# Patient Record
Sex: FEMALE | Race: WHITE | Employment: OTHER | URBAN - METROPOLITAN AREA
[De-identification: names, ages, dates, MRNs, and addresses within clinical notes are randomized per-mention and may not be internally consistent; named-entity substitution may affect disease eponyms.]

---

## 2018-02-21 PROBLEM — G30.9 ALZHEIMER'S DISEASE (HCC): Status: ACTIVE | Noted: 2018-02-21

## 2018-02-21 PROBLEM — F02.80 ALZHEIMER'S DISEASE (HCC): Status: ACTIVE | Noted: 2018-02-21

## 2018-02-21 PROBLEM — E53.8 VITAMIN B 12 DEFICIENCY: Status: ACTIVE | Noted: 2018-02-21

## 2018-03-21 ENCOUNTER — OFFICE VISIT (OUTPATIENT)
Dept: PRIMARY CARE CLINIC | Age: 83
End: 2018-03-21
Payer: MEDICARE

## 2018-03-21 VITALS
SYSTOLIC BLOOD PRESSURE: 134 MMHG | BODY MASS INDEX: 20.25 KG/M2 | HEART RATE: 64 BPM | WEIGHT: 126 LBS | TEMPERATURE: 98.4 F | DIASTOLIC BLOOD PRESSURE: 82 MMHG | HEIGHT: 66 IN

## 2018-03-21 DIAGNOSIS — M19.90 ARTHRITIS: ICD-10-CM

## 2018-03-21 DIAGNOSIS — K21.9 GASTROESOPHAGEAL REFLUX DISEASE WITHOUT ESOPHAGITIS: Primary | ICD-10-CM

## 2018-03-21 DIAGNOSIS — F02.80 ALZHEIMER'S DEMENTIA WITHOUT BEHAVIORAL DISTURBANCE, UNSPECIFIED TIMING OF DEMENTIA ONSET: ICD-10-CM

## 2018-03-21 DIAGNOSIS — G30.9 ALZHEIMER'S DEMENTIA WITHOUT BEHAVIORAL DISTURBANCE, UNSPECIFIED TIMING OF DEMENTIA ONSET: ICD-10-CM

## 2018-03-21 DIAGNOSIS — E53.8 VITAMIN B 12 DEFICIENCY: ICD-10-CM

## 2018-03-21 PROCEDURE — 99213 OFFICE O/P EST LOW 20 MIN: CPT | Performed by: INTERNAL MEDICINE

## 2018-03-21 PROCEDURE — 96372 THER/PROPH/DIAG INJ SC/IM: CPT | Performed by: INTERNAL MEDICINE

## 2018-03-21 RX ORDER — CYANOCOBALAMIN 1000 UG/ML
1000 INJECTION INTRAMUSCULAR; SUBCUTANEOUS ONCE
Status: COMPLETED | OUTPATIENT
Start: 2018-03-21 | End: 2018-03-21

## 2018-03-21 RX ORDER — CITALOPRAM 10 MG/1
10 TABLET ORAL EVERY MORNING
Qty: 90 TABLET | Refills: 0 | Status: SHIPPED | OUTPATIENT
Start: 2018-03-21 | End: 2018-05-30 | Stop reason: SDUPTHER

## 2018-03-21 RX ORDER — PROCHLORPERAZINE MALEATE 5 MG/1
5 TABLET ORAL EVERY 6 HOURS PRN
Qty: 60 TABLET | Refills: 1 | Status: SHIPPED | OUTPATIENT
Start: 2018-03-21 | End: 2018-10-03

## 2018-03-21 RX ORDER — PROCHLORPERAZINE MALEATE 5 MG/1
TABLET ORAL
Refills: 1 | COMMUNITY
Start: 2017-12-20 | End: 2018-03-21 | Stop reason: SDUPTHER

## 2018-03-21 RX ORDER — DONEPEZIL HYDROCHLORIDE 10 MG/1
10 TABLET, FILM COATED ORAL NIGHTLY
Qty: 90 TABLET | Refills: 0 | Status: SHIPPED | OUTPATIENT
Start: 2018-03-21 | End: 2018-05-30 | Stop reason: SDUPTHER

## 2018-03-21 RX ADMIN — CYANOCOBALAMIN 1000 MCG: 1000 INJECTION INTRAMUSCULAR; SUBCUTANEOUS at 15:39

## 2018-03-21 ASSESSMENT — ENCOUNTER SYMPTOMS
SHORTNESS OF BREATH: 0
HEMOPTYSIS: 0
EYE DISCHARGE: 0
ORTHOPNEA: 0
BLURRED VISION: 0
NAUSEA: 0
BLOOD IN STOOL: 0
ABDOMINAL PAIN: 0

## 2018-03-21 NOTE — PROGRESS NOTES
Chief Complaint   Patient presents with    Atrial Fibrillation    Other     b12 deficiency    Osteoporosis       HPI:  Patient is here for follow-up. Feel okay      Allergy and Medications are reviewed and updated. Past Medical History, Surgical History, and Family History has been reviewed and updated. Review of Systems:  Review of Systems   Constitutional: Negative for chills and fever. HENT: Negative for congestion and ear pain. Eyes: Negative for blurred vision and discharge. Respiratory: Negative for hemoptysis and shortness of breath (No new SOB). Cardiovascular: Negative for chest pain, orthopnea and leg swelling. Gastrointestinal: Negative for abdominal pain, blood in stool and nausea. Genitourinary: Negative for flank pain and hematuria. Musculoskeletal: Negative for myalgias and neck pain. Skin: Negative for rash. Neurological: Negative for dizziness, focal weakness and seizures. Endo/Heme/Allergies: Negative for polydipsia. Does not bruise/bleed easily. Psychiatric/Behavioral: Negative for depression, hallucinations and suicidal ideas. Vitals:    03/21/18 1442   BP: 134/82   Pulse: 64   Temp: 98.4 °F (36.9 °C)   TempSrc: Oral   Weight: 126 lb (57.2 kg)   Height: 5' 6\" (1.676 m)       Physical Exam   Constitutional: She is oriented to person, place, and time. She appears well-developed and well-nourished. HENT:   Head: Normocephalic and atraumatic. Eyes: EOM are normal. Pupils are equal, round, and reactive to light. Neck: Normal range of motion. Neck supple. Cardiovascular: Normal rate and regular rhythm. Pulmonary/Chest: Effort normal and breath sounds normal.   Abdominal: Soft. Bowel sounds are normal.   Musculoskeletal: Normal range of motion. She exhibits no edema. Neurological: She is alert and oriented to person, place, and time. Skin: Skin is warm and dry.        Labs :    Lab Results   Component Value Date    WBC 5.8 02/12/2018    HGB 13.9

## 2018-03-21 NOTE — PATIENT INSTRUCTIONS
B12 Injection is given today  Low salt, Low Carb diet an low fat diet  Continue medications as advised and take them regularly  Regular exercises as advised    Discussed natural and expected course of this diagnosis and need to alert me if symptoms do not follow expected course, or if any worse.

## 2018-05-30 ENCOUNTER — OFFICE VISIT (OUTPATIENT)
Dept: PRIMARY CARE CLINIC | Age: 83
End: 2018-05-30
Payer: MEDICARE

## 2018-05-30 VITALS
OXYGEN SATURATION: 96 % | HEIGHT: 66 IN | BODY MASS INDEX: 20.73 KG/M2 | WEIGHT: 129 LBS | TEMPERATURE: 98.6 F | SYSTOLIC BLOOD PRESSURE: 128 MMHG | HEART RATE: 59 BPM | DIASTOLIC BLOOD PRESSURE: 82 MMHG

## 2018-05-30 DIAGNOSIS — F02.80 ALZHEIMER'S DEMENTIA WITHOUT BEHAVIORAL DISTURBANCE, UNSPECIFIED TIMING OF DEMENTIA ONSET: Primary | ICD-10-CM

## 2018-05-30 DIAGNOSIS — G30.9 ALZHEIMER'S DEMENTIA WITHOUT BEHAVIORAL DISTURBANCE, UNSPECIFIED TIMING OF DEMENTIA ONSET: Primary | ICD-10-CM

## 2018-05-30 DIAGNOSIS — Z12.39 BREAST CANCER SCREENING: ICD-10-CM

## 2018-05-30 DIAGNOSIS — M19.90 ARTHRITIS: ICD-10-CM

## 2018-05-30 DIAGNOSIS — K21.9 GASTROESOPHAGEAL REFLUX DISEASE WITHOUT ESOPHAGITIS: ICD-10-CM

## 2018-05-30 DIAGNOSIS — E53.8 VITAMIN B 12 DEFICIENCY: ICD-10-CM

## 2018-05-30 PROCEDURE — 99213 OFFICE O/P EST LOW 20 MIN: CPT | Performed by: INTERNAL MEDICINE

## 2018-05-30 PROCEDURE — 96372 THER/PROPH/DIAG INJ SC/IM: CPT | Performed by: INTERNAL MEDICINE

## 2018-05-30 RX ORDER — MEMANTINE HYDROCHLORIDE 10 MG/1
10 TABLET ORAL 2 TIMES DAILY
Qty: 180 TABLET | Refills: 1 | Status: SHIPPED | OUTPATIENT
Start: 2018-05-30 | End: 2018-10-03 | Stop reason: SDUPTHER

## 2018-05-30 RX ORDER — CITALOPRAM 10 MG/1
10 TABLET ORAL EVERY MORNING
Qty: 90 TABLET | Refills: 1 | Status: SHIPPED | OUTPATIENT
Start: 2018-05-30 | End: 2018-10-03 | Stop reason: SDUPTHER

## 2018-05-30 RX ORDER — DONEPEZIL HYDROCHLORIDE 10 MG/1
10 TABLET, FILM COATED ORAL NIGHTLY
Qty: 90 TABLET | Refills: 1 | Status: SHIPPED | OUTPATIENT
Start: 2018-05-30 | End: 2018-10-03 | Stop reason: SDUPTHER

## 2018-05-30 RX ORDER — CALCITONIN SALMON 200 [IU]/.09ML
1 SPRAY, METERED NASAL DAILY
Qty: 3 BOTTLE | Refills: 1 | Status: CANCELLED | OUTPATIENT
Start: 2018-05-30

## 2018-05-30 RX ORDER — CYANOCOBALAMIN 1000 UG/ML
1000 INJECTION INTRAMUSCULAR; SUBCUTANEOUS ONCE
Status: COMPLETED | OUTPATIENT
Start: 2018-05-30 | End: 2018-05-30

## 2018-05-30 RX ADMIN — CYANOCOBALAMIN 1000 MCG: 1000 INJECTION INTRAMUSCULAR; SUBCUTANEOUS at 15:18

## 2018-05-30 ASSESSMENT — ENCOUNTER SYMPTOMS
BLOOD IN STOOL: 0
NAUSEA: 0
ABDOMINAL PAIN: 0
HEMOPTYSIS: 0
EYE DISCHARGE: 0
SHORTNESS OF BREATH: 0
BLURRED VISION: 0
ORTHOPNEA: 0

## 2018-06-05 ENCOUNTER — HOSPITAL ENCOUNTER (OUTPATIENT)
Dept: MAMMOGRAPHY | Age: 83
Discharge: HOME | End: 2018-06-07
Payer: MEDICARE

## 2018-06-05 DIAGNOSIS — Z12.39 BREAST CANCER SCREENING: ICD-10-CM

## 2018-06-05 PROCEDURE — 77063 BREAST TOMOSYNTHESIS BI: CPT

## 2018-06-27 ENCOUNTER — NURSE ONLY (OUTPATIENT)
Dept: PRIMARY CARE CLINIC | Age: 83
End: 2018-06-27
Payer: MEDICARE

## 2018-06-27 DIAGNOSIS — E53.8 VITAMIN B 12 DEFICIENCY: Primary | ICD-10-CM

## 2018-06-27 PROCEDURE — 96372 THER/PROPH/DIAG INJ SC/IM: CPT | Performed by: INTERNAL MEDICINE

## 2018-06-27 RX ORDER — CYANOCOBALAMIN 1000 UG/ML
1000 INJECTION INTRAMUSCULAR; SUBCUTANEOUS ONCE
Status: COMPLETED | OUTPATIENT
Start: 2018-06-27 | End: 2018-06-27

## 2018-06-27 RX ADMIN — CYANOCOBALAMIN 1000 MCG: 1000 INJECTION INTRAMUSCULAR; SUBCUTANEOUS at 11:17

## 2018-08-01 ENCOUNTER — HOSPITAL ENCOUNTER (OUTPATIENT)
Age: 83
Discharge: HOME | End: 2018-08-03
Payer: MEDICARE

## 2018-08-01 ENCOUNTER — OFFICE VISIT (OUTPATIENT)
Dept: PRIMARY CARE CLINIC | Age: 83
End: 2018-08-01
Payer: MEDICARE

## 2018-08-01 VITALS
WEIGHT: 130 LBS | DIASTOLIC BLOOD PRESSURE: 78 MMHG | HEIGHT: 66 IN | SYSTOLIC BLOOD PRESSURE: 136 MMHG | OXYGEN SATURATION: 97 % | HEART RATE: 56 BPM | BODY MASS INDEX: 20.89 KG/M2

## 2018-08-01 DIAGNOSIS — G30.9 ALZHEIMER'S DEMENTIA WITHOUT BEHAVIORAL DISTURBANCE, UNSPECIFIED TIMING OF DEMENTIA ONSET: ICD-10-CM

## 2018-08-01 DIAGNOSIS — K21.9 GASTROESOPHAGEAL REFLUX DISEASE WITHOUT ESOPHAGITIS: ICD-10-CM

## 2018-08-01 DIAGNOSIS — E53.8 VITAMIN B 12 DEFICIENCY: Primary | ICD-10-CM

## 2018-08-01 DIAGNOSIS — F02.80 ALZHEIMER'S DEMENTIA WITHOUT BEHAVIORAL DISTURBANCE, UNSPECIFIED TIMING OF DEMENTIA ONSET: ICD-10-CM

## 2018-08-01 DIAGNOSIS — M19.90 ARTHRITIS: ICD-10-CM

## 2018-08-01 LAB
ALBUMIN SERPL-MCNC: 3.9 G/DL (ref 3.5–5.2)
ALP BLD-CCNC: 99 U/L (ref 35–104)
ALT SERPL-CCNC: 9 U/L (ref 0–32)
ANION GAP SERPL CALCULATED.3IONS-SCNC: 12 MMOL/L (ref 7–16)
AST SERPL-CCNC: 21 U/L (ref 0–31)
BASOPHILS ABSOLUTE: 0.02 E9/L (ref 0–0.2)
BASOPHILS RELATIVE PERCENT: 0.3 % (ref 0–2)
BILIRUB SERPL-MCNC: 0.3 MG/DL (ref 0–1.2)
BUN BLDV-MCNC: 13 MG/DL (ref 8–23)
CALCIUM SERPL-MCNC: 9.2 MG/DL (ref 8.6–10.2)
CHLORIDE BLD-SCNC: 106 MMOL/L (ref 98–107)
CO2: 27 MMOL/L (ref 22–29)
CREAT SERPL-MCNC: 0.9 MG/DL (ref 0.5–1)
EOSINOPHILS ABSOLUTE: 0.06 E9/L (ref 0.05–0.5)
EOSINOPHILS RELATIVE PERCENT: 1 % (ref 0–6)
GFR AFRICAN AMERICAN: >60
GFR NON-AFRICAN AMERICAN: 59 ML/MIN/1.73
GLUCOSE BLD-MCNC: 90 MG/DL (ref 74–109)
HCT VFR BLD CALC: 39.4 % (ref 34–48)
HEMOGLOBIN: 12.5 G/DL (ref 11.5–15.5)
IMMATURE GRANULOCYTES #: 0.02 E9/L
IMMATURE GRANULOCYTES %: 0.3 % (ref 0–5)
LYMPHOCYTES ABSOLUTE: 2.32 E9/L (ref 1.5–4)
LYMPHOCYTES RELATIVE PERCENT: 39.1 % (ref 20–42)
MCH RBC QN AUTO: 31.6 PG (ref 26–35)
MCHC RBC AUTO-ENTMCNC: 31.7 % (ref 32–34.5)
MCV RBC AUTO: 99.7 FL (ref 80–99.9)
MONOCYTES ABSOLUTE: 0.64 E9/L (ref 0.1–0.95)
MONOCYTES RELATIVE PERCENT: 10.8 % (ref 2–12)
NEUTROPHILS ABSOLUTE: 2.87 E9/L (ref 1.8–7.3)
NEUTROPHILS RELATIVE PERCENT: 48.5 % (ref 43–80)
PDW BLD-RTO: 15 FL (ref 11.5–15)
PLATELET # BLD: 317 E9/L (ref 130–450)
PMV BLD AUTO: 10.2 FL (ref 7–12)
POTASSIUM SERPL-SCNC: 4.1 MMOL/L (ref 3.5–5)
RBC # BLD: 3.95 E12/L (ref 3.5–5.5)
SODIUM BLD-SCNC: 145 MMOL/L (ref 132–146)
TOTAL PROTEIN: 6.9 G/DL (ref 6.4–8.3)
WBC # BLD: 5.9 E9/L (ref 4.5–11.5)

## 2018-08-01 PROCEDURE — G8427 DOCREV CUR MEDS BY ELIG CLIN: HCPCS | Performed by: INTERNAL MEDICINE

## 2018-08-01 PROCEDURE — 80053 COMPREHEN METABOLIC PANEL: CPT

## 2018-08-01 PROCEDURE — 36415 COLL VENOUS BLD VENIPUNCTURE: CPT

## 2018-08-01 PROCEDURE — 99213 OFFICE O/P EST LOW 20 MIN: CPT | Performed by: INTERNAL MEDICINE

## 2018-08-01 PROCEDURE — 85025 COMPLETE CBC W/AUTO DIFF WBC: CPT

## 2018-08-01 PROCEDURE — 1101F PT FALLS ASSESS-DOCD LE1/YR: CPT | Performed by: INTERNAL MEDICINE

## 2018-08-01 PROCEDURE — 1123F ACP DISCUSS/DSCN MKR DOCD: CPT | Performed by: INTERNAL MEDICINE

## 2018-08-01 PROCEDURE — 96372 THER/PROPH/DIAG INJ SC/IM: CPT | Performed by: INTERNAL MEDICINE

## 2018-08-01 PROCEDURE — G8420 CALC BMI NORM PARAMETERS: HCPCS | Performed by: INTERNAL MEDICINE

## 2018-08-01 PROCEDURE — 1090F PRES/ABSN URINE INCON ASSESS: CPT | Performed by: INTERNAL MEDICINE

## 2018-08-01 PROCEDURE — 1036F TOBACCO NON-USER: CPT | Performed by: INTERNAL MEDICINE

## 2018-08-01 PROCEDURE — 4040F PNEUMOC VAC/ADMIN/RCVD: CPT | Performed by: INTERNAL MEDICINE

## 2018-08-01 PROCEDURE — G8399 PT W/DXA RESULTS DOCUMENT: HCPCS | Performed by: INTERNAL MEDICINE

## 2018-08-01 RX ORDER — CYANOCOBALAMIN 1000 UG/ML
1000 INJECTION INTRAMUSCULAR; SUBCUTANEOUS ONCE
Status: COMPLETED | OUTPATIENT
Start: 2018-08-01 | End: 2018-08-01

## 2018-08-01 RX ADMIN — CYANOCOBALAMIN 1000 MCG: 1000 INJECTION INTRAMUSCULAR; SUBCUTANEOUS at 15:00

## 2018-08-01 ASSESSMENT — ENCOUNTER SYMPTOMS
BLURRED VISION: 0
NAUSEA: 0
ABDOMINAL PAIN: 0
SHORTNESS OF BREATH: 0
EYE DISCHARGE: 0
HEMOPTYSIS: 0
ORTHOPNEA: 0
BLOOD IN STOOL: 0

## 2018-08-06 ENCOUNTER — OFFICE VISIT (OUTPATIENT)
Dept: ORTHOPEDIC SURGERY | Age: 83
End: 2018-08-06
Payer: MEDICARE

## 2018-08-06 VITALS — BODY MASS INDEX: 20.83 KG/M2 | HEIGHT: 65 IN | TEMPERATURE: 98 F | WEIGHT: 125 LBS

## 2018-08-06 DIAGNOSIS — M17.0 PRIMARY OSTEOARTHRITIS OF BOTH KNEES: Primary | ICD-10-CM

## 2018-08-06 PROCEDURE — 1101F PT FALLS ASSESS-DOCD LE1/YR: CPT | Performed by: ORTHOPAEDIC SURGERY

## 2018-08-06 PROCEDURE — 99204 OFFICE O/P NEW MOD 45 MIN: CPT | Performed by: ORTHOPAEDIC SURGERY

## 2018-08-06 PROCEDURE — 4040F PNEUMOC VAC/ADMIN/RCVD: CPT | Performed by: ORTHOPAEDIC SURGERY

## 2018-08-06 PROCEDURE — 1123F ACP DISCUSS/DSCN MKR DOCD: CPT | Performed by: ORTHOPAEDIC SURGERY

## 2018-08-06 PROCEDURE — 1090F PRES/ABSN URINE INCON ASSESS: CPT | Performed by: ORTHOPAEDIC SURGERY

## 2018-08-06 PROCEDURE — G8399 PT W/DXA RESULTS DOCUMENT: HCPCS | Performed by: ORTHOPAEDIC SURGERY

## 2018-08-06 PROCEDURE — 20610 DRAIN/INJ JOINT/BURSA W/O US: CPT | Performed by: ORTHOPAEDIC SURGERY

## 2018-08-06 PROCEDURE — 1036F TOBACCO NON-USER: CPT | Performed by: ORTHOPAEDIC SURGERY

## 2018-08-06 PROCEDURE — G8427 DOCREV CUR MEDS BY ELIG CLIN: HCPCS | Performed by: ORTHOPAEDIC SURGERY

## 2018-08-06 PROCEDURE — G8420 CALC BMI NORM PARAMETERS: HCPCS | Performed by: ORTHOPAEDIC SURGERY

## 2018-08-06 RX ORDER — TRIAMCINOLONE ACETONIDE 40 MG/ML
40 INJECTION, SUSPENSION INTRA-ARTICULAR; INTRAMUSCULAR ONCE
Status: COMPLETED | OUTPATIENT
Start: 2018-08-06 | End: 2018-08-06

## 2018-08-06 RX ADMIN — TRIAMCINOLONE ACETONIDE 40 MG: 40 INJECTION, SUSPENSION INTRA-ARTICULAR; INTRAMUSCULAR at 15:16

## 2018-08-06 RX ADMIN — TRIAMCINOLONE ACETONIDE 40 MG: 40 INJECTION, SUSPENSION INTRA-ARTICULAR; INTRAMUSCULAR at 15:15

## 2018-08-06 NOTE — PROGRESS NOTES
Disp: , Rfl:     therapeutic multivitamin-minerals (THERAGRAN-M) tablet, Take 1 tablet by mouth daily. , Disp: , Rfl:     Omega-3 Fatty Acids (FISH OIL) 1000 MG CAPS, Take 1,000 mg by mouth 2 times daily. , Disp: , Rfl:     Cinnamon 500 MG TABS, Take 1 tablet by mouth 2 times daily. , Disp: , Rfl:     Glucosamine 500 MG TABS, Take 3 tablets by mouth every morning , Disp: , Rfl:     potassium chloride 10 MEQ/100ML, Infuse 10 mEq intravenously 2 times daily. , Disp: , Rfl:     Flaxseed, Linseed, (FLAX SEED OIL) 1000 MG CAPS, Take 1,000 mg by mouth 2 times daily. , Disp: , Rfl:     calcium carbonate (OSCAL) 500 MG TABS tablet, Take 500 mg by mouth 2 times daily. , Disp: , Rfl:     Garlic 10 MG CAPS, Take 1 tablet by mouth 2 times daily. , Disp: , Rfl:   Allergies   Allergen Reactions    Iodine      Social History     Social History    Marital status:      Spouse name: N/A    Number of children: 3    Years of education: N/A     Occupational History    Not on file. Social History Main Topics    Smoking status: Never Smoker    Smokeless tobacco: Never Used    Alcohol use No    Drug use: No    Sexual activity: Not on file     Other Topics Concern    Not on file     Social History Narrative    No narrative on file     Family History   Problem Relation Age of Onset    Heart Disease Mother     Heart Disease Father          REVIEW OF SYSTEMS:     General/Constitution:  (-)weight loss, (-)fever, (-)chills, (-)weakness. Skin: (-) rash,(-) psoriasis,(-) eczema, (-)skin cancer. Musculoskeletal: (-) fractures,  (-) dislocations,(-) collagen vascular disease, (-) fibromyalgia, (-) multiple sclerosis, (-) muscular dystrophy, (-) RSD,(-) joint pain (-)swelling, (-) joint pain,swelling. Neurologic: (-) epilepsy, (-)seizures,(-) brain tumor,(-) TIA, (-)stroke, (-)headaches, (-)Parkinson disease,(-) memory loss, (-) LOC.   Cardiovascular: (-) Chest pain, (-) swelling in legs/feet, (-) SOB, (-) cramping in legs/feet with walking. Respiratory: (-) SOB, (-) Coughing, (-) night sweats. GI: (-) nausea, (-) vomiting, (-) diarrhea, (-) blood in stool, (-) gastric ulcer. Psychiatric: (-) Depression, (-) Anxiety, (-) bipolar disease, (-) Alzheimer's Disease  Allergic/Immunologic: (-) allergies latex, (-) allergies metal, (-) skin sensitivity. Hematlogic: (-) anemia, (-) blood transfusion, (-) DVT/PE, (-) Clotting disorders      Subjective:    Constitution:  Ht. 5 ft 6 in. , Wt. 125 lbs. Psycihatric:  The patient is alert and oriented x 3, appears to be stated age and in no distress. Respiratory:  Respiratory effort is not labored. Patient is not gasping. Palpation of the chest reveals no tactile fremitus. Skin:  Upon inspection: the skin appears warm, dry and intact. There is  a previous scar over the affected area. There is any cellulitis, lymphedema or cutaneous lesions noted in the lower extremities. Upon palpation there is no induration noted. Neurologic:  Gait: antalgic; Motor exam of the lower extremities show ; quadriceps, hamstrings, foot dorsi and plantar flexors intact R.  5/5 and L. 5/5. Deep tendon reflexes are 2/4 at the knees and 2/4 at the ankles with strong extensor hallicus longus motor strength bilaterally. Sensory to both feet is intact to all sensory roots. Cardiovascular: The vascular exam is normal and is well perfused to distal extremities. Distal pulses DP/PT: R. 2+; L. 2+. There is cap refill noted less than two seconds in all digits. There is not edema of the bilateral lower extremities. There is not varicosities noted in the distal extremities. Lymph:  Upon palpation,  there is no lymphadenopathy noted in bilateral lower extremities. Musculoskeletal:  Gait: antalgic; examination of the nails and digits reveal no cyanosis or clubbing. Lumbar exam:  On visual inspection, there is not deformity of the spine.    full range of motion, no tenderness, palpable spasm or pain on motion. Special tests: Straight Leg Raise negative, Tahira test negative. Hip exam:   Upon inspection, there is not deformity noted. Upon palpation there is not tenderness. ROM: is  full and symmetrical.   Strength: Hip Flexors 5/5; Hip Abductors 5/5; Hip Adduction 5/5. Knee exam:  Bilateral knee exam shows;  range of motion of R. Knee is 0 to 125, and L. Knee is 5 to 115. The patient does have  pain on motion, effusion is mild, there is tenderness over the  lateral, suprapatellar region, there are not any masses, there is not ligamentous instability, there is valgus  deformity noted. Knee exam: bilateral positive for moderate crepitations, some mild tenderness laxity is noted with  stress. There is not a popliteal cyst.    R. Knee:  Lachman's negative, Anterior Drawer negative, Posterior Drawer negative  Ana Maria's positive, Thallasy  positive,   PF grind test positive, Apprehension test negative, Patellar J sign  negative  L. Knee:  Lachman's negative, Anterior Drawer negative, Posterior Drawer negative  Ana Maria's positive, Thallasy  positive,   PF grind test positive, Apprehension test negative,  Patellar J sign  negative    Xray Exam:  B/l knee djd lateral compartment moderate - severe  Radiographic findings reviewed with patient    Assessment:  Encounter Diagnoses   Name Primary?  Primary osteoarthritis of both knees Yes       Plan:  Natural history and expected course discussed. Questions answered. Educational materials distributed. Rest, ice, compression, and elevation (RICE) therapy. Reduction in offending activity. Patellar compression sleeve. I will proceed with a cortisone injection in the Bilateral knees. Verbal and written consent was obtained for the injections. Skin was prepped with alcohol and betadine. 1 ml of Kenalog 40mg and 9 ml of 0.25% Marcaine was  injected to Bilateral knees. The injections were given through the lateral side of the knees.   The patient tolerated the injections well.  I will see the patient back prn

## 2018-09-24 ENCOUNTER — OFFICE VISIT (OUTPATIENT)
Dept: PRIMARY CARE CLINIC | Age: 83
End: 2018-09-24
Payer: MEDICARE

## 2018-09-24 VITALS
HEIGHT: 65 IN | WEIGHT: 125 LBS | HEART RATE: 72 BPM | TEMPERATURE: 97.6 F | DIASTOLIC BLOOD PRESSURE: 86 MMHG | OXYGEN SATURATION: 98 % | BODY MASS INDEX: 20.83 KG/M2 | SYSTOLIC BLOOD PRESSURE: 134 MMHG

## 2018-09-24 DIAGNOSIS — R93.7 ABNORMAL X-RAY OF PELVIS: Primary | ICD-10-CM

## 2018-09-24 DIAGNOSIS — R10.32 LEFT GROIN PAIN: Primary | ICD-10-CM

## 2018-09-24 PROCEDURE — 4040F PNEUMOC VAC/ADMIN/RCVD: CPT | Performed by: INTERNAL MEDICINE

## 2018-09-24 PROCEDURE — 1090F PRES/ABSN URINE INCON ASSESS: CPT | Performed by: INTERNAL MEDICINE

## 2018-09-24 PROCEDURE — 1101F PT FALLS ASSESS-DOCD LE1/YR: CPT | Performed by: INTERNAL MEDICINE

## 2018-09-24 PROCEDURE — 99213 OFFICE O/P EST LOW 20 MIN: CPT | Performed by: INTERNAL MEDICINE

## 2018-09-24 PROCEDURE — 1036F TOBACCO NON-USER: CPT | Performed by: INTERNAL MEDICINE

## 2018-09-24 PROCEDURE — G8420 CALC BMI NORM PARAMETERS: HCPCS | Performed by: INTERNAL MEDICINE

## 2018-09-24 PROCEDURE — 1123F ACP DISCUSS/DSCN MKR DOCD: CPT | Performed by: INTERNAL MEDICINE

## 2018-09-24 PROCEDURE — G8427 DOCREV CUR MEDS BY ELIG CLIN: HCPCS | Performed by: INTERNAL MEDICINE

## 2018-09-24 RX ORDER — MELOXICAM 15 MG/1
15 TABLET ORAL DAILY
Qty: 30 TABLET | Refills: 0 | Status: SHIPPED | OUTPATIENT
Start: 2018-09-24 | End: 2018-10-03

## 2018-10-01 ENCOUNTER — OFFICE VISIT (OUTPATIENT)
Dept: ORTHOPEDIC SURGERY | Age: 83
End: 2018-10-01
Payer: MEDICARE

## 2018-10-01 VITALS — WEIGHT: 122 LBS | TEMPERATURE: 98 F | BODY MASS INDEX: 19.61 KG/M2 | HEIGHT: 66 IN

## 2018-10-01 DIAGNOSIS — S32.512A CLOSED FRACTURE OF LEFT SUPERIOR PUBIC RAMUS, INITIAL ENCOUNTER: Primary | ICD-10-CM

## 2018-10-01 PROCEDURE — 1036F TOBACCO NON-USER: CPT | Performed by: ORTHOPAEDIC SURGERY

## 2018-10-01 PROCEDURE — G8427 DOCREV CUR MEDS BY ELIG CLIN: HCPCS | Performed by: ORTHOPAEDIC SURGERY

## 2018-10-01 PROCEDURE — 4040F PNEUMOC VAC/ADMIN/RCVD: CPT | Performed by: ORTHOPAEDIC SURGERY

## 2018-10-01 PROCEDURE — 1101F PT FALLS ASSESS-DOCD LE1/YR: CPT | Performed by: ORTHOPAEDIC SURGERY

## 2018-10-01 PROCEDURE — G8420 CALC BMI NORM PARAMETERS: HCPCS | Performed by: ORTHOPAEDIC SURGERY

## 2018-10-01 PROCEDURE — G8484 FLU IMMUNIZE NO ADMIN: HCPCS | Performed by: ORTHOPAEDIC SURGERY

## 2018-10-01 PROCEDURE — 1123F ACP DISCUSS/DSCN MKR DOCD: CPT | Performed by: ORTHOPAEDIC SURGERY

## 2018-10-01 PROCEDURE — 1090F PRES/ABSN URINE INCON ASSESS: CPT | Performed by: ORTHOPAEDIC SURGERY

## 2018-10-01 PROCEDURE — 27197 CLSD TX PELVIC RING FX: CPT | Performed by: ORTHOPAEDIC SURGERY

## 2018-10-01 PROCEDURE — 99214 OFFICE O/P EST MOD 30 MIN: CPT | Performed by: ORTHOPAEDIC SURGERY

## 2018-10-03 ENCOUNTER — OFFICE VISIT (OUTPATIENT)
Dept: PRIMARY CARE CLINIC | Age: 83
End: 2018-10-03
Payer: MEDICARE

## 2018-10-03 VITALS
HEIGHT: 65 IN | WEIGHT: 128 LBS | DIASTOLIC BLOOD PRESSURE: 78 MMHG | HEART RATE: 68 BPM | SYSTOLIC BLOOD PRESSURE: 120 MMHG | OXYGEN SATURATION: 94 % | TEMPERATURE: 98.4 F | BODY MASS INDEX: 21.33 KG/M2

## 2018-10-03 DIAGNOSIS — G30.9 ALZHEIMER'S DEMENTIA WITHOUT BEHAVIORAL DISTURBANCE, UNSPECIFIED TIMING OF DEMENTIA ONSET: ICD-10-CM

## 2018-10-03 DIAGNOSIS — Z23 NEEDS FLU SHOT: ICD-10-CM

## 2018-10-03 DIAGNOSIS — M19.90 ARTHRITIS: ICD-10-CM

## 2018-10-03 DIAGNOSIS — F02.80 ALZHEIMER'S DEMENTIA WITHOUT BEHAVIORAL DISTURBANCE, UNSPECIFIED TIMING OF DEMENTIA ONSET: ICD-10-CM

## 2018-10-03 DIAGNOSIS — E53.8 VITAMIN B 12 DEFICIENCY: Primary | ICD-10-CM

## 2018-10-03 DIAGNOSIS — S32.512A CLOSED FRACTURE OF LEFT SUPERIOR PUBIC RAMUS, INITIAL ENCOUNTER: ICD-10-CM

## 2018-10-03 PROCEDURE — G8420 CALC BMI NORM PARAMETERS: HCPCS | Performed by: INTERNAL MEDICINE

## 2018-10-03 PROCEDURE — G8427 DOCREV CUR MEDS BY ELIG CLIN: HCPCS | Performed by: INTERNAL MEDICINE

## 2018-10-03 PROCEDURE — 1123F ACP DISCUSS/DSCN MKR DOCD: CPT | Performed by: INTERNAL MEDICINE

## 2018-10-03 PROCEDURE — 1036F TOBACCO NON-USER: CPT | Performed by: INTERNAL MEDICINE

## 2018-10-03 PROCEDURE — 96372 THER/PROPH/DIAG INJ SC/IM: CPT | Performed by: INTERNAL MEDICINE

## 2018-10-03 PROCEDURE — 1090F PRES/ABSN URINE INCON ASSESS: CPT | Performed by: INTERNAL MEDICINE

## 2018-10-03 PROCEDURE — 90662 IIV NO PRSV INCREASED AG IM: CPT | Performed by: INTERNAL MEDICINE

## 2018-10-03 PROCEDURE — 99213 OFFICE O/P EST LOW 20 MIN: CPT | Performed by: INTERNAL MEDICINE

## 2018-10-03 PROCEDURE — 1101F PT FALLS ASSESS-DOCD LE1/YR: CPT | Performed by: INTERNAL MEDICINE

## 2018-10-03 PROCEDURE — 4040F PNEUMOC VAC/ADMIN/RCVD: CPT | Performed by: INTERNAL MEDICINE

## 2018-10-03 PROCEDURE — G0008 ADMIN INFLUENZA VIRUS VAC: HCPCS | Performed by: INTERNAL MEDICINE

## 2018-10-03 PROCEDURE — G8482 FLU IMMUNIZE ORDER/ADMIN: HCPCS | Performed by: INTERNAL MEDICINE

## 2018-10-03 RX ORDER — CITALOPRAM 10 MG/1
10 TABLET ORAL EVERY MORNING
Qty: 90 TABLET | Refills: 1 | Status: SHIPPED | OUTPATIENT
Start: 2018-10-03 | End: 2019-04-18 | Stop reason: SDUPTHER

## 2018-10-03 RX ORDER — MEMANTINE HYDROCHLORIDE 10 MG/1
10 TABLET ORAL 2 TIMES DAILY
Qty: 180 TABLET | Refills: 1 | Status: SHIPPED | OUTPATIENT
Start: 2018-10-03 | End: 2019-04-18 | Stop reason: SDUPTHER

## 2018-10-03 RX ORDER — CYANOCOBALAMIN 1000 UG/ML
1000 INJECTION INTRAMUSCULAR; SUBCUTANEOUS ONCE
Status: COMPLETED | OUTPATIENT
Start: 2018-10-03 | End: 2018-10-03

## 2018-10-03 RX ORDER — DONEPEZIL HYDROCHLORIDE 10 MG/1
10 TABLET, FILM COATED ORAL NIGHTLY
Qty: 90 TABLET | Refills: 1 | Status: SHIPPED | OUTPATIENT
Start: 2018-10-03 | End: 2019-04-18 | Stop reason: SDUPTHER

## 2018-10-03 RX ADMIN — CYANOCOBALAMIN 1000 MCG: 1000 INJECTION INTRAMUSCULAR; SUBCUTANEOUS at 15:59

## 2018-10-03 ASSESSMENT — ENCOUNTER SYMPTOMS
EYE DISCHARGE: 0
ABDOMINAL PAIN: 0
BLURRED VISION: 0
NAUSEA: 0
BLOOD IN STOOL: 0
ORTHOPNEA: 0
SHORTNESS OF BREATH: 0
HEMOPTYSIS: 0

## 2018-10-18 ENCOUNTER — OFFICE VISIT (OUTPATIENT)
Dept: PRIMARY CARE CLINIC | Age: 83
End: 2018-10-18
Payer: MEDICARE

## 2018-10-18 VITALS
HEART RATE: 44 BPM | DIASTOLIC BLOOD PRESSURE: 84 MMHG | OXYGEN SATURATION: 95 % | SYSTOLIC BLOOD PRESSURE: 128 MMHG | HEIGHT: 66 IN | BODY MASS INDEX: 19.93 KG/M2 | WEIGHT: 124 LBS

## 2018-10-18 DIAGNOSIS — F02.80 ALZHEIMER'S DEMENTIA WITHOUT BEHAVIORAL DISTURBANCE, UNSPECIFIED TIMING OF DEMENTIA ONSET: ICD-10-CM

## 2018-10-18 DIAGNOSIS — M25.551 ACUTE HIP PAIN, RIGHT: Primary | ICD-10-CM

## 2018-10-18 DIAGNOSIS — G30.9 ALZHEIMER'S DEMENTIA WITHOUT BEHAVIORAL DISTURBANCE, UNSPECIFIED TIMING OF DEMENTIA ONSET: ICD-10-CM

## 2018-10-18 DIAGNOSIS — S32.512A CLOSED FRACTURE OF LEFT SUPERIOR PUBIC RAMUS, INITIAL ENCOUNTER: ICD-10-CM

## 2018-10-18 DIAGNOSIS — M19.90 ARTHRITIS: ICD-10-CM

## 2018-10-18 DIAGNOSIS — K21.9 GASTROESOPHAGEAL REFLUX DISEASE WITHOUT ESOPHAGITIS: ICD-10-CM

## 2018-10-18 PROCEDURE — G8427 DOCREV CUR MEDS BY ELIG CLIN: HCPCS | Performed by: INTERNAL MEDICINE

## 2018-10-18 PROCEDURE — 1090F PRES/ABSN URINE INCON ASSESS: CPT | Performed by: INTERNAL MEDICINE

## 2018-10-18 PROCEDURE — 1123F ACP DISCUSS/DSCN MKR DOCD: CPT | Performed by: INTERNAL MEDICINE

## 2018-10-18 PROCEDURE — 99213 OFFICE O/P EST LOW 20 MIN: CPT | Performed by: INTERNAL MEDICINE

## 2018-10-18 PROCEDURE — 1101F PT FALLS ASSESS-DOCD LE1/YR: CPT | Performed by: INTERNAL MEDICINE

## 2018-10-18 PROCEDURE — 1036F TOBACCO NON-USER: CPT | Performed by: INTERNAL MEDICINE

## 2018-10-18 PROCEDURE — 4040F PNEUMOC VAC/ADMIN/RCVD: CPT | Performed by: INTERNAL MEDICINE

## 2018-10-18 PROCEDURE — G8420 CALC BMI NORM PARAMETERS: HCPCS | Performed by: INTERNAL MEDICINE

## 2018-10-18 PROCEDURE — G8482 FLU IMMUNIZE ORDER/ADMIN: HCPCS | Performed by: INTERNAL MEDICINE

## 2018-10-18 RX ORDER — MELOXICAM 15 MG/1
15 TABLET ORAL DAILY
Qty: 30 TABLET | Refills: 1 | Status: ON HOLD | OUTPATIENT
Start: 2018-10-18 | End: 2019-11-26 | Stop reason: HOSPADM

## 2018-10-18 ASSESSMENT — ENCOUNTER SYMPTOMS
VOMITING: 0
BACK PAIN: 1
COUGH: 0
SHORTNESS OF BREATH: 0
EYE REDNESS: 0
WHEEZING: 0
EYE DISCHARGE: 0
NAUSEA: 0
DIARRHEA: 0
SINUS PRESSURE: 0
EYE PAIN: 0
SORE THROAT: 0
ABDOMINAL DISTENTION: 0

## 2018-10-18 NOTE — PROGRESS NOTES
place, and time. Skin: Skin is warm and dry. Psychiatric: Her behavior is normal.   Vitals reviewed. Labs :    Lab Results   Component Value Date    WBC 5.9 08/01/2018    HGB 12.5 08/01/2018    HCT 39.4 08/01/2018     08/01/2018    CHOL 177 07/18/2016    TRIG 80 07/18/2016    HDL 80 02/12/2018    ALT 9 08/01/2018    AST 21 08/01/2018     08/01/2018    K 4.1 08/01/2018     08/01/2018    CREATININE 0.9 08/01/2018    BUN 13 08/01/2018    CO2 27 08/01/2018    TSH 2.410 02/12/2018    GLUF 95 02/12/2018     Lab Results   Component Value Date    COLORU Yellow 07/18/2016    NITRU Negative 07/18/2016    GLUCOSEU Negative 07/18/2016    KETUA Negative 07/18/2016    UROBILINOGEN 0.2 07/18/2016    BILIRUBINUR Negative 07/18/2016         ASSESSMENT     Patient Active Problem List    Diagnosis Date Noted    Arthritis 03/21/2018    Alzheimer's disease 02/21/2018    Vitamin B 12 deficiency 02/21/2018    Gastroesophageal reflux disease without esophagitis 10/21/2013        Diagnosis:     ICD-10-CM    1. Acute hip pain, right M25.551 XR HIP RIGHT (2-3 VIEWS)   2. Closed [facility] of left superior pubic ramus, [facility] S32.512A    3. Alzheimer's dementia without behavioral disturbance, unspecified timing of dementia onset G30.9     F02.80    4. Gastroesophageal reflux disease without esophagitis K21.9    5. Arthritis M19.90        PLAN:       XR Rt Hip    Mobic 15 mg qhs prn # 30 x 1    Tylenol PRN    Pt is stable on current medical treatment. Continue current treatment plan    Side effects/Adverse effects/Precautions are reviewed with patient. Low salt, Low Carb diet an low fat diet  Continue medications as advised and take them regularly  Regular exercises as advised    Discussed natural and expected course of this diagnosis and need to alert me if symptoms do not follow expected course, or if any worse. Smoking cessation if applicable, discussed with patient.        There are no

## 2018-10-27 DIAGNOSIS — M25.552 LEFT HIP PAIN: Primary | ICD-10-CM

## 2018-10-29 ENCOUNTER — OFFICE VISIT (OUTPATIENT)
Dept: ORTHOPEDIC SURGERY | Age: 83
End: 2018-10-29
Payer: MEDICARE

## 2018-10-29 VITALS — BODY MASS INDEX: 19.44 KG/M2 | WEIGHT: 121 LBS | HEIGHT: 66 IN | TEMPERATURE: 98 F

## 2018-10-29 DIAGNOSIS — M17.0 BILATERAL PRIMARY OSTEOARTHRITIS OF KNEE: ICD-10-CM

## 2018-10-29 DIAGNOSIS — M48.062 SPINAL STENOSIS OF LUMBAR REGION WITH NEUROGENIC CLAUDICATION: Primary | ICD-10-CM

## 2018-10-29 DIAGNOSIS — S32.512A CLOSED FRACTURE OF LEFT SUPERIOR PUBIC RAMUS, INITIAL ENCOUNTER: ICD-10-CM

## 2018-10-29 PROCEDURE — 1101F PT FALLS ASSESS-DOCD LE1/YR: CPT | Performed by: ORTHOPAEDIC SURGERY

## 2018-10-29 PROCEDURE — G8427 DOCREV CUR MEDS BY ELIG CLIN: HCPCS | Performed by: ORTHOPAEDIC SURGERY

## 2018-10-29 PROCEDURE — 1123F ACP DISCUSS/DSCN MKR DOCD: CPT | Performed by: ORTHOPAEDIC SURGERY

## 2018-10-29 PROCEDURE — G8482 FLU IMMUNIZE ORDER/ADMIN: HCPCS | Performed by: ORTHOPAEDIC SURGERY

## 2018-10-29 PROCEDURE — 1036F TOBACCO NON-USER: CPT | Performed by: ORTHOPAEDIC SURGERY

## 2018-10-29 PROCEDURE — G8420 CALC BMI NORM PARAMETERS: HCPCS | Performed by: ORTHOPAEDIC SURGERY

## 2018-10-29 PROCEDURE — 1090F PRES/ABSN URINE INCON ASSESS: CPT | Performed by: ORTHOPAEDIC SURGERY

## 2018-10-29 PROCEDURE — 99213 OFFICE O/P EST LOW 20 MIN: CPT | Performed by: ORTHOPAEDIC SURGERY

## 2018-10-29 PROCEDURE — 20610 DRAIN/INJ JOINT/BURSA W/O US: CPT | Performed by: ORTHOPAEDIC SURGERY

## 2018-10-29 PROCEDURE — 4040F PNEUMOC VAC/ADMIN/RCVD: CPT | Performed by: ORTHOPAEDIC SURGERY

## 2018-10-29 RX ORDER — TRIAMCINOLONE ACETONIDE 40 MG/ML
40 INJECTION, SUSPENSION INTRA-ARTICULAR; INTRAMUSCULAR ONCE
Status: COMPLETED | OUTPATIENT
Start: 2018-10-29 | End: 2018-10-29

## 2018-10-29 RX ADMIN — TRIAMCINOLONE ACETONIDE 40 MG: 40 INJECTION, SUSPENSION INTRA-ARTICULAR; INTRAMUSCULAR at 13:41

## 2018-10-29 NOTE — PROGRESS NOTES
Distal pulses are 2+ and symmetric bilaterally. Sensation is grossly intact to light touch and symmetric bilaterally. 15/25 hip motion bilaterally    Knee exam:  Bilateral knee exam shows;  range of motion of R. Knee is 0 to 125, and L. Knee is 5 to 115. The patient does have  pain on motion, effusion is mild, there is tenderness over the  lateral, suprapatellar region, there are not any masses, there is not ligamentous instability, there is valgus  deformity noted. Knee exam: bilateral positive for moderate crepitations, some mild tenderness laxity is noted with  stress. There is not a popliteal cyst.     R. Knee:  Lachman's negative, Anterior Drawer negative, Posterior Drawer negative  Ana Maria's positive, Thallasy  positive,   PF grind test positive, Apprehension test negative, Patellar J sign  negative  L. Knee:  Lachman's negative, Anterior Drawer negative, Posterior Drawer negative  Ana Maria's positive, Thallasy  positive,   PF grind test positive, Apprehension test negative,  Patellar J sign  negative     Xray Exam:  B/l knee djd lateral compartment moderate - severe  Xrays: There is diffuse demineralization of the osseous structures. There is   suggestion of cortical step-off at the superior left pubic ramus,   suspicious for nondisplaced fracture. Bilateral hip joint spaces are   preserved. There is no evidence of avascular necrosis. Bilateral   sacroiliac joints are maintained. Impression:     Diagnosis Orders   1. Spinal stenosis of lumbar region with neurogenic claudication  Ambulatory referral to [address]   2. Bilateral primary osteoarthritis of knee  [place] ARTHROCENTESIS ASPIR&/INJ MAJOR JT/BURSA W/O US    [place] ARTHROCENTESIS ASPIR&/INJ MAJOR JT/BURSA W/O US   3. Closed fracture of left superior pubic ramus, initial encounter ([facility]         Plan:   wbat lle   I will proceed with a cortisone injection in the Bilateral knee.   Verbal and written consent was obtained for the injections. The skin was prepped with alcohol. 1mL of Kenalog 40mg and 9mL of 0.25% Marcaine was  injected to Bilateral knee. The injection was given through the lateral side of the knee. The patient tolerated the injection well.  I will see the patient back prn  PMR referral for lumbar stenosis  FU 2 months

## 2018-11-06 ENCOUNTER — OFFICE VISIT (OUTPATIENT)
Dept: ORTHOPEDIC SURGERY | Age: 83
End: 2018-11-06
Payer: MEDICARE

## 2018-11-06 DIAGNOSIS — M17.0 BILATERAL PRIMARY OSTEOARTHRITIS OF KNEE: Primary | ICD-10-CM

## 2018-11-06 PROCEDURE — 20610 DRAIN/INJ JOINT/BURSA W/O US: CPT | Performed by: ORTHOPAEDIC SURGERY

## 2018-11-06 RX ORDER — HYALURONATE SODIUM 10 MG/ML
20 SYRINGE (ML) INTRAARTICULAR ONCE
Status: COMPLETED | OUTPATIENT
Start: 2018-11-06 | End: 2018-11-06

## 2018-11-06 RX ADMIN — Medication 20 MG: at 13:34

## 2018-11-06 RX ADMIN — Medication 20 MG: at 14:40

## 2018-11-06 RX ADMIN — Medication 20 MG: at 14:39

## 2018-11-13 ENCOUNTER — OFFICE VISIT (OUTPATIENT)
Dept: ORTHOPEDIC SURGERY | Age: 83
End: 2018-11-13
Payer: MEDICARE

## 2018-11-13 DIAGNOSIS — M17.0 BILATERAL PRIMARY OSTEOARTHRITIS OF KNEE: Primary | ICD-10-CM

## 2018-11-13 PROCEDURE — 20610 DRAIN/INJ JOINT/BURSA W/O US: CPT | Performed by: ORTHOPAEDIC SURGERY

## 2018-11-13 RX ORDER — HYALURONATE SODIUM 10 MG/ML
20 SYRINGE (ML) INTRAARTICULAR ONCE
Status: COMPLETED | OUTPATIENT
Start: 2018-11-13 | End: 2018-11-13

## 2018-11-13 RX ADMIN — Medication 20 MG: at 13:32

## 2018-11-20 ENCOUNTER — OFFICE VISIT (OUTPATIENT)
Dept: ORTHOPEDIC SURGERY | Age: 83
End: 2018-11-20
Payer: MEDICARE

## 2018-11-20 DIAGNOSIS — M17.0 BILATERAL PRIMARY OSTEOARTHRITIS OF KNEE: Primary | ICD-10-CM

## 2018-11-20 PROCEDURE — 20610 DRAIN/INJ JOINT/BURSA W/O US: CPT | Performed by: ORTHOPAEDIC SURGERY

## 2018-11-21 RX ORDER — HYALURONATE SODIUM 10 MG/ML
20 SYRINGE (ML) INTRAARTICULAR ONCE
Status: COMPLETED | OUTPATIENT
Start: 2018-11-21 | End: 2018-11-21

## 2018-11-21 RX ADMIN — Medication 20 MG: at 08:20

## 2018-11-21 RX ADMIN — Medication 20 MG: at 08:21

## 2018-11-26 ENCOUNTER — PREP FOR PROCEDURE (OUTPATIENT)
Dept: PHYSICAL MEDICINE AND REHAB | Age: 83
End: 2018-11-26

## 2018-11-26 ENCOUNTER — OFFICE VISIT (OUTPATIENT)
Dept: PHYSICAL MEDICINE AND REHAB | Age: 83
End: 2018-11-26
Payer: MEDICARE

## 2018-11-26 VITALS
SYSTOLIC BLOOD PRESSURE: 176 MMHG | HEIGHT: 66 IN | BODY MASS INDEX: 19.93 KG/M2 | DIASTOLIC BLOOD PRESSURE: 78 MMHG | WEIGHT: 124 LBS | HEART RATE: 59 BPM

## 2018-11-26 DIAGNOSIS — M46.1 SACROILIITIS (HCC): Primary | ICD-10-CM

## 2018-11-26 DIAGNOSIS — S32.040A CLOSED COMPRESSION FRACTURE OF FOURTH LUMBAR VERTEBRA, INITIAL ENCOUNTER: Primary | ICD-10-CM

## 2018-11-26 DIAGNOSIS — M46.1 SACROILIITIS (HCC): ICD-10-CM

## 2018-11-26 DIAGNOSIS — M21.70 LEG LENGTH DISCREPANCY: ICD-10-CM

## 2018-11-26 PROCEDURE — 4040F PNEUMOC VAC/ADMIN/RCVD: CPT | Performed by: PHYSICAL MEDICINE & REHABILITATION

## 2018-11-26 PROCEDURE — 99204 OFFICE O/P NEW MOD 45 MIN: CPT | Performed by: PHYSICAL MEDICINE & REHABILITATION

## 2018-11-26 PROCEDURE — 1123F ACP DISCUSS/DSCN MKR DOCD: CPT | Performed by: PHYSICAL MEDICINE & REHABILITATION

## 2018-11-26 PROCEDURE — 1036F TOBACCO NON-USER: CPT | Performed by: PHYSICAL MEDICINE & REHABILITATION

## 2018-11-26 PROCEDURE — G8482 FLU IMMUNIZE ORDER/ADMIN: HCPCS | Performed by: PHYSICAL MEDICINE & REHABILITATION

## 2018-11-26 PROCEDURE — 1101F PT FALLS ASSESS-DOCD LE1/YR: CPT | Performed by: PHYSICAL MEDICINE & REHABILITATION

## 2018-11-26 PROCEDURE — G8420 CALC BMI NORM PARAMETERS: HCPCS | Performed by: PHYSICAL MEDICINE & REHABILITATION

## 2018-11-26 PROCEDURE — 1090F PRES/ABSN URINE INCON ASSESS: CPT | Performed by: PHYSICAL MEDICINE & REHABILITATION

## 2018-11-26 PROCEDURE — G8427 DOCREV CUR MEDS BY ELIG CLIN: HCPCS | Performed by: PHYSICAL MEDICINE & REHABILITATION

## 2018-11-26 RX ORDER — ACETAMINOPHEN 160 MG
2000 TABLET,DISINTEGRATING ORAL DAILY
COMMUNITY

## 2018-11-26 NOTE — PROGRESS NOTES
General: well developed and well nourished in no acute distress. Body habitus is thin  HEENT: No rhinorrhea, sneezing, yawning, or lacrimation. No scleral icterus or conjunctival injection. Resp: symmetrical chest expansion, unlabored breathing, respirations unlabored. CV: Heart rate is regular. Peripheral pulses are palpable  Lymph: No visible regional lymphadenopathy. Skin: No rashes or ecchymosis. Normal turgor. Psych: Mood is calm. Affect is normal.   Ext: No edema noted     MSK:   Back/Hip Exam:   Inspection: Pelvis was asymmetric. Lumbar lordotic curvature was decreased. There was no scoliosis. No ecchymoses or erythema. Palpation: Palpatory exam revealed no tenderness along lumbosacral paraspinals, midline spine, concordant ttp right SI joint sulcus. There was no paraspinal spasms. There were no trigger points. ROM: ROM normal.   Special/provocative testing:   Supine SLR negative   negative FABERS. positiveGaenslens test on right   There was leg length discrepancy- right longer  *given a heel lift by PT. Not wearing it     Neurological Exam:  Strength:   R  L  Hip Flex  5  5  Knee Ext  5  5  Ankle dorsi  5  5  EHL   5 5  Ankle Plantar  5  5    Sensory:  Intact for light touch in all lower extremity dermatomes. Reflexes:   R  L  Patellar  (1+) (1+)  Ankle Jerk  (1+) (1+)    Gait is Antalgic. Impression:   Meredith Mccray is a 80 y.o. female     1. Sacroiliitis ([address]    2. Leg length discrepancy        Plan:   · Patient would benefit from right SI joint steroid injection under x-ray guidance. Procedure risks, benefits and alternatives were discussed. Patient would like to proceed. · Obtain lumbar and SI joint x-rays   · Start voltaren gel QID PRN     The patient was educated about the diagnosis, prognosis, indications, risks and benefits of treatment. An opportunity to ask questions was given to the patient and questions were answered.   The patient agreed to proceed with the

## 2018-11-26 NOTE — H&P
[place], [id number] [facility]  [address] [facility] [address]  Phone: [phone]  Fax: [phone]    PCP: Natali Knutson MD  Date of visit: 11/26/18    Chief Complaint   Patient presents with   Marcus Davalos       Dear Dr. Zahida Pratt,     Thank you for referring your patient to be seen. As you know,  Heydi Montanez is a 80 y.o. female with past medical history as below who presents with right low back pain for 3 month(s). There was a sudden onset of pain after falling, found to left superior pubic ramus fracture. Now, the pain is constant and occurs daily. The pain is rated Pain Score:   5, is described as \"hurts\", and is located in the right low back/buttock without radiation to the legs. The symptoms have been worse since onset. The pain is better with tylenol. Crossing right leg over left. The pain is worse with sitting. There is no associated numbness/tingling. There is no weakness. There is no bowel/bladder changes. The prior workup has included: Xray hips.      The prior treatment has included:  PT: made it worse   Chiropractic: none   Modalities: bengay with no relief   OTC Tylenol: yes with some relief   NSAIDS: yes with no relief  Membrane stabilizers: none    Muscle relaxers: none   Previous injections: none    Previous surgery at this site: none      Allergies   Allergen Reactions    Iodine        Current Outpatient Prescriptions   Medication Sig Dispense Refill    Glucosamine-Chondroit-Vit C-Mn (GLUCOSAMINE 1500 COMPLEX PO) Take by mouth      Cholecalciferol (VITAMIN D3) 2000 units CAPS Take by mouth      diclofenac sodium (VOLTAREN) 1 % GEL Apply 4 g topically 4 times daily as needed (pain) 3 Tube 3    meloxicam (MOBIC) 15 MG tablet Take 1 tablet by mouth daily 30 tablet 1    citalopram (CELEXA) 10 MG tablet Take 1 tablet by mouth every morning 90 tablet 1    donepezil (ARICEPT) 10 MG tablet Take 1 recommended treatment as described above. Follow up after injection      Thank you for the consultation and for allowing me to participate in the care of this patient.         Sincerely,     Sudhakar Calhoun DO, [facility]   Board Certified Physical Medicine and Rehabilitation

## 2018-11-26 NOTE — PATIENT INSTRUCTIONS
We appreciate that you chose [facility] to provide your healthcare needs today. We took great pleasure in caring for you. You may receive a survey to help us learn how to make your next visit to a [facility] facility better than the last.   Your feedback is important to help us continually improve the service we can provide you. Please take the time to complete it thoughtfully if you receive one as we value the feedback in every survey. In this survey we would appreciate that you answer \"always\" or \"yes\" for as many questions as possible (this is the answer we get credit for doing a good job). If you feel there is a question you cannot answer \"always\" or \"yes\", please let us know before you leave today so we can remedy the situation right away. We look forward to continuing to provide you with excellent care. Considering the survey questions related to access to care, please keep in mind the urgency of your problem (i.e. was it an emergent or urgent visit or more routine)  and whether the urgency of that problem was handled appropriately by our office. We always do our best to prioritize the patients who need the care most urgently given our specialty is in short supply and there is a high demand for visits. Thank you for your time and consideration.

## 2018-11-27 ENCOUNTER — TELEPHONE (OUTPATIENT)
Dept: PHYSICAL MEDICINE AND REHAB | Age: 83
End: 2018-11-27

## 2018-11-29 ENCOUNTER — TELEPHONE (OUTPATIENT)
Dept: PHYSICAL MEDICINE AND REHAB | Age: 83
End: 2018-11-29

## 2018-12-04 ENCOUNTER — HOSPITAL ENCOUNTER (OUTPATIENT)
Dept: MRI IMAGING | Age: 83
Discharge: HOME | End: 2018-12-06
Payer: MEDICARE

## 2018-12-04 DIAGNOSIS — S32.040A CLOSED COMPRESSION FRACTURE OF FOURTH LUMBAR VERTEBRA, INITIAL ENCOUNTER: ICD-10-CM

## 2018-12-04 PROCEDURE — 72148 MRI LUMBAR SPINE W/O DYE: CPT

## 2018-12-12 ENCOUNTER — OFFICE VISIT (OUTPATIENT)
Dept: PRIMARY CARE CLINIC | Age: 83
End: 2018-12-12
Payer: MEDICARE

## 2018-12-12 VITALS
OXYGEN SATURATION: 93 % | DIASTOLIC BLOOD PRESSURE: 88 MMHG | HEIGHT: 66 IN | HEART RATE: 50 BPM | BODY MASS INDEX: 19.29 KG/M2 | TEMPERATURE: 97.8 F | SYSTOLIC BLOOD PRESSURE: 132 MMHG | WEIGHT: 120 LBS

## 2018-12-12 DIAGNOSIS — E53.8 VITAMIN B 12 DEFICIENCY: Primary | ICD-10-CM

## 2018-12-12 DIAGNOSIS — F02.80 ALZHEIMER'S DEMENTIA WITHOUT BEHAVIORAL DISTURBANCE, UNSPECIFIED TIMING OF DEMENTIA ONSET: ICD-10-CM

## 2018-12-12 DIAGNOSIS — K21.9 GASTROESOPHAGEAL REFLUX DISEASE WITHOUT ESOPHAGITIS: ICD-10-CM

## 2018-12-12 DIAGNOSIS — M19.90 ARTHRITIS: ICD-10-CM

## 2018-12-12 DIAGNOSIS — M46.1 SACROILIITIS (HCC): ICD-10-CM

## 2018-12-12 DIAGNOSIS — E53.8 VITAMIN B 12 DEFICIENCY: Chronic | ICD-10-CM

## 2018-12-12 DIAGNOSIS — G30.9 ALZHEIMER'S DEMENTIA WITHOUT BEHAVIORAL DISTURBANCE, UNSPECIFIED TIMING OF DEMENTIA ONSET: ICD-10-CM

## 2018-12-12 PROCEDURE — 1090F PRES/ABSN URINE INCON ASSESS: CPT | Performed by: INTERNAL MEDICINE

## 2018-12-12 PROCEDURE — G8420 CALC BMI NORM PARAMETERS: HCPCS | Performed by: INTERNAL MEDICINE

## 2018-12-12 PROCEDURE — 96372 THER/PROPH/DIAG INJ SC/IM: CPT | Performed by: INTERNAL MEDICINE

## 2018-12-12 PROCEDURE — G8482 FLU IMMUNIZE ORDER/ADMIN: HCPCS | Performed by: INTERNAL MEDICINE

## 2018-12-12 PROCEDURE — 1036F TOBACCO NON-USER: CPT | Performed by: INTERNAL MEDICINE

## 2018-12-12 PROCEDURE — 1101F PT FALLS ASSESS-DOCD LE1/YR: CPT | Performed by: INTERNAL MEDICINE

## 2018-12-12 PROCEDURE — 99213 OFFICE O/P EST LOW 20 MIN: CPT | Performed by: INTERNAL MEDICINE

## 2018-12-12 PROCEDURE — G8427 DOCREV CUR MEDS BY ELIG CLIN: HCPCS | Performed by: INTERNAL MEDICINE

## 2018-12-12 PROCEDURE — 4040F PNEUMOC VAC/ADMIN/RCVD: CPT | Performed by: INTERNAL MEDICINE

## 2018-12-12 PROCEDURE — 1123F ACP DISCUSS/DSCN MKR DOCD: CPT | Performed by: INTERNAL MEDICINE

## 2018-12-12 RX ORDER — CYANOCOBALAMIN 1000 UG/ML
1000 INJECTION INTRAMUSCULAR; SUBCUTANEOUS ONCE
Status: COMPLETED | OUTPATIENT
Start: 2018-12-12 | End: 2018-12-12

## 2018-12-12 RX ADMIN — CYANOCOBALAMIN 1000 MCG: 1000 INJECTION INTRAMUSCULAR; SUBCUTANEOUS at 16:12

## 2018-12-12 ASSESSMENT — ENCOUNTER SYMPTOMS
EYE PAIN: 0
EYE DISCHARGE: 0
BACK PAIN: 1
SHORTNESS OF BREATH: 0
SORE THROAT: 0
SINUS PAIN: 0
NAUSEA: 0
BLOOD IN STOOL: 0
ABDOMINAL PAIN: 0

## 2018-12-13 ENCOUNTER — HOSPITAL ENCOUNTER (OUTPATIENT)
Dept: OPERATING ROOM | Age: 83
Setting detail: OUTPATIENT SURGERY
Discharge: HOME | End: 2018-12-13
Attending: PHYSICAL MEDICINE & REHABILITATION
Payer: MEDICARE

## 2018-12-13 ENCOUNTER — HOSPITAL ENCOUNTER (OUTPATIENT)
Age: 83
Setting detail: OUTPATIENT SURGERY
Discharge: HOME | End: 2018-12-13
Attending: PHYSICAL MEDICINE & REHABILITATION | Admitting: PHYSICAL MEDICINE & REHABILITATION
Payer: MEDICARE

## 2018-12-13 VITALS
RESPIRATION RATE: 16 BRPM | OXYGEN SATURATION: 98 % | SYSTOLIC BLOOD PRESSURE: 169 MMHG | TEMPERATURE: 98 F | DIASTOLIC BLOOD PRESSURE: 71 MMHG | HEART RATE: 64 BPM

## 2018-12-13 DIAGNOSIS — M46.1 SACROILIITIS (HCC): ICD-10-CM

## 2018-12-13 PROCEDURE — 2709999900 HC NON-CHARGEABLE SUPPLY: Performed by: PHYSICAL MEDICINE & REHABILITATION

## 2018-12-13 PROCEDURE — 6360000002 HC RX W HCPCS: Performed by: PHYSICAL MEDICINE & REHABILITATION

## 2018-12-13 PROCEDURE — 7100000010 HC PHASE II RECOVERY - FIRST 15 MIN: Performed by: PHYSICAL MEDICINE & REHABILITATION

## 2018-12-13 PROCEDURE — 6360000004 HC RX CONTRAST MEDICATION: Performed by: PHYSICAL MEDICINE & REHABILITATION

## 2018-12-13 PROCEDURE — 2500000003 HC RX 250 WO HCPCS: Performed by: PHYSICAL MEDICINE & REHABILITATION

## 2018-12-13 PROCEDURE — 3600000005 HC SURGERY LEVEL 5 BASE: Performed by: PHYSICAL MEDICINE & REHABILITATION

## 2018-12-13 PROCEDURE — 3209999900 FLUORO FOR SURGICAL PROCEDURES

## 2018-12-13 PROCEDURE — 27096 INJECT SACROILIAC JOINT: CPT | Performed by: PHYSICAL MEDICINE & REHABILITATION

## 2018-12-13 PROCEDURE — A9579 GAD-BASE MR CONTRAST NOS,1ML: HCPCS | Performed by: PHYSICAL MEDICINE & REHABILITATION

## 2018-12-13 PROCEDURE — 7100000011 HC PHASE II RECOVERY - ADDTL 15 MIN: Performed by: PHYSICAL MEDICINE & REHABILITATION

## 2018-12-13 RX ORDER — LIDOCAINE HYDROCHLORIDE 10 MG/ML
INJECTION, SOLUTION EPIDURAL; INFILTRATION; INTRACAUDAL; PERINEURAL PRN
Status: DISCONTINUED | OUTPATIENT
Start: 2018-12-13 | End: 2018-12-13 | Stop reason: HOSPADM

## 2018-12-13 ASSESSMENT — PAIN SCALES - GENERAL
PAINLEVEL_OUTOF10: 0
PAINLEVEL_OUTOF10: 0

## 2018-12-13 ASSESSMENT — PAIN DESCRIPTION - DESCRIPTORS: DESCRIPTORS: ACHING;DISCOMFORT

## 2018-12-13 ASSESSMENT — PAIN - FUNCTIONAL ASSESSMENT: PAIN_FUNCTIONAL_ASSESSMENT: 0-10

## 2019-01-09 ENCOUNTER — OFFICE VISIT (OUTPATIENT)
Dept: PHYSICAL MEDICINE AND REHAB | Age: 84
End: 2019-01-09
Payer: MEDICARE

## 2019-01-09 ENCOUNTER — PREP FOR PROCEDURE (OUTPATIENT)
Dept: PHYSICAL MEDICINE AND REHAB | Age: 84
End: 2019-01-09

## 2019-01-09 VITALS
HEART RATE: 59 BPM | BODY MASS INDEX: 19.93 KG/M2 | DIASTOLIC BLOOD PRESSURE: 82 MMHG | SYSTOLIC BLOOD PRESSURE: 142 MMHG | WEIGHT: 124 LBS | HEIGHT: 66 IN

## 2019-01-09 DIAGNOSIS — M46.1 SACROILIITIS (HCC): Primary | ICD-10-CM

## 2019-01-09 PROCEDURE — 99214 OFFICE O/P EST MOD 30 MIN: CPT | Performed by: PHYSICAL MEDICINE & REHABILITATION

## 2019-01-09 PROCEDURE — 4040F PNEUMOC VAC/ADMIN/RCVD: CPT | Performed by: PHYSICAL MEDICINE & REHABILITATION

## 2019-01-09 PROCEDURE — 1090F PRES/ABSN URINE INCON ASSESS: CPT | Performed by: PHYSICAL MEDICINE & REHABILITATION

## 2019-01-09 PROCEDURE — 1101F PT FALLS ASSESS-DOCD LE1/YR: CPT | Performed by: PHYSICAL MEDICINE & REHABILITATION

## 2019-01-09 PROCEDURE — G8420 CALC BMI NORM PARAMETERS: HCPCS | Performed by: PHYSICAL MEDICINE & REHABILITATION

## 2019-01-09 PROCEDURE — 1036F TOBACCO NON-USER: CPT | Performed by: PHYSICAL MEDICINE & REHABILITATION

## 2019-01-09 PROCEDURE — G8427 DOCREV CUR MEDS BY ELIG CLIN: HCPCS | Performed by: PHYSICAL MEDICINE & REHABILITATION

## 2019-01-09 PROCEDURE — 1123F ACP DISCUSS/DSCN MKR DOCD: CPT | Performed by: PHYSICAL MEDICINE & REHABILITATION

## 2019-01-09 PROCEDURE — G8482 FLU IMMUNIZE ORDER/ADMIN: HCPCS | Performed by: PHYSICAL MEDICINE & REHABILITATION

## 2019-01-17 ENCOUNTER — HOSPITAL ENCOUNTER (OUTPATIENT)
Age: 84
Setting detail: OUTPATIENT SURGERY
Discharge: HOME | End: 2019-01-17
Attending: PHYSICAL MEDICINE & REHABILITATION | Admitting: PHYSICAL MEDICINE & REHABILITATION
Payer: MEDICARE

## 2019-01-17 ENCOUNTER — HOSPITAL ENCOUNTER (OUTPATIENT)
Dept: OPERATING ROOM | Age: 84
Setting detail: OUTPATIENT SURGERY
Discharge: HOME | End: 2019-01-17
Attending: PHYSICAL MEDICINE & REHABILITATION
Payer: MEDICARE

## 2019-01-17 VITALS
SYSTOLIC BLOOD PRESSURE: 160 MMHG | OXYGEN SATURATION: 98 % | RESPIRATION RATE: 14 BRPM | DIASTOLIC BLOOD PRESSURE: 70 MMHG | HEART RATE: 60 BPM

## 2019-01-17 DIAGNOSIS — M46.1 SACROILIITIS (HCC): ICD-10-CM

## 2019-01-17 PROCEDURE — 6360000002 HC RX W HCPCS: Performed by: PHYSICAL MEDICINE & REHABILITATION

## 2019-01-17 PROCEDURE — 2709999900 HC NON-CHARGEABLE SUPPLY: Performed by: PHYSICAL MEDICINE & REHABILITATION

## 2019-01-17 PROCEDURE — 7100000010 HC PHASE II RECOVERY - FIRST 15 MIN: Performed by: PHYSICAL MEDICINE & REHABILITATION

## 2019-01-17 PROCEDURE — 27096 INJECT SACROILIAC JOINT: CPT | Performed by: PHYSICAL MEDICINE & REHABILITATION

## 2019-01-17 PROCEDURE — 2500000003 HC RX 250 WO HCPCS: Performed by: PHYSICAL MEDICINE & REHABILITATION

## 2019-01-17 PROCEDURE — 3600000005 HC SURGERY LEVEL 5 BASE: Performed by: PHYSICAL MEDICINE & REHABILITATION

## 2019-01-17 PROCEDURE — 3209999900 FLUORO FOR SURGICAL PROCEDURES

## 2019-01-17 RX ORDER — LIDOCAINE HYDROCHLORIDE 10 MG/ML
INJECTION, SOLUTION EPIDURAL; INFILTRATION; INTRACAUDAL; PERINEURAL PRN
Status: DISCONTINUED | OUTPATIENT
Start: 2019-01-17 | End: 2019-01-17 | Stop reason: HOSPADM

## 2019-01-17 ASSESSMENT — PAIN SCALES - GENERAL
PAINLEVEL_OUTOF10: 0
PAINLEVEL_OUTOF10: 0

## 2019-01-17 ASSESSMENT — PAIN - FUNCTIONAL ASSESSMENT: PAIN_FUNCTIONAL_ASSESSMENT: 0-10

## 2019-02-13 ENCOUNTER — OFFICE VISIT (OUTPATIENT)
Dept: FAMILY MEDICINE CLINIC | Age: 84
End: 2019-02-13
Payer: MEDICARE

## 2019-02-13 VITALS
TEMPERATURE: 97.6 F | WEIGHT: 122 LBS | OXYGEN SATURATION: 99 % | BODY MASS INDEX: 19.69 KG/M2 | SYSTOLIC BLOOD PRESSURE: 118 MMHG | DIASTOLIC BLOOD PRESSURE: 82 MMHG | HEART RATE: 76 BPM

## 2019-02-13 DIAGNOSIS — R35.0 INCREASED URINARY FREQUENCY: Primary | ICD-10-CM

## 2019-02-13 DIAGNOSIS — F02.80 ALZHEIMER'S DEMENTIA WITHOUT BEHAVIORAL DISTURBANCE, UNSPECIFIED TIMING OF DEMENTIA ONSET: ICD-10-CM

## 2019-02-13 DIAGNOSIS — E78.5 HYPERLIPIDEMIA, UNSPECIFIED HYPERLIPIDEMIA TYPE: ICD-10-CM

## 2019-02-13 DIAGNOSIS — K21.9 GASTROESOPHAGEAL REFLUX DISEASE WITHOUT ESOPHAGITIS: ICD-10-CM

## 2019-02-13 DIAGNOSIS — G30.9 ALZHEIMER'S DEMENTIA WITHOUT BEHAVIORAL DISTURBANCE, UNSPECIFIED TIMING OF DEMENTIA ONSET: ICD-10-CM

## 2019-02-13 DIAGNOSIS — M19.90 ARTHRITIS: ICD-10-CM

## 2019-02-13 DIAGNOSIS — R73.01 ABNORMAL FASTING GLUCOSE: ICD-10-CM

## 2019-02-13 DIAGNOSIS — E53.8 VITAMIN B 12 DEFICIENCY: ICD-10-CM

## 2019-02-13 PROCEDURE — 1036F TOBACCO NON-USER: CPT | Performed by: INTERNAL MEDICINE

## 2019-02-13 PROCEDURE — 99213 OFFICE O/P EST LOW 20 MIN: CPT | Performed by: INTERNAL MEDICINE

## 2019-02-13 PROCEDURE — 1090F PRES/ABSN URINE INCON ASSESS: CPT | Performed by: INTERNAL MEDICINE

## 2019-02-13 PROCEDURE — 96372 THER/PROPH/DIAG INJ SC/IM: CPT | Performed by: INTERNAL MEDICINE

## 2019-02-13 PROCEDURE — G8427 DOCREV CUR MEDS BY ELIG CLIN: HCPCS | Performed by: INTERNAL MEDICINE

## 2019-02-13 PROCEDURE — 4040F PNEUMOC VAC/ADMIN/RCVD: CPT | Performed by: INTERNAL MEDICINE

## 2019-02-13 PROCEDURE — G8420 CALC BMI NORM PARAMETERS: HCPCS | Performed by: INTERNAL MEDICINE

## 2019-02-13 PROCEDURE — G8482 FLU IMMUNIZE ORDER/ADMIN: HCPCS | Performed by: INTERNAL MEDICINE

## 2019-02-13 PROCEDURE — 1101F PT FALLS ASSESS-DOCD LE1/YR: CPT | Performed by: INTERNAL MEDICINE

## 2019-02-13 PROCEDURE — 1123F ACP DISCUSS/DSCN MKR DOCD: CPT | Performed by: INTERNAL MEDICINE

## 2019-02-13 RX ORDER — CYANOCOBALAMIN 1000 UG/ML
1000 INJECTION INTRAMUSCULAR; SUBCUTANEOUS ONCE
Status: COMPLETED | OUTPATIENT
Start: 2019-02-13 | End: 2019-02-13

## 2019-02-13 RX ADMIN — CYANOCOBALAMIN 1000 MCG: 1000 INJECTION INTRAMUSCULAR; SUBCUTANEOUS at 14:45

## 2019-02-13 ASSESSMENT — ENCOUNTER SYMPTOMS
SORE THROAT: 0
EYE DISCHARGE: 0
EYE PAIN: 0
SINUS PAIN: 0
BLOOD IN STOOL: 0
ABDOMINAL PAIN: 0
SHORTNESS OF BREATH: 0
NAUSEA: 0

## 2019-02-13 ASSESSMENT — PATIENT HEALTH QUESTIONNAIRE - PHQ9
1. LITTLE INTEREST OR PLEASURE IN DOING THINGS: 0
SUM OF ALL RESPONSES TO PHQ QUESTIONS 1-9: 0
SUM OF ALL RESPONSES TO PHQ9 QUESTIONS 1 & 2: 0
SUM OF ALL RESPONSES TO PHQ QUESTIONS 1-9: 0
2. FEELING DOWN, DEPRESSED OR HOPELESS: 0

## 2019-03-01 ENCOUNTER — HOSPITAL ENCOUNTER (OUTPATIENT)
Age: 84
Discharge: HOME | End: 2019-03-03
Payer: MEDICARE

## 2019-03-01 DIAGNOSIS — G30.9 ALZHEIMER'S DEMENTIA WITHOUT BEHAVIORAL DISTURBANCE, UNSPECIFIED TIMING OF DEMENTIA ONSET: ICD-10-CM

## 2019-03-01 DIAGNOSIS — E78.5 HYPERLIPIDEMIA, UNSPECIFIED HYPERLIPIDEMIA TYPE: ICD-10-CM

## 2019-03-01 DIAGNOSIS — R73.01 ABNORMAL FASTING GLUCOSE: ICD-10-CM

## 2019-03-01 DIAGNOSIS — R35.0 INCREASED URINARY FREQUENCY: ICD-10-CM

## 2019-03-01 DIAGNOSIS — K21.9 GASTROESOPHAGEAL REFLUX DISEASE WITHOUT ESOPHAGITIS: ICD-10-CM

## 2019-03-01 DIAGNOSIS — E53.8 VITAMIN B 12 DEFICIENCY: ICD-10-CM

## 2019-03-01 DIAGNOSIS — F02.80 ALZHEIMER'S DEMENTIA WITHOUT BEHAVIORAL DISTURBANCE, UNSPECIFIED TIMING OF DEMENTIA ONSET: ICD-10-CM

## 2019-03-01 LAB
ALBUMIN SERPL-MCNC: 3.9 G/DL (ref 3.5–5.2)
ALP BLD-CCNC: 87 U/L (ref 35–104)
ALT SERPL-CCNC: 11 U/L (ref 0–32)
ANION GAP SERPL CALCULATED.3IONS-SCNC: 12 MMOL/L (ref 7–16)
AST SERPL-CCNC: 20 U/L (ref 0–31)
BACTERIA: ABNORMAL /HPF
BASOPHILS ABSOLUTE: 0.02 E9/L (ref 0–0.2)
BASOPHILS RELATIVE PERCENT: 0.3 % (ref 0–2)
BILIRUB SERPL-MCNC: 0.2 MG/DL (ref 0–1.2)
BILIRUBIN URINE: NEGATIVE
BLOOD, URINE: NEGATIVE
BUN BLDV-MCNC: 17 MG/DL (ref 8–23)
CALCIUM SERPL-MCNC: 9.1 MG/DL (ref 8.6–10.2)
CHLORIDE BLD-SCNC: 109 MMOL/L (ref 98–107)
CHOLESTEROL, FASTING: 183 MG/DL (ref 0–199)
CLARITY: CLEAR
CO2: 26 MMOL/L (ref 22–29)
COLOR: YELLOW
CREAT SERPL-MCNC: 0.7 MG/DL (ref 0.5–1)
EOSINOPHILS ABSOLUTE: 0.08 E9/L (ref 0.05–0.5)
EOSINOPHILS RELATIVE PERCENT: 1.2 % (ref 0–6)
GFR AFRICAN AMERICAN: >60
GFR NON-AFRICAN AMERICAN: >60 ML/MIN/1.73
GLUCOSE FASTING: 90 MG/DL (ref 74–99)
GLUCOSE URINE: NEGATIVE MG/DL
HBA1C MFR BLD: 5.7 % (ref 4–5.6)
HCT VFR BLD CALC: 43 % (ref 34–48)
HDLC SERPL-MCNC: 83 MG/DL
HEMOGLOBIN: 12.8 G/DL (ref 11.5–15.5)
IMMATURE GRANULOCYTES #: 0.02 E9/L
IMMATURE GRANULOCYTES %: 0.3 % (ref 0–5)
KETONES, URINE: NEGATIVE MG/DL
LDL CHOLESTEROL CALCULATED: 79 MG/DL (ref 0–99)
LEUKOCYTE ESTERASE, URINE: NEGATIVE
LYMPHOCYTES ABSOLUTE: 2.07 E9/L (ref 1.5–4)
LYMPHOCYTES RELATIVE PERCENT: 31 % (ref 20–42)
MCH RBC QN AUTO: 31.1 PG (ref 26–35)
MCHC RBC AUTO-ENTMCNC: 29.8 % (ref 32–34.5)
MCV RBC AUTO: 104.4 FL (ref 80–99.9)
MONOCYTES ABSOLUTE: 0.72 E9/L (ref 0.1–0.95)
MONOCYTES RELATIVE PERCENT: 10.8 % (ref 2–12)
NEUTROPHILS ABSOLUTE: 3.76 E9/L (ref 1.8–7.3)
NEUTROPHILS RELATIVE PERCENT: 56.4 % (ref 43–80)
NITRITE, URINE: NEGATIVE
PDW BLD-RTO: 14.8 FL (ref 11.5–15)
PH UA: 7 (ref 5–9)
PLATELET # BLD: 323 E9/L (ref 130–450)
PMV BLD AUTO: 10.1 FL (ref 7–12)
POTASSIUM SERPL-SCNC: 3.9 MMOL/L (ref 3.5–5)
PROTEIN UA: NEGATIVE MG/DL
RBC # BLD: 4.12 E12/L (ref 3.5–5.5)
RBC UA: ABNORMAL /HPF (ref 0–2)
SODIUM BLD-SCNC: 147 MMOL/L (ref 132–146)
SPECIFIC GRAVITY UA: 1.01 (ref 1–1.03)
TOTAL PROTEIN: 6.5 G/DL (ref 6.4–8.3)
TRIGLYCERIDE, FASTING: 105 MG/DL (ref 0–149)
TSH SERPL DL<=0.05 MIU/L-ACNC: 1.36 UIU/ML (ref 0.27–4.2)
UROBILINOGEN, URINE: 0.2 E.U./DL
VLDLC SERPL CALC-MCNC: 21 MG/DL
WBC # BLD: 6.7 E9/L (ref 4.5–11.5)
WBC UA: ABNORMAL /HPF (ref 0–5)

## 2019-03-01 PROCEDURE — 83036 HEMOGLOBIN GLYCOSYLATED A1C: CPT

## 2019-03-01 PROCEDURE — 80053 COMPREHEN METABOLIC PANEL: CPT

## 2019-03-01 PROCEDURE — 81001 URINALYSIS AUTO W/SCOPE: CPT

## 2019-03-01 PROCEDURE — 84443 ASSAY THYROID STIM HORMONE: CPT

## 2019-03-01 PROCEDURE — 36415 COLL VENOUS BLD VENIPUNCTURE: CPT

## 2019-03-01 PROCEDURE — 85025 COMPLETE CBC W/AUTO DIFF WBC: CPT

## 2019-03-01 PROCEDURE — 80061 LIPID PANEL: CPT

## 2019-03-08 ENCOUNTER — TELEPHONE (OUTPATIENT)
Dept: ADMINISTRATIVE | Age: 84
End: 2019-03-08

## 2019-04-18 ENCOUNTER — OFFICE VISIT (OUTPATIENT)
Dept: FAMILY MEDICINE CLINIC | Age: 84
End: 2019-04-18
Payer: MEDICARE

## 2019-04-18 VITALS
DIASTOLIC BLOOD PRESSURE: 90 MMHG | SYSTOLIC BLOOD PRESSURE: 146 MMHG | TEMPERATURE: 97.8 F | BODY MASS INDEX: 19.77 KG/M2 | HEIGHT: 66 IN | OXYGEN SATURATION: 99 % | WEIGHT: 123 LBS | HEART RATE: 82 BPM

## 2019-04-18 DIAGNOSIS — F02.80 ALZHEIMER'S DEMENTIA WITHOUT BEHAVIORAL DISTURBANCE, UNSPECIFIED TIMING OF DEMENTIA ONSET: ICD-10-CM

## 2019-04-18 DIAGNOSIS — K21.9 GASTROESOPHAGEAL REFLUX DISEASE WITHOUT ESOPHAGITIS: Primary | ICD-10-CM

## 2019-04-18 DIAGNOSIS — E53.8 VITAMIN B 12 DEFICIENCY: ICD-10-CM

## 2019-04-18 DIAGNOSIS — M19.90 ARTHRITIS: ICD-10-CM

## 2019-04-18 DIAGNOSIS — G30.9 ALZHEIMER'S DEMENTIA WITHOUT BEHAVIORAL DISTURBANCE, UNSPECIFIED TIMING OF DEMENTIA ONSET: ICD-10-CM

## 2019-04-18 DIAGNOSIS — Z91.81 AT HIGH RISK FOR FALLS: ICD-10-CM

## 2019-04-18 DIAGNOSIS — E78.5 HYPERLIPIDEMIA, UNSPECIFIED HYPERLIPIDEMIA TYPE: ICD-10-CM

## 2019-04-18 DIAGNOSIS — Z23 NEED FOR PROPHYLACTIC VACCINATION AGAINST STREPTOCOCCUS PNEUMONIAE (PNEUMOCOCCUS): ICD-10-CM

## 2019-04-18 PROCEDURE — G0009 ADMIN PNEUMOCOCCAL VACCINE: HCPCS | Performed by: INTERNAL MEDICINE

## 2019-04-18 PROCEDURE — G8420 CALC BMI NORM PARAMETERS: HCPCS | Performed by: INTERNAL MEDICINE

## 2019-04-18 PROCEDURE — 1123F ACP DISCUSS/DSCN MKR DOCD: CPT | Performed by: INTERNAL MEDICINE

## 2019-04-18 PROCEDURE — G8427 DOCREV CUR MEDS BY ELIG CLIN: HCPCS | Performed by: INTERNAL MEDICINE

## 2019-04-18 PROCEDURE — 90670 PCV13 VACCINE IM: CPT | Performed by: INTERNAL MEDICINE

## 2019-04-18 PROCEDURE — 4040F PNEUMOC VAC/ADMIN/RCVD: CPT | Performed by: INTERNAL MEDICINE

## 2019-04-18 PROCEDURE — 1090F PRES/ABSN URINE INCON ASSESS: CPT | Performed by: INTERNAL MEDICINE

## 2019-04-18 PROCEDURE — 1036F TOBACCO NON-USER: CPT | Performed by: INTERNAL MEDICINE

## 2019-04-18 PROCEDURE — 99213 OFFICE O/P EST LOW 20 MIN: CPT | Performed by: INTERNAL MEDICINE

## 2019-04-18 PROCEDURE — 96372 THER/PROPH/DIAG INJ SC/IM: CPT | Performed by: INTERNAL MEDICINE

## 2019-04-18 RX ORDER — CITALOPRAM 10 MG/1
10 TABLET ORAL EVERY MORNING
Qty: 90 TABLET | Refills: 1 | Status: SHIPPED | OUTPATIENT
Start: 2019-04-18 | End: 2019-10-07 | Stop reason: SDUPTHER

## 2019-04-18 RX ORDER — DONEPEZIL HYDROCHLORIDE 10 MG/1
10 TABLET, FILM COATED ORAL NIGHTLY
Qty: 90 TABLET | Refills: 1 | Status: SHIPPED | OUTPATIENT
Start: 2019-04-18 | End: 2019-12-23 | Stop reason: SDUPTHER

## 2019-04-18 RX ORDER — MEMANTINE HYDROCHLORIDE 10 MG/1
10 TABLET ORAL 2 TIMES DAILY
Qty: 180 TABLET | Refills: 1 | Status: SHIPPED | OUTPATIENT
Start: 2019-04-18 | End: 2019-12-23 | Stop reason: SDUPTHER

## 2019-04-18 RX ORDER — CYANOCOBALAMIN 1000 UG/ML
1000 INJECTION INTRAMUSCULAR; SUBCUTANEOUS ONCE
Status: COMPLETED | OUTPATIENT
Start: 2019-04-18 | End: 2019-04-18

## 2019-04-18 RX ADMIN — CYANOCOBALAMIN 1000 MCG: 1000 INJECTION INTRAMUSCULAR; SUBCUTANEOUS at 15:54

## 2019-04-18 ASSESSMENT — ENCOUNTER SYMPTOMS
BLOOD IN STOOL: 0
EYE DISCHARGE: 0
SHORTNESS OF BREATH: 0
NAUSEA: 0
ABDOMINAL PAIN: 0
SINUS PAIN: 0
SORE THROAT: 0
EYE PAIN: 0

## 2019-04-18 NOTE — PROGRESS NOTES
Chief Complaint   Patient presents with   [address]     Completed on 3/1/19       HPI:  Patient is here for follow-up . Feel okay     Allergy and Medications are reviewed and updated. Past Medical History, Surgical History, and Family History has been reviewed and updated. Review of Systems:  Review of Systems   Constitutional: Negative for chills and fever. HENT: Negative for congestion, sinus pain and sore throat. Eyes: Negative for pain and discharge. Respiratory: Negative for shortness of breath (No new SOb). Cardiovascular: Negative for chest pain. Gastrointestinal: Negative for abdominal pain, blood in stool and nausea. Genitourinary: Negative for flank pain and frequency. Musculoskeletal: Negative for neck pain. Hematological: Does not bruise/bleed easily. Psychiatric/Behavioral: Negative for suicidal ideas. Vitals:    04/18/19 1436   BP: (!) 146/90   Pulse: 82   Temp: 97.8 °F (36.6 °C)   SpO2: 99%   Weight: 123 lb (55.8 kg)   Height: 5' 6\" (1.676 m)       Physical Exam   Constitutional: She is oriented to person, place, and time. She appears well-developed and well-nourished. HENT:   Head: Normocephalic and atraumatic. Mouth/Throat: No oropharyngeal exudate. Eyes: Pupils are equal, round, and reactive to light. Conjunctivae are normal.   Neck: No JVD present. Cardiovascular: Normal rate and regular rhythm. Pulmonary/Chest: Effort normal and breath sounds normal. She has no rales. Abdominal: Soft. Bowel sounds are normal.   Musculoskeletal: Normal range of motion. She exhibits no edema. Lymphadenopathy:     She has no cervical adenopathy. Neurological: She is alert and oriented to person, place, and time. Skin: Skin is warm and dry. Psychiatric: Her behavior is normal.   Vitals reviewed.        Labs :    Lab Results   Component Value Date    WBC 6.7 03/01/2019    HGB 12.8 03/01/2019    HCT 43.0 03/01/2019     03/01/2019    CHOL 177 07/18/2016

## 2019-05-20 ENCOUNTER — OFFICE VISIT (OUTPATIENT)
Dept: ORTHOPEDIC SURGERY | Age: 84
End: 2019-05-20
Payer: MEDICARE

## 2019-05-20 VITALS — HEIGHT: 66 IN | BODY MASS INDEX: 20.09 KG/M2 | TEMPERATURE: 98 F | WEIGHT: 125 LBS

## 2019-05-20 DIAGNOSIS — M17.12 PRIMARY OSTEOARTHRITIS OF LEFT KNEE: ICD-10-CM

## 2019-05-20 DIAGNOSIS — M17.11 PRIMARY OSTEOARTHRITIS OF RIGHT KNEE: Primary | ICD-10-CM

## 2019-05-20 PROCEDURE — 1123F ACP DISCUSS/DSCN MKR DOCD: CPT | Performed by: ORTHOPAEDIC SURGERY

## 2019-05-20 PROCEDURE — 1036F TOBACCO NON-USER: CPT | Performed by: ORTHOPAEDIC SURGERY

## 2019-05-20 PROCEDURE — G8420 CALC BMI NORM PARAMETERS: HCPCS | Performed by: ORTHOPAEDIC SURGERY

## 2019-05-20 PROCEDURE — 99213 OFFICE O/P EST LOW 20 MIN: CPT | Performed by: ORTHOPAEDIC SURGERY

## 2019-05-20 PROCEDURE — G8427 DOCREV CUR MEDS BY ELIG CLIN: HCPCS | Performed by: ORTHOPAEDIC SURGERY

## 2019-05-20 PROCEDURE — 4040F PNEUMOC VAC/ADMIN/RCVD: CPT | Performed by: ORTHOPAEDIC SURGERY

## 2019-05-20 PROCEDURE — 1090F PRES/ABSN URINE INCON ASSESS: CPT | Performed by: ORTHOPAEDIC SURGERY

## 2019-05-20 NOTE — PROGRESS NOTES
Chief Complaint   Patient presents with    Knee Pain     bilateral knee pain has returned. Patient was last seen in 11/18 for Euflexxa injections and patient states those lasted about 5 months. She is requesting to have those injections again. Subjective:     Patient ID: Alan Arnett is a 80 y.o..  female    Knee Pain  Patient complains of bilateral knee pain. She had euflexxa with 5 months of relief.     Past Medical History:   Diagnosis Date    Arthritis     GERD (gastroesophageal reflux disease)      Past Surgical History:   Procedure Laterality Date    ABDOMEN SURGERY      APPENDECTOMY      COLONOSCOPY      ECHO COMPL W DOP COLOR FLOW  10/21/2013         HC INJECTION PROCEDURE FOR SACROILIAC JOINT Right 12/13/2018    RIGHT SACROILIAC JOINT STEROID INJECTION UNDER X-RAY GUIDANCE performed by Gus Gagnon DO at [address] [place] [address] Left 1/17/2019    LEFT SACROILIAC JOINT STEROID INJECTION UNDER X-RAY GUIDANCE performed by Gus Gagnon DO at [facility] Right 12/13/2018    sacroiliac joint     NERVE BLOCK Left 01/17/2019    sacroiliac joint injection        Current Outpatient Medications:     citalopram (CELEXA) 10 MG tablet, Take 1 tablet by mouth every morning, Disp: 90 tablet, Rfl: 1    donepezil (ARICEPT) 10 MG tablet, Take 1 tablet by mouth nightly, Disp: 90 tablet, Rfl: 1    memantine (NAMENDA) 10 MG tablet, Take 1 tablet by mouth 2 times daily, Disp: 180 tablet, Rfl: 1    Glucosamine-Chondroit-Vit C-Mn (GLUCOSAMINE 1500 COMPLEX PO), Take by mouth, Disp: , Rfl:     diclofenac sodium (VOLTAREN) 1 % GEL, Apply 4 g topically 4 times daily as needed (pain), Disp: 3 Tube, Rfl: 3    Cholecalciferol (VITAMIN D3) 2000 units CAPS, Take by mouth, Disp: , Rfl:     meloxicam (MOBIC) 15 MG tablet, Take 1 tablet by mouth daily, Disp: 30 tablet, Rfl: 1  Allergies   Allergen Reactions  Iodine      I.V. Social History     Socioeconomic History    Marital status:      Spouse name: Not on file    Number of children: 3    Years of education: Not on file    Highest education level: Not on file   Occupational History    Not on file   Social Needs    Financial resource strain: Not on file    Food insecurity:     Worry: Not on file     Inability: Not on file    Transportation needs:     Medical: Not on file     Non-medical: Not on file   Tobacco Use    Smoking status: Never Smoker    Smokeless tobacco: Never Used   Substance and Sexual Activity    Alcohol use: No    Drug use: No    Sexual activity: Not on file   Lifestyle    Physical activity:     Days per week: Not on file     Minutes per session: Not on file    Stress: Not on file   Relationships    Social connections:     Talks on phone: Not on file     Gets together: Not on file     Attends Taoism service: Not on file     Active member of club or organization: Not on file     Attends meetings of clubs or organizations: Not on file     Relationship status: Not on file    Intimate partner violence:     Fear of current or ex partner: Not on file     Emotionally abused: Not on file     Physically abused: Not on file     Forced sexual activity: Not on file   Other Topics Concern    Not on file   Social History Narrative    Not on file     Family History   Problem Relation Age of Onset    Heart Disease Mother     Heart Disease Father          REVIEW OF SYSTEMS:     General/Constitution:  (-)weight loss, (-)fever, (-)chills, (-)weakness. Skin: (-) rash,(-) psoriasis,(-) eczema, (-)skin cancer. Musculoskeletal: (-) fractures,  (-) dislocations,(-) collagen vascular disease, (-) fibromyalgia, (-) multiple sclerosis, (-) muscular dystrophy, (-) RSD,(-) joint pain (-)swelling, (-) joint pain,swelling.   Neurologic: (-) epilepsy, (-)seizures,(-) brain tumor,(-) TIA, (-)stroke, (-)headaches, (-)Parkinson disease,(-) memory loss, (-) LOC. Cardiovascular: (-) Chest pain, (-) swelling in legs/feet, (-) SOB, (-) cramping in legs/feet with walking. Respiratory: (-) SOB, (-) Coughing, (-) night sweats. GI: (-) nausea, (-) vomiting, (-) diarrhea, (-) blood in stool, (-) gastric ulcer. Psychiatric: (-) Depression, (-) Anxiety, (-) bipolar disease, (-) Alzheimer's Disease  Allergic/Immunologic: (-) allergies latex, (-) allergies metal, (-) skin sensitivity. Hematlogic: (-) anemia, (-) blood transfusion, (-) DVT/PE, (-) Clotting disorders      Subjective:    Constitution:  Ht. 5 ft 6 in. , Wt. 125 lbs. Psycihatric:  The patient is alert and oriented x 3, appears to be stated age and in no distress. Respiratory:  Respiratory effort is not labored. Patient is not gasping. Palpation of the chest reveals no tactile fremitus. Skin:  Upon inspection: the skin appears warm, dry and intact. There is  a previous scar over the affected area. There is any cellulitis, lymphedema or cutaneous lesions noted in the lower extremities. Upon palpation there is no induration noted. Neurologic:  Gait: antalgic; Motor exam of the lower extremities show ; quadriceps, hamstrings, foot dorsi and plantar flexors intact R.  5/5 and L. 5/5. Deep tendon reflexes are 2/4 at the knees and 2/4 at the ankles with strong extensor hallicus longus motor strength bilaterally. Sensory to both feet is intact to all sensory roots. Cardiovascular: The vascular exam is normal and is well perfused to distal extremities. Distal pulses DP/PT: R. 2+; L. 2+. There is cap refill noted less than two seconds in all digits. There is not edema of the bilateral lower extremities. There is not varicosities noted in the distal extremities. Lymph:  Upon palpation,  there is no lymphadenopathy noted in bilateral lower extremities. Musculoskeletal:  Gait: antalgic; examination of the nails and digits reveal no cyanosis or clubbing.     Lumbar exam:  On visual inspection, there is not deformity of the spine. full range of motion, no tenderness, palpable spasm or pain on motion. Special tests: Straight Leg Raise negative, Tahira test negative. Hip exam:   Upon inspection, there is not deformity noted. Upon palpation there is not tenderness. ROM: is  full and symmetrical.   Strength: Hip Flexors 5/5; Hip Abductors 5/5; Hip Adduction 5/5. Knee exam:  Bilateral knee exam shows;  range of motion of R. Knee is 0 to 125, and L. Knee is 5 to 115. The patient does have  pain on motion, effusion is mild, there is tenderness over the  lateral, suprapatellar region, there are not any masses, there is not ligamentous instability, there is valgus  deformity noted. Knee exam: bilateral positive for moderate crepitations, some mild tenderness laxity is noted with  stress. There is not a popliteal cyst.    R. Knee:  Lachman's negative, Anterior Drawer negative, Posterior Drawer negative  Ana Maria's positive, Thallasy  positive,   PF grind test positive, Apprehension test negative, Patellar J sign  negative  L. Knee:  Lachman's negative, Anterior Drawer negative, Posterior Drawer negative  Ana Maria's positive, Thallasy  positive,   PF grind test positive, Apprehension test negative,  Patellar J sign  negative    Xray Exam:  B/l knee djd lateral compartment moderate - severe  Radiographic findings reviewed with patient    Assessment:  Encounter Diagnoses   Name Primary?  Primary osteoarthritis of right knee Yes    Primary osteoarthritis of left knee        Plan:  Natural history and expected course discussed. Questions answered. Educational materials distributed. Rest, ice, compression, and elevation (RICE) therapy. Reduction in offending activity. Patellar compression sleeve. The patient has failed conservative measures such as NSAIDS, HEP, and cortisone injections.   She is an excellent candidate for viscous supplementation injections including euflexxa in the Bilateral knee.    We will contact the patient's insurance company and see them back in the office once we have received approval.

## 2019-06-05 ENCOUNTER — NURSE ONLY (OUTPATIENT)
Dept: ORTHOPEDIC SURGERY | Age: 84
End: 2019-06-05
Payer: MEDICARE

## 2019-06-05 DIAGNOSIS — M17.11 PRIMARY OSTEOARTHRITIS OF RIGHT KNEE: Primary | ICD-10-CM

## 2019-06-05 DIAGNOSIS — M17.12 PRIMARY OSTEOARTHRITIS OF LEFT KNEE: ICD-10-CM

## 2019-06-05 PROCEDURE — 99999 PR OFFICE/OUTPT VISIT,PROCEDURE ONLY: CPT | Performed by: NURSE PRACTITIONER

## 2019-06-05 PROCEDURE — 20611 DRAIN/INJ JOINT/BURSA W/US: CPT | Performed by: NURSE PRACTITIONER

## 2019-06-12 ENCOUNTER — NURSE ONLY (OUTPATIENT)
Dept: ORTHOPEDIC SURGERY | Age: 84
End: 2019-06-12
Payer: MEDICARE

## 2019-06-12 DIAGNOSIS — M17.12 PRIMARY OSTEOARTHRITIS OF LEFT KNEE: ICD-10-CM

## 2019-06-12 DIAGNOSIS — M17.11 PRIMARY OSTEOARTHRITIS OF RIGHT KNEE: Primary | ICD-10-CM

## 2019-06-12 PROCEDURE — 20611 DRAIN/INJ JOINT/BURSA W/US: CPT | Performed by: NURSE PRACTITIONER

## 2019-06-13 NOTE — PROGRESS NOTES
Verbal and written consent was obtained by the patient. The following is a well known to me that is here for bilateral knee injections. They are here for  synvisc # 2. Her knees were prepped in sterile fashion. synvisc 16 mg injected to Bilateral knees using ultrasound guidance. The patient tolerated the injections well and I will see the patient back in 1 week for repeat injections.

## 2019-06-18 NOTE — TELEPHONE ENCOUNTER
Called and spoke with Leonela from [facility] and patient still has refills on Voltaren gel. Called and spoke with the patient  to inform him that she still has refills. Patient  is aware and voiced understanding.

## 2019-06-19 ENCOUNTER — NURSE ONLY (OUTPATIENT)
Dept: ORTHOPEDIC SURGERY | Age: 84
End: 2019-06-19
Payer: MEDICARE

## 2019-06-19 ENCOUNTER — OFFICE VISIT (OUTPATIENT)
Dept: FAMILY MEDICINE CLINIC | Age: 84
End: 2019-06-19
Payer: MEDICARE

## 2019-06-19 VITALS
HEART RATE: 74 BPM | OXYGEN SATURATION: 97 % | SYSTOLIC BLOOD PRESSURE: 130 MMHG | BODY MASS INDEX: 19.77 KG/M2 | WEIGHT: 123 LBS | HEIGHT: 66 IN | TEMPERATURE: 97.3 F | DIASTOLIC BLOOD PRESSURE: 72 MMHG

## 2019-06-19 DIAGNOSIS — K21.9 GASTROESOPHAGEAL REFLUX DISEASE WITHOUT ESOPHAGITIS: Primary | ICD-10-CM

## 2019-06-19 DIAGNOSIS — F02.80 ALZHEIMER'S DEMENTIA WITHOUT BEHAVIORAL DISTURBANCE, UNSPECIFIED TIMING OF DEMENTIA ONSET: ICD-10-CM

## 2019-06-19 DIAGNOSIS — E78.5 HYPERLIPIDEMIA, UNSPECIFIED HYPERLIPIDEMIA TYPE: ICD-10-CM

## 2019-06-19 DIAGNOSIS — M17.12 PRIMARY OSTEOARTHRITIS OF LEFT KNEE: ICD-10-CM

## 2019-06-19 DIAGNOSIS — G30.9 ALZHEIMER'S DEMENTIA WITHOUT BEHAVIORAL DISTURBANCE, UNSPECIFIED TIMING OF DEMENTIA ONSET: ICD-10-CM

## 2019-06-19 DIAGNOSIS — M19.90 ARTHRITIS: ICD-10-CM

## 2019-06-19 DIAGNOSIS — M17.11 PRIMARY OSTEOARTHRITIS OF RIGHT KNEE: Primary | ICD-10-CM

## 2019-06-19 DIAGNOSIS — E53.8 VITAMIN B 12 DEFICIENCY: ICD-10-CM

## 2019-06-19 PROCEDURE — G8420 CALC BMI NORM PARAMETERS: HCPCS | Performed by: INTERNAL MEDICINE

## 2019-06-19 PROCEDURE — 1036F TOBACCO NON-USER: CPT | Performed by: INTERNAL MEDICINE

## 2019-06-19 PROCEDURE — 4040F PNEUMOC VAC/ADMIN/RCVD: CPT | Performed by: INTERNAL MEDICINE

## 2019-06-19 PROCEDURE — 96372 THER/PROPH/DIAG INJ SC/IM: CPT | Performed by: INTERNAL MEDICINE

## 2019-06-19 PROCEDURE — 1123F ACP DISCUSS/DSCN MKR DOCD: CPT | Performed by: INTERNAL MEDICINE

## 2019-06-19 PROCEDURE — G8427 DOCREV CUR MEDS BY ELIG CLIN: HCPCS | Performed by: INTERNAL MEDICINE

## 2019-06-19 PROCEDURE — 1090F PRES/ABSN URINE INCON ASSESS: CPT | Performed by: INTERNAL MEDICINE

## 2019-06-19 PROCEDURE — 99213 OFFICE O/P EST LOW 20 MIN: CPT | Performed by: INTERNAL MEDICINE

## 2019-06-19 PROCEDURE — 20611 DRAIN/INJ JOINT/BURSA W/US: CPT | Performed by: NURSE PRACTITIONER

## 2019-06-19 RX ORDER — CYANOCOBALAMIN 1000 UG/ML
1000 INJECTION INTRAMUSCULAR; SUBCUTANEOUS ONCE
Status: COMPLETED | OUTPATIENT
Start: 2019-06-19 | End: 2019-06-19

## 2019-06-19 RX ADMIN — CYANOCOBALAMIN 1000 MCG: 1000 INJECTION INTRAMUSCULAR; SUBCUTANEOUS at 16:31

## 2019-06-19 ASSESSMENT — ENCOUNTER SYMPTOMS
NAUSEA: 0
BLOOD IN STOOL: 0
EYE DISCHARGE: 0
SINUS PAIN: 0
SHORTNESS OF BREATH: 0
SORE THROAT: 0
EYE PAIN: 0
ABDOMINAL PAIN: 0

## 2019-06-19 NOTE — PROGRESS NOTES
Verbal and written consent was obtained by the patient. The following is a well known to me that is here for bilateral knee injections. They are here for  synvisc # 3. Her knees were prepped in sterile fashion. synvisc 16 mg injected to Bilateral knees using ultrasound guidance. The patient tolerated the injections well and I will see the patient back in 1 week prn     Diagnosis Orders   1. Primary osteoarthritis of right knee     2.  Primary osteoarthritis of left knee

## 2019-06-19 NOTE — PROGRESS NOTES
Chief Complaint   Patient presents with    [facility] Mammo order.  Memory Loss      states memmory is becoming worse. HPI:  Patient is here for follow-up   Above noted    Allergy and Medications are reviewed and updated. Past Medical History, Surgical History, and Family History has been reviewed and updated. Review of Systems:  Review of Systems   Constitutional: Negative for chills and fever. HENT: Negative for congestion, sinus pain and sore throat. Eyes: Negative for pain and discharge. Respiratory: Negative for shortness of breath (No new SOb). Cardiovascular: Negative for chest pain. Gastrointestinal: Negative for abdominal pain, blood in stool and nausea. Genitourinary: Negative for flank pain and frequency. Musculoskeletal: Negative for neck pain. Hematological: Does not bruise/bleed easily. Psychiatric/Behavioral: Negative for suicidal ideas. Vitals:    06/19/19 1452   BP: 130/72   Pulse: 74   Temp: 97.3 °F (36.3 °C)   SpO2: 97%   Weight: 123 lb (55.8 kg)   Height: 5' 6\" (1.676 m)       Physical Exam   Constitutional: She is oriented to person, place, and time. She appears well-developed and well-nourished. HENT:   Head: Normocephalic and atraumatic. Mouth/Throat: No oropharyngeal exudate. Eyes: Pupils are equal, round, and reactive to light. Conjunctivae are normal.   Neck: No JVD present. Cardiovascular: Normal rate and regular rhythm. Pulmonary/Chest: Effort normal and breath sounds normal. She has no rales. Abdominal: Soft. Bowel sounds are normal.   Musculoskeletal: Normal range of motion. She exhibits no edema. Lymphadenopathy:     She has no cervical adenopathy. Neurological: She is alert and oriented to person, place, and time. Skin: Skin is warm and dry. Psychiatric: Her behavior is normal.   Vitals reviewed.        Labs :    Lab Results   Component Value Date    WBC 6.7 03/01/2019    HGB 12.8 03/01/2019 HCT 43.0 03/01/2019     03/01/2019    CHOL 177 07/18/2016    TRIG 80 07/18/2016    HDL 83 03/01/2019    ALT 11 03/01/2019    AST 20 03/01/2019     (H) 03/01/2019    K 3.9 03/01/2019     (H) 03/01/2019    CREATININE 0.7 03/01/2019    BUN 17 03/01/2019    CO2 26 03/01/2019    TSH 1.360 03/01/2019    GLUF 90 03/01/2019    LABA1C 5.7 (H) 03/01/2019     Lab Results   Component Value Date    COLORU Yellow 03/01/2019    NITRU Negative 03/01/2019    GLUCOSEU Negative 03/01/2019    KETUA Negative 03/01/2019    UROBILINOGEN 0.2 03/01/2019    BILIRUBINUR Negative 03/01/2019           ASSESSMENT     Patient Active Problem List    Diagnosis Date Noted    Sacroiliitis ([facility] [address] 11/26/2018    Arthritis 03/21/2018    Alzheimer's disease 02/21/2018    Vitamin B 12 deficiency 02/21/2018    Gastroesophageal reflux disease without esophagitis 10/21/2013        Diagnosis:     ICD-10-CM    1. Gastroesophageal reflux disease without esophagitis K21.9    2. Alzheimer's dementia without behavioral disturbance, unspecified timing of dementia onset G30.9     F02.80    3. Arthritis M19.90    4. Hyperlipidemia, unspecified hyperlipidemia type E78.5    5. Vitamin B 12 deficiency E53.8        PLAN:        Inj B12 today    Had knee injections today    Low salt, Low Carb diet an low fat diet  Continue medications as advised and take them regularly  Regular exercises as advised    Discussed natural and expected course of this diagnosis and need to alert me if symptoms do not follow expected course, or if any worse. Smoking cessation if applicable, discussed with patient. Pt is stable on current medical treatment. Continue current treatment plan    Side effects/Adverse effects/Precautions are reviewed with patient. There are no Patient Instructions on file for this visit. Return in about 2 months (around 8/19/2019).

## 2019-07-18 ENCOUNTER — APPOINTMENT (OUTPATIENT)
Dept: CT IMAGING | Age: 84
End: 2019-07-18
Payer: MEDICARE

## 2019-07-18 ENCOUNTER — HOSPITAL ENCOUNTER (EMERGENCY)
Age: 84
Discharge: HOME | End: 2019-07-18
Attending: EMERGENCY MEDICINE
Payer: MEDICARE

## 2019-07-18 ENCOUNTER — APPOINTMENT (OUTPATIENT)
Dept: GENERAL RADIOLOGY | Age: 84
End: 2019-07-18
Payer: MEDICARE

## 2019-07-18 VITALS
RESPIRATION RATE: 20 BRPM | OXYGEN SATURATION: 98 % | TEMPERATURE: 98.4 F | HEART RATE: 66 BPM | DIASTOLIC BLOOD PRESSURE: 68 MMHG | SYSTOLIC BLOOD PRESSURE: 156 MMHG

## 2019-07-18 DIAGNOSIS — S09.90XA INJURY OF HEAD, INITIAL ENCOUNTER: Primary | ICD-10-CM

## 2019-07-18 DIAGNOSIS — S01.81XA FACIAL LACERATION, INITIAL ENCOUNTER: ICD-10-CM

## 2019-07-18 PROCEDURE — 6360000002 HC RX W HCPCS: Performed by: EMERGENCY MEDICINE

## 2019-07-18 PROCEDURE — 73110 X-RAY EXAM OF WRIST: CPT

## 2019-07-18 PROCEDURE — 12011 RPR F/E/E/N/L/M 2.5 CM/<: CPT

## 2019-07-18 PROCEDURE — 90471 IMMUNIZATION ADMIN: CPT | Performed by: EMERGENCY MEDICINE

## 2019-07-18 PROCEDURE — 6370000000 HC RX 637 (ALT 250 FOR IP): Performed by: EMERGENCY MEDICINE

## 2019-07-18 PROCEDURE — 90715 TDAP VACCINE 7 YRS/> IM: CPT | Performed by: EMERGENCY MEDICINE

## 2019-07-18 PROCEDURE — 99284 EMERGENCY DEPT VISIT MOD MDM: CPT

## 2019-07-18 PROCEDURE — 70450 CT HEAD/BRAIN W/O DYE: CPT

## 2019-07-18 RX ADMIN — TETANUS TOXOID, REDUCED DIPHTHERIA TOXOID AND ACELLULAR PERTUSSIS VACCINE, ADSORBED 0.5 ML: 5; 2.5; 8; 8; 2.5 SUSPENSION INTRAMUSCULAR at 20:14

## 2019-07-18 RX ADMIN — Medication: at 21:11

## 2019-07-18 ASSESSMENT — ENCOUNTER SYMPTOMS
COUGH: 0
BACK PAIN: 0
VOMITING: 0
DIARRHEA: 0
WHEEZING: 0
EYE PAIN: 0
SHORTNESS OF BREATH: 0
CHEST TIGHTNESS: 0
SINUS PRESSURE: 0
SORE THROAT: 0
NAUSEA: 0
ABDOMINAL DISTENTION: 0
ABDOMINAL PAIN: 0

## 2019-07-19 ENCOUNTER — CARE COORDINATION (OUTPATIENT)
Dept: CARE COORDINATION | Age: 84
End: 2019-07-19

## 2019-07-19 NOTE — ED PROVIDER NOTES
Chief complaint:  Fall    HPI history provided by the patient and spouse  Patient comes in complaining of a right head injury and right wrist pain. She tripped and fell with a mechanical fall this evening. She did hit her head and did hit her right wrist, states immediately that she has a history of right wrist fracture which was never healed quite correctly and is always somewhat deformed but has hit this evening and has a slight increase in her pain. No other arm or leg pain or injuries and no extremity numbness, tingling, paresthesias or weakness. Was not dizzy or lightheaded and did not pass out. She states that she tripped and fell. Denies neck or back pain. Complains of a laceration to the right eyebrow which was bandaged at home already with no other facial or head injuries. No general headache. No confusion. No anticoagulant use. Review of Systems   Constitutional: Negative for chills, diaphoresis, fatigue and fever. HENT: Negative for congestion, sinus pressure and sore throat. Eyes: Negative for pain and visual disturbance. Respiratory: Negative for cough, chest tightness, shortness of breath and wheezing. Cardiovascular: Negative for chest pain, palpitations and leg swelling. Gastrointestinal: Negative for abdominal distention, abdominal pain, diarrhea, nausea and vomiting. Genitourinary: Negative for dysuria, flank pain and frequency. Musculoskeletal: Negative for arthralgias, back pain, gait problem, joint swelling, myalgias, neck pain and neck stiffness. Skin: Positive for wound. Negative for rash. Neurological: Negative for dizziness, seizures, syncope, weakness, light-headedness, numbness and headaches. Hematological: Negative for adenopathy. All other systems reviewed and are negative. Physical Exam   Constitutional: She is oriented to person, place, and time. She appears well-developed and well-nourished. Non-toxic appearance.  She does not have a sickly ---------------------------------  At this time the patient is without objective evidence of an acute process requiring hospitalization or inpatient management. They have remained hemodynamically stable throughout their entire ED visit and are stable for discharge with outpatient follow-up. The plan has been discussed in detail and they are aware of the specific conditions for emergent return, as well as the importance of follow-up. New Prescriptions    No medications on file       Diagnosis:  1. Injury of head, initial encounter    2. Facial laceration, initial encounter        Disposition:  Patient's disposition: Discharge to home  Patient's condition is stable.              Meryle Childs, DO  07/18/19 [id number]

## 2019-07-22 ENCOUNTER — OFFICE VISIT (OUTPATIENT)
Dept: FAMILY MEDICINE CLINIC | Age: 84
End: 2019-07-22
Payer: MEDICARE

## 2019-07-22 VITALS
HEART RATE: 46 BPM | DIASTOLIC BLOOD PRESSURE: 74 MMHG | HEIGHT: 66 IN | BODY MASS INDEX: 19.93 KG/M2 | OXYGEN SATURATION: 98 % | WEIGHT: 124 LBS | SYSTOLIC BLOOD PRESSURE: 122 MMHG

## 2019-07-22 DIAGNOSIS — K58.0 IRRITABLE BOWEL SYNDROME WITH DIARRHEA: Primary | ICD-10-CM

## 2019-07-22 DIAGNOSIS — S09.90XA INJURY OF HEAD, INITIAL ENCOUNTER: ICD-10-CM
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
## 2019-07-22 DIAGNOSIS — K58.0 IRRITABLE BOWEL SYNDROME WITH DIARRHEA: ICD-10-CM

## 2019-07-22 PROCEDURE — G8420 CALC BMI NORM PARAMETERS: HCPCS | Performed by: INTERNAL MEDICINE

## 2019-07-22 PROCEDURE — 1090F PRES/ABSN URINE INCON ASSESS: CPT | Performed by: INTERNAL MEDICINE

## 2019-07-22 PROCEDURE — 1036F TOBACCO NON-USER: CPT | Performed by: INTERNAL MEDICINE

## 2019-07-22 PROCEDURE — 4040F PNEUMOC VAC/ADMIN/RCVD: CPT | Performed by: INTERNAL MEDICINE

## 2019-07-22 PROCEDURE — 99213 OFFICE O/P EST LOW 20 MIN: CPT | Performed by: INTERNAL MEDICINE

## 2019-07-22 PROCEDURE — 1123F ACP DISCUSS/DSCN MKR DOCD: CPT | Performed by: INTERNAL MEDICINE

## 2019-07-22 PROCEDURE — G8427 DOCREV CUR MEDS BY ELIG CLIN: HCPCS | Performed by: INTERNAL MEDICINE

## 2019-07-22 RX ORDER — DIPHENOXYLATE HYDROCHLORIDE AND ATROPINE SULFATE 2.5; .025 MG/1; MG/1
1 TABLET ORAL 4 TIMES DAILY PRN
COMMUNITY
End: 2019-07-22 | Stop reason: SDUPTHER

## 2019-07-22 RX ORDER — DIPHENOXYLATE HYDROCHLORIDE AND ATROPINE SULFATE 2.5; .025 MG/1; MG/1
1 TABLET ORAL 2 TIMES DAILY PRN
Qty: 40 TABLET | Refills: 1 | Status: SHIPPED | OUTPATIENT
Start: 2019-07-22 | End: 2020-02-24 | Stop reason: SDUPTHER

## 2019-07-22 ASSESSMENT — ENCOUNTER SYMPTOMS
EYE PAIN: 0
NAUSEA: 0
ABDOMINAL PAIN: 0
SHORTNESS OF BREATH: 0
EYE DISCHARGE: 0
BLOOD IN STOOL: 0
SINUS PAIN: 0
SORE THROAT: 0

## 2019-07-22 NOTE — PROGRESS NOTES
Chief Complaint   Patient presents with    Fall     ER follow up for fall at home. laceration to the Rt side of head. injury to Rt Wrist.        HPI:  Patient is here for follow-up   Feeling okay. No HA, Dizziness  No LOC reported at the time or later on        Allergy and Medications are reviewed and updated. Past Medical History, Surgical History, and Family History has been reviewed and updated. Review of Systems:  Review of Systems   Constitutional: Negative for chills and fever. HENT: Negative for congestion, sinus pain and sore throat. Eyes: Negative for pain and discharge. Respiratory: Negative for shortness of breath (No new SOb). Cardiovascular: Negative for chest pain. Gastrointestinal: Negative for abdominal pain, blood in stool and nausea. Genitourinary: Negative for flank pain and frequency. Musculoskeletal: Negative for neck pain. Hematological: Does not bruise/bleed easily. Psychiatric/Behavioral: Negative for suicidal ideas. Vitals:    07/22/19 1019   BP: 122/74   Pulse: (!) 46   SpO2: 98%   Weight: 124 lb (56.2 kg)   Height: 5' 6\" (1.676 m)       Physical Exam   Constitutional: She is oriented to person, place, and time. She appears well-developed and well-nourished. HENT:   Head: Normocephalic and atraumatic. Mouth/Throat: No oropharyngeal exudate. Eyes: Pupils are equal, round, and reactive to light. Conjunctivae are normal.   Neck: No JVD present. Cardiovascular: Normal rate and regular rhythm. Pulmonary/Chest: Effort normal and breath sounds normal. She has no rales. Abdominal: Soft. Bowel sounds are normal.   Musculoskeletal: Normal range of motion. She exhibits no edema. Lymphadenopathy:     She has no cervical adenopathy. Neurological: She is alert and oriented to person, place, and time. Skin: Skin is warm and dry. Psychiatric: Her behavior is normal.   Vitals reviewed.        Labs :    Lab Results   Component Value Date    WBC 6.7 03/01/2019    HGB 12.8 03/01/2019    HCT 43.0 03/01/2019     03/01/2019    CHOL 177 07/18/2016    TRIG 80 07/18/2016    HDL 83 03/01/2019    ALT 11 03/01/2019    AST 20 03/01/2019     (H) 03/01/2019    K 3.9 03/01/2019     (H) 03/01/2019    CREATININE 0.7 03/01/2019    BUN 17 03/01/2019    CO2 26 03/01/2019    TSH 1.360 03/01/2019    GLUF 90 03/01/2019    LABA1C 5.7 (H) 03/01/2019     Lab Results   Component Value Date    COLORU Yellow 03/01/2019    NITRU Negative 03/01/2019    GLUCOSEU Negative 03/01/2019    KETUA Negative 03/01/2019    UROBILINOGEN 0.2 03/01/2019    BILIRUBINUR Negative 03/01/2019           ASSESSMENT     Patient Active Problem List    Diagnosis Date Noted    Sacroiliitis ([facility] [address] 11/26/2018    Arthritis 03/21/2018    Alzheimer's disease 02/21/2018    Vitamin B 12 deficiency 02/21/2018    Gastroesophageal reflux disease without esophagitis 10/21/2013        Diagnosis:     ICD-10-CM    1. Fall, initial encounter [address] XXXA    2. Injury of head, initial encounter S09.90XA    3. Irritable bowel syndrome with diarrhea K58.0        PLAN:      Stable  Wound care    Low salt, Low Carb diet an low fat diet  Continue medications as advised and take them regularly  Regular exercises as advised    Discussed natural and expected course of this diagnosis and need to alert me if symptoms do not follow expected course, or if any worse. Controlled Substance Monitoring:    Acute and Chronic Pain Monitoring:   RX Monitoring 7/22/2019   Periodic Controlled Substance Monitoring Possible medication side effects, risk of tolerance/dependence & alternative treatments discussed. ;No signs of potential drug abuse or diversion identified. Lomotil 1 tab bid prn for diarrhea/IBS # 40 x 1 RF       Patient Instructions   Patient Education        Learning About How to Make a Home Safe  Making your home safe: Overview  You can help protect the person in your care by making the home safe.  Here are

## 2019-08-19 ENCOUNTER — OFFICE VISIT (OUTPATIENT)
Dept: FAMILY MEDICINE CLINIC | Age: 84
End: 2019-08-19
Payer: MEDICARE

## 2019-08-19 VITALS
HEART RATE: 50 BPM | TEMPERATURE: 98.4 F | OXYGEN SATURATION: 96 % | WEIGHT: 124.6 LBS | HEIGHT: 66 IN | SYSTOLIC BLOOD PRESSURE: 130 MMHG | DIASTOLIC BLOOD PRESSURE: 82 MMHG | BODY MASS INDEX: 20.03 KG/M2

## 2019-08-19 DIAGNOSIS — G30.9 ALZHEIMER'S DEMENTIA WITHOUT BEHAVIORAL DISTURBANCE, UNSPECIFIED TIMING OF DEMENTIA ONSET: ICD-10-CM

## 2019-08-19 DIAGNOSIS — K58.0 IRRITABLE BOWEL SYNDROME WITH DIARRHEA: ICD-10-CM

## 2019-08-19 DIAGNOSIS — F32.A DEPRESSION, UNSPECIFIED DEPRESSION TYPE: ICD-10-CM

## 2019-08-19 DIAGNOSIS — E78.5 HYPERLIPIDEMIA, UNSPECIFIED HYPERLIPIDEMIA TYPE: ICD-10-CM

## 2019-08-19 DIAGNOSIS — F02.80 ALZHEIMER'S DEMENTIA WITHOUT BEHAVIORAL DISTURBANCE, UNSPECIFIED TIMING OF DEMENTIA ONSET: ICD-10-CM

## 2019-08-19 DIAGNOSIS — E53.8 VITAMIN B 12 DEFICIENCY: ICD-10-CM

## 2019-08-19 DIAGNOSIS — K21.9 GASTROESOPHAGEAL REFLUX DISEASE WITHOUT ESOPHAGITIS: Primary | ICD-10-CM

## 2019-08-19 PROCEDURE — 1090F PRES/ABSN URINE INCON ASSESS: CPT | Performed by: INTERNAL MEDICINE

## 2019-08-19 PROCEDURE — 96372 THER/PROPH/DIAG INJ SC/IM: CPT | Performed by: INTERNAL MEDICINE

## 2019-08-19 PROCEDURE — 99213 OFFICE O/P EST LOW 20 MIN: CPT | Performed by: INTERNAL MEDICINE

## 2019-08-19 PROCEDURE — G8420 CALC BMI NORM PARAMETERS: HCPCS | Performed by: INTERNAL MEDICINE

## 2019-08-19 PROCEDURE — 4040F PNEUMOC VAC/ADMIN/RCVD: CPT | Performed by: INTERNAL MEDICINE

## 2019-08-19 PROCEDURE — G8427 DOCREV CUR MEDS BY ELIG CLIN: HCPCS | Performed by: INTERNAL MEDICINE

## 2019-08-19 PROCEDURE — 1036F TOBACCO NON-USER: CPT | Performed by: INTERNAL MEDICINE

## 2019-08-19 PROCEDURE — 1123F ACP DISCUSS/DSCN MKR DOCD: CPT | Performed by: INTERNAL MEDICINE

## 2019-08-19 RX ORDER — CYANOCOBALAMIN 1000 UG/ML
1000 INJECTION INTRAMUSCULAR; SUBCUTANEOUS ONCE
Status: COMPLETED | OUTPATIENT
Start: 2019-08-19 | End: 2019-08-19

## 2019-08-19 RX ADMIN — CYANOCOBALAMIN 1000 MCG: 1000 INJECTION INTRAMUSCULAR; SUBCUTANEOUS at 15:52

## 2019-08-19 ASSESSMENT — ENCOUNTER SYMPTOMS
EYE PAIN: 0
SINUS PAIN: 0
ABDOMINAL PAIN: 0
SHORTNESS OF BREATH: 0
EYE DISCHARGE: 0
SORE THROAT: 0
NAUSEA: 0
BLOOD IN STOOL: 0

## 2019-09-11 ENCOUNTER — OFFICE VISIT (OUTPATIENT)
Dept: PHYSICAL MEDICINE AND REHAB | Age: 84
End: 2019-09-11
Payer: MEDICARE

## 2019-09-11 VITALS
SYSTOLIC BLOOD PRESSURE: 122 MMHG | HEART RATE: 72 BPM | HEIGHT: 66 IN | WEIGHT: 124 LBS | BODY MASS INDEX: 19.93 KG/M2 | DIASTOLIC BLOOD PRESSURE: 68 MMHG

## 2019-09-11 DIAGNOSIS — M46.1 SACROILIITIS (HCC): Primary | ICD-10-CM

## 2019-09-11 PROCEDURE — 1123F ACP DISCUSS/DSCN MKR DOCD: CPT | Performed by: PHYSICAL MEDICINE & REHABILITATION

## 2019-09-11 PROCEDURE — 4040F PNEUMOC VAC/ADMIN/RCVD: CPT | Performed by: PHYSICAL MEDICINE & REHABILITATION

## 2019-09-11 PROCEDURE — G8420 CALC BMI NORM PARAMETERS: HCPCS | Performed by: PHYSICAL MEDICINE & REHABILITATION

## 2019-09-11 PROCEDURE — 1036F TOBACCO NON-USER: CPT | Performed by: PHYSICAL MEDICINE & REHABILITATION

## 2019-09-11 PROCEDURE — 1090F PRES/ABSN URINE INCON ASSESS: CPT | Performed by: PHYSICAL MEDICINE & REHABILITATION

## 2019-09-11 PROCEDURE — G8427 DOCREV CUR MEDS BY ELIG CLIN: HCPCS | Performed by: PHYSICAL MEDICINE & REHABILITATION

## 2019-09-11 PROCEDURE — 99214 OFFICE O/P EST MOD 30 MIN: CPT | Performed by: PHYSICAL MEDICINE & REHABILITATION

## 2019-09-11 NOTE — PROGRESS NOTES
anxiety, depression    Hematologic/Lymphatic/Immunologic: Denies bruising       Physical Exam:   Blood pressure 122/68, pulse 72, height 5' 6\" (1.676 m), weight 124 lb (56.2 kg). General: well developed and well nourished in no acute distress. Body habitus is thin  HEENT: No rhinorrhea, sneezing, yawning, or lacrimation. No scleral icterus or conjunctival injection. Resp: symmetrical chest expansion, unlabored breathing, respirations unlabored. CV: Heart rate is regular. Peripheral pulses are palpable  Lymph: No visible regional lymphadenopathy. Skin: No rashes or ecchymosis. Normal turgor. Psych: Mood is calm. Affect is normal.   Ext: No edema noted     MSK:   Back/Hip Exam:   Inspection: Pelvis was asymmetric. Lumbar lordotic curvature was decreased. There was no scoliosis. No ecchymoses or erythema. Palpation: Palpatory exam revealed no tenderness along lumbosacral paraspinals, midline spine, ttp bilateral SI Joint sulci. There was no paraspinal spasms. There were no trigger points. ROM: ROM normal.   Special/provocative testing:   Supine SLR negative   negative FABERS. positiveGaenslens test  There was leg length discrepancy- right longer  *given a heel lift by PT. Not wearing it     Neurological Exam:  Strength:   R  L  Hip Flex  5  5  Knee Ext  5  5  Ankle dorsi  5  5  EHL   5 5  Ankle Plantar  5  5    Sensory:  Intact for light touch in all lower extremity dermatomes. Reflexes:   R  L  Patellar  (1+) (1+)  Ankle Jerk  (1+) (1+)    Gait is Antalgic. MRI L Spine 12/4/18:  BONE MARROW SIGNAL: Confluent hyperintense T1 signal changes with   patchy signal hyperintensities present in the right sacral ala. Additional abnormal T2 signal changes on the STIR images noted within   the left sacral ala. No abnormal signal associated with the L4   vertebral body.       T12-L1: Normal T12-L1       L1-L2: Normal L1-L2       L2-L3: Degenerative disc disease with no central or foraminal   stenosis.

## 2019-09-19 ENCOUNTER — HOSPITAL ENCOUNTER (OUTPATIENT)
Dept: OPERATING ROOM | Age: 84
Setting detail: OUTPATIENT SURGERY
Discharge: HOME | End: 2019-09-19
Attending: PHYSICAL MEDICINE & REHABILITATION
Payer: MEDICARE

## 2019-09-19 ENCOUNTER — HOSPITAL ENCOUNTER (OUTPATIENT)
Age: 84
Setting detail: OUTPATIENT SURGERY
Discharge: HOME | End: 2019-09-19
Attending: PHYSICAL MEDICINE & REHABILITATION | Admitting: PHYSICAL MEDICINE & REHABILITATION
Payer: MEDICARE

## 2019-09-19 VITALS
TEMPERATURE: 98 F | HEART RATE: 82 BPM | RESPIRATION RATE: 18 BRPM | SYSTOLIC BLOOD PRESSURE: 171 MMHG | DIASTOLIC BLOOD PRESSURE: 81 MMHG | OXYGEN SATURATION: 99 %

## 2019-09-19 DIAGNOSIS — M46.1 SACROILIITIS (HCC): ICD-10-CM

## 2019-09-19 PROCEDURE — A9579 GAD-BASE MR CONTRAST NOS,1ML: HCPCS | Performed by: PHYSICAL MEDICINE & REHABILITATION

## 2019-09-19 PROCEDURE — 6360000002 HC RX W HCPCS: Performed by: PHYSICAL MEDICINE & REHABILITATION

## 2019-09-19 PROCEDURE — 3209999900 FLUORO FOR SURGICAL PROCEDURES

## 2019-09-19 PROCEDURE — 7100000011 HC PHASE II RECOVERY - ADDTL 15 MIN: Performed by: PHYSICAL MEDICINE & REHABILITATION

## 2019-09-19 PROCEDURE — 6360000004 HC RX CONTRAST MEDICATION: Performed by: PHYSICAL MEDICINE & REHABILITATION

## 2019-09-19 PROCEDURE — 27096 INJECT SACROILIAC JOINT: CPT | Performed by: PHYSICAL MEDICINE & REHABILITATION

## 2019-09-19 PROCEDURE — 7100000010 HC PHASE II RECOVERY - FIRST 15 MIN: Performed by: PHYSICAL MEDICINE & REHABILITATION

## 2019-09-19 PROCEDURE — 2500000003 HC RX 250 WO HCPCS: Performed by: PHYSICAL MEDICINE & REHABILITATION

## 2019-09-19 PROCEDURE — 2709999900 HC NON-CHARGEABLE SUPPLY: Performed by: PHYSICAL MEDICINE & REHABILITATION

## 2019-09-19 PROCEDURE — 3600000005 HC SURGERY LEVEL 5 BASE: Performed by: PHYSICAL MEDICINE & REHABILITATION

## 2019-09-19 RX ORDER — LIDOCAINE HYDROCHLORIDE 10 MG/ML
INJECTION, SOLUTION EPIDURAL; INFILTRATION; INTRACAUDAL; PERINEURAL PRN
Status: DISCONTINUED | OUTPATIENT
Start: 2019-09-19 | End: 2019-09-19 | Stop reason: ALTCHOICE

## 2019-09-19 ASSESSMENT — PAIN DESCRIPTION - DESCRIPTORS: DESCRIPTORS: ACHING;DISCOMFORT

## 2019-09-19 ASSESSMENT — PAIN - FUNCTIONAL ASSESSMENT: PAIN_FUNCTIONAL_ASSESSMENT: 0-10

## 2019-09-19 ASSESSMENT — PAIN SCALES - GENERAL
PAINLEVEL_OUTOF10: 0
PAINLEVEL_OUTOF10: 0

## 2019-09-19 ASSESSMENT — PAIN DESCRIPTION - LOCATION
LOCATION: BACK
LOCATION: BACK

## 2019-09-19 ASSESSMENT — PAIN DESCRIPTION - PAIN TYPE
TYPE: SURGICAL PAIN
TYPE: SURGICAL PAIN

## 2019-09-19 NOTE — H&P
Tyra Leon, [address] [facility] Physical Medicine and Rehabilitation  [address] [facility] [place]  Phone: [phone]  Fax: [phone]     PCP: Maylin Cameron MD  Date of visit: 9/11/19          Chief Complaint   Patient presents with    Back Pain       Follow up, refill voltaren gel         Interval:   Patient presents today for follow up visit. She underwent SI joint injection on 1/17/19 and reports 100% relief at the site. Her pain is back and is interested in repeating injections. The pain is rated Pain Score:   7, is described as \"hurts\", and is located in the low back/buttock without radiation to the legs. The pain is better with tylenol, voltaren gel. Needs refills. The pain is worse with sitting. There is no associated numbness/tingling. There is no weakness. There is no bowel/bladder changes.      The prior workup has included: Xray hips, MRI L spine       The prior treatment has included:  PT: made it worse   Chiropractic: none   Modalities: bengay with no relief   OTC Tylenol: yes with some relief   NSAIDS: yes with no relief, voltaren gel with relief   Membrane stabilizers: none    Muscle relaxers: none   Previous injections: bilateral SI joint with 100% relief     Previous surgery at this site: none              Allergies   Allergen Reactions    Iodine         I. V.                Current Outpatient Medications   Medication Sig Dispense Refill    diclofenac sodium (VOLTAREN) 1 % GEL Apply 4 g topically 4 times daily as needed (pain) 480 g 5    diphenoxylate-atropine (LOMOTIL) 2.5-0.025 MG per tablet Take 1 tablet by mouth 2 times daily as needed for Diarrhea for up to 180 days.  40 tablet 1    citalopram (CELEXA) 10 MG tablet Take 1 tablet by mouth every morning 90 tablet 1    donepezil (ARICEPT) 10 MG tablet Take 1 tablet by mouth nightly 90 tablet 1    memantine (NAMENDA) 10 MG tablet Take 1 tablet by mouth 2 times daily 180 tablet 1    Glucosamine-Chondroit-Vit C-Mn (GLUCOSAMINE 1500 COMPLEX PO) Take by mouth        Cholecalciferol (VITAMIN D3) 2000 units CAPS Take by mouth        meloxicam (MOBIC) 15 MG tablet Take 1 tablet by mouth daily 30 tablet 1      No current facility-administered medications for this visit.               Past Medical History:   Diagnosis Date    Arthritis      GERD (gastroesophageal reflux disease)                 Past Surgical History:   Procedure Laterality Date    ABDOMEN SURGERY        APPENDECTOMY        COLONOSCOPY        ECHO COMPL W DOP COLOR FLOW   10/21/2013          HC INJECTION PROCEDURE FOR SACROILIAC JOINT Right 12/13/2018     RIGHT SACROILIAC JOINT STEROID INJECTION UNDER X-RAY GUIDANCE performed by Shayla Gonzalez DO at [address] Left 1/17/2019     LEFT SACROILIAC JOINT STEROID INJECTION UNDER X-RAY GUIDANCE performed by Shayla Gonzalez DO at [address] [facility] Right 12/13/2018     sacroiliac joint     NERVE BLOCK Left 01/17/2019     sacroiliac joint injection                Family History   Problem Relation Age of Onset    Heart Disease Mother      Heart Disease Father        Social History           Tobacco Use    Smoking status: Never Smoker    Smokeless tobacco: Never Used   Substance Use Topics    Alcohol use: No    Drug use: No         Functional Status:    The patient is able to ambulate and perform activities of daily living without the use of an assistive device.            ROS:     Constitutional: Denies fevers, chills, night sweats, unintentional weight loss     Skin: Denies rash or skin changes     Eyes: Denies vision changes    Ears/Nose/Throat: Denies nasal congestion or sore throat     Respiratory: Denies SOB or cough     Cardiovascular: Denies CP, palpitations, edema      Gastrointestinal: Denies abdominal pain,  N/V, constipation, or diarrhea    Genitourinary: Denies

## 2019-10-07 RX ORDER — CITALOPRAM 10 MG/1
10 TABLET ORAL EVERY MORNING
Qty: 90 TABLET | Refills: 1 | Status: SHIPPED
Start: 2019-10-07 | End: 2020-04-10 | Stop reason: SDUPTHER

## 2019-10-08 ENCOUNTER — OFFICE VISIT (OUTPATIENT)
Dept: PHYSICAL MEDICINE AND REHAB | Age: 84
End: 2019-10-08
Payer: MEDICARE

## 2019-10-08 VITALS
SYSTOLIC BLOOD PRESSURE: 149 MMHG | DIASTOLIC BLOOD PRESSURE: 96 MMHG | BODY MASS INDEX: 19.77 KG/M2 | HEIGHT: 66 IN | WEIGHT: 123 LBS | HEART RATE: 54 BPM

## 2019-10-08 DIAGNOSIS — M21.70 LEG LENGTH DISCREPANCY: ICD-10-CM

## 2019-10-08 DIAGNOSIS — M46.1 SACROILIITIS (HCC): Primary | ICD-10-CM

## 2019-10-08 PROCEDURE — G8427 DOCREV CUR MEDS BY ELIG CLIN: HCPCS | Performed by: PHYSICAL MEDICINE & REHABILITATION

## 2019-10-08 PROCEDURE — 4040F PNEUMOC VAC/ADMIN/RCVD: CPT | Performed by: PHYSICAL MEDICINE & REHABILITATION

## 2019-10-08 PROCEDURE — 99212 OFFICE O/P EST SF 10 MIN: CPT | Performed by: PHYSICAL MEDICINE & REHABILITATION

## 2019-10-08 PROCEDURE — G8420 CALC BMI NORM PARAMETERS: HCPCS | Performed by: PHYSICAL MEDICINE & REHABILITATION

## 2019-10-08 PROCEDURE — 1036F TOBACCO NON-USER: CPT | Performed by: PHYSICAL MEDICINE & REHABILITATION

## 2019-10-08 PROCEDURE — 1090F PRES/ABSN URINE INCON ASSESS: CPT | Performed by: PHYSICAL MEDICINE & REHABILITATION

## 2019-10-08 PROCEDURE — 1123F ACP DISCUSS/DSCN MKR DOCD: CPT | Performed by: PHYSICAL MEDICINE & REHABILITATION

## 2019-10-08 PROCEDURE — G8484 FLU IMMUNIZE NO ADMIN: HCPCS | Performed by: PHYSICAL MEDICINE & REHABILITATION

## 2019-10-15 ENCOUNTER — OFFICE VISIT (OUTPATIENT)
Dept: ORTHOPEDIC SURGERY | Age: 84
End: 2019-10-15
Payer: MEDICARE

## 2019-10-15 VITALS — WEIGHT: 123 LBS | HEIGHT: 66 IN | BODY MASS INDEX: 19.77 KG/M2

## 2019-10-15 DIAGNOSIS — M17.11 PRIMARY OSTEOARTHRITIS OF RIGHT KNEE: Primary | ICD-10-CM

## 2019-10-15 DIAGNOSIS — M17.12 PRIMARY OSTEOARTHRITIS OF LEFT KNEE: ICD-10-CM

## 2019-10-15 PROCEDURE — G8484 FLU IMMUNIZE NO ADMIN: HCPCS | Performed by: ORTHOPAEDIC SURGERY

## 2019-10-15 PROCEDURE — 4040F PNEUMOC VAC/ADMIN/RCVD: CPT | Performed by: ORTHOPAEDIC SURGERY

## 2019-10-15 PROCEDURE — 1090F PRES/ABSN URINE INCON ASSESS: CPT | Performed by: ORTHOPAEDIC SURGERY

## 2019-10-15 PROCEDURE — 20610 DRAIN/INJ JOINT/BURSA W/O US: CPT | Performed by: ORTHOPAEDIC SURGERY

## 2019-10-15 PROCEDURE — 99213 OFFICE O/P EST LOW 20 MIN: CPT | Performed by: ORTHOPAEDIC SURGERY

## 2019-10-15 PROCEDURE — 1036F TOBACCO NON-USER: CPT | Performed by: ORTHOPAEDIC SURGERY

## 2019-10-15 PROCEDURE — G8420 CALC BMI NORM PARAMETERS: HCPCS | Performed by: ORTHOPAEDIC SURGERY

## 2019-10-15 PROCEDURE — G8427 DOCREV CUR MEDS BY ELIG CLIN: HCPCS | Performed by: ORTHOPAEDIC SURGERY

## 2019-10-15 PROCEDURE — 1123F ACP DISCUSS/DSCN MKR DOCD: CPT | Performed by: ORTHOPAEDIC SURGERY

## 2019-10-15 RX ORDER — TRIAMCINOLONE ACETONIDE 40 MG/ML
40 INJECTION, SUSPENSION INTRA-ARTICULAR; INTRAMUSCULAR ONCE
Status: COMPLETED | OUTPATIENT
Start: 2019-10-15 | End: 2019-10-15

## 2019-10-15 RX ADMIN — TRIAMCINOLONE ACETONIDE 40 MG: 40 INJECTION, SUSPENSION INTRA-ARTICULAR; INTRAMUSCULAR at 12:08

## 2019-10-16 ENCOUNTER — TELEPHONE (OUTPATIENT)
Dept: FAMILY MEDICINE CLINIC | Age: 84
End: 2019-10-16

## 2019-10-21 ENCOUNTER — OFFICE VISIT (OUTPATIENT)
Dept: FAMILY MEDICINE CLINIC | Age: 84
End: 2019-10-21
Payer: MEDICARE

## 2019-10-21 VITALS
OXYGEN SATURATION: 96 % | HEART RATE: 77 BPM | DIASTOLIC BLOOD PRESSURE: 82 MMHG | WEIGHT: 123 LBS | SYSTOLIC BLOOD PRESSURE: 122 MMHG | HEIGHT: 66 IN | BODY MASS INDEX: 19.77 KG/M2

## 2019-10-21 DIAGNOSIS — F32.A DEPRESSION, UNSPECIFIED DEPRESSION TYPE: ICD-10-CM

## 2019-10-21 DIAGNOSIS — Z00.00 ROUTINE GENERAL MEDICAL EXAMINATION AT A HEALTH CARE FACILITY: ICD-10-CM

## 2019-10-21 DIAGNOSIS — Z23 NEED FOR INFLUENZA VACCINATION: Primary | ICD-10-CM

## 2019-10-21 PROCEDURE — 1123F ACP DISCUSS/DSCN MKR DOCD: CPT | Performed by: INTERNAL MEDICINE

## 2019-10-21 PROCEDURE — G0438 PPPS, INITIAL VISIT: HCPCS | Performed by: INTERNAL MEDICINE

## 2019-10-21 PROCEDURE — 90653 IIV ADJUVANT VACCINE IM: CPT | Performed by: INTERNAL MEDICINE

## 2019-10-21 PROCEDURE — 4040F PNEUMOC VAC/ADMIN/RCVD: CPT | Performed by: INTERNAL MEDICINE

## 2019-10-21 PROCEDURE — G8482 FLU IMMUNIZE ORDER/ADMIN: HCPCS | Performed by: INTERNAL MEDICINE

## 2019-10-21 PROCEDURE — G0008 ADMIN INFLUENZA VIRUS VAC: HCPCS | Performed by: INTERNAL MEDICINE

## 2019-10-21 ASSESSMENT — PATIENT HEALTH QUESTIONNAIRE - PHQ9
SUM OF ALL RESPONSES TO PHQ QUESTIONS 1-9: 0
SUM OF ALL RESPONSES TO PHQ QUESTIONS 1-9: 0

## 2019-10-21 ASSESSMENT — LIFESTYLE VARIABLES: HOW OFTEN DO YOU HAVE A DRINK CONTAINING ALCOHOL: 0

## 2019-11-19 ENCOUNTER — APPOINTMENT (OUTPATIENT)
Dept: ULTRASOUND IMAGING | Age: 84
DRG: 420 | End: 2019-11-19
Payer: MEDICARE

## 2019-11-19 ENCOUNTER — HOSPITAL ENCOUNTER (INPATIENT)
Age: 84
LOS: 6 days | Discharge: HOME HEALTH | DRG: 420 | End: 2019-11-26
Attending: EMERGENCY MEDICINE | Admitting: SURGERY
Payer: MEDICARE

## 2019-11-19 DIAGNOSIS — K81.9 CHOLECYSTITIS: ICD-10-CM

## 2019-11-19 DIAGNOSIS — K82.9 GALLBLADDER DISEASE: ICD-10-CM

## 2019-11-19 DIAGNOSIS — K80.00 CALCULUS OF GALLBLADDER WITH ACUTE CHOLECYSTITIS WITHOUT OBSTRUCTION: Primary | ICD-10-CM

## 2019-11-19 DIAGNOSIS — K80.11 CALCULUS OF GALLBLADDER WITH CHOLECYSTITIS WITH BILIARY OBSTRUCTION, UNSPECIFIED CHOLECYSTITIS ACUITY: ICD-10-CM

## 2019-11-19 DIAGNOSIS — N17.9 AKI (ACUTE KIDNEY INJURY) (HCC): ICD-10-CM

## 2019-11-19 LAB
BASOPHILS ABSOLUTE: 0.02 E9/L (ref 0–0.2)
BASOPHILS RELATIVE PERCENT: 0.2 % (ref 0–2)
EOSINOPHILS ABSOLUTE: 0.17 E9/L (ref 0.05–0.5)
EOSINOPHILS RELATIVE PERCENT: 1.9 % (ref 0–6)
HCT VFR BLD CALC: 38.5 % (ref 34–48)
HEMOGLOBIN: 12.1 G/DL (ref 11.5–15.5)
IMMATURE GRANULOCYTES #: 0.06 E9/L
IMMATURE GRANULOCYTES %: 0.7 % (ref 0–5)
LYMPHOCYTES ABSOLUTE: 1.76 E9/L (ref 1.5–4)
LYMPHOCYTES RELATIVE PERCENT: 19.2 % (ref 20–42)
MCH RBC QN AUTO: 31.1 PG (ref 26–35)
MCHC RBC AUTO-ENTMCNC: 31.4 % (ref 32–34.5)
MCV RBC AUTO: 99 FL (ref 80–99.9)
MONOCYTES ABSOLUTE: 0.91 E9/L (ref 0.1–0.95)
MONOCYTES RELATIVE PERCENT: 9.9 % (ref 2–12)
NEUTROPHILS ABSOLUTE: 6.23 E9/L (ref 1.8–7.3)
NEUTROPHILS RELATIVE PERCENT: 68.1 % (ref 43–80)
PDW BLD-RTO: 14 FL (ref 11.5–15)
PLATELET # BLD: 281 E9/L (ref 130–450)
PMV BLD AUTO: 9.5 FL (ref 7–12)
RBC # BLD: 3.89 E12/L (ref 3.5–5.5)
WBC # BLD: 9.2 E9/L (ref 4.5–11.5)

## 2019-11-19 PROCEDURE — 81001 URINALYSIS AUTO W/SCOPE: CPT

## 2019-11-19 PROCEDURE — 80053 COMPREHEN METABOLIC PANEL: CPT

## 2019-11-19 PROCEDURE — 83605 ASSAY OF LACTIC ACID: CPT

## 2019-11-19 PROCEDURE — 85025 COMPLETE CBC W/AUTO DIFF WBC: CPT

## 2019-11-19 PROCEDURE — 76705 ECHO EXAM OF ABDOMEN: CPT

## 2019-11-19 PROCEDURE — 83690 ASSAY OF LIPASE: CPT

## 2019-11-19 PROCEDURE — 99285 EMERGENCY DEPT VISIT HI MDM: CPT

## 2019-11-19 PROCEDURE — 36415 COLL VENOUS BLD VENIPUNCTURE: CPT

## 2019-11-19 PROCEDURE — 84484 ASSAY OF TROPONIN QUANT: CPT

## 2019-11-19 ASSESSMENT — PAIN DESCRIPTION - LOCATION: LOCATION: ABDOMEN

## 2019-11-19 ASSESSMENT — ENCOUNTER SYMPTOMS
WHEEZING: 0
SHORTNESS OF BREATH: 0
CONSTIPATION: 0
CHEST TIGHTNESS: 0
COUGH: 0
RHINORRHEA: 0
NAUSEA: 0
VOMITING: 0
SINUS PRESSURE: 0
BLOOD IN STOOL: 0
TROUBLE SWALLOWING: 0
SORE THROAT: 0
DIARRHEA: 0
ABDOMINAL DISTENTION: 0
ABDOMINAL PAIN: 1
BACK PAIN: 0

## 2019-11-19 ASSESSMENT — PAIN DESCRIPTION - FREQUENCY: FREQUENCY: CONTINUOUS

## 2019-11-19 ASSESSMENT — PAIN SCALES - GENERAL: PAINLEVEL_OUTOF10: 10

## 2019-11-19 ASSESSMENT — PAIN DESCRIPTION - PROGRESSION: CLINICAL_PROGRESSION: NOT CHANGED

## 2019-11-19 ASSESSMENT — PAIN DESCRIPTION - PAIN TYPE: TYPE: ACUTE PAIN

## 2019-11-19 ASSESSMENT — PAIN DESCRIPTION - ONSET: ONSET: ON-GOING

## 2019-11-19 ASSESSMENT — PAIN DESCRIPTION - ORIENTATION: ORIENTATION: RIGHT;UPPER

## 2019-11-20 ENCOUNTER — APPOINTMENT (OUTPATIENT)
Dept: CT IMAGING | Age: 84
DRG: 420 | End: 2019-11-20
Payer: MEDICARE

## 2019-11-20 PROBLEM — K80.21 CALCULUS OF GALLBLADDER WITHOUT CHOLECYSTITIS WITH OBSTRUCTION: Status: ACTIVE | Noted: 2019-11-20

## 2019-11-20 PROBLEM — R55 VASOVAGAL SYNCOPE: Status: ACTIVE | Noted: 2019-11-20

## 2019-11-20 PROBLEM — K80.20 CALCULUS OF GALLBLADDER WITHOUT CHOLECYSTITIS WITHOUT OBSTRUCTION: Status: ACTIVE | Noted: 2019-11-20

## 2019-11-20 PROBLEM — R10.11 RUQ ABDOMINAL PAIN: Status: ACTIVE | Noted: 2019-11-20

## 2019-11-20 LAB
ALBUMIN SERPL-MCNC: 3.9 G/DL (ref 3.5–5.2)
ALP BLD-CCNC: 112 U/L (ref 35–104)
ALT SERPL-CCNC: 24 U/L (ref 0–32)
ANION GAP SERPL CALCULATED.3IONS-SCNC: 12 MMOL/L (ref 7–16)
AST SERPL-CCNC: 56 U/L (ref 0–31)
BACTERIA: ABNORMAL /HPF
BILIRUB SERPL-MCNC: <0.2 MG/DL (ref 0–1.2)
BILIRUBIN URINE: NEGATIVE
BLOOD, URINE: NEGATIVE
BUN BLDV-MCNC: 24 MG/DL (ref 8–23)
CALCIUM SERPL-MCNC: 9.1 MG/DL (ref 8.6–10.2)
CASTS: ABNORMAL /LPF
CHLORIDE BLD-SCNC: 104 MMOL/L (ref 98–107)
CLARITY: CLEAR
CO2: 24 MMOL/L (ref 22–29)
COLOR: YELLOW
CREAT SERPL-MCNC: 0.8 MG/DL (ref 0.5–1)
EPITHELIAL CELLS, UA: ABNORMAL /HPF
GFR AFRICAN AMERICAN: >60
GFR NON-AFRICAN AMERICAN: >60 ML/MIN/1.73
GLUCOSE BLD-MCNC: 144 MG/DL (ref 74–99)
GLUCOSE URINE: NEGATIVE MG/DL
KETONES, URINE: ABNORMAL MG/DL
LACTIC ACID: 1.3 MMOL/L (ref 0.5–2.2)
LEUKOCYTE ESTERASE, URINE: NEGATIVE
LIPASE: 28 U/L (ref 13–60)
NITRITE, URINE: NEGATIVE
PH UA: 6 (ref 5–9)
POTASSIUM SERPL-SCNC: 4.3 MMOL/L (ref 3.5–5)
PROTEIN UA: NEGATIVE MG/DL
RBC UA: ABNORMAL /HPF (ref 0–2)
SODIUM BLD-SCNC: 140 MMOL/L (ref 132–146)
SPECIFIC GRAVITY UA: 1.01 (ref 1–1.03)
TOTAL PROTEIN: 6.5 G/DL (ref 6.4–8.3)
TROPONIN: <0.01 NG/ML (ref 0–0.03)
UROBILINOGEN, URINE: 0.2 E.U./DL
WBC UA: ABNORMAL /HPF (ref 0–5)

## 2019-11-20 PROCEDURE — 2580000003 HC RX 258: Performed by: STUDENT IN AN ORGANIZED HEALTH CARE EDUCATION/TRAINING PROGRAM

## 2019-11-20 PROCEDURE — 93005 ELECTROCARDIOGRAM TRACING: CPT | Performed by: NURSE PRACTITIONER

## 2019-11-20 PROCEDURE — 1200000000 HC SEMI PRIVATE

## 2019-11-20 PROCEDURE — 93005 ELECTROCARDIOGRAM TRACING: CPT | Performed by: EMERGENCY MEDICINE

## 2019-11-20 PROCEDURE — 6370000000 HC RX 637 (ALT 250 FOR IP): Performed by: NURSE PRACTITIONER

## 2019-11-20 PROCEDURE — 99222 1ST HOSP IP/OBS MODERATE 55: CPT | Performed by: SURGERY

## 2019-11-20 PROCEDURE — 6360000002 HC RX W HCPCS: Performed by: INTERNAL MEDICINE

## 2019-11-20 PROCEDURE — 2580000003 HC RX 258: Performed by: INTERNAL MEDICINE

## 2019-11-20 PROCEDURE — 99223 1ST HOSP IP/OBS HIGH 75: CPT | Performed by: INTERNAL MEDICINE

## 2019-11-20 PROCEDURE — 74176 CT ABD & PELVIS W/O CONTRAST: CPT

## 2019-11-20 PROCEDURE — 94761 N-INVAS EAR/PLS OXIMETRY MLT: CPT

## 2019-11-20 PROCEDURE — APPSS45 APP SPLIT SHARED TIME 31-45 MINUTES: Performed by: NURSE PRACTITIONER

## 2019-11-20 PROCEDURE — 2580000003 HC RX 258: Performed by: NURSE PRACTITIONER

## 2019-11-20 PROCEDURE — 6360000002 HC RX W HCPCS: Performed by: STUDENT IN AN ORGANIZED HEALTH CARE EDUCATION/TRAINING PROGRAM

## 2019-11-20 PROCEDURE — 2500000003 HC RX 250 WO HCPCS: Performed by: STUDENT IN AN ORGANIZED HEALTH CARE EDUCATION/TRAINING PROGRAM

## 2019-11-20 RX ORDER — ONDANSETRON 2 MG/ML
4 INJECTION INTRAMUSCULAR; INTRAVENOUS EVERY 6 HOURS PRN
Status: DISCONTINUED | OUTPATIENT
Start: 2019-11-20 | End: 2019-11-26 | Stop reason: HOSPADM

## 2019-11-20 RX ORDER — CITALOPRAM 10 MG/1
10 TABLET ORAL EVERY MORNING
Status: DISCONTINUED | OUTPATIENT
Start: 2019-11-20 | End: 2019-11-26 | Stop reason: HOSPADM

## 2019-11-20 RX ORDER — SODIUM CHLORIDE 0.9 % (FLUSH) 0.9 %
10 SYRINGE (ML) INJECTION EVERY 12 HOURS SCHEDULED
Status: DISCONTINUED | OUTPATIENT
Start: 2019-11-20 | End: 2019-11-26 | Stop reason: HOSPADM

## 2019-11-20 RX ORDER — SODIUM CHLORIDE 9 MG/ML
25 INJECTION, SOLUTION INTRAVENOUS PRN
Status: DISCONTINUED | OUTPATIENT
Start: 2019-11-20 | End: 2019-11-26 | Stop reason: HOSPADM

## 2019-11-20 RX ORDER — SODIUM CHLORIDE 0.9 % (FLUSH) 0.9 %
10 SYRINGE (ML) INJECTION PRN
Status: DISCONTINUED | OUTPATIENT
Start: 2019-11-20 | End: 2019-11-26 | Stop reason: HOSPADM

## 2019-11-20 RX ORDER — ACETAMINOPHEN 325 MG/1
650 TABLET ORAL EVERY 4 HOURS PRN
Status: DISCONTINUED | OUTPATIENT
Start: 2019-11-20 | End: 2019-11-20

## 2019-11-20 RX ORDER — ACETAMINOPHEN 500 MG
1000 TABLET ORAL EVERY 8 HOURS PRN
Status: DISCONTINUED | OUTPATIENT
Start: 2019-11-20 | End: 2019-11-26 | Stop reason: HOSPADM

## 2019-11-20 RX ORDER — DONEPEZIL HYDROCHLORIDE 5 MG/1
10 TABLET, FILM COATED ORAL NIGHTLY
Status: DISCONTINUED | OUTPATIENT
Start: 2019-11-20 | End: 2019-11-26 | Stop reason: HOSPADM

## 2019-11-20 RX ORDER — MEMANTINE HYDROCHLORIDE 10 MG/1
10 TABLET ORAL 2 TIMES DAILY
Status: DISCONTINUED | OUTPATIENT
Start: 2019-11-20 | End: 2019-11-26 | Stop reason: HOSPADM

## 2019-11-20 RX ORDER — SODIUM CHLORIDE 9 MG/ML
INJECTION, SOLUTION INTRAVENOUS CONTINUOUS
Status: DISCONTINUED | OUTPATIENT
Start: 2019-11-20 | End: 2019-11-24

## 2019-11-20 RX ORDER — MELOXICAM 7.5 MG/1
15 TABLET ORAL DAILY
Status: DISCONTINUED | OUTPATIENT
Start: 2019-11-20 | End: 2019-11-26 | Stop reason: HOSPADM

## 2019-11-20 RX ADMIN — SODIUM CHLORIDE: 9 INJECTION, SOLUTION INTRAVENOUS at 03:57

## 2019-11-20 RX ADMIN — MEMANTINE HYDROCHLORIDE 10 MG: 10 TABLET, FILM COATED ORAL at 12:20

## 2019-11-20 RX ADMIN — MEMANTINE HYDROCHLORIDE 10 MG: 10 TABLET, FILM COATED ORAL at 21:17

## 2019-11-20 RX ADMIN — SODIUM CHLORIDE: 9 INJECTION, SOLUTION INTRAVENOUS at 17:17

## 2019-11-20 RX ADMIN — CITALOPRAM HYDROBROMIDE 10 MG: 10 TABLET ORAL at 12:20

## 2019-11-20 RX ADMIN — MELOXICAM 15 MG: 7.5 TABLET ORAL at 12:21

## 2019-11-20 RX ADMIN — WATER 1 G: 1 INJECTION INTRAMUSCULAR; INTRAVENOUS; SUBCUTANEOUS at 00:38

## 2019-11-20 RX ADMIN — ACETAMINOPHEN 1000 MG: 500 TABLET, FILM COATED ORAL at 12:07

## 2019-11-20 RX ADMIN — PIPERACILLIN AND TAZOBACTAM 3.38 G: 3; .375 INJECTION, POWDER, LYOPHILIZED, FOR SOLUTION INTRAVENOUS at 17:27

## 2019-11-20 RX ADMIN — DONEPEZIL HYDROCHLORIDE 10 MG: 5 TABLET, FILM COATED ORAL at 21:17

## 2019-11-20 RX ADMIN — METRONIDAZOLE 500 MG: 500 INJECTION, SOLUTION INTRAVENOUS at 01:10

## 2019-11-20 ASSESSMENT — PAIN DESCRIPTION - PROGRESSION: CLINICAL_PROGRESSION: GRADUALLY IMPROVING

## 2019-11-20 ASSESSMENT — PAIN SCALES - GENERAL
PAINLEVEL_OUTOF10: 2
PAINLEVEL_OUTOF10: 0
PAINLEVEL_OUTOF10: 6
PAINLEVEL_OUTOF10: 7

## 2019-11-21 ENCOUNTER — ANESTHESIA EVENT (OUTPATIENT)
Dept: OPERATING ROOM | Age: 84
DRG: 420 | End: 2019-11-21
Payer: MEDICARE

## 2019-11-21 ENCOUNTER — APPOINTMENT (OUTPATIENT)
Dept: GENERAL RADIOLOGY | Age: 84
DRG: 420 | End: 2019-11-21
Payer: MEDICARE

## 2019-11-21 ENCOUNTER — ANESTHESIA (OUTPATIENT)
Dept: OPERATING ROOM | Age: 84
DRG: 420 | End: 2019-11-21
Payer: MEDICARE

## 2019-11-21 VITALS
SYSTOLIC BLOOD PRESSURE: 138 MMHG | DIASTOLIC BLOOD PRESSURE: 67 MMHG | RESPIRATION RATE: 2 BRPM | TEMPERATURE: 98.4 F | OXYGEN SATURATION: 100 %

## 2019-11-21 LAB
ALBUMIN SERPL-MCNC: 3 G/DL (ref 3.5–5.2)
ALP BLD-CCNC: 72 U/L (ref 35–104)
ALT SERPL-CCNC: 12 U/L (ref 0–32)
ANION GAP SERPL CALCULATED.3IONS-SCNC: 10 MMOL/L (ref 7–16)
AST SERPL-CCNC: 18 U/L (ref 0–31)
BASOPHILS ABSOLUTE: 0.01 E9/L (ref 0–0.2)
BASOPHILS RELATIVE PERCENT: 0.2 % (ref 0–2)
BILIRUB SERPL-MCNC: 0.2 MG/DL (ref 0–1.2)
BUN BLDV-MCNC: 12 MG/DL (ref 8–23)
CALCIUM SERPL-MCNC: 8.4 MG/DL (ref 8.6–10.2)
CHLORIDE BLD-SCNC: 111 MMOL/L (ref 98–107)
CO2: 24 MMOL/L (ref 22–29)
CREAT SERPL-MCNC: 0.8 MG/DL (ref 0.5–1)
EOSINOPHILS ABSOLUTE: 0.18 E9/L (ref 0.05–0.5)
EOSINOPHILS RELATIVE PERCENT: 3.3 % (ref 0–6)
GFR AFRICAN AMERICAN: >60
GFR NON-AFRICAN AMERICAN: >60 ML/MIN/1.73
GLUCOSE BLD-MCNC: 91 MG/DL (ref 74–99)
HCT VFR BLD CALC: 36 % (ref 34–48)
HEMOGLOBIN: 11.6 G/DL (ref 11.5–15.5)
IMMATURE GRANULOCYTES #: 0.02 E9/L
IMMATURE GRANULOCYTES %: 0.4 % (ref 0–5)
LYMPHOCYTES ABSOLUTE: 1.65 E9/L (ref 1.5–4)
LYMPHOCYTES RELATIVE PERCENT: 30.5 % (ref 20–42)
MCH RBC QN AUTO: 31.6 PG (ref 26–35)
MCHC RBC AUTO-ENTMCNC: 32.2 % (ref 32–34.5)
MCV RBC AUTO: 98.1 FL (ref 80–99.9)
MONOCYTES ABSOLUTE: 0.69 E9/L (ref 0.1–0.95)
MONOCYTES RELATIVE PERCENT: 12.8 % (ref 2–12)
NEUTROPHILS ABSOLUTE: 2.86 E9/L (ref 1.8–7.3)
NEUTROPHILS RELATIVE PERCENT: 52.8 % (ref 43–80)
PDW BLD-RTO: 14 FL (ref 11.5–15)
PLATELET # BLD: 254 E9/L (ref 130–450)
PMV BLD AUTO: 9.6 FL (ref 7–12)
POTASSIUM SERPL-SCNC: 3.7 MMOL/L (ref 3.5–5)
RBC # BLD: 3.67 E12/L (ref 3.5–5.5)
SODIUM BLD-SCNC: 145 MMOL/L (ref 132–146)
TOTAL PROTEIN: 5.3 G/DL (ref 6.4–8.3)
WBC # BLD: 5.4 E9/L (ref 4.5–11.5)

## 2019-11-21 PROCEDURE — 2709999900 HC NON-CHARGEABLE SUPPLY: Performed by: SURGERY

## 2019-11-21 PROCEDURE — 6360000002 HC RX W HCPCS: Performed by: NURSE ANESTHETIST, CERTIFIED REGISTERED

## 2019-11-21 PROCEDURE — 74300 X-RAY BILE DUCTS/PANCREAS: CPT | Performed by: SURGERY

## 2019-11-21 PROCEDURE — 6360000004 HC RX CONTRAST MEDICATION: Performed by: SURGERY

## 2019-11-21 PROCEDURE — 1200000000 HC SEMI PRIVATE

## 2019-11-21 PROCEDURE — 2500000003 HC RX 250 WO HCPCS: Performed by: NURSE ANESTHETIST, CERTIFIED REGISTERED

## 2019-11-21 PROCEDURE — APPSS30 APP SPLIT SHARED TIME 16-30 MINUTES: Performed by: NURSE PRACTITIONER

## 2019-11-21 PROCEDURE — 3600000004 HC SURGERY LEVEL 4 BASE: Performed by: SURGERY

## 2019-11-21 PROCEDURE — 2700000000 HC OXYGEN THERAPY PER DAY

## 2019-11-21 PROCEDURE — 3700000001 HC ADD 15 MINUTES (ANESTHESIA): Performed by: SURGERY

## 2019-11-21 PROCEDURE — BF131ZZ FLUOROSCOPY OF GALLBLADDER AND BILE DUCTS USING LOW OSMOLAR CONTRAST: ICD-10-PCS | Performed by: SURGERY

## 2019-11-21 PROCEDURE — 0DJ08ZZ INSPECTION OF UPPER INTESTINAL TRACT, VIA NATURAL OR ARTIFICIAL OPENING ENDOSCOPIC: ICD-10-PCS | Performed by: SURGERY

## 2019-11-21 PROCEDURE — 0DB80ZZ EXCISION OF SMALL INTESTINE, OPEN APPROACH: ICD-10-PCS | Performed by: SURGERY

## 2019-11-21 PROCEDURE — 44120 REMOVAL OF SMALL INTESTINE: CPT | Performed by: SURGERY

## 2019-11-21 PROCEDURE — 94761 N-INVAS EAR/PLS OXIMETRY MLT: CPT

## 2019-11-21 PROCEDURE — 36415 COLL VENOUS BLD VENIPUNCTURE: CPT

## 2019-11-21 PROCEDURE — 7100000001 HC PACU RECOVERY - ADDTL 15 MIN: Performed by: SURGERY

## 2019-11-21 PROCEDURE — 85025 COMPLETE CBC W/AUTO DIFF WBC: CPT

## 2019-11-21 PROCEDURE — 6360000002 HC RX W HCPCS: Performed by: INTERNAL MEDICINE

## 2019-11-21 PROCEDURE — 6360000002 HC RX W HCPCS: Performed by: SURGERY

## 2019-11-21 PROCEDURE — 2580000003 HC RX 258: Performed by: INTERNAL MEDICINE

## 2019-11-21 PROCEDURE — 2720000010 HC SURG SUPPLY STERILE: Performed by: SURGERY

## 2019-11-21 PROCEDURE — 88307 TISSUE EXAM BY PATHOLOGIST: CPT

## 2019-11-21 PROCEDURE — 3700000000 HC ANESTHESIA ATTENDED CARE: Performed by: SURGERY

## 2019-11-21 PROCEDURE — 3600000014 HC SURGERY LEVEL 4 ADDTL 15MIN: Performed by: SURGERY

## 2019-11-21 PROCEDURE — 0FJ44ZZ INSPECTION OF GALLBLADDER, PERCUTANEOUS ENDOSCOPIC APPROACH: ICD-10-PCS | Performed by: SURGERY

## 2019-11-21 PROCEDURE — 99233 SBSQ HOSP IP/OBS HIGH 50: CPT | Performed by: INTERNAL MEDICINE

## 2019-11-21 PROCEDURE — 74300 X-RAY BILE DUCTS/PANCREAS: CPT

## 2019-11-21 PROCEDURE — 2580000003 HC RX 258: Performed by: NURSE ANESTHETIST, CERTIFIED REGISTERED

## 2019-11-21 PROCEDURE — 2580000003 HC RX 258: Performed by: NURSE PRACTITIONER

## 2019-11-21 PROCEDURE — 2500000003 HC RX 250 WO HCPCS: Performed by: SURGERY

## 2019-11-21 PROCEDURE — 99024 POSTOP FOLLOW-UP VISIT: CPT | Performed by: SURGERY

## 2019-11-21 PROCEDURE — 80053 COMPREHEN METABOLIC PANEL: CPT

## 2019-11-21 PROCEDURE — 7100000000 HC PACU RECOVERY - FIRST 15 MIN: Performed by: SURGERY

## 2019-11-21 PROCEDURE — 74018 RADEX ABDOMEN 1 VIEW: CPT

## 2019-11-21 RX ORDER — GLYCOPYRROLATE 1 MG/5 ML
SYRINGE (ML) INTRAVENOUS PRN
Status: DISCONTINUED | OUTPATIENT
Start: 2019-11-21 | End: 2019-11-21 | Stop reason: SDUPTHER

## 2019-11-21 RX ORDER — TRAMADOL HYDROCHLORIDE 50 MG/1
25 TABLET ORAL EVERY 6 HOURS PRN
Status: DISCONTINUED | OUTPATIENT
Start: 2019-11-21 | End: 2019-11-26 | Stop reason: HOSPADM

## 2019-11-21 RX ORDER — TRAMADOL HYDROCHLORIDE 50 MG/1
50 TABLET ORAL EVERY 6 HOURS PRN
Status: DISCONTINUED | OUTPATIENT
Start: 2019-11-21 | End: 2019-11-26 | Stop reason: HOSPADM

## 2019-11-21 RX ORDER — FENTANYL CITRATE 50 UG/ML
INJECTION, SOLUTION INTRAMUSCULAR; INTRAVENOUS PRN
Status: DISCONTINUED | OUTPATIENT
Start: 2019-11-21 | End: 2019-11-21 | Stop reason: SDUPTHER

## 2019-11-21 RX ORDER — LABETALOL 20 MG/4 ML (5 MG/ML) INTRAVENOUS SYRINGE
5 EVERY 10 MIN PRN
Status: DISCONTINUED | OUTPATIENT
Start: 2019-11-21 | End: 2019-11-21 | Stop reason: HOSPADM

## 2019-11-21 RX ORDER — MEPERIDINE HYDROCHLORIDE 25 MG/ML
12.5 INJECTION INTRAMUSCULAR; INTRAVENOUS; SUBCUTANEOUS EVERY 5 MIN PRN
Status: DISCONTINUED | OUTPATIENT
Start: 2019-11-21 | End: 2019-11-21 | Stop reason: HOSPADM

## 2019-11-21 RX ORDER — HYDROMORPHONE HYDROCHLORIDE 1 MG/ML
0.25 INJECTION, SOLUTION INTRAMUSCULAR; INTRAVENOUS; SUBCUTANEOUS EVERY 5 MIN PRN
Status: DISCONTINUED | OUTPATIENT
Start: 2019-11-21 | End: 2019-11-21 | Stop reason: HOSPADM

## 2019-11-21 RX ORDER — SODIUM CHLORIDE 9 MG/ML
INJECTION, SOLUTION INTRAVENOUS CONTINUOUS PRN
Status: DISCONTINUED | OUTPATIENT
Start: 2019-11-21 | End: 2019-11-21 | Stop reason: SDUPTHER

## 2019-11-21 RX ORDER — LIDOCAINE HYDROCHLORIDE 20 MG/ML
INJECTION, SOLUTION INTRAVENOUS PRN
Status: DISCONTINUED | OUTPATIENT
Start: 2019-11-21 | End: 2019-11-21 | Stop reason: SDUPTHER

## 2019-11-21 RX ORDER — DEXAMETHASONE SODIUM PHOSPHATE 10 MG/ML
INJECTION, SOLUTION INTRAMUSCULAR; INTRAVENOUS PRN
Status: DISCONTINUED | OUTPATIENT
Start: 2019-11-21 | End: 2019-11-21 | Stop reason: SDUPTHER

## 2019-11-21 RX ORDER — ONDANSETRON 2 MG/ML
INJECTION INTRAMUSCULAR; INTRAVENOUS PRN
Status: DISCONTINUED | OUTPATIENT
Start: 2019-11-21 | End: 2019-11-21 | Stop reason: SDUPTHER

## 2019-11-21 RX ORDER — MORPHINE SULFATE 2 MG/ML
2 INJECTION, SOLUTION INTRAMUSCULAR; INTRAVENOUS
Status: DISCONTINUED | OUTPATIENT
Start: 2019-11-21 | End: 2019-11-26 | Stop reason: HOSPADM

## 2019-11-21 RX ORDER — PROPOFOL 10 MG/ML
INJECTION, EMULSION INTRAVENOUS PRN
Status: DISCONTINUED | OUTPATIENT
Start: 2019-11-21 | End: 2019-11-21 | Stop reason: SDUPTHER

## 2019-11-21 RX ORDER — ONDANSETRON 2 MG/ML
4 INJECTION INTRAMUSCULAR; INTRAVENOUS
Status: DISCONTINUED | OUTPATIENT
Start: 2019-11-21 | End: 2019-11-21 | Stop reason: HOSPADM

## 2019-11-21 RX ORDER — KETOROLAC TROMETHAMINE 30 MG/ML
INJECTION, SOLUTION INTRAMUSCULAR; INTRAVENOUS PRN
Status: DISCONTINUED | OUTPATIENT
Start: 2019-11-21 | End: 2019-11-21 | Stop reason: SDUPTHER

## 2019-11-21 RX ORDER — MORPHINE SULFATE 10 MG/ML
INJECTION, SOLUTION INTRAMUSCULAR; INTRAVENOUS PRN
Status: DISCONTINUED | OUTPATIENT
Start: 2019-11-21 | End: 2019-11-21 | Stop reason: SDUPTHER

## 2019-11-21 RX ORDER — HYDROMORPHONE HYDROCHLORIDE 1 MG/ML
0.5 INJECTION, SOLUTION INTRAMUSCULAR; INTRAVENOUS; SUBCUTANEOUS EVERY 5 MIN PRN
Status: DISCONTINUED | OUTPATIENT
Start: 2019-11-21 | End: 2019-11-21 | Stop reason: HOSPADM

## 2019-11-21 RX ORDER — PROMETHAZINE HYDROCHLORIDE 25 MG/ML
6.25 INJECTION, SOLUTION INTRAMUSCULAR; INTRAVENOUS
Status: DISCONTINUED | OUTPATIENT
Start: 2019-11-21 | End: 2019-11-21 | Stop reason: HOSPADM

## 2019-11-21 RX ORDER — BUPIVACAINE HYDROCHLORIDE AND EPINEPHRINE 2.5; 5 MG/ML; UG/ML
INJECTION, SOLUTION EPIDURAL; INFILTRATION; INTRACAUDAL; PERINEURAL PRN
Status: DISCONTINUED | OUTPATIENT
Start: 2019-11-21 | End: 2019-11-21 | Stop reason: ALTCHOICE

## 2019-11-21 RX ORDER — NEOSTIGMINE METHYLSULFATE 1 MG/ML
INJECTION, SOLUTION INTRAVENOUS PRN
Status: DISCONTINUED | OUTPATIENT
Start: 2019-11-21 | End: 2019-11-21 | Stop reason: SDUPTHER

## 2019-11-21 RX ORDER — ROCURONIUM BROMIDE 10 MG/ML
INJECTION, SOLUTION INTRAVENOUS PRN
Status: DISCONTINUED | OUTPATIENT
Start: 2019-11-21 | End: 2019-11-21 | Stop reason: SDUPTHER

## 2019-11-21 RX ORDER — HYDRALAZINE HYDROCHLORIDE 20 MG/ML
5 INJECTION INTRAMUSCULAR; INTRAVENOUS EVERY 10 MIN PRN
Status: DISCONTINUED | OUTPATIENT
Start: 2019-11-21 | End: 2019-11-21 | Stop reason: HOSPADM

## 2019-11-21 RX ADMIN — Medication 0.6 MG: at 14:20

## 2019-11-21 RX ADMIN — KETOROLAC TROMETHAMINE 30 MG: 30 INJECTION, SOLUTION INTRAMUSCULAR; INTRAVENOUS at 14:34

## 2019-11-21 RX ADMIN — MORPHINE SULFATE 2 MG: 10 INJECTION, SOLUTION INTRAMUSCULAR; INTRAVENOUS at 14:36

## 2019-11-21 RX ADMIN — FENTANYL CITRATE 100 MCG: 50 INJECTION, SOLUTION INTRAMUSCULAR; INTRAVENOUS at 12:44

## 2019-11-21 RX ADMIN — PROPOFOL 70 MG: 10 INJECTION, EMULSION INTRAVENOUS at 12:50

## 2019-11-21 RX ADMIN — ONDANSETRON HYDROCHLORIDE 4 MG: 2 INJECTION, SOLUTION INTRAMUSCULAR; INTRAVENOUS at 13:07

## 2019-11-21 RX ADMIN — SODIUM CHLORIDE: 9 INJECTION, SOLUTION INTRAVENOUS at 16:34

## 2019-11-21 RX ADMIN — FENTANYL CITRATE 50 MCG: 50 INJECTION, SOLUTION INTRAMUSCULAR; INTRAVENOUS at 13:16

## 2019-11-21 RX ADMIN — Medication 10 MG: at 13:23

## 2019-11-21 RX ADMIN — PHENYLEPHRINE HYDROCHLORIDE 50 MCG: 10 INJECTION INTRAVENOUS at 13:32

## 2019-11-21 RX ADMIN — MORPHINE SULFATE 2 MG: 10 INJECTION, SOLUTION INTRAMUSCULAR; INTRAVENOUS at 14:29

## 2019-11-21 RX ADMIN — MORPHINE SULFATE 2 MG: 10 INJECTION, SOLUTION INTRAMUSCULAR; INTRAVENOUS at 14:32

## 2019-11-21 RX ADMIN — PROPOFOL 30 MG: 10 INJECTION, EMULSION INTRAVENOUS at 12:55

## 2019-11-21 RX ADMIN — Medication 25 MG: at 12:50

## 2019-11-21 RX ADMIN — MORPHINE SULFATE 2 MG: 10 INJECTION, SOLUTION INTRAMUSCULAR; INTRAVENOUS at 14:40

## 2019-11-21 RX ADMIN — SODIUM CHLORIDE: 9 INJECTION, SOLUTION INTRAVENOUS at 12:43

## 2019-11-21 RX ADMIN — MORPHINE SULFATE 2 MG: 2 INJECTION, SOLUTION INTRAMUSCULAR; INTRAVENOUS at 20:47

## 2019-11-21 RX ADMIN — PIPERACILLIN AND TAZOBACTAM 3.38 G: 3; .375 INJECTION, POWDER, LYOPHILIZED, FOR SOLUTION INTRAVENOUS at 00:13

## 2019-11-21 RX ADMIN — PIPERACILLIN AND TAZOBACTAM 3.38 G: 3; .375 INJECTION, POWDER, LYOPHILIZED, FOR SOLUTION INTRAVENOUS at 18:30

## 2019-11-21 RX ADMIN — MORPHINE SULFATE 2 MG: 10 INJECTION, SOLUTION INTRAMUSCULAR; INTRAVENOUS at 14:44

## 2019-11-21 RX ADMIN — DEXAMETHASONE SODIUM PHOSPHATE 10 MG: 10 INJECTION, SOLUTION INTRAMUSCULAR; INTRAVENOUS at 13:07

## 2019-11-21 RX ADMIN — PHENYLEPHRINE HYDROCHLORIDE 50 MCG: 10 INJECTION INTRAVENOUS at 13:46

## 2019-11-21 RX ADMIN — Medication 3 MG: at 14:20

## 2019-11-21 RX ADMIN — SODIUM CHLORIDE: 9 INJECTION, SOLUTION INTRAVENOUS at 14:15

## 2019-11-21 RX ADMIN — FENTANYL CITRATE 50 MCG: 50 INJECTION, SOLUTION INTRAMUSCULAR; INTRAVENOUS at 14:15

## 2019-11-21 RX ADMIN — FENTANYL CITRATE 50 MCG: 50 INJECTION, SOLUTION INTRAMUSCULAR; INTRAVENOUS at 13:20

## 2019-11-21 RX ADMIN — PIPERACILLIN AND TAZOBACTAM 3.38 G: 3; .375 INJECTION, POWDER, LYOPHILIZED, FOR SOLUTION INTRAVENOUS at 09:38

## 2019-11-21 RX ADMIN — LIDOCAINE HYDROCHLORIDE 60 MG: 20 INJECTION, SOLUTION INTRAVENOUS at 12:50

## 2019-11-21 ASSESSMENT — PULMONARY FUNCTION TESTS
PIF_VALUE: 2
PIF_VALUE: 16
PIF_VALUE: 0
PIF_VALUE: 13
PIF_VALUE: 2
PIF_VALUE: 1
PIF_VALUE: 1
PIF_VALUE: 12
PIF_VALUE: 15
PIF_VALUE: 13
PIF_VALUE: 20
PIF_VALUE: 2
PIF_VALUE: 13
PIF_VALUE: 17
PIF_VALUE: 1
PIF_VALUE: 12
PIF_VALUE: 1
PIF_VALUE: 13
PIF_VALUE: 13
PIF_VALUE: 15
PIF_VALUE: 14
PIF_VALUE: 1
PIF_VALUE: 13
PIF_VALUE: 14
PIF_VALUE: 19
PIF_VALUE: 14
PIF_VALUE: 12
PIF_VALUE: 1
PIF_VALUE: 0
PIF_VALUE: 11
PIF_VALUE: 18
PIF_VALUE: 1
PIF_VALUE: 14
PIF_VALUE: 1
PIF_VALUE: 13
PIF_VALUE: 0
PIF_VALUE: 1
PIF_VALUE: 14
PIF_VALUE: 12
PIF_VALUE: 14
PIF_VALUE: 12
PIF_VALUE: 13
PIF_VALUE: 14
PIF_VALUE: 1
PIF_VALUE: 1
PIF_VALUE: 0
PIF_VALUE: 1
PIF_VALUE: 0
PIF_VALUE: 14
PIF_VALUE: 2
PIF_VALUE: 1
PIF_VALUE: 15
PIF_VALUE: 13
PIF_VALUE: 21
PIF_VALUE: 2
PIF_VALUE: 1
PIF_VALUE: 2
PIF_VALUE: 12
PIF_VALUE: 12
PIF_VALUE: 14
PIF_VALUE: 2
PIF_VALUE: 12
PIF_VALUE: 13
PIF_VALUE: 2
PIF_VALUE: 12
PIF_VALUE: 12
PIF_VALUE: 1
PIF_VALUE: 12
PIF_VALUE: 1
PIF_VALUE: 2
PIF_VALUE: 16
PIF_VALUE: 13
PIF_VALUE: 2
PIF_VALUE: 1
PIF_VALUE: 2
PIF_VALUE: 13
PIF_VALUE: 1
PIF_VALUE: 13
PIF_VALUE: 14
PIF_VALUE: 19
PIF_VALUE: 14
PIF_VALUE: 17
PIF_VALUE: 12
PIF_VALUE: 14
PIF_VALUE: 15
PIF_VALUE: 14
PIF_VALUE: 1
PIF_VALUE: 12
PIF_VALUE: 1
PIF_VALUE: 14
PIF_VALUE: 17
PIF_VALUE: 2
PIF_VALUE: 13
PIF_VALUE: 12
PIF_VALUE: 14
PIF_VALUE: 12
PIF_VALUE: 15
PIF_VALUE: 20
PIF_VALUE: 12
PIF_VALUE: 19
PIF_VALUE: 18
PIF_VALUE: 1
PIF_VALUE: 10
PIF_VALUE: 16
PIF_VALUE: 2
PIF_VALUE: 15
PIF_VALUE: 1
PIF_VALUE: 13
PIF_VALUE: 1
PIF_VALUE: 11
PIF_VALUE: 20
PIF_VALUE: 13
PIF_VALUE: 12
PIF_VALUE: 12
PIF_VALUE: 19
PIF_VALUE: 1
PIF_VALUE: 1
PIF_VALUE: 14
PIF_VALUE: 1
PIF_VALUE: 12
PIF_VALUE: 13
PIF_VALUE: 11
PIF_VALUE: 14
PIF_VALUE: 18
PIF_VALUE: 1
PIF_VALUE: 15
PIF_VALUE: 20
PIF_VALUE: 17
PIF_VALUE: 13
PIF_VALUE: 13
PIF_VALUE: 17
PIF_VALUE: 13
PIF_VALUE: 0
PIF_VALUE: 1
PIF_VALUE: 11
PIF_VALUE: 19
PIF_VALUE: 13
PIF_VALUE: 19
PIF_VALUE: 2
PIF_VALUE: 1
PIF_VALUE: 16
PIF_VALUE: 17
PIF_VALUE: 14

## 2019-11-21 ASSESSMENT — PAIN SCALES - GENERAL
PAINLEVEL_OUTOF10: 0
PAINLEVEL_OUTOF10: 10
PAINLEVEL_OUTOF10: 0
PAINLEVEL_OUTOF10: 0

## 2019-11-22 LAB
ALBUMIN SERPL-MCNC: 2.9 G/DL (ref 3.5–5.2)
ALP BLD-CCNC: 67 U/L (ref 35–104)
ALT SERPL-CCNC: 34 U/L (ref 0–32)
ANION GAP SERPL CALCULATED.3IONS-SCNC: 14 MMOL/L (ref 7–16)
AST SERPL-CCNC: 43 U/L (ref 0–31)
BILIRUB SERPL-MCNC: 0.3 MG/DL (ref 0–1.2)
BUN BLDV-MCNC: 16 MG/DL (ref 8–23)
CALCIUM SERPL-MCNC: 8.3 MG/DL (ref 8.6–10.2)
CHLORIDE BLD-SCNC: 109 MMOL/L (ref 98–107)
CO2: 20 MMOL/L (ref 22–29)
CREAT SERPL-MCNC: 0.9 MG/DL (ref 0.5–1)
EKG ATRIAL RATE: 66 BPM
EKG ATRIAL RATE: 73 BPM
EKG P AXIS: 50 DEGREES
EKG P AXIS: 63 DEGREES
EKG P-R INTERVAL: 176 MS
EKG P-R INTERVAL: 224 MS
EKG Q-T INTERVAL: 418 MS
EKG Q-T INTERVAL: 426 MS
EKG QRS DURATION: 66 MS
EKG QRS DURATION: 72 MS
EKG QTC CALCULATION (BAZETT): 460 MS
EKG QTC CALCULATION (BAZETT): 466 MS
EKG R AXIS: 10 DEGREES
EKG R AXIS: 12 DEGREES
EKG T AXIS: 16 DEGREES
EKG T AXIS: 33 DEGREES
EKG VENTRICULAR RATE: 72 BPM
EKG VENTRICULAR RATE: 73 BPM
GFR AFRICAN AMERICAN: >60
GFR NON-AFRICAN AMERICAN: 59 ML/MIN/1.73
GLUCOSE BLD-MCNC: 161 MG/DL (ref 74–99)
HCT VFR BLD CALC: 38.5 % (ref 34–48)
HEMOGLOBIN: 12.1 G/DL (ref 11.5–15.5)
MCH RBC QN AUTO: 31.7 PG (ref 26–35)
MCHC RBC AUTO-ENTMCNC: 31.4 % (ref 32–34.5)
MCV RBC AUTO: 100.8 FL (ref 80–99.9)
PDW BLD-RTO: 13.9 FL (ref 11.5–15)
PLATELET # BLD: 305 E9/L (ref 130–450)
PMV BLD AUTO: 9.7 FL (ref 7–12)
POTASSIUM SERPL-SCNC: 4.4 MMOL/L (ref 3.5–5)
RBC # BLD: 3.82 E12/L (ref 3.5–5.5)
SODIUM BLD-SCNC: 143 MMOL/L (ref 132–146)
TOTAL PROTEIN: 5.5 G/DL (ref 6.4–8.3)
WBC # BLD: 15.7 E9/L (ref 4.5–11.5)

## 2019-11-22 PROCEDURE — 99233 SBSQ HOSP IP/OBS HIGH 50: CPT | Performed by: INTERNAL MEDICINE

## 2019-11-22 PROCEDURE — 97116 GAIT TRAINING THERAPY: CPT | Performed by: PHYSICAL THERAPIST

## 2019-11-22 PROCEDURE — 6360000002 HC RX W HCPCS: Performed by: SURGERY

## 2019-11-22 PROCEDURE — 2580000003 HC RX 258: Performed by: SURGERY

## 2019-11-22 PROCEDURE — 97530 THERAPEUTIC ACTIVITIES: CPT | Performed by: PHYSICAL THERAPIST

## 2019-11-22 PROCEDURE — 97165 OT EVAL LOW COMPLEX 30 MIN: CPT

## 2019-11-22 PROCEDURE — 80053 COMPREHEN METABOLIC PANEL: CPT

## 2019-11-22 PROCEDURE — 2700000000 HC OXYGEN THERAPY PER DAY

## 2019-11-22 PROCEDURE — 94761 N-INVAS EAR/PLS OXIMETRY MLT: CPT

## 2019-11-22 PROCEDURE — APPSS30 APP SPLIT SHARED TIME 16-30 MINUTES: Performed by: NURSE PRACTITIONER

## 2019-11-22 PROCEDURE — 97161 PT EVAL LOW COMPLEX 20 MIN: CPT | Performed by: PHYSICAL THERAPIST

## 2019-11-22 PROCEDURE — 97530 THERAPEUTIC ACTIVITIES: CPT

## 2019-11-22 PROCEDURE — 85027 COMPLETE CBC AUTOMATED: CPT

## 2019-11-22 PROCEDURE — 1200000000 HC SEMI PRIVATE

## 2019-11-22 PROCEDURE — 36415 COLL VENOUS BLD VENIPUNCTURE: CPT

## 2019-11-22 PROCEDURE — 97535 SELF CARE MNGMENT TRAINING: CPT

## 2019-11-22 PROCEDURE — 51798 US URINE CAPACITY MEASURE: CPT

## 2019-11-22 PROCEDURE — 99024 POSTOP FOLLOW-UP VISIT: CPT | Performed by: SURGERY

## 2019-11-22 RX ADMIN — MORPHINE SULFATE 2 MG: 2 INJECTION, SOLUTION INTRAMUSCULAR; INTRAVENOUS at 02:15

## 2019-11-22 RX ADMIN — SODIUM CHLORIDE: 9 INJECTION, SOLUTION INTRAVENOUS at 11:07

## 2019-11-22 RX ADMIN — PIPERACILLIN AND TAZOBACTAM 3.38 G: 3; .375 INJECTION, POWDER, LYOPHILIZED, FOR SOLUTION INTRAVENOUS at 19:12

## 2019-11-22 RX ADMIN — MORPHINE SULFATE 2 MG: 2 INJECTION, SOLUTION INTRAMUSCULAR; INTRAVENOUS at 06:48

## 2019-11-22 RX ADMIN — MORPHINE SULFATE 2 MG: 2 INJECTION, SOLUTION INTRAMUSCULAR; INTRAVENOUS at 10:11

## 2019-11-22 RX ADMIN — SODIUM CHLORIDE: 9 INJECTION, SOLUTION INTRAVENOUS at 02:19

## 2019-11-22 RX ADMIN — MORPHINE SULFATE 2 MG: 2 INJECTION, SOLUTION INTRAMUSCULAR; INTRAVENOUS at 13:12

## 2019-11-22 RX ADMIN — PIPERACILLIN AND TAZOBACTAM 3.38 G: 3; .375 INJECTION, POWDER, LYOPHILIZED, FOR SOLUTION INTRAVENOUS at 02:15

## 2019-11-22 RX ADMIN — PIPERACILLIN AND TAZOBACTAM 3.38 G: 3; .375 INJECTION, POWDER, LYOPHILIZED, FOR SOLUTION INTRAVENOUS at 10:11

## 2019-11-22 ASSESSMENT — PAIN DESCRIPTION - LOCATION
LOCATION: ABDOMEN

## 2019-11-22 ASSESSMENT — PAIN DESCRIPTION - FREQUENCY
FREQUENCY: CONTINUOUS
FREQUENCY: CONTINUOUS

## 2019-11-22 ASSESSMENT — PAIN DESCRIPTION - DESCRIPTORS
DESCRIPTORS: DISCOMFORT;SORE;TENDER
DESCRIPTORS: DISCOMFORT;SORE;TENDER
DESCRIPTORS: ACHING;CONSTANT;DISCOMFORT

## 2019-11-22 ASSESSMENT — PAIN SCALES - GENERAL
PAINLEVEL_OUTOF10: 5
PAINLEVEL_OUTOF10: 8
PAINLEVEL_OUTOF10: 3
PAINLEVEL_OUTOF10: 0
PAINLEVEL_OUTOF10: 7

## 2019-11-22 ASSESSMENT — PAIN DESCRIPTION - PAIN TYPE
TYPE: SURGICAL PAIN
TYPE: SURGICAL PAIN
TYPE: ACUTE PAIN

## 2019-11-22 ASSESSMENT — PAIN DESCRIPTION - ORIENTATION
ORIENTATION: MID
ORIENTATION: UPPER
ORIENTATION: UPPER

## 2019-11-23 LAB
ALBUMIN SERPL-MCNC: 2.8 G/DL (ref 3.5–5.2)
ALP BLD-CCNC: 61 U/L (ref 35–104)
ALT SERPL-CCNC: 24 U/L (ref 0–32)
ANION GAP SERPL CALCULATED.3IONS-SCNC: 11 MMOL/L (ref 7–16)
AST SERPL-CCNC: 25 U/L (ref 0–31)
BILIRUB SERPL-MCNC: 0.3 MG/DL (ref 0–1.2)
BUN BLDV-MCNC: 18 MG/DL (ref 8–23)
CALCIUM SERPL-MCNC: 8.1 MG/DL (ref 8.6–10.2)
CHLORIDE BLD-SCNC: 112 MMOL/L (ref 98–107)
CO2: 22 MMOL/L (ref 22–29)
CREAT SERPL-MCNC: 1 MG/DL (ref 0.5–1)
GFR AFRICAN AMERICAN: >60
GFR NON-AFRICAN AMERICAN: 52 ML/MIN/1.73
GLUCOSE BLD-MCNC: 108 MG/DL (ref 74–99)
HCT VFR BLD CALC: 29.7 % (ref 34–48)
HEMOGLOBIN: 9.3 G/DL (ref 11.5–15.5)
MCH RBC QN AUTO: 31.2 PG (ref 26–35)
MCHC RBC AUTO-ENTMCNC: 31.3 % (ref 32–34.5)
MCV RBC AUTO: 99.7 FL (ref 80–99.9)
PDW BLD-RTO: 14.5 FL (ref 11.5–15)
PLATELET # BLD: 224 E9/L (ref 130–450)
PMV BLD AUTO: 9.7 FL (ref 7–12)
POTASSIUM SERPL-SCNC: 3.8 MMOL/L (ref 3.5–5)
RBC # BLD: 2.98 E12/L (ref 3.5–5.5)
SODIUM BLD-SCNC: 145 MMOL/L (ref 132–146)
TOTAL PROTEIN: 5.1 G/DL (ref 6.4–8.3)
WBC # BLD: 11.2 E9/L (ref 4.5–11.5)

## 2019-11-23 PROCEDURE — 1200000000 HC SEMI PRIVATE

## 2019-11-23 PROCEDURE — APPSS30 APP SPLIT SHARED TIME 16-30 MINUTES: Performed by: NURSE PRACTITIONER

## 2019-11-23 PROCEDURE — 36415 COLL VENOUS BLD VENIPUNCTURE: CPT

## 2019-11-23 PROCEDURE — 6360000002 HC RX W HCPCS: Performed by: SURGERY

## 2019-11-23 PROCEDURE — 99233 SBSQ HOSP IP/OBS HIGH 50: CPT | Performed by: INTERNAL MEDICINE

## 2019-11-23 PROCEDURE — 85027 COMPLETE CBC AUTOMATED: CPT

## 2019-11-23 PROCEDURE — 2580000003 HC RX 258: Performed by: SURGERY

## 2019-11-23 PROCEDURE — 99024 POSTOP FOLLOW-UP VISIT: CPT | Performed by: SURGERY

## 2019-11-23 PROCEDURE — 80053 COMPREHEN METABOLIC PANEL: CPT

## 2019-11-23 RX ADMIN — ENOXAPARIN SODIUM 40 MG: 40 INJECTION SUBCUTANEOUS at 11:25

## 2019-11-23 RX ADMIN — PIPERACILLIN AND TAZOBACTAM 3.38 G: 3; .375 INJECTION, POWDER, LYOPHILIZED, FOR SOLUTION INTRAVENOUS at 11:25

## 2019-11-23 RX ADMIN — SODIUM CHLORIDE: 9 INJECTION, SOLUTION INTRAVENOUS at 19:54

## 2019-11-23 RX ADMIN — MORPHINE SULFATE 2 MG: 2 INJECTION, SOLUTION INTRAMUSCULAR; INTRAVENOUS at 01:09

## 2019-11-23 RX ADMIN — PIPERACILLIN AND TAZOBACTAM 3.38 G: 3; .375 INJECTION, POWDER, LYOPHILIZED, FOR SOLUTION INTRAVENOUS at 03:00

## 2019-11-23 RX ADMIN — PIPERACILLIN AND TAZOBACTAM 3.38 G: 3; .375 INJECTION, POWDER, LYOPHILIZED, FOR SOLUTION INTRAVENOUS at 19:53

## 2019-11-23 RX ADMIN — SODIUM CHLORIDE: 9 INJECTION, SOLUTION INTRAVENOUS at 09:39

## 2019-11-23 RX ADMIN — MORPHINE SULFATE 2 MG: 2 INJECTION, SOLUTION INTRAMUSCULAR; INTRAVENOUS at 11:25

## 2019-11-23 RX ADMIN — MORPHINE SULFATE 2 MG: 2 INJECTION, SOLUTION INTRAMUSCULAR; INTRAVENOUS at 17:31

## 2019-11-23 ASSESSMENT — PAIN DESCRIPTION - ORIENTATION: ORIENTATION: UPPER;MID

## 2019-11-23 ASSESSMENT — PAIN - FUNCTIONAL ASSESSMENT
PAIN_FUNCTIONAL_ASSESSMENT: PREVENTS OR INTERFERES WITH MANY ACTIVE NOT PASSIVE ACTIVITIES
PAIN_FUNCTIONAL_ASSESSMENT: PREVENTS OR INTERFERES SOME ACTIVE ACTIVITIES AND ADLS
PAIN_FUNCTIONAL_ASSESSMENT: PREVENTS OR INTERFERES SOME ACTIVE ACTIVITIES AND ADLS

## 2019-11-23 ASSESSMENT — PAIN SCALES - GENERAL
PAINLEVEL_OUTOF10: 0
PAINLEVEL_OUTOF10: 7
PAINLEVEL_OUTOF10: 10
PAINLEVEL_OUTOF10: 6
PAINLEVEL_OUTOF10: 10

## 2019-11-23 ASSESSMENT — PAIN DESCRIPTION - LOCATION
LOCATION: ABDOMEN
LOCATION: BACK

## 2019-11-23 ASSESSMENT — PAIN DESCRIPTION - DESCRIPTORS
DESCRIPTORS: ACHING;DISCOMFORT;SORE
DESCRIPTORS: ACHING;DISCOMFORT;CRAMPING
DESCRIPTORS: ACHING;DISCOMFORT;SORE

## 2019-11-23 ASSESSMENT — PAIN DESCRIPTION - PAIN TYPE
TYPE: SURGICAL PAIN
TYPE: ACUTE PAIN
TYPE: SURGICAL PAIN

## 2019-11-24 LAB
ALBUMIN SERPL-MCNC: 2.7 G/DL (ref 3.5–5.2)
ALP BLD-CCNC: 58 U/L (ref 35–104)
ALT SERPL-CCNC: 18 U/L (ref 0–32)
ANION GAP SERPL CALCULATED.3IONS-SCNC: 17 MMOL/L (ref 7–16)
ANION GAP SERPL CALCULATED.3IONS-SCNC: 19 MMOL/L (ref 7–16)
AST SERPL-CCNC: 23 U/L (ref 0–31)
BILIRUB SERPL-MCNC: 0.4 MG/DL (ref 0–1.2)
BILIRUBIN FLUID: 3.01 MG/DL
BUN BLDV-MCNC: 10 MG/DL (ref 8–23)
BUN BLDV-MCNC: 11 MG/DL (ref 8–23)
CALCIUM SERPL-MCNC: 8.2 MG/DL (ref 8.6–10.2)
CALCIUM SERPL-MCNC: 8.5 MG/DL (ref 8.6–10.2)
CHLORIDE BLD-SCNC: 110 MMOL/L (ref 98–107)
CHLORIDE BLD-SCNC: 112 MMOL/L (ref 98–107)
CO2: 17 MMOL/L (ref 22–29)
CO2: 17 MMOL/L (ref 22–29)
CREAT SERPL-MCNC: 0.8 MG/DL (ref 0.5–1)
CREAT SERPL-MCNC: 0.8 MG/DL (ref 0.5–1)
FLUID TYPE: NORMAL
GFR AFRICAN AMERICAN: >60
GFR AFRICAN AMERICAN: >60
GFR NON-AFRICAN AMERICAN: >60 ML/MIN/1.73
GFR NON-AFRICAN AMERICAN: >60 ML/MIN/1.73
GLUCOSE BLD-MCNC: 210 MG/DL (ref 74–99)
GLUCOSE BLD-MCNC: 78 MG/DL (ref 74–99)
HCT VFR BLD CALC: 32.4 % (ref 34–48)
HEMOGLOBIN: 10.1 G/DL (ref 11.5–15.5)
MAGNESIUM: 1.8 MG/DL (ref 1.6–2.6)
MCH RBC QN AUTO: 30.8 PG (ref 26–35)
MCHC RBC AUTO-ENTMCNC: 31.2 % (ref 32–34.5)
MCV RBC AUTO: 98.8 FL (ref 80–99.9)
PDW BLD-RTO: 13.8 FL (ref 11.5–15)
PHOSPHORUS: 2.5 MG/DL (ref 2.5–4.5)
PLATELET # BLD: 235 E9/L (ref 130–450)
PMV BLD AUTO: 9.3 FL (ref 7–12)
POTASSIUM SERPL-SCNC: 3.4 MMOL/L (ref 3.5–5)
POTASSIUM SERPL-SCNC: 3.6 MMOL/L (ref 3.5–5)
RBC # BLD: 3.28 E12/L (ref 3.5–5.5)
SODIUM BLD-SCNC: 144 MMOL/L (ref 132–146)
SODIUM BLD-SCNC: 148 MMOL/L (ref 132–146)
TOTAL PROTEIN: 5.2 G/DL (ref 6.4–8.3)
WBC # BLD: 9 E9/L (ref 4.5–11.5)

## 2019-11-24 PROCEDURE — APPSS30 APP SPLIT SHARED TIME 16-30 MINUTES: Performed by: NURSE PRACTITIONER

## 2019-11-24 PROCEDURE — 1200000000 HC SEMI PRIVATE

## 2019-11-24 PROCEDURE — 99233 SBSQ HOSP IP/OBS HIGH 50: CPT | Performed by: INTERNAL MEDICINE

## 2019-11-24 PROCEDURE — 97116 GAIT TRAINING THERAPY: CPT

## 2019-11-24 PROCEDURE — 6370000000 HC RX 637 (ALT 250 FOR IP): Performed by: NURSE PRACTITIONER

## 2019-11-24 PROCEDURE — 94761 N-INVAS EAR/PLS OXIMETRY MLT: CPT

## 2019-11-24 PROCEDURE — 85027 COMPLETE CBC AUTOMATED: CPT

## 2019-11-24 PROCEDURE — 84100 ASSAY OF PHOSPHORUS: CPT

## 2019-11-24 PROCEDURE — 2580000003 HC RX 258: Performed by: SURGERY

## 2019-11-24 PROCEDURE — 6370000000 HC RX 637 (ALT 250 FOR IP): Performed by: INTERNAL MEDICINE

## 2019-11-24 PROCEDURE — 80048 BASIC METABOLIC PNL TOTAL CA: CPT

## 2019-11-24 PROCEDURE — 80053 COMPREHEN METABOLIC PANEL: CPT

## 2019-11-24 PROCEDURE — 83735 ASSAY OF MAGNESIUM: CPT

## 2019-11-24 PROCEDURE — 93005 ELECTROCARDIOGRAM TRACING: CPT | Performed by: INTERNAL MEDICINE

## 2019-11-24 PROCEDURE — 2500000003 HC RX 250 WO HCPCS: Performed by: NURSE PRACTITIONER

## 2019-11-24 PROCEDURE — 82247 BILIRUBIN TOTAL: CPT

## 2019-11-24 PROCEDURE — 6370000000 HC RX 637 (ALT 250 FOR IP): Performed by: SURGERY

## 2019-11-24 PROCEDURE — 99024 POSTOP FOLLOW-UP VISIT: CPT | Performed by: SURGERY

## 2019-11-24 PROCEDURE — 6360000002 HC RX W HCPCS: Performed by: SURGERY

## 2019-11-24 PROCEDURE — 36415 COLL VENOUS BLD VENIPUNCTURE: CPT

## 2019-11-24 PROCEDURE — 2580000003 HC RX 258: Performed by: NURSE PRACTITIONER

## 2019-11-24 RX ORDER — SODIUM CHLORIDE 450 MG/100ML
INJECTION, SOLUTION INTRAVENOUS CONTINUOUS
Status: DISCONTINUED | OUTPATIENT
Start: 2019-11-24 | End: 2019-11-26 | Stop reason: HOSPADM

## 2019-11-24 RX ORDER — METOPROLOL TARTRATE 5 MG/5ML
5 INJECTION INTRAVENOUS ONCE
Status: COMPLETED | OUTPATIENT
Start: 2019-11-24 | End: 2019-11-24

## 2019-11-24 RX ORDER — POTASSIUM CHLORIDE 20 MEQ/1
40 TABLET, EXTENDED RELEASE ORAL 2 TIMES DAILY
Status: COMPLETED | OUTPATIENT
Start: 2019-11-24 | End: 2019-11-24

## 2019-11-24 RX ADMIN — MORPHINE SULFATE 2 MG: 2 INJECTION, SOLUTION INTRAMUSCULAR; INTRAVENOUS at 14:48

## 2019-11-24 RX ADMIN — ENOXAPARIN SODIUM 40 MG: 40 INJECTION SUBCUTANEOUS at 08:35

## 2019-11-24 RX ADMIN — METOPROLOL TARTRATE 25 MG: 25 TABLET ORAL at 20:18

## 2019-11-24 RX ADMIN — METOPROLOL TARTRATE 5 MG: 5 INJECTION, SOLUTION INTRAVENOUS at 13:44

## 2019-11-24 RX ADMIN — SODIUM CHLORIDE: 9 INJECTION, SOLUTION INTRAVENOUS at 05:33

## 2019-11-24 RX ADMIN — MEMANTINE HYDROCHLORIDE 10 MG: 10 TABLET, FILM COATED ORAL at 20:18

## 2019-11-24 RX ADMIN — MORPHINE SULFATE 2 MG: 2 INJECTION, SOLUTION INTRAMUSCULAR; INTRAVENOUS at 11:06

## 2019-11-24 RX ADMIN — MORPHINE SULFATE 2 MG: 2 INJECTION, SOLUTION INTRAMUSCULAR; INTRAVENOUS at 17:49

## 2019-11-24 RX ADMIN — POTASSIUM CHLORIDE 40 MEQ: 20 TABLET, EXTENDED RELEASE ORAL at 20:17

## 2019-11-24 RX ADMIN — DONEPEZIL HYDROCHLORIDE 10 MG: 5 TABLET, FILM COATED ORAL at 20:17

## 2019-11-24 RX ADMIN — PIPERACILLIN AND TAZOBACTAM 3.38 G: 3; .375 INJECTION, POWDER, LYOPHILIZED, FOR SOLUTION INTRAVENOUS at 11:06

## 2019-11-24 RX ADMIN — POTASSIUM CHLORIDE 40 MEQ: 20 TABLET, EXTENDED RELEASE ORAL at 14:48

## 2019-11-24 RX ADMIN — SODIUM CHLORIDE: 4.5 INJECTION, SOLUTION INTRAVENOUS at 11:06

## 2019-11-24 RX ADMIN — PIPERACILLIN AND TAZOBACTAM 3.38 G: 3; .375 INJECTION, POWDER, LYOPHILIZED, FOR SOLUTION INTRAVENOUS at 03:00

## 2019-11-24 RX ADMIN — PIPERACILLIN AND TAZOBACTAM 3.38 G: 3; .375 INJECTION, POWDER, LYOPHILIZED, FOR SOLUTION INTRAVENOUS at 20:18

## 2019-11-24 ASSESSMENT — PAIN SCALES - GENERAL
PAINLEVEL_OUTOF10: 6
PAINLEVEL_OUTOF10: 8
PAINLEVEL_OUTOF10: 3
PAINLEVEL_OUTOF10: 3
PAINLEVEL_OUTOF10: 0
PAINLEVEL_OUTOF10: 5
PAINLEVEL_OUTOF10: 0
PAINLEVEL_OUTOF10: 5
PAINLEVEL_OUTOF10: 3

## 2019-11-24 ASSESSMENT — PAIN DESCRIPTION - DESCRIPTORS
DESCRIPTORS: ACHING;DISCOMFORT;SORE

## 2019-11-24 ASSESSMENT — PAIN - FUNCTIONAL ASSESSMENT
PAIN_FUNCTIONAL_ASSESSMENT: PREVENTS OR INTERFERES SOME ACTIVE ACTIVITIES AND ADLS

## 2019-11-24 ASSESSMENT — PAIN DESCRIPTION - LOCATION: LOCATION: ABDOMEN

## 2019-11-24 ASSESSMENT — PAIN DESCRIPTION - PAIN TYPE
TYPE: SURGICAL PAIN

## 2019-11-25 LAB
ALBUMIN SERPL-MCNC: 2.5 G/DL (ref 3.5–5.2)
ALP BLD-CCNC: 58 U/L (ref 35–104)
ALT SERPL-CCNC: 13 U/L (ref 0–32)
ANION GAP SERPL CALCULATED.3IONS-SCNC: 9 MMOL/L (ref 7–16)
AST SERPL-CCNC: 16 U/L (ref 0–31)
BILIRUB SERPL-MCNC: 0.4 MG/DL (ref 0–1.2)
BUN BLDV-MCNC: 8 MG/DL (ref 8–23)
CALCIUM SERPL-MCNC: 8.4 MG/DL (ref 8.6–10.2)
CHLORIDE BLD-SCNC: 110 MMOL/L (ref 98–107)
CO2: 23 MMOL/L (ref 22–29)
CREAT SERPL-MCNC: 0.8 MG/DL (ref 0.5–1)
GFR AFRICAN AMERICAN: >60
GFR NON-AFRICAN AMERICAN: >60 ML/MIN/1.73
GLUCOSE BLD-MCNC: 113 MG/DL (ref 74–99)
HCT VFR BLD CALC: 29.7 % (ref 34–48)
HEMOGLOBIN: 9.4 G/DL (ref 11.5–15.5)
LV EF: 55 %
LVEF MODALITY: NORMAL
MCH RBC QN AUTO: 30.9 PG (ref 26–35)
MCHC RBC AUTO-ENTMCNC: 31.6 % (ref 32–34.5)
MCV RBC AUTO: 97.7 FL (ref 80–99.9)
PDW BLD-RTO: 14 FL (ref 11.5–15)
PLATELET # BLD: 256 E9/L (ref 130–450)
PMV BLD AUTO: 9.3 FL (ref 7–12)
POTASSIUM SERPL-SCNC: 3.9 MMOL/L (ref 3.5–5)
RBC # BLD: 3.04 E12/L (ref 3.5–5.5)
SODIUM BLD-SCNC: 142 MMOL/L (ref 132–146)
TOTAL PROTEIN: 5.2 G/DL (ref 6.4–8.3)
TSH SERPL DL<=0.05 MIU/L-ACNC: 4.33 UIU/ML (ref 0.27–4.2)
WBC # BLD: 7.7 E9/L (ref 4.5–11.5)

## 2019-11-25 PROCEDURE — 2580000003 HC RX 258: Performed by: SURGERY

## 2019-11-25 PROCEDURE — 6360000002 HC RX W HCPCS: Performed by: SURGERY

## 2019-11-25 PROCEDURE — 6370000000 HC RX 637 (ALT 250 FOR IP): Performed by: SURGERY

## 2019-11-25 PROCEDURE — 2580000003 HC RX 258: Performed by: NURSE PRACTITIONER

## 2019-11-25 PROCEDURE — APPSS30 APP SPLIT SHARED TIME 16-30 MINUTES: Performed by: NURSE PRACTITIONER

## 2019-11-25 PROCEDURE — 97116 GAIT TRAINING THERAPY: CPT

## 2019-11-25 PROCEDURE — 93306 TTE W/DOPPLER COMPLETE: CPT

## 2019-11-25 PROCEDURE — 99024 POSTOP FOLLOW-UP VISIT: CPT | Performed by: SURGERY

## 2019-11-25 PROCEDURE — 1200000000 HC SEMI PRIVATE

## 2019-11-25 PROCEDURE — 6370000000 HC RX 637 (ALT 250 FOR IP): Performed by: INTERNAL MEDICINE

## 2019-11-25 PROCEDURE — 84443 ASSAY THYROID STIM HORMONE: CPT

## 2019-11-25 PROCEDURE — 36415 COLL VENOUS BLD VENIPUNCTURE: CPT

## 2019-11-25 PROCEDURE — 80053 COMPREHEN METABOLIC PANEL: CPT

## 2019-11-25 PROCEDURE — 85027 COMPLETE CBC AUTOMATED: CPT

## 2019-11-25 PROCEDURE — 97110 THERAPEUTIC EXERCISES: CPT

## 2019-11-25 PROCEDURE — 99233 SBSQ HOSP IP/OBS HIGH 50: CPT | Performed by: INTERNAL MEDICINE

## 2019-11-25 RX ADMIN — MORPHINE SULFATE 2 MG: 2 INJECTION, SOLUTION INTRAMUSCULAR; INTRAVENOUS at 17:08

## 2019-11-25 RX ADMIN — MEMANTINE HYDROCHLORIDE 10 MG: 10 TABLET, FILM COATED ORAL at 21:34

## 2019-11-25 RX ADMIN — PIPERACILLIN AND TAZOBACTAM 3.38 G: 3; .375 INJECTION, POWDER, LYOPHILIZED, FOR SOLUTION INTRAVENOUS at 21:33

## 2019-11-25 RX ADMIN — DONEPEZIL HYDROCHLORIDE 10 MG: 5 TABLET, FILM COATED ORAL at 21:34

## 2019-11-25 RX ADMIN — METOPROLOL TARTRATE 25 MG: 25 TABLET ORAL at 21:34

## 2019-11-25 RX ADMIN — PIPERACILLIN AND TAZOBACTAM 3.38 G: 3; .375 INJECTION, POWDER, LYOPHILIZED, FOR SOLUTION INTRAVENOUS at 03:39

## 2019-11-25 RX ADMIN — ENOXAPARIN SODIUM 40 MG: 40 INJECTION SUBCUTANEOUS at 09:25

## 2019-11-25 RX ADMIN — CITALOPRAM HYDROBROMIDE 10 MG: 10 TABLET ORAL at 09:26

## 2019-11-25 RX ADMIN — METOPROLOL TARTRATE 25 MG: 25 TABLET ORAL at 09:27

## 2019-11-25 RX ADMIN — SODIUM CHLORIDE: 4.5 INJECTION, SOLUTION INTRAVENOUS at 00:38

## 2019-11-25 RX ADMIN — Medication 10 ML: at 09:27

## 2019-11-25 RX ADMIN — TRAMADOL HYDROCHLORIDE 50 MG: 50 TABLET, FILM COATED ORAL at 14:30

## 2019-11-25 RX ADMIN — Medication 10 ML: at 21:37

## 2019-11-25 RX ADMIN — PIPERACILLIN AND TAZOBACTAM 3.38 G: 3; .375 INJECTION, POWDER, LYOPHILIZED, FOR SOLUTION INTRAVENOUS at 12:26

## 2019-11-25 RX ADMIN — TRAMADOL HYDROCHLORIDE 50 MG: 50 TABLET, FILM COATED ORAL at 00:39

## 2019-11-25 RX ADMIN — MEMANTINE HYDROCHLORIDE 10 MG: 10 TABLET, FILM COATED ORAL at 09:26

## 2019-11-25 RX ADMIN — MORPHINE SULFATE 2 MG: 2 INJECTION, SOLUTION INTRAMUSCULAR; INTRAVENOUS at 13:07

## 2019-11-25 RX ADMIN — MORPHINE SULFATE 2 MG: 2 INJECTION, SOLUTION INTRAMUSCULAR; INTRAVENOUS at 09:25

## 2019-11-25 ASSESSMENT — PAIN SCALES - GENERAL
PAINLEVEL_OUTOF10: 8
PAINLEVEL_OUTOF10: 8
PAINLEVEL_OUTOF10: 0
PAINLEVEL_OUTOF10: 6
PAINLEVEL_OUTOF10: 8
PAINLEVEL_OUTOF10: 7
PAINLEVEL_OUTOF10: 8
PAINLEVEL_OUTOF10: 5
PAINLEVEL_OUTOF10: 6
PAINLEVEL_OUTOF10: 3
PAINLEVEL_OUTOF10: 7

## 2019-11-26 ENCOUNTER — TELEPHONE (OUTPATIENT)
Dept: ADMINISTRATIVE | Age: 84
End: 2019-11-26

## 2019-11-26 VITALS
HEART RATE: 85 BPM | TEMPERATURE: 98.8 F | WEIGHT: 120.2 LBS | DIASTOLIC BLOOD PRESSURE: 87 MMHG | BODY MASS INDEX: 19.32 KG/M2 | SYSTOLIC BLOOD PRESSURE: 179 MMHG | OXYGEN SATURATION: 95 % | RESPIRATION RATE: 18 BRPM | HEIGHT: 66 IN

## 2019-11-26 LAB
ALBUMIN SERPL-MCNC: 2.4 G/DL (ref 3.5–5.2)
ALP BLD-CCNC: 69 U/L (ref 35–104)
ALT SERPL-CCNC: 14 U/L (ref 0–32)
ANION GAP SERPL CALCULATED.3IONS-SCNC: 12 MMOL/L (ref 7–16)
AST SERPL-CCNC: 15 U/L (ref 0–31)
BILIRUB SERPL-MCNC: 0.3 MG/DL (ref 0–1.2)
BUN BLDV-MCNC: 7 MG/DL (ref 8–23)
CALCIUM SERPL-MCNC: 8.4 MG/DL (ref 8.6–10.2)
CHLORIDE BLD-SCNC: 107 MMOL/L (ref 98–107)
CO2: 22 MMOL/L (ref 22–29)
CREAT SERPL-MCNC: 0.7 MG/DL (ref 0.5–1)
GFR AFRICAN AMERICAN: >60
GFR NON-AFRICAN AMERICAN: >60 ML/MIN/1.73
GLUCOSE BLD-MCNC: 111 MG/DL (ref 74–99)
HCT VFR BLD CALC: 30.9 % (ref 34–48)
HEMOGLOBIN: 10 G/DL (ref 11.5–15.5)
MAGNESIUM: 1.7 MG/DL (ref 1.6–2.6)
MCH RBC QN AUTO: 31.3 PG (ref 26–35)
MCHC RBC AUTO-ENTMCNC: 32.4 % (ref 32–34.5)
MCV RBC AUTO: 96.9 FL (ref 80–99.9)
PDW BLD-RTO: 13.9 FL (ref 11.5–15)
PLATELET # BLD: 290 E9/L (ref 130–450)
PMV BLD AUTO: 9.7 FL (ref 7–12)
POTASSIUM SERPL-SCNC: 3.4 MMOL/L (ref 3.5–5)
RBC # BLD: 3.19 E12/L (ref 3.5–5.5)
SODIUM BLD-SCNC: 141 MMOL/L (ref 132–146)
TOTAL PROTEIN: 5.1 G/DL (ref 6.4–8.3)
WBC # BLD: 8.2 E9/L (ref 4.5–11.5)

## 2019-11-26 PROCEDURE — APPSS45 APP SPLIT SHARED TIME 31-45 MINUTES: Performed by: NURSE PRACTITIONER

## 2019-11-26 PROCEDURE — 2580000003 HC RX 258: Performed by: NURSE PRACTITIONER

## 2019-11-26 PROCEDURE — 99024 POSTOP FOLLOW-UP VISIT: CPT | Performed by: SURGERY

## 2019-11-26 PROCEDURE — 2700000000 HC OXYGEN THERAPY PER DAY

## 2019-11-26 PROCEDURE — 99239 HOSP IP/OBS DSCHRG MGMT >30: CPT | Performed by: INTERNAL MEDICINE

## 2019-11-26 PROCEDURE — 6370000000 HC RX 637 (ALT 250 FOR IP): Performed by: NURSE PRACTITIONER

## 2019-11-26 PROCEDURE — 2580000003 HC RX 258: Performed by: SURGERY

## 2019-11-26 PROCEDURE — 97530 THERAPEUTIC ACTIVITIES: CPT

## 2019-11-26 PROCEDURE — 6360000002 HC RX W HCPCS: Performed by: SURGERY

## 2019-11-26 PROCEDURE — 85027 COMPLETE CBC AUTOMATED: CPT

## 2019-11-26 PROCEDURE — 6360000002 HC RX W HCPCS: Performed by: INTERNAL MEDICINE

## 2019-11-26 PROCEDURE — 6370000000 HC RX 637 (ALT 250 FOR IP): Performed by: SURGERY

## 2019-11-26 PROCEDURE — 80053 COMPREHEN METABOLIC PANEL: CPT

## 2019-11-26 PROCEDURE — 83735 ASSAY OF MAGNESIUM: CPT

## 2019-11-26 PROCEDURE — 97110 THERAPEUTIC EXERCISES: CPT

## 2019-11-26 PROCEDURE — 99233 SBSQ HOSP IP/OBS HIGH 50: CPT | Performed by: INTERNAL MEDICINE

## 2019-11-26 PROCEDURE — 36415 COLL VENOUS BLD VENIPUNCTURE: CPT

## 2019-11-26 PROCEDURE — 6370000000 HC RX 637 (ALT 250 FOR IP): Performed by: INTERNAL MEDICINE

## 2019-11-26 PROCEDURE — 94761 N-INVAS EAR/PLS OXIMETRY MLT: CPT

## 2019-11-26 RX ORDER — POTASSIUM CHLORIDE 20 MEQ/1
40 TABLET, EXTENDED RELEASE ORAL 2 TIMES DAILY
Status: COMPLETED | OUTPATIENT
Start: 2019-11-26 | End: 2019-11-26

## 2019-11-26 RX ORDER — POTASSIUM CHLORIDE 20 MEQ/1
40 TABLET, EXTENDED RELEASE ORAL ONCE
Status: DISCONTINUED | OUTPATIENT
Start: 2019-11-26 | End: 2019-11-26

## 2019-11-26 RX ORDER — SULFAMETHOXAZOLE AND TRIMETHOPRIM 800; 160 MG/1; MG/1
1 TABLET ORAL 2 TIMES DAILY
Qty: 14 TABLET | Refills: 0 | Status: SHIPPED | OUTPATIENT
Start: 2019-11-26 | End: 2019-12-03

## 2019-11-26 RX ORDER — TRAMADOL HYDROCHLORIDE 50 MG/1
25 TABLET ORAL EVERY 6 HOURS PRN
Qty: 12 TABLET | Refills: 0 | Status: SHIPPED | OUTPATIENT
Start: 2019-11-26 | End: 2019-11-29

## 2019-11-26 RX ORDER — MAGNESIUM SULFATE IN WATER 40 MG/ML
2 INJECTION, SOLUTION INTRAVENOUS ONCE
Status: COMPLETED | OUTPATIENT
Start: 2019-11-26 | End: 2019-11-26

## 2019-11-26 RX ADMIN — CITALOPRAM HYDROBROMIDE 10 MG: 10 TABLET ORAL at 08:23

## 2019-11-26 RX ADMIN — ENOXAPARIN SODIUM 40 MG: 40 INJECTION SUBCUTANEOUS at 08:23

## 2019-11-26 RX ADMIN — SODIUM CHLORIDE: 4.5 INJECTION, SOLUTION INTRAVENOUS at 03:37

## 2019-11-26 RX ADMIN — MAGNESIUM SULFATE HEPTAHYDRATE 2 G: 40 INJECTION, SOLUTION INTRAVENOUS at 11:23

## 2019-11-26 RX ADMIN — PIPERACILLIN AND TAZOBACTAM 3.38 G: 3; .375 INJECTION, POWDER, LYOPHILIZED, FOR SOLUTION INTRAVENOUS at 11:24

## 2019-11-26 RX ADMIN — PIPERACILLIN AND TAZOBACTAM 3.38 G: 3; .375 INJECTION, POWDER, LYOPHILIZED, FOR SOLUTION INTRAVENOUS at 03:57

## 2019-11-26 RX ADMIN — MORPHINE SULFATE 2 MG: 2 INJECTION, SOLUTION INTRAMUSCULAR; INTRAVENOUS at 00:48

## 2019-11-26 RX ADMIN — MEMANTINE HYDROCHLORIDE 10 MG: 10 TABLET, FILM COATED ORAL at 08:24

## 2019-11-26 RX ADMIN — POTASSIUM CHLORIDE 40 MEQ: 20 TABLET, EXTENDED RELEASE ORAL at 11:24

## 2019-11-26 RX ADMIN — METOPROLOL TARTRATE 25 MG: 25 TABLET ORAL at 08:24

## 2019-11-26 RX ADMIN — TRAMADOL HYDROCHLORIDE 50 MG: 50 TABLET, FILM COATED ORAL at 08:23

## 2019-11-26 ASSESSMENT — PAIN SCALES - GENERAL
PAINLEVEL_OUTOF10: 9
PAINLEVEL_OUTOF10: 0
PAINLEVEL_OUTOF10: 4
PAINLEVEL_OUTOF10: 8
PAINLEVEL_OUTOF10: 0

## 2019-11-26 ASSESSMENT — PAIN DESCRIPTION - ONSET: ONSET: ON-GOING

## 2019-11-26 ASSESSMENT — PAIN DESCRIPTION - DESCRIPTORS
DESCRIPTORS: ACHING;CONSTANT;DISCOMFORT
DESCRIPTORS: ACHING;CONSTANT;DISCOMFORT

## 2019-11-26 ASSESSMENT — PAIN DESCRIPTION - FREQUENCY: FREQUENCY: CONTINUOUS

## 2019-11-26 ASSESSMENT — PAIN DESCRIPTION - LOCATION
LOCATION: ABDOMEN;BACK
LOCATION: ABDOMEN

## 2019-11-26 ASSESSMENT — PAIN DESCRIPTION - PAIN TYPE
TYPE: ACUTE PAIN
TYPE: SURGICAL PAIN

## 2019-11-26 ASSESSMENT — PAIN - FUNCTIONAL ASSESSMENT: PAIN_FUNCTIONAL_ASSESSMENT: PREVENTS OR INTERFERES SOME ACTIVE ACTIVITIES AND ADLS

## 2019-11-27 LAB
EKG ATRIAL RATE: 115 BPM
EKG Q-T INTERVAL: 326 MS
EKG QRS DURATION: 74 MS
EKG QTC CALCULATION (BAZETT): 435 MS
EKG R AXIS: 2 DEGREES
EKG T AXIS: 4 DEGREES
EKG VENTRICULAR RATE: 107 BPM

## 2019-12-02 ENCOUNTER — OFFICE VISIT (OUTPATIENT)
Dept: FAMILY MEDICINE CLINIC | Age: 84
End: 2019-12-02
Payer: MEDICARE

## 2019-12-02 VITALS
WEIGHT: 122 LBS | SYSTOLIC BLOOD PRESSURE: 160 MMHG | BODY MASS INDEX: 19.61 KG/M2 | HEIGHT: 66 IN | HEART RATE: 72 BPM | DIASTOLIC BLOOD PRESSURE: 78 MMHG

## 2019-12-02 DIAGNOSIS — E78.5 HYPERLIPIDEMIA, UNSPECIFIED HYPERLIPIDEMIA TYPE: ICD-10-CM

## 2019-12-02 DIAGNOSIS — I48.20 CHRONIC ATRIAL FIBRILLATION (HCC): ICD-10-CM

## 2019-12-02 DIAGNOSIS — K82.9 GALLBLADDER DISEASE: Primary | ICD-10-CM

## 2019-12-02 DIAGNOSIS — F32.A DEPRESSION, UNSPECIFIED DEPRESSION TYPE: ICD-10-CM

## 2019-12-02 DIAGNOSIS — F02.80 ALZHEIMER'S DEMENTIA WITHOUT BEHAVIORAL DISTURBANCE, UNSPECIFIED TIMING OF DEMENTIA ONSET: ICD-10-CM

## 2019-12-02 DIAGNOSIS — G30.9 ALZHEIMER'S DEMENTIA WITHOUT BEHAVIORAL DISTURBANCE, UNSPECIFIED TIMING OF DEMENTIA ONSET: ICD-10-CM

## 2019-12-02 DIAGNOSIS — K21.9 GASTROESOPHAGEAL REFLUX DISEASE WITHOUT ESOPHAGITIS: ICD-10-CM

## 2019-12-02 PROCEDURE — 1123F ACP DISCUSS/DSCN MKR DOCD: CPT | Performed by: INTERNAL MEDICINE

## 2019-12-02 PROCEDURE — G8427 DOCREV CUR MEDS BY ELIG CLIN: HCPCS | Performed by: INTERNAL MEDICINE

## 2019-12-02 PROCEDURE — G8482 FLU IMMUNIZE ORDER/ADMIN: HCPCS | Performed by: INTERNAL MEDICINE

## 2019-12-02 PROCEDURE — 1036F TOBACCO NON-USER: CPT | Performed by: INTERNAL MEDICINE

## 2019-12-02 PROCEDURE — 4040F PNEUMOC VAC/ADMIN/RCVD: CPT | Performed by: INTERNAL MEDICINE

## 2019-12-02 PROCEDURE — G8420 CALC BMI NORM PARAMETERS: HCPCS | Performed by: INTERNAL MEDICINE

## 2019-12-02 PROCEDURE — 1111F DSCHRG MED/CURRENT MED MERGE: CPT | Performed by: INTERNAL MEDICINE

## 2019-12-02 PROCEDURE — 99214 OFFICE O/P EST MOD 30 MIN: CPT | Performed by: INTERNAL MEDICINE

## 2019-12-02 PROCEDURE — 1090F PRES/ABSN URINE INCON ASSESS: CPT | Performed by: INTERNAL MEDICINE

## 2019-12-02 ASSESSMENT — ENCOUNTER SYMPTOMS
SHORTNESS OF BREATH: 0
BLOOD IN STOOL: 0
SORE THROAT: 0
NAUSEA: 0
ABDOMINAL PAIN: 0
EYE DISCHARGE: 0
SINUS PAIN: 0
EYE PAIN: 0

## 2019-12-05 ENCOUNTER — PROCEDURE VISIT (OUTPATIENT)
Dept: BARIATRICS/WEIGHT MGMT | Age: 84
End: 2019-12-05

## 2019-12-05 ENCOUNTER — OFFICE VISIT (OUTPATIENT)
Dept: SURGERY | Age: 84
End: 2019-12-05

## 2019-12-05 VITALS
HEIGHT: 66 IN | DIASTOLIC BLOOD PRESSURE: 72 MMHG | WEIGHT: 122 LBS | BODY MASS INDEX: 19.61 KG/M2 | SYSTOLIC BLOOD PRESSURE: 137 MMHG | HEART RATE: 71 BPM | TEMPERATURE: 97.2 F | OXYGEN SATURATION: 94 %

## 2019-12-05 DIAGNOSIS — R10.11 RUQ PAIN: Primary | ICD-10-CM

## 2019-12-05 DIAGNOSIS — G89.18 POST-OP PAIN: Primary | ICD-10-CM

## 2019-12-05 PROCEDURE — 99024 POSTOP FOLLOW-UP VISIT: CPT | Performed by: SURGERY

## 2019-12-05 RX ORDER — TRAMADOL HYDROCHLORIDE 50 MG/1
50 TABLET ORAL EVERY 4 HOURS PRN
Qty: 18 TABLET | Refills: 0 | Status: SHIPPED | OUTPATIENT
Start: 2019-12-05 | End: 2019-12-08

## 2019-12-09 ENCOUNTER — TELEPHONE (OUTPATIENT)
Dept: SURGERY | Age: 84
End: 2019-12-09

## 2019-12-11 ENCOUNTER — HOSPITAL ENCOUNTER (OUTPATIENT)
Dept: CT IMAGING | Age: 84
Discharge: HOME | End: 2019-12-11
Payer: MEDICARE

## 2019-12-11 DIAGNOSIS — R10.11 RUQ PAIN: ICD-10-CM

## 2019-12-11 PROCEDURE — 74176 CT ABD & PELVIS W/O CONTRAST: CPT

## 2019-12-11 PROCEDURE — 6360000004 HC RX CONTRAST MEDICATION: Performed by: RADIOLOGY

## 2019-12-11 RX ADMIN — IOHEXOL 50 ML: 240 INJECTION, SOLUTION INTRATHECAL; INTRAVASCULAR; INTRAVENOUS; ORAL at 10:28

## 2019-12-23 ENCOUNTER — OFFICE VISIT (OUTPATIENT)
Dept: FAMILY MEDICINE CLINIC | Age: 84
End: 2019-12-23
Payer: MEDICARE

## 2019-12-23 VITALS
BODY MASS INDEX: 19.61 KG/M2 | HEART RATE: 58 BPM | WEIGHT: 122 LBS | OXYGEN SATURATION: 98 % | SYSTOLIC BLOOD PRESSURE: 104 MMHG | DIASTOLIC BLOOD PRESSURE: 54 MMHG | HEIGHT: 66 IN

## 2019-12-23 DIAGNOSIS — F32.A DEPRESSION, UNSPECIFIED DEPRESSION TYPE: ICD-10-CM

## 2019-12-23 DIAGNOSIS — R19.7 DIARRHEA, UNSPECIFIED TYPE: Primary | ICD-10-CM

## 2019-12-23 PROCEDURE — 1123F ACP DISCUSS/DSCN MKR DOCD: CPT | Performed by: INTERNAL MEDICINE

## 2019-12-23 PROCEDURE — G8482 FLU IMMUNIZE ORDER/ADMIN: HCPCS | Performed by: INTERNAL MEDICINE

## 2019-12-23 PROCEDURE — G8420 CALC BMI NORM PARAMETERS: HCPCS | Performed by: INTERNAL MEDICINE

## 2019-12-23 PROCEDURE — 1036F TOBACCO NON-USER: CPT | Performed by: INTERNAL MEDICINE

## 2019-12-23 PROCEDURE — G8427 DOCREV CUR MEDS BY ELIG CLIN: HCPCS | Performed by: INTERNAL MEDICINE

## 2019-12-23 PROCEDURE — 4040F PNEUMOC VAC/ADMIN/RCVD: CPT | Performed by: INTERNAL MEDICINE

## 2019-12-23 PROCEDURE — 1090F PRES/ABSN URINE INCON ASSESS: CPT | Performed by: INTERNAL MEDICINE

## 2019-12-23 PROCEDURE — 99213 OFFICE O/P EST LOW 20 MIN: CPT | Performed by: INTERNAL MEDICINE

## 2019-12-23 PROCEDURE — 1111F DSCHRG MED/CURRENT MED MERGE: CPT | Performed by: INTERNAL MEDICINE

## 2019-12-23 RX ORDER — DONEPEZIL HYDROCHLORIDE 10 MG/1
10 TABLET, FILM COATED ORAL NIGHTLY
Qty: 90 TABLET | Refills: 1 | Status: SHIPPED | OUTPATIENT
Start: 2019-12-23 | End: 2020-06-15 | Stop reason: SDUPTHER

## 2019-12-23 RX ORDER — MEMANTINE HYDROCHLORIDE 10 MG/1
10 TABLET ORAL 2 TIMES DAILY
Qty: 180 TABLET | Refills: 1 | Status: SHIPPED | OUTPATIENT
Start: 2019-12-23 | End: 2020-06-15 | Stop reason: SDUPTHER

## 2019-12-23 ASSESSMENT — ENCOUNTER SYMPTOMS
SORE THROAT: 0
EYE DISCHARGE: 0
SHORTNESS OF BREATH: 0
SINUS PAIN: 0
DIARRHEA: 1
BLOOD IN STOOL: 0
ABDOMINAL PAIN: 0
NAUSEA: 0
EYE PAIN: 0

## 2019-12-26 ENCOUNTER — HOSPITAL ENCOUNTER (OUTPATIENT)
Age: 84
Discharge: HOME | End: 2019-12-28
Payer: MEDICARE

## 2019-12-26 DIAGNOSIS — R19.7 DIARRHEA, UNSPECIFIED TYPE: ICD-10-CM

## 2020-01-14 ENCOUNTER — OFFICE VISIT (OUTPATIENT)
Dept: PHYSICAL MEDICINE AND REHAB | Age: 85
End: 2020-01-14
Payer: MEDICARE

## 2020-01-14 VITALS
WEIGHT: 127 LBS | HEIGHT: 65 IN | HEART RATE: 88 BPM | SYSTOLIC BLOOD PRESSURE: 140 MMHG | DIASTOLIC BLOOD PRESSURE: 90 MMHG | BODY MASS INDEX: 21.16 KG/M2

## 2020-01-14 PROCEDURE — G8482 FLU IMMUNIZE ORDER/ADMIN: HCPCS | Performed by: PHYSICAL MEDICINE & REHABILITATION

## 2020-01-14 PROCEDURE — G8427 DOCREV CUR MEDS BY ELIG CLIN: HCPCS | Performed by: PHYSICAL MEDICINE & REHABILITATION

## 2020-01-14 PROCEDURE — 4040F PNEUMOC VAC/ADMIN/RCVD: CPT | Performed by: PHYSICAL MEDICINE & REHABILITATION

## 2020-01-14 PROCEDURE — 1090F PRES/ABSN URINE INCON ASSESS: CPT | Performed by: PHYSICAL MEDICINE & REHABILITATION

## 2020-01-14 PROCEDURE — 1123F ACP DISCUSS/DSCN MKR DOCD: CPT | Performed by: PHYSICAL MEDICINE & REHABILITATION

## 2020-01-14 PROCEDURE — 1036F TOBACCO NON-USER: CPT | Performed by: PHYSICAL MEDICINE & REHABILITATION

## 2020-01-14 PROCEDURE — 99213 OFFICE O/P EST LOW 20 MIN: CPT | Performed by: PHYSICAL MEDICINE & REHABILITATION

## 2020-01-14 PROCEDURE — G8420 CALC BMI NORM PARAMETERS: HCPCS | Performed by: PHYSICAL MEDICINE & REHABILITATION

## 2020-01-14 NOTE — PROGRESS NOTES
metoprolol tartrate (LOPRESSOR) 25 MG tablet Take 1 tablet by mouth 2 times daily 60 tablet 0    diclofenac sodium (VOLTAREN) 1 % GEL Apply 4 g topically 4 times daily as needed (pain) 480 g 5    diphenoxylate-atropine (LOMOTIL) 2.5-0.025 MG per tablet Take 1 tablet by mouth 2 times daily as needed for Diarrhea for up to 180 days. (Patient not taking: Reported on 1/14/2020) 40 tablet 1     No current facility-administered medications for this visit.         Past Medical History:   Diagnosis Date    Alzheimer disease ([facility] [address]     Arthritis     GERD (gastroesophageal reflux disease)        Past Surgical History:   Procedure Laterality Date    ABDOMEN SURGERY      APPENDECTOMY      CHOLECYSTECTOMY, LAPAROSCOPIC N/A 11/21/2019    DIAGNOSITIC LAPAROSCOPY CONVERTED TO EXPLORATORY LAPAROTOMY WITH CLOSURE OF ENTEROTOMY X1, CHOLANGIOGRAM, EGD performed by Ella Ramachandran MD at [address] [place] [address] ECHO [address] [place] [address],[address], [address]  10/21/2013         [facility] INJECTION PROCEDURE FOR SACROILIAC JOINT Right 12/13/2018    RIGHT SACROILIAC JOINT STEROID INJECTION UNDER X-RAY GUIDANCE performed by Leeann Tian DO at [address] JOINT Left 1/17/2019    LEFT SACROILIAC JOINT STEROID INJECTION UNDER X-RAY GUIDANCE performed by Leeann Tian DO at [address] [facility], [facility] INJECTION PROCEDURE FOR SACROILIAC JOINT Bilateral 9/19/2019    BILATERAL SACROILIAC JOINT INJECTION X-RAY [facility] Leeann Tian DO at [facility] Right 12/13/2018    sacroiliac joint     NERVE BLOCK Left 01/17/2019    sacroiliac joint injection     STOMACH SURGERY N/A     d/t peptic ulcers       Family History   Problem Relation Age of Onset    Heart Disease Mother     Heart Disease Father      Social History     Tobacco Use    Smoking status: Never Smoker    Smokeless tobacco: Never Used   Substance Use Topics    Alcohol use: No    Drug use: No       Functional Status: The patient is able to ambulate and perform activities of daily living without the use of an assistive device. ROS:     Constitutional: Denies fevers, chills, night sweats, unintentional weight loss     Skin: Denies rash or skin changes     Eyes: Denies vision changes    Ears/Nose/Throat: Denies nasal congestion or sore throat     Respiratory: Denies SOB or cough     Cardiovascular: Denies CP, palpitations, edema      Gastrointestinal: Denies abdominal pain,  N/V, constipation, or diarrhea    Genitourinary: Denies urinary symptoms    Neurologic: See HPI.     MSK: See HPI. Psychiatric: Denies sleep disturbance, anxiety, depression    Hematologic/Lymphatic/Immunologic: Denies bruising       Physical Exam:   Blood pressure (!) 140/90, pulse 88, height 5' 5\" (1.651 m), weight 127 lb (57.6 kg). General: well developed and well nourished in no acute distress. Body habitus is thin  HEENT: No rhinorrhea, sneezing, yawning, or lacrimation. No scleral icterus or conjunctival injection. Resp: symmetrical chest expansion, unlabored breathing, respirations unlabored. CV: Heart rate is regular. Peripheral pulses are palpable  Lymph: No visible regional lymphadenopathy. Skin: No rashes or ecchymosis. Normal turgor. Psych: Mood is calm. Affect is normal.   Ext: No edema noted     MSK:   Back/Hip Exam:   Inspection: Pelvis was asymmetric. Lumbar lordotic curvature was decreased. There was no scoliosis. No ecchymoses or erythema. Palpation: Palpatory exam revealed tenderness along lumbosacral paraspinals, no ttp midline spine, no ttp bilateral SI Joint sulci. There was no paraspinal spasms. There were no trigger points. ROM limited. +facet loading. Neurological Exam:  Gait is Antalgic. MRI L Spine 12/4/18:  BONE MARROW SIGNAL: Confluent hyperintense T1 signal changes with   patchy signal hyperintensities present in the right sacral ala.    Additional abnormal T2 signal changes on the STIR images noted within   the left sacral ala. No abnormal signal associated with the L4   vertebral body.       T12-L1: Normal T12-L1       L1-L2: Normal L1-L2       L2-L3: Degenerative disc disease with no central or foraminal   stenosis.       L3-L4: Small disc spur complex noted with no central or foraminal   stenosis.       L4-L5: Disc spur complex noted with mild central and foraminal   stenosis.       L5-S1: Mild bulge and severe facet upper superior mild foraminal and   canal stenosis.       SOFT TISSUES: The visualized paravertebral soft tissues are within   normal limits.           Impression       1. No acute compression fractures. L4 fracture is remote. 2.  Sacral insufficiency fractures. Impression:   Maximilian Guadarrama is a 80 y.o. female     1. Facet arthropathy        Plan:   · Patient would benefit from bilateral medial branch blocks to treat L4-5 and L5-S1. Discussed potential of RFA. Procedure risks, benefits and alternatives were discussed. Patient would like to proceed. The patient was educated about the diagnosis, prognosis, indications, risks and benefits of treatment. An opportunity to ask questions was given to the patient and questions were answered. The patient agreed to proceed with the recommended treatment as described above.      Follow up after MBB        Iraj Malcolm DO, FAAPMR   Board Certified Physical Medicine and Rehabilitation

## 2020-01-14 NOTE — H&P
Nehemiah Colvin, [address] [facility] Physical Medicine and Rehabilitation  [address] [facility] [place]  Phone: [phone]  Fax: [phone]    PCP: Jose Ramon Hull MD  Date of visit: 1/14/20    Chief Complaint   Patient presents with    Back Pain       Interval:   Patient presents today for follow up regarding increase in back pain. She was last seen in October and was doing good in regards to her back until a few weeks ago. She was hospitalized and had gallbladder surgery. Started on eliquis and still on it. Her pain is located in the low back without radiation. It is higher today than SI joint pain as it had been in the past.  The pain is rated Pain Score:   8. The pain is better with nothing. The pain is worse with standing. There is no associated numbness/tingling. There is no weakness. There is no bowel/bladder changes. The prior workup has included: Xray hips, MRI L spine      The prior treatment has included:  PT: made it worse   Chiropractic: none   Modalities: bengay with no relief   OTC Tylenol: yes with some relief   NSAIDS: yes with no relief, voltaren gel with relief   Membrane stabilizers: none    Muscle relaxers: none   Previous injections: 1/17/19 bilateral SI joint with 100% relief  9/19/19 bilateral SI joint injections      Previous surgery at this site: none      Allergies   Allergen Reactions    Iodine      I.V.        Current Outpatient Medications   Medication Sig Dispense Refill    apixaban (ELIQUIS) 2.5 MG TABS tablet Take 2.5 mg by mouth daily      donepezil (ARICEPT) 10 MG tablet Take 1 tablet by mouth nightly 90 tablet 1    memantine (NAMENDA) 10 MG tablet Take 1 tablet by mouth 2 times daily 180 tablet 1    citalopram (CELEXA) 10 MG tablet Take 1 tablet by mouth every morning 90 tablet 1    Glucosamine-Chondroit-Vit C-Mn (GLUCOSAMINE 1500 COMPLEX PO) Take by mouth      Cholecalciferol (VITAMIN D3) 2000 units CAPS Take by mouth      abnormal T2 signal changes on the STIR images noted within   the left sacral ala. No abnormal signal associated with the L4   vertebral body.       T12-L1: Normal T12-L1       L1-L2: Normal L1-L2       L2-L3: Degenerative disc disease with no central or foraminal   stenosis.       L3-L4: Small disc spur complex noted with no central or foraminal   stenosis.       L4-L5: Disc spur complex noted with mild central and foraminal   stenosis.       L5-S1: Mild bulge and severe facet upper superior mild foraminal and   canal stenosis.       SOFT TISSUES: The visualized paravertebral soft tissues are within   normal limits.           Impression       1. No acute compression fractures. L4 fracture is remote. 2.  Sacral insufficiency fractures. Impression:   Carlo Gross is a 80 y.o. female     1. Facet arthropathy        Plan:   · Patient would benefit from bilateral medial branch blocks to treat L4-5 and L5-S1. Discussed potential of RFA. Procedure risks, benefits and alternatives were discussed. Patient would like to proceed. The patient was educated about the diagnosis, prognosis, indications, risks and benefits of treatment. An opportunity to ask questions was given to the patient and questions were answered. The patient agreed to proceed with the recommended treatment as described above.      Follow up after ARELY Courtney DO, FAAPMR   Board Certified Physical Medicine and Rehabilitation

## 2020-01-17 ENCOUNTER — TELEPHONE (OUTPATIENT)
Dept: ORTHOPEDIC SURGERY | Age: 85
End: 2020-01-17

## 2020-01-17 NOTE — TELEPHONE ENCOUNTER
Called and left message for patient to call schedule her bilateral knee Euflexxa.  Electronically signed by Jefferson Robert MA on 1/17/20 at 4:01 PM

## 2020-01-20 ENCOUNTER — OFFICE VISIT (OUTPATIENT)
Dept: FAMILY MEDICINE CLINIC | Age: 85
End: 2020-01-20
Payer: MEDICARE

## 2020-01-20 VITALS
DIASTOLIC BLOOD PRESSURE: 76 MMHG | WEIGHT: 126 LBS | OXYGEN SATURATION: 98 % | SYSTOLIC BLOOD PRESSURE: 114 MMHG | HEART RATE: 52 BPM | BODY MASS INDEX: 20.25 KG/M2 | HEIGHT: 66 IN | TEMPERATURE: 97.6 F

## 2020-01-20 PROCEDURE — 99214 OFFICE O/P EST MOD 30 MIN: CPT | Performed by: INTERNAL MEDICINE

## 2020-01-20 PROCEDURE — 1036F TOBACCO NON-USER: CPT | Performed by: INTERNAL MEDICINE

## 2020-01-20 PROCEDURE — G8427 DOCREV CUR MEDS BY ELIG CLIN: HCPCS | Performed by: INTERNAL MEDICINE

## 2020-01-20 PROCEDURE — 1090F PRES/ABSN URINE INCON ASSESS: CPT | Performed by: INTERNAL MEDICINE

## 2020-01-20 PROCEDURE — G8420 CALC BMI NORM PARAMETERS: HCPCS | Performed by: INTERNAL MEDICINE

## 2020-01-20 PROCEDURE — 4040F PNEUMOC VAC/ADMIN/RCVD: CPT | Performed by: INTERNAL MEDICINE

## 2020-01-20 PROCEDURE — 1123F ACP DISCUSS/DSCN MKR DOCD: CPT | Performed by: INTERNAL MEDICINE

## 2020-01-20 PROCEDURE — G8482 FLU IMMUNIZE ORDER/ADMIN: HCPCS | Performed by: INTERNAL MEDICINE

## 2020-01-20 RX ORDER — DOXYCYCLINE HYCLATE 100 MG
100 TABLET ORAL 2 TIMES DAILY
Qty: 20 TABLET | Refills: 0 | Status: SHIPPED | OUTPATIENT
Start: 2020-01-20 | End: 2020-01-30

## 2020-01-20 RX ORDER — FUROSEMIDE 20 MG/1
20 TABLET ORAL DAILY
Qty: 30 TABLET | Refills: 0 | Status: SHIPPED | OUTPATIENT
Start: 2020-01-20 | End: 2020-01-28 | Stop reason: SDUPTHER

## 2020-01-20 ASSESSMENT — ENCOUNTER SYMPTOMS
EYE PAIN: 0
NAUSEA: 0
SHORTNESS OF BREATH: 0
SORE THROAT: 0
BLOOD IN STOOL: 0
EYE DISCHARGE: 0
ABDOMINAL PAIN: 0
SINUS PAIN: 0

## 2020-01-20 NOTE — PROGRESS NOTES
Chief Complaint   Patient presents with    Rash     Lt leg rash x 6 months, Pt states it started \"ceeping\" last night, denies pain in leg. HPI:  Patient is here for follow-up  Increasing swelling in L.R leg    On Eliquis for A fib  No new dyspnea  No fever       Allergy and Medications are reviewed and updated. Past Medical History, Surgical History, and Family History has been reviewed and updated. Review of Systems:  Review of Systems   Constitutional: Negative for chills and fever. HENT: Negative for congestion, sinus pain and sore throat. Eyes: Negative for pain and discharge. Respiratory: Negative for shortness of breath (No new SOb). Cardiovascular: Positive for leg swelling. Negative for chest pain. Gastrointestinal: Negative for abdominal pain, blood in stool and nausea. Genitourinary: Negative for flank pain and frequency. Musculoskeletal: Negative for neck pain. Hematological: Does not bruise/bleed easily. Psychiatric/Behavioral: Negative for suicidal ideas. Vitals:    01/20/20 1017   BP: 114/76   Pulse: 52   Temp: 97.6 °F (36.4 °C)   SpO2: 98%   Weight: 126 lb (57.2 kg)   Height: 5' 6\" (1.676 m)       Physical Exam  Vitals signs reviewed. Constitutional:       Appearance: She is well-developed. HENT:      Head: Normocephalic and atraumatic. Mouth/Throat:      Pharynx: No oropharyngeal exudate. Eyes:      Conjunctiva/sclera: Conjunctivae normal.      Pupils: Pupils are equal, round, and reactive to light. Neck:      Vascular: No JVD. Cardiovascular:      Rate and Rhythm: Normal rate and regular rhythm. Pulmonary:      Effort: Pulmonary effort is normal.      Breath sounds: Normal breath sounds. No rales. Abdominal:      General: Bowel sounds are normal.      Palpations: Abdomen is soft. Musculoskeletal: Normal range of motion. General: Swelling (LE , L> R , Mild erythema) present. Lymphadenopathy:      Cervical: No cervical adenopathy.

## 2020-01-28 ENCOUNTER — OFFICE VISIT (OUTPATIENT)
Dept: FAMILY MEDICINE CLINIC | Age: 85
End: 2020-01-28
Payer: MEDICARE

## 2020-01-28 VITALS
HEART RATE: 58 BPM | SYSTOLIC BLOOD PRESSURE: 138 MMHG | DIASTOLIC BLOOD PRESSURE: 80 MMHG | WEIGHT: 121 LBS | HEIGHT: 66 IN | OXYGEN SATURATION: 97 % | BODY MASS INDEX: 19.44 KG/M2

## 2020-01-28 PROCEDURE — 4040F PNEUMOC VAC/ADMIN/RCVD: CPT | Performed by: INTERNAL MEDICINE

## 2020-01-28 PROCEDURE — G8427 DOCREV CUR MEDS BY ELIG CLIN: HCPCS | Performed by: INTERNAL MEDICINE

## 2020-01-28 PROCEDURE — G8420 CALC BMI NORM PARAMETERS: HCPCS | Performed by: INTERNAL MEDICINE

## 2020-01-28 PROCEDURE — 1090F PRES/ABSN URINE INCON ASSESS: CPT | Performed by: INTERNAL MEDICINE

## 2020-01-28 PROCEDURE — 99213 OFFICE O/P EST LOW 20 MIN: CPT | Performed by: INTERNAL MEDICINE

## 2020-01-28 PROCEDURE — G8482 FLU IMMUNIZE ORDER/ADMIN: HCPCS | Performed by: INTERNAL MEDICINE

## 2020-01-28 PROCEDURE — 1123F ACP DISCUSS/DSCN MKR DOCD: CPT | Performed by: INTERNAL MEDICINE

## 2020-01-28 PROCEDURE — 1036F TOBACCO NON-USER: CPT | Performed by: INTERNAL MEDICINE

## 2020-01-28 RX ORDER — FUROSEMIDE 20 MG/1
20 TABLET ORAL DAILY
Qty: 90 TABLET | Refills: 0 | Status: CANCELLED | OUTPATIENT
Start: 2020-01-28 | End: 2020-04-27

## 2020-01-28 RX ORDER — FUROSEMIDE 20 MG/1
20 TABLET ORAL
Qty: 36 TABLET | Refills: 0 | Status: SHIPPED
Start: 2020-01-29 | End: 2020-04-10 | Stop reason: SDUPTHER

## 2020-01-28 ASSESSMENT — ENCOUNTER SYMPTOMS
ABDOMINAL PAIN: 0
SORE THROAT: 0
SINUS PAIN: 0
EYE DISCHARGE: 0
BLOOD IN STOOL: 0
EYE PAIN: 0
SHORTNESS OF BREATH: 0
NAUSEA: 0

## 2020-01-28 NOTE — PROGRESS NOTES
Chief Complaint   Patient presents with    Leg Swelling     Lt leg, 1 wk f/u, leg feeling better with no \"ceeping\" still taking Rx prescribed. HPI:  Patient is here for follow-up   Swelling and redness are better    \" I feel great\"       Allergy and Medications are reviewed and updated. Past Medical History, Surgical History, and Family History has been reviewed and updated. Review of Systems:  Review of Systems   Constitutional: Negative for chills and fever. HENT: Negative for congestion, sinus pain and sore throat. Eyes: Negative for pain and discharge. Respiratory: Negative for shortness of breath (No new SOb). Cardiovascular: Negative for chest pain. Gastrointestinal: Negative for abdominal pain, blood in stool and nausea. Genitourinary: Negative for flank pain and frequency. Musculoskeletal: Negative for neck pain. Hematological: Does not bruise/bleed easily. Psychiatric/Behavioral: Negative for suicidal ideas. Vitals:    01/28/20 0944 01/28/20 1009   BP: (!) 161/70 138/80   Pulse: 58    SpO2: 97%    Weight: 121 lb (54.9 kg)    Height: 5' 6\" (1.676 m)        Physical Exam  Vitals signs reviewed. Constitutional:       Appearance: She is well-developed. HENT:      Head: Normocephalic and atraumatic. Mouth/Throat:      Pharynx: No oropharyngeal exudate. Eyes:      Conjunctiva/sclera: Conjunctivae normal.      Pupils: Pupils are equal, round, and reactive to light. Neck:      Vascular: No JVD. Cardiovascular:      Rate and Rhythm: Normal rate and regular rhythm. Pulmonary:      Effort: Pulmonary effort is normal.      Breath sounds: Normal breath sounds. No rales. Abdominal:      General: Bowel sounds are normal.      Palpations: Abdomen is soft. Musculoskeletal: Normal range of motion. Right lower leg: Edema (Less) present. Left lower leg: Edema (Less) present. Lymphadenopathy:      Cervical: No cervical adenopathy.    Skin:     General: Skin is warm and dry. Findings: No erythema. Neurological:      Mental Status: She is alert and oriented to person, place, and time. Psychiatric:         Behavior: Behavior normal.          Labs :    Lab Results   Component Value Date    WBC 8.2 11/26/2019    HGB 10.0 (L) 11/26/2019    HCT 30.9 (L) 11/26/2019     11/26/2019    CHOL 177 07/18/2016    TRIG 80 07/18/2016    HDL 83 03/01/2019    ALT 14 11/26/2019    AST 15 11/26/2019     11/26/2019    K 3.4 (L) 11/26/2019     11/26/2019    CREATININE 0.7 11/26/2019    BUN 7 (L) 11/26/2019    CO2 22 11/26/2019    TSH 4.330 (H) 11/25/2019    GLUF 90 03/01/2019    LABA1C 5.7 (H) 03/01/2019     Lab Results   Component Value Date    COLORU Yellow 11/19/2019    NITRU Negative 11/19/2019    GLUCOSEU Negative 11/19/2019    KETUA TRACE 11/19/2019    UROBILINOGEN 0.2 11/19/2019    BILIRUBINUR Negative 11/19/2019         ASSESSMENT     Patient Active Problem List    Diagnosis Date Noted    Calculus of gallbladder with acute cholecystitis without obstruction     Calculus of gallbladder with cholecystitis with biliary obstruction     Gallbladder disease     RUQ abdominal pain 11/20/2019    Calculus of gallbladder without cholecystitis without obstruction 11/20/2019    Syncope 11/20/2019    Depression 08/19/2019    Hyperlipidemia 08/19/2019    Sacroiliitis ([facility] [address] 11/26/2018    Arthritis 03/21/2018    Alzheimer's dementia without behavioral disturbance ([facility] [address] 02/21/2018    Vitamin B 12 deficiency 02/21/2018    Atrial fibrillation ([facility] [address] 10/21/2013    Gastroesophageal reflux disease without esophagitis 10/21/2013        Diagnosis:     ICD-10-CM    1. Cellulitis of left leg - Better L03.116    2. Edema of leg - Better R60.0    3. Atrial fibrillation, unspecified type [facility]) -Nov 2019 - Dr Keene Dense - on Eliquis I48.91 CBC Auto Differential     TSH without Reflex   4.  Alzheimer's dementia without behavioral disturbance, unspecified timing of dementia onset

## 2020-01-29 ENCOUNTER — TELEPHONE (OUTPATIENT)
Dept: PHYSICAL MEDICINE AND REHAB | Age: 85
End: 2020-01-29

## 2020-01-29 NOTE — TELEPHONE ENCOUNTER
CPT [id number], [id number] (orders placed on 1/14/20) No authorization is needed for Medicare. Cardiac clearance obtained. Patient instructed to hold Eliquis 3 days prior. Scheduled for B/L medial branch 2/6/20. Spoke w/  in detail, voiced understanding. Orders faxed to surgery center [phone].

## 2020-02-06 ENCOUNTER — HOSPITAL ENCOUNTER (OUTPATIENT)
Age: 85
Setting detail: OUTPATIENT SURGERY
Discharge: HOME | End: 2020-02-06
Attending: PHYSICAL MEDICINE & REHABILITATION | Admitting: PHYSICAL MEDICINE & REHABILITATION
Payer: MEDICARE

## 2020-02-06 VITALS
SYSTOLIC BLOOD PRESSURE: 157 MMHG | HEART RATE: 60 BPM | DIASTOLIC BLOOD PRESSURE: 61 MMHG | RESPIRATION RATE: 16 BRPM | OXYGEN SATURATION: 99 %

## 2020-02-06 PROBLEM — M47.816 LUMBAR SPONDYLOSIS: Status: ACTIVE | Noted: 2020-02-06

## 2020-02-06 PROCEDURE — 2500000003 HC RX 250 WO HCPCS: Performed by: PHYSICAL MEDICINE & REHABILITATION

## 2020-02-06 PROCEDURE — 3600000005 HC SURGERY LEVEL 5 BASE: Performed by: PHYSICAL MEDICINE & REHABILITATION

## 2020-02-06 PROCEDURE — 64494 INJ PARAVERT F JNT L/S 2 LEV: CPT | Performed by: PHYSICAL MEDICINE & REHABILITATION

## 2020-02-06 PROCEDURE — 7100000010 HC PHASE II RECOVERY - FIRST 15 MIN: Performed by: PHYSICAL MEDICINE & REHABILITATION

## 2020-02-06 PROCEDURE — 2709999900 HC NON-CHARGEABLE SUPPLY: Performed by: PHYSICAL MEDICINE & REHABILITATION

## 2020-02-06 PROCEDURE — 64493 INJ PARAVERT F JNT L/S 1 LEV: CPT | Performed by: PHYSICAL MEDICINE & REHABILITATION

## 2020-02-06 PROCEDURE — 7100000011 HC PHASE II RECOVERY - ADDTL 15 MIN: Performed by: PHYSICAL MEDICINE & REHABILITATION

## 2020-02-06 RX ORDER — LIDOCAINE HYDROCHLORIDE 10 MG/ML
INJECTION, SOLUTION EPIDURAL; INFILTRATION; INTRACAUDAL; PERINEURAL PRN
Status: DISCONTINUED | OUTPATIENT
Start: 2020-02-06 | End: 2020-02-06 | Stop reason: ALTCHOICE

## 2020-02-06 ASSESSMENT — PAIN DESCRIPTION - DESCRIPTORS: DESCRIPTORS: ACHING;DISCOMFORT

## 2020-02-06 ASSESSMENT — PAIN SCALES - GENERAL
PAINLEVEL_OUTOF10: 0
PAINLEVEL_OUTOF10: 0

## 2020-02-06 ASSESSMENT — PAIN - FUNCTIONAL ASSESSMENT: PAIN_FUNCTIONAL_ASSESSMENT: 0-10

## 2020-02-06 NOTE — H&P
Damita Curling, [address] [facility] Physical Medicine and Rehabilitation  [address] [facility] [place]  Phone: [phone]  Fax: [phone]     PCP: Jones Alvarez MD  Date of visit: 1/14/20         Chief Complaint   Patient presents with    Back Pain         Interval:   Patient presents today for follow up regarding increase in back pain. She was last seen in October and was doing good in regards to her back until a few weeks ago. She was hospitalized and had gallbladder surgery. Started on eliquis and still on it. Her pain is located in the low back without radiation. It is higher today than SI joint pain as it had been in the past.  The pain is rated Pain Score:   8. The pain is better with nothing. The pain is worse with standing. There is no associated numbness/tingling. There is no weakness. There is no bowel/bladder changes.      The prior workup has included: Xray hips, MRI L spine       The prior treatment has included:  PT: made it worse   Chiropractic: none   Modalities: bengay with no relief   OTC Tylenol: yes with some relief   NSAIDS: yes with no relief, voltaren gel with relief   Membrane stabilizers: none    Muscle relaxers: none   Previous injections: 1/17/19 bilateral SI joint with 100% relief  9/19/19 bilateral SI joint injections      Previous surgery at this site: none              Allergies   Allergen Reactions    Iodine         I. V.                Current Outpatient Medications   Medication Sig Dispense Refill    apixaban (ELIQUIS) 2.5 MG TABS tablet Take 2.5 mg by mouth daily        donepezil (ARICEPT) 10 MG tablet Take 1 tablet by mouth nightly 90 tablet 1    memantine (NAMENDA) 10 MG tablet Take 1 tablet by mouth 2 times daily 180 tablet 1    citalopram (CELEXA) 10 MG tablet Take 1 tablet by mouth every morning 90 tablet 1    Glucosamine-Chondroit-Vit C-Mn (GLUCOSAMINE 1500 COMPLEX PO) Take by mouth        Cholecalciferol (VITAMIN D3) 2000 units  Smokeless tobacco: Never Used   Substance Use Topics    Alcohol use: No    Drug use: No         Functional Status: The patient is able to ambulate and perform activities of daily living without the use of an assistive device.            ROS:     Constitutional: Denies fevers, chills, night sweats, unintentional weight loss     Skin: Denies rash or skin changes     Eyes: Denies vision changes    Ears/Nose/Throat: Denies nasal congestion or sore throat     Respiratory: Denies SOB or cough     Cardiovascular: Denies CP, palpitations, edema      Gastrointestinal: Denies abdominal pain,  N/V, constipation, or diarrhea    Genitourinary: Denies urinary symptoms    Neurologic: See HPI.     MSK: See HPI. Psychiatric: Denies sleep disturbance, anxiety, depression    Hematologic/Lymphatic/Immunologic: Denies bruising         Physical Exam:   Blood pressure (!) 140/90, pulse 88, height 5' 5\" (1.651 m), weight 127 lb (57.6 kg). General: well developed and well nourished in no acute distress. Body habitus is thin  HEENT: No rhinorrhea, sneezing, yawning, or lacrimation. No scleral icterus or conjunctival injection. Resp: symmetrical chest expansion, unlabored breathing, respirations unlabored. CV: Heart rate is regular. Peripheral pulses are palpable  Lymph: No visible regional lymphadenopathy. Skin: No rashes or ecchymosis. Normal turgor. Psych: Mood is calm. Affect is normal.   Ext: No edema noted      MSK:   Back/Hip Exam:   Inspection: Pelvis was asymmetric. Lumbar lordotic curvature was decreased. There was no scoliosis. No ecchymoses or erythema. Palpation: Palpatory exam revealed tenderness along lumbosacral paraspinals, no ttp midline spine, no ttp bilateral SI Joint sulci. There was no paraspinal spasms. There were no trigger points. ROM limited.    +facet loading.         Neurological Exam:  Gait is Antalgic.         MRI L Spine 12/4/18:  BONE MARROW SIGNAL: Confluent hyperintense T1 signal

## 2020-02-06 NOTE — OP NOTE
Bilateral  L4-5, L5-S1. Negative aspiration was noted prior to each injection. The needles were removed intact and Band-Aids were applied. [place] was transferred to the recovery area. She was monitored, reassessed and eventually discharged after an appropriate observatory period. No complications were noted. Following the procedure [place] noted improvement of previous pain symptoms. Plan: [place] will return to the office as scheduled. She was encouraged to call with questions, concerns or if worsening of symptoms occurs.       Mena Reynolds DO, [facility]   Board Certified Physical Medicine and Rehabilitation

## 2020-02-11 ENCOUNTER — OFFICE VISIT (OUTPATIENT)
Dept: ORTHOPEDIC SURGERY | Age: 85
End: 2020-02-11
Payer: MEDICARE

## 2020-02-11 PROCEDURE — 20610 DRAIN/INJ JOINT/BURSA W/O US: CPT | Performed by: ORTHOPAEDIC SURGERY

## 2020-02-11 RX ORDER — HYALURONATE SODIUM 10 MG/ML
20 SYRINGE (ML) INTRAARTICULAR ONCE
Status: COMPLETED | OUTPATIENT
Start: 2020-02-11 | End: 2020-02-11

## 2020-02-11 RX ADMIN — Medication 20 MG: at 11:44

## 2020-02-18 ENCOUNTER — OFFICE VISIT (OUTPATIENT)
Dept: ORTHOPEDIC SURGERY | Age: 85
End: 2020-02-18
Payer: MEDICARE

## 2020-02-18 PROCEDURE — 20610 DRAIN/INJ JOINT/BURSA W/O US: CPT | Performed by: ORTHOPAEDIC SURGERY

## 2020-02-18 RX ORDER — HYALURONATE SODIUM 10 MG/ML
20 SYRINGE (ML) INTRAARTICULAR ONCE
Status: COMPLETED | OUTPATIENT
Start: 2020-02-18 | End: 2020-02-18

## 2020-02-18 RX ADMIN — Medication 20 MG: at 11:55

## 2020-02-18 NOTE — PROGRESS NOTES
Chief Complaint   Patient presents with    Injections     bilateral knee pain, euflexxa #2       Verbal and written consent was obtained by the patient. The following is a well known to me that is here for bilateral knee injections. They are here for  Euflexxa # 2. Her knees were prepped in sterile fashion. Euflexxa 20 mg injected to Bilateral knees. The patient tolerated the injections well and I will see the patient back in 1 week for repeat injections. [place] was seen today for injections.     Diagnoses and all orders for this visit:    Primary osteoarthritis of right knee  -     VT ARTHROCENTESIS ASPIR&/INJ MAJOR JT/BURSA W/O US    Primary osteoarthritis of left knee  -     VT ARTHROCENTESIS ASPIR&/INJ MAJOR JT/BURSA W/O US    Other orders  -     sodium hyaluronate (viscosup) injection 20 mg  -     sodium hyaluronate (viscosup) injection 20 mg

## 2020-02-19 ENCOUNTER — OFFICE VISIT (OUTPATIENT)
Dept: PHYSICAL MEDICINE AND REHAB | Age: 85
End: 2020-02-19
Payer: MEDICARE

## 2020-02-19 VITALS
HEART RATE: 79 BPM | BODY MASS INDEX: 21.17 KG/M2 | DIASTOLIC BLOOD PRESSURE: 81 MMHG | SYSTOLIC BLOOD PRESSURE: 152 MMHG | WEIGHT: 124 LBS | HEIGHT: 64 IN

## 2020-02-19 PROCEDURE — 1123F ACP DISCUSS/DSCN MKR DOCD: CPT | Performed by: PHYSICAL MEDICINE & REHABILITATION

## 2020-02-19 PROCEDURE — 1036F TOBACCO NON-USER: CPT | Performed by: PHYSICAL MEDICINE & REHABILITATION

## 2020-02-19 PROCEDURE — G8420 CALC BMI NORM PARAMETERS: HCPCS | Performed by: PHYSICAL MEDICINE & REHABILITATION

## 2020-02-19 PROCEDURE — G8427 DOCREV CUR MEDS BY ELIG CLIN: HCPCS | Performed by: PHYSICAL MEDICINE & REHABILITATION

## 2020-02-19 PROCEDURE — 99213 OFFICE O/P EST LOW 20 MIN: CPT | Performed by: PHYSICAL MEDICINE & REHABILITATION

## 2020-02-19 PROCEDURE — G8482 FLU IMMUNIZE ORDER/ADMIN: HCPCS | Performed by: PHYSICAL MEDICINE & REHABILITATION

## 2020-02-19 PROCEDURE — 1090F PRES/ABSN URINE INCON ASSESS: CPT | Performed by: PHYSICAL MEDICINE & REHABILITATION

## 2020-02-19 PROCEDURE — 4040F PNEUMOC VAC/ADMIN/RCVD: CPT | Performed by: PHYSICAL MEDICINE & REHABILITATION

## 2020-02-24 VITALS
HEIGHT: 63 IN | WEIGHT: 118 LBS | HEART RATE: 88 BPM | BODY MASS INDEX: 20.91 KG/M2 | DIASTOLIC BLOOD PRESSURE: 62 MMHG | SYSTOLIC BLOOD PRESSURE: 122 MMHG

## 2020-02-24 RX ORDER — DIPHENOXYLATE HYDROCHLORIDE AND ATROPINE SULFATE 2.5; .025 MG/1; MG/1
1 TABLET ORAL 2 TIMES DAILY PRN
Qty: 40 TABLET | Refills: 1 | Status: SHIPPED | OUTPATIENT
Start: 2020-02-24 | End: 2020-08-22

## 2020-02-24 RX ORDER — TRAMADOL HYDROCHLORIDE 50 MG/1
50 TABLET ORAL DAILY
COMMUNITY
End: 2020-06-01

## 2020-02-25 ENCOUNTER — OFFICE VISIT (OUTPATIENT)
Dept: ORTHOPEDIC SURGERY | Age: 85
End: 2020-02-25
Payer: MEDICARE

## 2020-02-25 PROCEDURE — 20610 DRAIN/INJ JOINT/BURSA W/O US: CPT | Performed by: ORTHOPAEDIC SURGERY

## 2020-02-25 RX ORDER — HYALURONATE SODIUM 10 MG/ML
20 SYRINGE (ML) INTRAARTICULAR ONCE
Status: COMPLETED | OUTPATIENT
Start: 2020-02-25 | End: 2020-02-25

## 2020-02-25 RX ADMIN — Medication 20 MG: at 11:22

## 2020-02-28 ENCOUNTER — TELEPHONE (OUTPATIENT)
Dept: FAMILY MEDICINE CLINIC | Age: 85
End: 2020-02-28

## 2020-03-02 ENCOUNTER — HOSPITAL ENCOUNTER (OUTPATIENT)
Age: 85
Discharge: HOME | End: 2020-03-04
Payer: MEDICARE

## 2020-03-02 LAB
ALBUMIN SERPL-MCNC: 3.6 G/DL (ref 3.5–5.2)
ALP BLD-CCNC: 92 U/L (ref 35–104)
ALT SERPL-CCNC: 9 U/L (ref 0–32)
ANION GAP SERPL CALCULATED.3IONS-SCNC: 14 MMOL/L (ref 7–16)
AST SERPL-CCNC: 19 U/L (ref 0–31)
BASOPHILS ABSOLUTE: 0.01 E9/L (ref 0–0.2)
BASOPHILS RELATIVE PERCENT: 0.2 % (ref 0–2)
BILIRUB SERPL-MCNC: 0.3 MG/DL (ref 0–1.2)
BUN BLDV-MCNC: 21 MG/DL (ref 8–23)
CALCIUM SERPL-MCNC: 9.3 MG/DL (ref 8.6–10.2)
CHLORIDE BLD-SCNC: 108 MMOL/L (ref 98–107)
CO2: 24 MMOL/L (ref 22–29)
CREAT SERPL-MCNC: 0.9 MG/DL (ref 0.5–1)
EOSINOPHILS ABSOLUTE: 0.13 E9/L (ref 0.05–0.5)
EOSINOPHILS RELATIVE PERCENT: 2.1 % (ref 0–6)
GFR AFRICAN AMERICAN: >60
GFR NON-AFRICAN AMERICAN: 59 ML/MIN/1.73
GLUCOSE FASTING: 104 MG/DL (ref 74–99)
HCT VFR BLD CALC: 39.7 % (ref 34–48)
HEMOGLOBIN: 11.6 G/DL (ref 11.5–15.5)
IMMATURE GRANULOCYTES #: 0.02 E9/L
IMMATURE GRANULOCYTES %: 0.3 % (ref 0–5)
LYMPHOCYTES ABSOLUTE: 1.7 E9/L (ref 1.5–4)
LYMPHOCYTES RELATIVE PERCENT: 26.9 % (ref 20–42)
MCH RBC QN AUTO: 28.2 PG (ref 26–35)
MCHC RBC AUTO-ENTMCNC: 29.2 % (ref 32–34.5)
MCV RBC AUTO: 96.6 FL (ref 80–99.9)
MONOCYTES ABSOLUTE: 0.65 E9/L (ref 0.1–0.95)
MONOCYTES RELATIVE PERCENT: 10.3 % (ref 2–12)
NEUTROPHILS ABSOLUTE: 3.8 E9/L (ref 1.8–7.3)
NEUTROPHILS RELATIVE PERCENT: 60.2 % (ref 43–80)
PDW BLD-RTO: 14.8 FL (ref 11.5–15)
PLATELET # BLD: 289 E9/L (ref 130–450)
PMV BLD AUTO: 10.9 FL (ref 7–12)
POTASSIUM SERPL-SCNC: 4.4 MMOL/L (ref 3.5–5)
RBC # BLD: 4.11 E12/L (ref 3.5–5.5)
SODIUM BLD-SCNC: 146 MMOL/L (ref 132–146)
TOTAL PROTEIN: 6.7 G/DL (ref 6.4–8.3)
TSH SERPL DL<=0.05 MIU/L-ACNC: 2.9 UIU/ML (ref 0.27–4.2)
WBC # BLD: 6.3 E9/L (ref 4.5–11.5)

## 2020-03-02 PROCEDURE — 36415 COLL VENOUS BLD VENIPUNCTURE: CPT

## 2020-03-02 PROCEDURE — 85025 COMPLETE CBC W/AUTO DIFF WBC: CPT

## 2020-03-02 PROCEDURE — 80053 COMPREHEN METABOLIC PANEL: CPT

## 2020-03-02 PROCEDURE — 84443 ASSAY THYROID STIM HORMONE: CPT

## 2020-03-11 ENCOUNTER — OFFICE VISIT (OUTPATIENT)
Dept: FAMILY MEDICINE CLINIC | Age: 85
End: 2020-03-11
Payer: MEDICARE

## 2020-03-11 VITALS
HEIGHT: 64 IN | WEIGHT: 128 LBS | OXYGEN SATURATION: 98 % | BODY MASS INDEX: 21.85 KG/M2 | DIASTOLIC BLOOD PRESSURE: 80 MMHG | HEART RATE: 68 BPM | SYSTOLIC BLOOD PRESSURE: 132 MMHG

## 2020-03-11 PROCEDURE — 1036F TOBACCO NON-USER: CPT | Performed by: INTERNAL MEDICINE

## 2020-03-11 PROCEDURE — 1123F ACP DISCUSS/DSCN MKR DOCD: CPT | Performed by: INTERNAL MEDICINE

## 2020-03-11 PROCEDURE — G8482 FLU IMMUNIZE ORDER/ADMIN: HCPCS | Performed by: INTERNAL MEDICINE

## 2020-03-11 PROCEDURE — G8420 CALC BMI NORM PARAMETERS: HCPCS | Performed by: INTERNAL MEDICINE

## 2020-03-11 PROCEDURE — 1090F PRES/ABSN URINE INCON ASSESS: CPT | Performed by: INTERNAL MEDICINE

## 2020-03-11 PROCEDURE — 4040F PNEUMOC VAC/ADMIN/RCVD: CPT | Performed by: INTERNAL MEDICINE

## 2020-03-11 PROCEDURE — G8427 DOCREV CUR MEDS BY ELIG CLIN: HCPCS | Performed by: INTERNAL MEDICINE

## 2020-03-11 PROCEDURE — 99213 OFFICE O/P EST LOW 20 MIN: CPT | Performed by: INTERNAL MEDICINE

## 2020-03-11 ASSESSMENT — ENCOUNTER SYMPTOMS
BLOOD IN STOOL: 0
SHORTNESS OF BREATH: 0
EYE PAIN: 0
SINUS PAIN: 0
ABDOMINAL PAIN: 0
EYE DISCHARGE: 0
NAUSEA: 0
SORE THROAT: 0

## 2020-03-11 NOTE — PROGRESS NOTES
Labs :    Lab Results   Component Value Date    WBC 6.3 03/02/2020    HGB 11.6 03/02/2020    HCT 39.7 03/02/2020     03/02/2020    CHOL 177 07/18/2016    TRIG 80 07/18/2016    HDL 83 03/01/2019    ALT 9 03/02/2020    AST 19 03/02/2020     03/02/2020    K 4.4 03/02/2020     (H) 03/02/2020    CREATININE 0.9 03/02/2020    BUN 21 03/02/2020    CO2 24 03/02/2020    TSH 2.900 03/02/2020    GLUF 104 (H) 03/02/2020    LABA1C 5.7 (H) 03/01/2019     Lab Results   Component Value Date    COLORU Yellow 11/19/2019    NITRU Negative 11/19/2019    GLUCOSEU Negative 11/19/2019    KETUA TRACE 11/19/2019    UROBILINOGEN 0.2 11/19/2019    BILIRUBINUR Negative 11/19/2019             ASSESSMENT     Patient Active Problem List    Diagnosis Date Noted    Lumbar spondylosis 02/06/2020    Calculus of gallbladder with acute cholecystitis without obstruction     Calculus of gallbladder with cholecystitis with biliary obstruction     Gallbladder disease     RUQ abdominal pain 11/20/2019    Calculus of gallbladder without cholecystitis without obstruction 11/20/2019    Syncope 11/20/2019    Depression 08/19/2019    Hyperlipidemia 08/19/2019    Sacroiliitis ([address] 11/26/2018    Arthritis 03/21/2018    Alzheimer's dementia without behavioral disturbance ([address] 02/21/2018    Vitamin B 12 deficiency 02/21/2018    Atrial fibrillation ([address] 10/21/2013    Gastroesophageal reflux disease without esophagitis 10/21/2013        Diagnosis:     ICD-10-CM    1. Atrial fibrillation, unspecified type [facility]) -Nov 2019 - Dr Nick Heredia - on Eliquis I48.91    2. Alzheimer's dementia without behavioral disturbance, unspecified timing of dementia onset ([address] G30.9     F02.80    3. Depression, unspecified depression type F32.9    4. Edema of leg - Better R60.0    5. Irritable bowel syndrome with diarrhea K58.0        PLAN:        POPPY hose for left leg     Pt is stable on current medical treatment.    Continue current treatment plan    Side effects/Adverse effects/Precautions are reviewed with patient. Low salt, Low Carb diet an low fat diet  Continue medications as advised and take them regularly  Regular exercises as advised    Discussed natural and expected course of this diagnosis and need to alert me if symptoms do not follow expected course, or if any worse. Smoking cessation if applicable, discussed with patient. There are no Patient Instructions on file for this visit. Return in about 2 months (around 5/11/2020).

## 2020-04-10 RX ORDER — CITALOPRAM 10 MG/1
10 TABLET ORAL EVERY MORNING
Qty: 90 TABLET | Refills: 1 | Status: SHIPPED
Start: 2020-04-10 | End: 2020-10-22 | Stop reason: SDUPTHER

## 2020-04-10 RX ORDER — FUROSEMIDE 20 MG/1
20 TABLET ORAL
Qty: 36 TABLET | Refills: 0 | Status: SHIPPED
Start: 2020-04-10 | End: 2020-06-08

## 2020-05-11 ENCOUNTER — OFFICE VISIT (OUTPATIENT)
Dept: FAMILY MEDICINE CLINIC | Age: 85
End: 2020-05-11
Payer: MEDICARE

## 2020-05-11 VITALS
SYSTOLIC BLOOD PRESSURE: 136 MMHG | HEART RATE: 74 BPM | WEIGHT: 125 LBS | HEIGHT: 64 IN | OXYGEN SATURATION: 91 % | TEMPERATURE: 97.2 F | RESPIRATION RATE: 14 BRPM | DIASTOLIC BLOOD PRESSURE: 86 MMHG | BODY MASS INDEX: 21.34 KG/M2

## 2020-05-11 PROCEDURE — 99214 OFFICE O/P EST MOD 30 MIN: CPT | Performed by: INTERNAL MEDICINE

## 2020-05-11 PROCEDURE — G8427 DOCREV CUR MEDS BY ELIG CLIN: HCPCS | Performed by: INTERNAL MEDICINE

## 2020-05-11 PROCEDURE — 1123F ACP DISCUSS/DSCN MKR DOCD: CPT | Performed by: INTERNAL MEDICINE

## 2020-05-11 PROCEDURE — 4040F PNEUMOC VAC/ADMIN/RCVD: CPT | Performed by: INTERNAL MEDICINE

## 2020-05-11 PROCEDURE — 96372 THER/PROPH/DIAG INJ SC/IM: CPT | Performed by: INTERNAL MEDICINE

## 2020-05-11 PROCEDURE — G8420 CALC BMI NORM PARAMETERS: HCPCS | Performed by: INTERNAL MEDICINE

## 2020-05-11 PROCEDURE — 1090F PRES/ABSN URINE INCON ASSESS: CPT | Performed by: INTERNAL MEDICINE

## 2020-05-11 PROCEDURE — 1036F TOBACCO NON-USER: CPT | Performed by: INTERNAL MEDICINE

## 2020-05-11 RX ORDER — CYANOCOBALAMIN 1000 UG/ML
1000 INJECTION INTRAMUSCULAR; SUBCUTANEOUS ONCE
Status: COMPLETED | OUTPATIENT
Start: 2020-05-11 | End: 2020-05-11

## 2020-05-11 RX ADMIN — CYANOCOBALAMIN 1000 MCG: 1000 INJECTION INTRAMUSCULAR; SUBCUTANEOUS at 15:01

## 2020-05-11 ASSESSMENT — ENCOUNTER SYMPTOMS
SORE THROAT: 0
NAUSEA: 0
SINUS PAIN: 0
EYE PAIN: 0
SHORTNESS OF BREATH: 0
BLOOD IN STOOL: 0
EYE DISCHARGE: 0
ABDOMINAL PAIN: 0

## 2020-05-11 NOTE — PROGRESS NOTES
Chief Complaint   Patient presents with    Follow-up       HPI:  Patient is here for follow-up     No complaints        Allergy and Medications are reviewed and updated. Past Medical History, Surgical History, and Family History has been reviewed and updated. Review of Systems:  Review of Systems   Constitutional: Negative for chills and fever. HENT: Negative for congestion, sinus pain and sore throat. Eyes: Negative for pain and discharge. Respiratory: Negative for shortness of breath (No new SOb). Cardiovascular: Negative for chest pain. Gastrointestinal: Negative for abdominal pain, blood in stool and nausea. Genitourinary: Negative for flank pain and frequency. Musculoskeletal: Negative for neck pain. Hematological: Does not bruise/bleed easily. Psychiatric/Behavioral: Negative for suicidal ideas. Vitals:    05/11/20 1411   BP: 136/86   Pulse: 74   Resp: 14   Temp: 97.2 °F (36.2 °C)   TempSrc: Temporal   SpO2: 91%   Weight: 125 lb (56.7 kg)   Height: 5' 4\" (1.626 m)       Physical Exam  Vitals signs reviewed. Constitutional:       Appearance: She is well-developed. HENT:      Head: Normocephalic and atraumatic. Mouth/Throat:      Pharynx: No oropharyngeal exudate. Eyes:      Conjunctiva/sclera: Conjunctivae normal.      Pupils: Pupils are equal, round, and reactive to light. Neck:      Vascular: No JVD. Cardiovascular:      Rate and Rhythm: Normal rate and regular rhythm. Pulmonary:      Effort: Pulmonary effort is normal.      Breath sounds: Normal breath sounds. No rales. Abdominal:      General: Bowel sounds are normal.      Palpations: Abdomen is soft. Musculoskeletal: Normal range of motion. General: Swelling (LE edema - stable) present. Lymphadenopathy:      Cervical: No cervical adenopathy. Skin:     General: Skin is warm and dry. Neurological:      Mental Status: She is alert and oriented to person, place, and time.    Psychiatric:

## 2020-05-13 RX ORDER — APIXABAN 2.5 MG/1
TABLET, FILM COATED ORAL
Qty: 180 TABLET | Refills: 0 | Status: SHIPPED
Start: 2020-05-13 | End: 2020-09-28 | Stop reason: SDUPTHER

## 2020-05-30 NOTE — PROGRESS NOTES
[facility] Cardiology Progress Note  Dr. Miriam Hughes      Referring Physician: Chencho Bliss MD  CHIEF COMPLAINT:   Chief Complaint   Patient presents with    Atrial Fibrillation     6 month follow-up. Patient denies any cp sob or dizziness. Patient has been doing well       HISTORY OF PRESENT ILLNESS:    Patient is 80year old female with history of GERD, Alzheimer's dementia, hyperlipidemia, here for hospital follow up. She presented to the hospital with respiratory failure and cholelithiasis and new onset atrial fibrillation. Occasional palpitations, shortness of breath is at baseline incontinence and chest pain, lightheadedness or dizziness, no pedal edema, no tympany, no orthopnea, no syncope, no presyncopal episodes.       Past Medical History:   Diagnosis Date    Alzheimer disease ([address]     Arthritis     Atrial fibrillation (HCC)     GERD (gastroesophageal reflux disease)     Hyperlipidemia          Past Surgical History:   Procedure Laterality Date    ABDOMEN SURGERY      ANESTHESIA NERVE BLOCK Bilateral 2/6/2020    BILATERAL MEDIAL BRANCH L4-5 AND L5-S1 JOINTS performed by Annel Murcia DO at [address] [facility], LAPAROSCOPIC N/A 11/21/2019    DIAGNOSITIC LAPAROSCOPY CONVERTED TO EXPLORATORY LAPAROTOMY WITH CLOSURE OF ENTEROTOMY X1, CHOLANGIOGRAM, EGD performed by Brad Higuera MD at [address] [facility] ECHO COMPL [address]  10/21/2013         [facility] [place]. INJECTION PROCEDURE FOR SACROILIAC JOINT Right 12/13/2018    RIGHT SACROILIAC JOINT STEROID INJECTION UNDER X-RAY GUIDANCE performed by Annel Murcia DO at [address] [facility] [address] JOINT Left 1/17/2019    LEFT SACROILIAC JOINT STEROID INJECTION UNDER X-RAY GUIDANCE performed by Annel Murcia DO at [address] [facility] INJECTION PROCEDURE FOR SACROILIAC JOINT Bilateral 9/19/2019    BILATERAL SACROILIAC JOINT INJECTION X-RAY performed cooperative, no apparent distress, and appears stated age  [de-identified]:  Moist and pink mucous membranes, normocephalic, without obvious abnormality, atraumatic  NECK:  Supple, symmetrical, trachea midline, no adenopathy, thyroid symmetric, not enlarged and no tenderness, skin normal  LUNGS:  No increased work of breathing, good air exchange, clear to auscultation bilaterally, no crackles or wheezing  CARDIOVASCULAR:  Normal apical impulse, regular rate and rhythm, normal S1 and S2, no S3 or S4, 2 to 3/6 systolic murmur at the apex, 2 to 3/6 systolic murmur at the left lower sternal border,and no pedal edema, no carotid bruit, no JVD, no pulsating masses. ABDOMEN:  soft, nontender, no hepatomegaly, no splenomegaly, bowel sounds positive. MUSCULOSKELETAL:  No clubbing, no cyanosis, no pedal edema,there is no redness, warmth, or swelling of the joints, full range of motion noted. NEUROLOGIC:  Alert, awake, oriented  3.    /72 (Site: Left Upper Arm, Position: Sitting, Cuff Size: Medium Adult)   Pulse 54   Ht 5' 6\" (1.676 m)   Wt 128 lb (58.1 kg)   BMI 20.66 kg/m²     DATA:   I personally reviewed the visit EKG with the following interpretation: Sinus rhythm with PACs, nonspecific T wave changes    ECHO: (11/25/2019) Normal left ventricular wall thickness. Ejection fraction 55%. No regional wall motion abnormalities seen. E/A flow reversal noted. Suggestive of diastolic dysfunction. Mild mitral regurgitation is present. Physiologic and/or trace aortic regurgitation is noted. Mild tricuspid regurgitation. RVSP is 32 mmHg. Pulmonary hypertension is mild.     Cardiology Labs: BMP:    Lab Results   Component Value Date     03/02/2020    K 4.4 03/02/2020     03/02/2020    CO2 24 03/02/2020    BUN 21 03/02/2020    CREATININE 0.9 03/02/2020     CMP:    Lab Results   Component Value Date     03/02/2020    K 4.4 03/02/2020     03/02/2020    CO2 24 03/02/2020    BUN 21 03/02/2020 present

## 2020-06-01 ENCOUNTER — OFFICE VISIT (OUTPATIENT)
Dept: CARDIOLOGY CLINIC | Age: 85
End: 2020-06-01
Payer: MEDICARE

## 2020-06-01 VITALS
DIASTOLIC BLOOD PRESSURE: 72 MMHG | HEART RATE: 54 BPM | SYSTOLIC BLOOD PRESSURE: 122 MMHG | BODY MASS INDEX: 20.57 KG/M2 | HEIGHT: 66 IN | WEIGHT: 128 LBS

## 2020-06-01 PROCEDURE — 93000 ELECTROCARDIOGRAM COMPLETE: CPT | Performed by: INTERNAL MEDICINE

## 2020-06-01 PROCEDURE — 99214 OFFICE O/P EST MOD 30 MIN: CPT | Performed by: INTERNAL MEDICINE

## 2020-06-08 RX ORDER — FUROSEMIDE 20 MG/1
TABLET ORAL
Qty: 36 TABLET | Refills: 0 | Status: SHIPPED
Start: 2020-06-08 | End: 2020-10-22 | Stop reason: SDUPTHER

## 2020-06-15 RX ORDER — MEMANTINE HYDROCHLORIDE 10 MG/1
10 TABLET ORAL 2 TIMES DAILY
Qty: 180 TABLET | Refills: 0 | Status: SHIPPED
Start: 2020-06-15 | End: 2020-09-28 | Stop reason: SDUPTHER

## 2020-06-15 RX ORDER — DONEPEZIL HYDROCHLORIDE 10 MG/1
10 TABLET, FILM COATED ORAL NIGHTLY
Qty: 90 TABLET | Refills: 0 | Status: SHIPPED
Start: 2020-06-15 | End: 2020-08-11 | Stop reason: SDUPTHER

## 2020-07-13 ENCOUNTER — OFFICE VISIT (OUTPATIENT)
Dept: FAMILY MEDICINE CLINIC | Age: 85
End: 2020-07-13
Payer: MEDICARE

## 2020-07-13 VITALS
HEIGHT: 66 IN | DIASTOLIC BLOOD PRESSURE: 58 MMHG | TEMPERATURE: 97.4 F | OXYGEN SATURATION: 99 % | HEART RATE: 64 BPM | SYSTOLIC BLOOD PRESSURE: 134 MMHG | BODY MASS INDEX: 19.93 KG/M2 | RESPIRATION RATE: 16 BRPM | WEIGHT: 124 LBS

## 2020-07-13 PROCEDURE — 99213 OFFICE O/P EST LOW 20 MIN: CPT | Performed by: INTERNAL MEDICINE

## 2020-07-13 PROCEDURE — 1036F TOBACCO NON-USER: CPT | Performed by: INTERNAL MEDICINE

## 2020-07-13 PROCEDURE — 1090F PRES/ABSN URINE INCON ASSESS: CPT | Performed by: INTERNAL MEDICINE

## 2020-07-13 PROCEDURE — 1123F ACP DISCUSS/DSCN MKR DOCD: CPT | Performed by: INTERNAL MEDICINE

## 2020-07-13 PROCEDURE — G8420 CALC BMI NORM PARAMETERS: HCPCS | Performed by: INTERNAL MEDICINE

## 2020-07-13 PROCEDURE — 4040F PNEUMOC VAC/ADMIN/RCVD: CPT | Performed by: INTERNAL MEDICINE

## 2020-07-13 PROCEDURE — G8427 DOCREV CUR MEDS BY ELIG CLIN: HCPCS | Performed by: INTERNAL MEDICINE

## 2020-07-13 ASSESSMENT — ENCOUNTER SYMPTOMS
EYE PAIN: 0
SORE THROAT: 0
EYE DISCHARGE: 0
SHORTNESS OF BREATH: 0
SINUS PAIN: 0
NAUSEA: 0
ABDOMINAL PAIN: 0
BLOOD IN STOOL: 0

## 2020-07-13 NOTE — PROGRESS NOTES
Chief Complaint   Patient presents with    Follow-up       HPI:  Patient is here for follow-up     No complaints reported    Memories are declining     Allergy and Medications are reviewed and updated. Past Medical History, Surgical History, and Family History has been reviewed and updated. Review of Systems:  Review of Systems   Constitutional: Negative for chills and fever. HENT: Negative for congestion, sinus pain and sore throat. Eyes: Negative for pain and discharge. Respiratory: Negative for shortness of breath (No new SOb). Cardiovascular: Negative for chest pain. Gastrointestinal: Negative for abdominal pain, blood in stool and nausea. Genitourinary: Negative for flank pain and frequency. Musculoskeletal: Negative for neck pain. Hematological: Does not bruise/bleed easily. Psychiatric/Behavioral: Negative for suicidal ideas. Vitals:    07/13/20 1412   BP: (!) 134/58   Pulse: 64   Resp: 16   Temp: 97.4 °F (36.3 °C)   TempSrc: Temporal   SpO2: 99%   Weight: 124 lb (56.2 kg)   Height: 5' 6\" (1.676 m)       Physical Exam  Vitals signs reviewed. Constitutional:       Appearance: She is well-developed. HENT:      Head: Normocephalic and atraumatic. Mouth/Throat:      Pharynx: No oropharyngeal exudate. Eyes:      Conjunctiva/sclera: Conjunctivae normal.      Pupils: Pupils are equal, round, and reactive to light. Neck:      Vascular: No JVD. Cardiovascular:      Rate and Rhythm: Normal rate and regular rhythm. Pulmonary:      Effort: Pulmonary effort is normal.      Breath sounds: Normal breath sounds. No rales. Abdominal:      General: Bowel sounds are normal.      Palpations: Abdomen is soft. Musculoskeletal: Normal range of motion. Lymphadenopathy:      Cervical: No cervical adenopathy. Skin:     General: Skin is warm and dry. Neurological:      General: No focal deficit present. Mental Status: She is alert. Mental status is at baseline. Psychiatric:         Behavior: Behavior normal.          Labs :    Lab Results   Component Value Date    WBC 6.3 03/02/2020    HGB 11.6 03/02/2020    HCT 39.7 03/02/2020     03/02/2020    CHOL 177 07/18/2016    TRIG 80 07/18/2016    HDL 83 03/01/2019    ALT 9 03/02/2020    AST 19 03/02/2020     03/02/2020    K 4.4 03/02/2020     (H) 03/02/2020    CREATININE 0.9 03/02/2020    BUN 21 03/02/2020    CO2 24 03/02/2020    TSH 2.900 03/02/2020    GLUF 104 (H) 03/02/2020    LABA1C 5.7 (H) 03/01/2019     Lab Results   Component Value Date    COLORU Yellow 11/19/2019    NITRU Negative 11/19/2019    GLUCOSEU Negative 11/19/2019    KETUA TRACE 11/19/2019    UROBILINOGEN 0.2 11/19/2019    BILIRUBINUR Negative 11/19/2019             ASSESSMENT     Patient Active Problem List    Diagnosis Date Noted    Lumbar spondylosis 02/06/2020    Calculus of gallbladder with acute cholecystitis without obstruction     Calculus of gallbladder with cholecystitis with biliary obstruction     Gallbladder disease     RUQ abdominal pain 11/20/2019    Calculus of gallbladder without cholecystitis without obstruction 11/20/2019    Syncope 11/20/2019    Depression 08/19/2019    Hyperlipidemia 08/19/2019    Sacroiliitis ([facility] 11/26/2018    Arthritis 03/21/2018    [facility]'s dementia without [facility] disturbance [facility] 02/21/2018    Vitamin B 12 deficiency 02/21/2018    Atrial fibrillation ([facility] 10/21/2013    Gastroesophageal reflux disease without esophagitis 10/21/2013        Diagnosis:     ICD-10-CM    1. Atrial fibrillation, unspecified type [facility]) -Nov 2019 - Dr Heriberto Meredith - on Eliquis  I48.91    2. Alzheimer's dementia without behavioral disturbance, unspecified timing of dementia onset ([facility]  G30.9     F02.80    3. Depression, unspecified depression type  F32.9    4. Irritable bowel syndrome with diarrhea  K58.0        PLAN:         Pt is stable on current medical treatment.    Continue current treatment plan    Side effects/Adverse effects/Precautions are reviewed with patient. Low salt, Low Carb diet an low fat diet  Continue medications as advised and take them regularly  Regular exercises as advised    Discussed natural and expected course of this diagnosis and need to alert me if symptoms do not follow expected course, or if any worse. Smoking cessation if applicable, discussed with patient. There are no Patient Instructions on file for this visit. Return in about 2 months (around 9/13/2020).

## 2020-07-30 RX ORDER — FUROSEMIDE 20 MG/1
TABLET ORAL
Qty: 36 TABLET | Refills: 3 | OUTPATIENT
Start: 2020-07-30

## 2020-08-04 RX ORDER — APIXABAN 2.5 MG/1
TABLET, FILM COATED ORAL
Qty: 180 TABLET | Refills: 3 | OUTPATIENT
Start: 2020-08-04

## 2020-08-04 RX ORDER — CITALOPRAM 10 MG/1
TABLET ORAL
Qty: 90 TABLET | Refills: 3 | OUTPATIENT
Start: 2020-08-04

## 2020-08-11 RX ORDER — DONEPEZIL HYDROCHLORIDE 10 MG/1
10 TABLET, FILM COATED ORAL NIGHTLY
Qty: 90 TABLET | Refills: 0 | Status: SHIPPED
Start: 2020-08-11 | End: 2021-01-04 | Stop reason: SDUPTHER

## 2020-09-09 ENCOUNTER — OFFICE VISIT (OUTPATIENT)
Dept: FAMILY MEDICINE CLINIC | Age: 85
End: 2020-09-09
Payer: MEDICARE

## 2020-09-09 VITALS
TEMPERATURE: 97.1 F | HEART RATE: 66 BPM | WEIGHT: 125 LBS | RESPIRATION RATE: 14 BRPM | OXYGEN SATURATION: 96 % | DIASTOLIC BLOOD PRESSURE: 66 MMHG | BODY MASS INDEX: 20.09 KG/M2 | SYSTOLIC BLOOD PRESSURE: 108 MMHG | HEIGHT: 66 IN

## 2020-09-09 PROCEDURE — 4040F PNEUMOC VAC/ADMIN/RCVD: CPT | Performed by: INTERNAL MEDICINE

## 2020-09-09 PROCEDURE — 1090F PRES/ABSN URINE INCON ASSESS: CPT | Performed by: INTERNAL MEDICINE

## 2020-09-09 PROCEDURE — G8420 CALC BMI NORM PARAMETERS: HCPCS | Performed by: INTERNAL MEDICINE

## 2020-09-09 PROCEDURE — 99213 OFFICE O/P EST LOW 20 MIN: CPT | Performed by: INTERNAL MEDICINE

## 2020-09-09 PROCEDURE — G8427 DOCREV CUR MEDS BY ELIG CLIN: HCPCS | Performed by: INTERNAL MEDICINE

## 2020-09-09 PROCEDURE — 1123F ACP DISCUSS/DSCN MKR DOCD: CPT | Performed by: INTERNAL MEDICINE

## 2020-09-09 PROCEDURE — 1036F TOBACCO NON-USER: CPT | Performed by: INTERNAL MEDICINE

## 2020-09-09 ASSESSMENT — ENCOUNTER SYMPTOMS
EYE DISCHARGE: 0
NAUSEA: 0
ABDOMINAL PAIN: 0
BLOOD IN STOOL: 0
SHORTNESS OF BREATH: 0
EYE PAIN: 0
SORE THROAT: 0
SINUS PAIN: 0

## 2020-09-09 NOTE — PROGRESS NOTES
Chief Complaint   Patient presents with    Follow-up       HPI:  Patient is here for follow-up     Feeling okay        Allergy and Medications are reviewed and updated. Past Medical History, Surgical History, and Family History has been reviewed and updated. Review of Systems:  Review of Systems   Constitutional: Negative for chills and fever. HENT: Negative for congestion, sinus pain and sore throat. Eyes: Negative for pain and discharge. Respiratory: Negative for shortness of breath (No new SOb). Cardiovascular: Negative for chest pain. Gastrointestinal: Negative for abdominal pain, blood in stool and nausea. Genitourinary: Negative for flank pain and frequency. Musculoskeletal: Negative for neck pain. Hematological: Does not bruise/bleed easily. Psychiatric/Behavioral: Negative for suicidal ideas. Vitals:    09/09/20 1428   BP: 108/66   Pulse: 66   Resp: 14   Temp: 97.1 °F (36.2 °C)   TempSrc: Temporal   SpO2: 96%   Weight: 125 lb (56.7 kg)   Height: 5' 6\" (1.676 m)       Physical Exam  Vitals signs reviewed. Constitutional:       Appearance: She is well-developed. HENT:      Head: Normocephalic and atraumatic. Mouth/Throat:      Pharynx: No oropharyngeal exudate. Eyes:      Conjunctiva/sclera: Conjunctivae normal.      Pupils: Pupils are equal, round, and reactive to light. Neck:      Vascular: No JVD. Cardiovascular:      Rate and Rhythm: Normal rate and regular rhythm. Pulmonary:      Effort: Pulmonary effort is normal.      Breath sounds: Normal breath sounds. No rales. Abdominal:      General: Bowel sounds are normal.      Palpations: Abdomen is soft. Musculoskeletal: Normal range of motion. Lymphadenopathy:      Cervical: No cervical adenopathy. Skin:     General: Skin is warm and dry. Neurological:      Mental Status: She is alert and oriented to person, place, and time.    Psychiatric:         Behavior: Behavior normal.          Labs :    Lab Results   Component Value Date    WBC 6.3 03/02/2020    HGB 11.6 03/02/2020    HCT 39.7 03/02/2020     03/02/2020    CHOL 177 07/18/2016    TRIG 80 07/18/2016    HDL 83 03/01/2019    ALT 9 03/02/2020    AST 19 03/02/2020     03/02/2020    K 4.4 03/02/2020     (H) 03/02/2020    CREATININE 0.9 03/02/2020    BUN 21 03/02/2020    CO2 24 03/02/2020    TSH 2.900 03/02/2020    GLUF 104 (H) 03/02/2020    LABA1C 5.7 (H) 03/01/2019     Lab Results   Component Value Date    COLORU Yellow 11/19/2019    NITRU Negative 11/19/2019    GLUCOSEU Negative 11/19/2019    KETUA TRACE 11/19/2019    UROBILINOGEN 0.2 11/19/2019    BILIRUBINUR Negative 11/19/2019             ASSESSMENT     Patient Active Problem List    Diagnosis Date Noted    Lumbar spondylosis 02/06/2020    Calculus of gallbladder with acute cholecystitis without obstruction     Calculus of gallbladder with cholecystitis with biliary obstruction     Gallbladder disease     RUQ abdominal pain 11/20/2019    Calculus of gallbladder without cholecystitis without obstruction 11/20/2019    Syncope 11/20/2019    Depression 08/19/2019    Hyperlipidemia 08/19/2019    [facility]roili[facility] ([facility] 11/26/2018    Arthritis 03/21/2018    Alzheimer's dementia without behavioral disturbance ([facility] 02/21/2018    Vitamin B 12 deficiency 02/21/2018    Atrial fibrillation ([facility] [facility] 10/21/2013    Gastroesophageal reflux disease without esophagitis 10/21/2013        Diagnosis:     ICD-10-CM    1. Atrial fibrillation, unspecified type [facility]) -Nov 2019 - Dr Jeffry Lee - on Eliquis  I48.91 CBC Auto Differential     Comprehensive Metabolic Panel, Fasting     TSH without Reflex     Urinalysis with Microscopic   2. Alzheimer's dementia without behavioral disturbance, unspecified timing of dementia onset ([facility] [address]  G30.9     F02.80    3. Depression, unspecified depression type  F32.9    4. Irritable bowel syndrome with diarrhea  K58.0    5.  Other hyperlipidemia  E78.49 Lipid, Fasting

## 2020-09-24 ENCOUNTER — HOSPITAL ENCOUNTER (OUTPATIENT)
Age: 85
Discharge: HOME | End: 2020-09-26
Payer: MEDICARE

## 2020-09-24 LAB
ALBUMIN SERPL-MCNC: 3.9 G/DL (ref 3.5–5.2)
ALP BLD-CCNC: 73 U/L (ref 35–104)
ALT SERPL-CCNC: 9 U/L (ref 0–32)
ANION GAP SERPL CALCULATED.3IONS-SCNC: 16 MMOL/L (ref 7–16)
AST SERPL-CCNC: 20 U/L (ref 0–31)
BACTERIA: ABNORMAL /HPF
BASOPHILS ABSOLUTE: 0.02 E9/L (ref 0–0.2)
BASOPHILS RELATIVE PERCENT: 0.3 % (ref 0–2)
BILIRUB SERPL-MCNC: 0.2 MG/DL (ref 0–1.2)
BILIRUBIN URINE: NEGATIVE
BLOOD, URINE: ABNORMAL
BUN BLDV-MCNC: 21 MG/DL (ref 8–23)
CALCIUM SERPL-MCNC: 9.4 MG/DL (ref 8.6–10.2)
CHLORIDE BLD-SCNC: 104 MMOL/L (ref 98–107)
CHOLESTEROL, FASTING: 168 MG/DL (ref 0–199)
CLARITY: ABNORMAL
CO2: 23 MMOL/L (ref 22–29)
COLOR: YELLOW
CREAT SERPL-MCNC: 1 MG/DL (ref 0.5–1)
EOSINOPHILS ABSOLUTE: 0.16 E9/L (ref 0.05–0.5)
EOSINOPHILS RELATIVE PERCENT: 2.6 % (ref 0–6)
GFR AFRICAN AMERICAN: >60
GFR NON-AFRICAN AMERICAN: 52 ML/MIN/1.73
GLUCOSE FASTING: 92 MG/DL (ref 74–99)
GLUCOSE URINE: NEGATIVE MG/DL
HCT VFR BLD CALC: 40.7 % (ref 34–48)
HDLC SERPL-MCNC: 68 MG/DL
HEMOGLOBIN: 12.4 G/DL (ref 11.5–15.5)
IMMATURE GRANULOCYTES #: 0.02 E9/L
IMMATURE GRANULOCYTES %: 0.3 % (ref 0–5)
KETONES, URINE: NEGATIVE MG/DL
LDL CHOLESTEROL CALCULATED: 78 MG/DL (ref 0–99)
LEUKOCYTE ESTERASE, URINE: ABNORMAL
LYMPHOCYTES ABSOLUTE: 2.02 E9/L (ref 1.5–4)
LYMPHOCYTES RELATIVE PERCENT: 33.3 % (ref 20–42)
MCH RBC QN AUTO: 31.1 PG (ref 26–35)
MCHC RBC AUTO-ENTMCNC: 30.5 % (ref 32–34.5)
MCV RBC AUTO: 102 FL (ref 80–99.9)
MONOCYTES ABSOLUTE: 0.73 E9/L (ref 0.1–0.95)
MONOCYTES RELATIVE PERCENT: 12 % (ref 2–12)
NEUTROPHILS ABSOLUTE: 3.11 E9/L (ref 1.8–7.3)
NEUTROPHILS RELATIVE PERCENT: 51.5 % (ref 43–80)
NITRITE, URINE: NEGATIVE
PDW BLD-RTO: 14.4 FL (ref 11.5–15)
PH UA: 6 (ref 5–9)
PLATELET # BLD: 291 E9/L (ref 130–450)
PMV BLD AUTO: 10.2 FL (ref 7–12)
POTASSIUM SERPL-SCNC: 4.5 MMOL/L (ref 3.5–5)
PROTEIN UA: NEGATIVE MG/DL
RBC # BLD: 3.99 E12/L (ref 3.5–5.5)
RBC UA: ABNORMAL /HPF (ref 0–2)
SODIUM BLD-SCNC: 143 MMOL/L (ref 132–146)
SPECIFIC GRAVITY UA: <=1.005 (ref 1–1.03)
TOTAL PROTEIN: 6.4 G/DL (ref 6.4–8.3)
TRIGLYCERIDE, FASTING: 108 MG/DL (ref 0–149)
TSH SERPL DL<=0.05 MIU/L-ACNC: 3.42 UIU/ML (ref 0.27–4.2)
UROBILINOGEN, URINE: 0.2 E.U./DL
VLDLC SERPL CALC-MCNC: 22 MG/DL
WBC # BLD: 6.1 E9/L (ref 4.5–11.5)
WBC UA: ABNORMAL /HPF (ref 0–5)

## 2020-09-24 PROCEDURE — 36415 COLL VENOUS BLD VENIPUNCTURE: CPT

## 2020-09-24 PROCEDURE — 84443 ASSAY THYROID STIM HORMONE: CPT

## 2020-09-24 PROCEDURE — 80053 COMPREHEN METABOLIC PANEL: CPT

## 2020-09-24 PROCEDURE — 80061 LIPID PANEL: CPT

## 2020-09-24 PROCEDURE — 85025 COMPLETE CBC W/AUTO DIFF WBC: CPT

## 2020-09-24 PROCEDURE — 81001 URINALYSIS AUTO W/SCOPE: CPT

## 2020-09-28 RX ORDER — MEMANTINE HYDROCHLORIDE 10 MG/1
10 TABLET ORAL 2 TIMES DAILY
Qty: 180 TABLET | Refills: 0 | Status: SHIPPED
Start: 2020-09-28 | End: 2021-01-04 | Stop reason: SDUPTHER

## 2020-10-07 ENCOUNTER — OFFICE VISIT (OUTPATIENT)
Dept: PHYSICAL MEDICINE AND REHAB | Age: 85
End: 2020-10-07
Payer: MEDICARE

## 2020-10-07 VITALS
SYSTOLIC BLOOD PRESSURE: 133 MMHG | HEART RATE: 60 BPM | WEIGHT: 120.2 LBS | DIASTOLIC BLOOD PRESSURE: 81 MMHG | BODY MASS INDEX: 19.32 KG/M2 | HEIGHT: 66 IN

## 2020-10-07 PROCEDURE — 1123F ACP DISCUSS/DSCN MKR DOCD: CPT | Performed by: PHYSICAL MEDICINE & REHABILITATION

## 2020-10-07 PROCEDURE — G8427 DOCREV CUR MEDS BY ELIG CLIN: HCPCS | Performed by: PHYSICAL MEDICINE & REHABILITATION

## 2020-10-07 PROCEDURE — G8420 CALC BMI NORM PARAMETERS: HCPCS | Performed by: PHYSICAL MEDICINE & REHABILITATION

## 2020-10-07 PROCEDURE — 1036F TOBACCO NON-USER: CPT | Performed by: PHYSICAL MEDICINE & REHABILITATION

## 2020-10-07 PROCEDURE — G8484 FLU IMMUNIZE NO ADMIN: HCPCS | Performed by: PHYSICAL MEDICINE & REHABILITATION

## 2020-10-07 PROCEDURE — 1090F PRES/ABSN URINE INCON ASSESS: CPT | Performed by: PHYSICAL MEDICINE & REHABILITATION

## 2020-10-07 PROCEDURE — 99213 OFFICE O/P EST LOW 20 MIN: CPT | Performed by: PHYSICAL MEDICINE & REHABILITATION

## 2020-10-07 PROCEDURE — 4040F PNEUMOC VAC/ADMIN/RCVD: CPT | Performed by: PHYSICAL MEDICINE & REHABILITATION

## 2020-10-07 NOTE — H&P
Neeta Shah, [address] [facility] Physical Medicine and Rehabilitation  [address] [facility] [address]  Phone: [phone]  Fax: [phone]    PCP: Ramakrishna Cerrato MD  Date of visit: 10/7/20    Chief Complaint   Patient presents with    Back Pain     follow up        Interval:   Patient presents today for follow up visit regarding back pain. Her pain started 1 month ago. She underwent MBB and had 100% relief. The pain is located in the low back without radiating into the legs. It is rated a 10/10. Described as achy, sharp, shooting. Pain is better with rest. Worse with activity. There is no associated numbness/tingling. There is no weakness, there is no bowel/bladder changes. The prior workup has included: Xray hips, MRI L spine      The prior treatment has included:  PT: made it worse   Chiropractic: none   Modalities: bengay with no relief   OTC Tylenol: yes with some relief   NSAIDS: yes with no relief, voltaren gel with relief   Membrane stabilizers: none    Muscle relaxers: none   Previous injections: 1/17/19 bilateral SI joint with 100% relief  9/19/19 bilateral SI joint injections    2/06/2020  # 1 Fluoroscopic guided lumbar medial branch blocks at  levels: medial branch level: L3, L4, L5 Joint: Bilateral L4-5, L5-S1. - 100% relief. Previous surgery at this site: none      Allergies   Allergen Reactions    Iodine      I.V.        Current Outpatient Medications   Medication Sig Dispense Refill    memantine (NAMENDA) 10 MG tablet Take 1 tablet by mouth 2 times daily 180 tablet 0    apixaban (ELIQUIS) 2.5 MG TABS tablet TAKE 1 TABLET BY MOUTH  TWICE DAILY 180 tablet 0    donepezil (ARICEPT) 10 MG tablet Take 1 tablet by mouth nightly 90 tablet 0    furosemide (LASIX) 20 MG tablet TAKE 1 TABLET BY MOUTH 3  TIMES WEEKLY 36 tablet 0    citalopram (CELEXA) 10 MG tablet Take 1 tablet by mouth every morning 90 tablet 1    Compression Stockings MISC Wear during wake hours. 1 each 0    Glucosamine-Chondroit-Vit C-Mn (GLUCOSAMINE 1500 COMPLEX PO) Take by mouth      Cholecalciferol (VITAMIN D3) 2000 units CAPS Take by mouth      metoprolol tartrate (LOPRESSOR) 25 MG tablet Take 1 tablet by mouth 2 times daily 180 tablet 0    diclofenac sodium (VOLTAREN) 1 % GEL Apply 4 g topically 4 times daily as needed (pain) 480 g 5     No current facility-administered medications for this visit.         Past Medical History:   Diagnosis Date    Alzheimer disease ([address]     Arthritis     Atrial fibrillation (HCC)     GERD (gastroesophageal reflux disease)     Hyperlipidemia        Past Surgical History:   Procedure Laterality Date    ABDOMEN SURGERY      ANESTHESIA NERVE BLOCK Bilateral 2/6/2020    BILATERAL MEDIAL BRANCH L4-5 AND L5-S1 JOINTS performed by Farhan Daniel DO at [facility], LAPAROSCOPIC N/A 11/21/2019    DIAGNOSITIC LAPAROSCOPY CONVERTED TO EXPLORATORY LAPAROTOMY WITH CLOSURE OF ENTEROTOMY X1, CHOLANGIOGRAM, EGD performed by Alexander Licea MD at [address] [facility] [address] [facility]  10/21/2013         [facility], [facility] INJECTION PROCEDURE FOR SACROILIAC JOINT Right 12/13/2018    RIGHT SACROILIAC JOINT STEROID INJECTION UNDER X-RAY GUIDANCE performed by Farhan Daniel DO at [address] [facility] [address] JOINT Left 1/17/2019    LEFT SACROILIAC JOINT STEROID INJECTION UNDER X-RAY GUIDANCE performed by Farhan Daniel DO at [address] [place] [address], [facility] INJECTION PROCEDURE FOR SACROILIAC JOINT Bilateral 9/19/2019    BILATERAL SACROILIAC JOINT INJECTION X-RAY performed by Farhan Daniel DO at [facility] Right 12/13/2018    sacroiliac joint     NERVE BLOCK Left 01/17/2019    sacroiliac joint injection     NERVE BLOCK Bilateral 02/06/2020    medial branch block    STOMACH SURGERY N/A     d/t peptic ulcers       Family History   Problem Relation Age of Onset    Heart Disease Mother     Heart Disease Father      Social History     Tobacco Use    Smoking status: Never Smoker    Smokeless tobacco: Never Used   Substance Use Topics    Alcohol use: No     Comment: 2 cups of coffee a day     Drug use: No       Functional Status: The patient is able to ambulate and perform activities of daily living without the use of an assistive device. ROS:     Constitutional: Denies fevers, chills, night sweats, unintentional weight loss     Skin: Denies rash or skin changes     Eyes: Denies vision changes    Ears/Nose/Throat: Denies nasal congestion or sore throat     Respiratory: Denies SOB or cough     Cardiovascular: Denies CP, palpitations, edema      Gastrointestinal: Denies abdominal pain,  N/V, constipation, or diarrhea    Genitourinary: Denies urinary symptoms    Neurologic: See HPI.     MSK: See HPI. Psychiatric: Denies sleep disturbance, anxiety, depression    Hematologic/Lymphatic/Immunologic: Denies bruising       Physical Exam:   Blood pressure 133/81, pulse 60, height 5' 6\" (1.676 m), weight 120 lb 3.2 oz (54.5 kg). General: well developed and well nourished in no acute distress. Body habitus is thin  HEENT: No rhinorrhea, sneezing, yawning, or lacrimation. No scleral icterus or conjunctival injection. Resp: symmetrical chest expansion, unlabored breathing, respirations unlabored. CV: Heart rate is regular. Peripheral pulses are palpable  Lymph: No visible regional lymphadenopathy. Skin: No rashes or ecchymosis. Normal turgor. Psych: Mood is calm. Affect is normal.   Ext: No edema noted     MSK:   Back/Hip Exam:   Inspection: Pelvis was asymmetric. Lumbar lordotic curvature was decreased. There was no scoliosis. No ecchymoses or erythema. Palpation: Palpatory exam revealed tenderness along lumbosacral paraspinals, no ttp midline spine, no ttp bilateral SI Joint sulci. There was no paraspinal spasms. There were no trigger points. ROM limited. Neurological Exam:  Strength:                     R          L  Hip Flex                       5          5  Knee Ext                     5          5  Ankle dorsi                  5          5  EHL                             5          5  Ankle Plantar               5          5     Sensory:  Intact for light touch in all lower extremity dermatomes.       Reflexes:                     R          L  Patellar                        (1+)      (1+)  Ankle Jerk                   (1+)      (1+)     Gait is Antalgic. MRI L Spine 12/4/18:  BONE MARROW SIGNAL: Confluent hyperintense T1 signal changes with   patchy signal hyperintensities present in the right sacral ala. Additional abnormal T2 signal changes on the STIR images noted within   the left sacral ala. No abnormal signal associated with the L4   vertebral body.       T12-L1: Normal T12-L1       L1-L2: Normal L1-L2       L2-L3: Degenerative disc disease with no central or foraminal   stenosis.       L3-L4: Small disc spur complex noted with no central or foraminal   stenosis.       L4-L5: Disc spur complex noted with mild central and foraminal   stenosis.       L5-S1: Mild bulge and severe facet upper superior mild foraminal and   canal stenosis.       SOFT TISSUES: The visualized paravertebral soft tissues are within   normal limits.           Impression       1. No acute compression fractures. L4 fracture is remote. 2.  Sacral insufficiency fractures. Impression:   Gus Schmitz is a 80 y.o. female     1. Facet arthropathy        Plan:   · Patient would benefit from bilateral L4-5 and L5-S1 intra-articular facet steroid Injection. Procedure risks, benefits and alternatives were discussed. Patient would like to proceed.      Follow up after injections       Ann Russell DO, FAAPMR   Board Certified Physical Medicine and Rehabilitation        The patient was counseled at length about the risks of sheila Covid-19 during their perioperative period and any recovery window from their procedure. The patient was made aware that sheila Covid-19  may worsen their prognosis for recovering from their procedure  and lend to a higher morbidity and/or mortality risk. All material risks, benefits, and reasonable alternatives including postponing the procedure were discussed. The patient does wish to proceed with the procedure at this time.

## 2020-10-07 NOTE — PROGRESS NOTES
Tram Ferreira, [address] [facility] Physical Medicine and Rehabilitation  [address] [facility] [place]  Phone: [phone]  Fax: [phone]    PCP: Cristina Ga MD  Date of visit: 10/7/20    Chief Complaint   Patient presents with    Back Pain     follow up        Interval:   Patient presents today for follow up visit regarding back pain. Her pain started 1 month ago. She underwent MBB and had 100% relief. The pain is located in the low back without radiating into the legs. It is rated a 10/10. Described as achy, sharp, shooting. Pain is better with rest. Worse with activity. There is no associated numbness/tingling. There is no weakness, there is no bowel/bladder changes. The prior workup has included: Xray hips, MRI L spine      The prior treatment has included:  PT: made it worse   Chiropractic: none   Modalities: bengay with no relief   OTC Tylenol: yes with some relief   NSAIDS: yes with no relief, voltaren gel with relief   Membrane stabilizers: none    Muscle relaxers: none   Previous injections: 1/17/19 bilateral SI joint with 100% relief  9/19/19 bilateral SI joint injections    2/06/2020  # 1 Fluoroscopic guided lumbar medial branch blocks at  levels: medial branch level: L3, L4, L5 Joint: Bilateral L4-5, L5-S1. - 100% relief. Previous surgery at this site: none      Allergies   Allergen Reactions    Iodine      I.V.        Current Outpatient Medications   Medication Sig Dispense Refill    memantine (NAMENDA) 10 MG tablet Take 1 tablet by mouth 2 times daily 180 tablet 0    apixaban (ELIQUIS) 2.5 MG TABS tablet TAKE 1 TABLET BY MOUTH  TWICE DAILY 180 tablet 0    donepezil (ARICEPT) 10 MG tablet Take 1 tablet by mouth nightly 90 tablet 0    furosemide (LASIX) 20 MG tablet TAKE 1 TABLET BY MOUTH 3  TIMES WEEKLY 36 tablet 0    citalopram (CELEXA) 10 MG tablet Take 1 tablet by mouth every morning 90 tablet 1    Compression Stockings MISC Wear during wake hours. 1 each 0    Glucosamine-Chondroit-Vit C-Mn (GLUCOSAMINE 1500 COMPLEX PO) Take by mouth      Cholecalciferol (VITAMIN D3) 2000 units CAPS Take by mouth      metoprolol tartrate (LOPRESSOR) 25 MG tablet Take 1 tablet by mouth 2 times daily 180 tablet 0    diclofenac sodium (VOLTAREN) 1 % GEL Apply 4 g topically 4 times daily as needed (pain) 480 g 5     No current facility-administered medications for this visit.         Past Medical History:   Diagnosis Date    Alzheimer disease ([address]     Arthritis     Atrial fibrillation (HCC)     GERD (gastroesophageal reflux disease)     Hyperlipidemia        Past Surgical History:   Procedure Laterality Date    ABDOMEN SURGERY      ANESTHESIA NERVE BLOCK Bilateral 2/6/2020    BILATERAL MEDIAL BRANCH L4-5 AND L5-S1 JOINTS performed by Uche Lr DO at [facility], LAPAROSCOPIC N/A 11/21/2019    DIAGNOSITIC LAPAROSCOPY CONVERTED TO EXPLORATORY LAPAROTOMY WITH CLOSURE OF ENTEROTOMY X1, CHOLANGIOGRAM, EGD performed by Quang Tran MD at [address] [facility] E[address] COM[address]  10/21/2013         [place] INJECTION PROCEDURE FOR SACROILIAC JOINT Right 12/13/2018    RIGHT SACROILIAC JOINT STEROID INJECTION UNDER X-RAY GUIDANCE performed by Uche Lr DO at [address] [facility] [address] JOINT Left 1/17/2019    LEFT SACROILIAC JOINT STEROID INJECTION UNDER X-RAY GUIDANCE performed by Uche Lr DO at [address] [place] [address] [place] INJECTION PROCEDURE FOR SACROILIAC JOINT Bilateral 9/19/2019    BILATERAL SACROILIAC JOINT INJECTION X-RAY [place] [facility]che Lr DO [place] [facility] [place] Right 12/13/2018    sacroiliac joint     NERVE BLOCK Left 01/17/2019    sacroiliac joint injection     NERVE BLOCK Bilateral 02/06/2020    medial branch block    STOMACH SURGERY N/A     d/t peptic ulcers       Family History   Problem Relation Age of Onset    Heart Disease Mother     Heart Disease Father      Social History     Tobacco Use    Smoking status: Never Smoker    Smokeless tobacco: Never Used   Substance Use Topics    Alcohol use: No     Comment: 2 cups of coffee a day     Drug use: No       Functional Status: The patient is able to ambulate and perform activities of daily living without the use of an assistive device. ROS:     Constitutional: Denies fevers, chills, night sweats, unintentional weight loss     Skin: Denies rash or skin changes     Eyes: Denies vision changes    Ears/Nose/Throat: Denies nasal congestion or sore throat     Respiratory: Denies SOB or cough     Cardiovascular: Denies CP, palpitations, edema      Gastrointestinal: Denies abdominal pain,  N/V, constipation, or diarrhea    Genitourinary: Denies urinary symptoms    Neurologic: See HPI.     MSK: See HPI. Psychiatric: Denies sleep disturbance, anxiety, depression    Hematologic/Lymphatic/Immunologic: Denies bruising       Physical Exam:   Blood pressure 133/81, pulse 60, height 5' 6\" (1.676 m), weight 120 lb 3.2 oz (54.5 kg). General: well developed and well nourished in no acute distress. Body habitus is thin  HEENT: No rhinorrhea, sneezing, yawning, or lacrimation. No scleral icterus or conjunctival injection. Resp: symmetrical chest expansion, unlabored breathing, respirations unlabored. CV: Heart rate is regular. Peripheral pulses are palpable  Lymph: No visible regional lymphadenopathy. Skin: No rashes or ecchymosis. Normal turgor. Psych: Mood is calm. Affect is normal.   Ext: No edema noted     MSK:   Back/Hip Exam:   Inspection: Pelvis was asymmetric. Lumbar lordotic curvature was decreased. There was no scoliosis. No ecchymoses or erythema. Palpation: Palpatory exam revealed tenderness along lumbosacral paraspinals, no ttp midline spine, no ttp bilateral SI Joint sulci. There was no paraspinal spasms. There were no trigger points. ROM limited. Neurological Exam:  Strength:                     R          L  Hip Flex                       5          5  Knee Ext                     5          5  Ankle dorsi                  5          5  EHL                             5          5  Ankle Plantar               5          5     Sensory:  Intact for light touch in all lower extremity dermatomes.       Reflexes:                     R          L  Patellar                        (1+)      (1+)  Ankle Jerk                   (1+)      (1+)     Gait is Antalgic. MRI L Spine 12/4/18:  BONE MARROW SIGNAL: Confluent hyperintense T1 signal changes with   patchy signal hyperintensities present in the right sacral ala. Additional abnormal T2 signal changes on the STIR images noted within   the left sacral ala. No abnormal signal associated with the L4   vertebral body.       T12-L1: Normal T12-L1       L1-L2: Normal L1-L2       L2-L3: Degenerative disc disease with no central or foraminal   stenosis.       L3-L4: Small disc spur complex noted with no central or foraminal   stenosis.       L4-L5: Disc spur complex noted with mild central and foraminal   stenosis.       L5-S1: Mild bulge and severe facet upper superior mild foraminal and   canal stenosis.       SOFT TISSUES: The visualized paravertebral soft tissues are within   normal limits.           Impression       1. No acute compression fractures. L4 fracture is remote. 2.  Sacral insufficiency fractures. Impression:   Piper Soliz is a 80 y.o. female     1. Facet arthropathy        Plan:   · Patient would benefit from bilateral L4-5 and L5-S1 intra-articular facet steroid Injection. Procedure risks, benefits and alternatives were discussed. Patient would like to proceed.      Follow up after injections       Romulo Dupont DO, FAAPMR   Board Certified Physical Medicine and Rehabilitation        The patient was counseled at length

## 2020-10-08 ENCOUNTER — TELEPHONE (OUTPATIENT)
Dept: PHYSICAL MEDICINE AND REHAB | Age: 85
End: 2020-10-08

## 2020-10-08 RX ORDER — TRAMADOL HYDROCHLORIDE 50 MG/1
50 TABLET ORAL EVERY 8 HOURS PRN
Qty: 21 TABLET | Refills: 0 | Status: SHIPPED
Start: 2020-10-08 | End: 2020-10-21 | Stop reason: SDUPTHER

## 2020-10-08 NOTE — TELEPHONE ENCOUNTER
Called and spoke with the patient  Eliane Jacques to inform him that a script was sent to her pharmacy for a 7 day supply of Tramadol. Patient  is aware and voiced understanding.

## 2020-10-08 NOTE — TELEPHONE ENCOUNTER
Patient  Celine Cardoza called stated that his wife is in a lot of pain and wanted to know if you can give her something for the pain while there waiting to get scheduled for epidural.  I did inform the patient that you do not give pain medication. Please advise.

## 2020-10-08 NOTE — TELEPHONE ENCOUNTER
I sent 7 day prescription in for tramadol. NO refills. Controlled Substance Monitoring:    Acute and Chronic Pain Monitoring:   RX Monitoring 10/8/2020   Periodic Controlled Substance Monitoring No signs of potential drug abuse or diversion identified.

## 2020-10-20 NOTE — TELEPHONE ENCOUNTER
Patient's  called requesting a refill on Tramadol 50mg every 8 hours prn. Patient is due to have procedure on 10/30/20. Please advise.

## 2020-10-21 RX ORDER — TRAMADOL HYDROCHLORIDE 50 MG/1
50 TABLET ORAL EVERY 8 HOURS PRN
Qty: 21 TABLET | Refills: 0 | Status: SHIPPED | OUTPATIENT
Start: 2020-10-21 | End: 2020-10-28

## 2020-10-21 NOTE — TELEPHONE ENCOUNTER
Approve one more refill. Cannot provide any further refills after this. Please let the patient know.

## 2020-10-22 ENCOUNTER — HOSPITAL ENCOUNTER (OUTPATIENT)
Age: 85
Discharge: HOME | End: 2020-10-24
Payer: MEDICARE

## 2020-10-22 ENCOUNTER — OFFICE VISIT (OUTPATIENT)
Dept: FAMILY MEDICINE CLINIC | Age: 85
End: 2020-10-22
Payer: MEDICARE

## 2020-10-22 VITALS
BODY MASS INDEX: 16.25 KG/M2 | HEART RATE: 50 BPM | OXYGEN SATURATION: 93 % | WEIGHT: 120 LBS | HEIGHT: 72 IN | SYSTOLIC BLOOD PRESSURE: 112 MMHG | TEMPERATURE: 97.3 F | DIASTOLIC BLOOD PRESSURE: 72 MMHG

## 2020-10-22 PROCEDURE — U0003 INFECTIOUS AGENT DETECTION BY NUCLEIC ACID (DNA OR RNA); SEVERE ACUTE RESPIRATORY SYNDROME CORONAVIRUS 2 (SARS-COV-2) (CORONAVIRUS DISEASE [COVID-19]), AMPLIFIED PROBE TECHNIQUE, MAKING USE OF HIGH THROUGHPUT TECHNOLOGIES AS DESCRIBED BY CMS-2020-01-R: HCPCS

## 2020-10-22 PROCEDURE — G8484 FLU IMMUNIZE NO ADMIN: HCPCS | Performed by: INTERNAL MEDICINE

## 2020-10-22 PROCEDURE — 1123F ACP DISCUSS/DSCN MKR DOCD: CPT | Performed by: INTERNAL MEDICINE

## 2020-10-22 PROCEDURE — G0439 PPPS, SUBSEQ VISIT: HCPCS | Performed by: INTERNAL MEDICINE

## 2020-10-22 PROCEDURE — 90694 VACC AIIV4 NO PRSRV 0.5ML IM: CPT | Performed by: INTERNAL MEDICINE

## 2020-10-22 PROCEDURE — G0008 ADMIN INFLUENZA VIRUS VAC: HCPCS | Performed by: INTERNAL MEDICINE

## 2020-10-22 PROCEDURE — 4040F PNEUMOC VAC/ADMIN/RCVD: CPT | Performed by: INTERNAL MEDICINE

## 2020-10-22 RX ORDER — CITALOPRAM 10 MG/1
10 TABLET ORAL EVERY MORNING
Qty: 90 TABLET | Refills: 1 | Status: SHIPPED
Start: 2020-10-22 | End: 2021-02-22

## 2020-10-22 RX ORDER — FUROSEMIDE 20 MG/1
TABLET ORAL
Qty: 36 TABLET | Refills: 1 | Status: SHIPPED
Start: 2020-10-22 | End: 2021-01-23 | Stop reason: SDUPTHER

## 2020-10-22 ASSESSMENT — LIFESTYLE VARIABLES
HOW OFTEN DO YOU HAVE A DRINK CONTAINING ALCOHOL: 0
HOW OFTEN DO YOU HAVE A DRINK CONTAINING ALCOHOL: NEVER
AUDIT-C TOTAL SCORE: INCOMPLETE
AUDIT TOTAL SCORE: INCOMPLETE

## 2020-10-22 ASSESSMENT — PATIENT HEALTH QUESTIONNAIRE - PHQ9
SUM OF ALL RESPONSES TO PHQ QUESTIONS 1-9: 0
SUM OF ALL RESPONSES TO PHQ QUESTIONS 1-9: 0
SUM OF ALL RESPONSES TO PHQ9 QUESTIONS 1 & 2: 0
SUM OF ALL RESPONSES TO PHQ QUESTIONS 1-9: 0
2. FEELING DOWN, DEPRESSED OR HOPELESS: 0
1. LITTLE INTEREST OR PLEASURE IN DOING THINGS: 0

## 2020-10-22 NOTE — TELEPHONE ENCOUNTER
Called and spoke with the patient  to inform him that a script for Tramadol was sent to her pharmacy. Patient  is aware and voiced understanding.

## 2020-10-22 NOTE — PATIENT INSTRUCTIONS
Learning About How to Make a Home Safe  Making your home safe: Overview  You can help protect the person in your care by making the home safe. Here are some general tips for how to lower the chance of getting injured in the home. · Pad sharp corners on furniture and counter tops. · Keep objects that are used often within easy reach. · Install handrails around the toilet and in the shower. Use a tub mat to prevent slipping. · Use a shower chair or bath bench when the person bathes. · Provide good lighting inside and outside the home. Put night-lights in bedrooms, hallways, and bathrooms. Have light at the top and bottom of stairways. · Have a first aid kit. It is also important to be aware of safe temperatures in the home. When helping someone bathe, use the back of your hand to test the water to make sure it's not too hot. Lower the temperature setting in the hot water heater to 120°F or lower to avoid burns. And make sure other liquids (such as coffee, tea, or soup) are not too hot. How can you protect from fire and carbon monoxide? Home safety alarms are very important. A smoke alarm can detect small amounts of smoke. This can allow time to escape from a fire. And a carbon monoxide alarm can let people know about this deadly gas before it starts to make them sick. · Put smoke alarms:  ? On each level of the home, in the hallway outside sleeping areas, and inside each bedroom. ? In the center of a ceiling, or on a wall 6 to 12 inches from the ceiling. This is where smoke goes first. Avoid places near doors, windows, or air ducts. · Put a carbon monoxide alarm in the hallway outside of the bedrooms in each sleeping area of the house. The alarm should be placed high on the wall. Make sure that the alarm can't be covered up by furniture or drapes. · Test alarms regularly by pressing the test button. · Replace non-lithium batteries in alarms twice a year.   · Have a plan for getting out of the home if there is a fire. Practice by having a fire drill. · Keep a fire extinguisher in the kitchen. What can you do to prevent falls? You can help prevent falls by keeping rooms uncluttered, with clear walkways around furniture. Keep electrical cords off the floor, and remove throw rugs to prevent tripping. If there are steps in the home, make sure they all have handrails, and always use the handrails. Don't leave items on the steps, and be sure to fix any that are loose, broken, or uneven. How can you increase safety for people with dementia? If you are caring for someone who has dementia, you may need to make some extra changes to create a safe home. People with dementia have a loss of mental skills, such as memory, problem solving, and learning. So things that might not have been a danger to them before can cause safety problems now. Here are some things to consider:  · Don't move furniture around. The person may become confused. · Use locks on doors and cupboards. Lock up knives, scissors, medicines, cleaning supplies, and other dangerous items. · Use hidden switches or controls for the stove, thermostat, water heater, and other appliances. · If your loved one is still cooking, think about whether that is safe. It may be okay with some help, depending on your loved one's condition. But for people who have memory or thinking problems, it's best to avoid any activities that might not be safe. · If the person tends to wander or to try to leave the home, install motion-sensor lights on all doors and windows. · Have emergency numbers in a central area near a phone. Include 911 and numbers for the doctor and family members. · Get medical alert jewelry for the person so you can be contacted if he or she wanders away. If possible, provide a safe place for wandering, such as an enclosed yard or garden. Where can you learn more? Go to [email or web address] and sign in to your [email or web address] account. Enter K361 in the [facility] box to learn more about \"Learning About How to Make a Home Safe. \"     If you do not have an account, please click on the \"Sign Up Now\" link. Current as of: December 9, 2019               Content Version: 12.6  © 4785-2167 [employer]. Care instructions adapted under license by [facility] ([facility]). If you have questions about a medical condition or this instruction, always ask your healthcare professional. [address] any warranty or liability for your use of this information. Personalized Preventive Plan for [facility] - 10/22/2020  Medicare offers a range of preventive health benefits. Some of the tests and screenings are paid in full while other may be subject to a deductible, co-insurance, and/or copay. Some of these benefits include a comprehensive review of your medical history including lifestyle, illnesses that may run in your family, and various assessments and screenings as appropriate. After reviewing your medical record and screening and assessments performed today your provider may have ordered immunizations, labs, imaging, and/or referrals for you. A list of these orders (if applicable) as well as your Preventive Care list are included within your After Visit Summary for your review. Other Preventive Recommendations:    · A preventive eye exam performed by an eye specialist is recommended every 1-2 years to screen for glaucoma; cataracts, macular degeneration, and other eye disorders. · A preventive dental visit is recommended every 6 months. · Try to get at least 150 minutes of exercise per week or 10,000 steps per day on a pedometer . · Order or download the FREE \"Exercise & Physical Activity: Your Everyday Guide\" from [employer]. Call 7-821.969.5714 or search [employer] online. · You need 4170-1440 mg of calcium and 5681-7573 IU of vitamin D per day.  It is possible to meet your calcium requirement with diet alone, but a vitamin D supplement is usually necessary to meet this goal.  · When exposed to the sun, use a sunscreen that protects against both UVA and UVB radiation with an SPF of 30 or greater. Reapply every 2 to 3 hours or after sweating, drying off with a towel, or swimming. · Always wear a seat belt when traveling in a car. Always wear a helmet when riding a bicycle or motorcycle.

## 2020-10-22 NOTE — PROGRESS NOTES
Medicare Annual Wellness Visit  Name: Curry Diaz Date: 10/22/2020   MRN: <[id number]> Sex: Female   Age: 80 y.o. Ethnicity: Non-/Non    : 1932 Race: Skyler Madison is here for Medicare AWV; Medication Refill; and Discuss Labs    Screenings for behavioral, psychosocial and functional/safety risks, and cognitive dysfunction are all negative except as indicated below. These results, as well as other patient data from the [address] form, are documented in Flowsheets linked to this Encounter. Allergies   Allergen Reactions    Iodine      I.V.       Prior to Visit Medications    Medication Sig Taking? Authorizing Provider   furosemide (LASIX) 20 MG tablet TAKE 1 TABLET BY MOUTH 3  TIMES WEEKLY Yes Niall Ashraf MD   citalopram (CELEXA) 10 MG tablet Take 1 tablet by mouth every morning Yes Niall Ashraf MD   traMADol (ULTRAM) 50 MG tablet Take 1 tablet by mouth every 8 hours as needed for Pain for up to 7 days. Yes Taylor Thakkar DO   memantine (NAMENDA) 10 MG tablet Take 1 tablet by mouth 2 times daily Yes Niall Ashraf MD   apixaban (ELIQUIS) 2.5 MG TABS tablet TAKE 1 TABLET BY MOUTH  TWICE DAILY Yes Niall Ashraf MD   donepezil (ARICEPT) 10 MG tablet Take 1 tablet by mouth nightly Yes Stacie Gutierrez MD   metoprolol tartrate (LOPRESSOR) 25 MG tablet Take 1 tablet by mouth 2 times daily Yes Gama Romo MD   Compression Stockings MISC Wear during wake hours.  Yes Niall Ashraf MD   diclofenac sodium (VOLTAREN) 1 % GEL Apply 4 g topically 4 times daily as needed (pain) Yes Taylor Thakkar DO   Glucosamine-Chondroit-Vit C-Mn (GLUCOSAMINE 1500 COMPLEX PO) Take by mouth Yes Historical Provider, MD   Cholecalciferol (VITAMIN D3) 2000 units CAPS Take by mouth Yes Historical Provider, MD       Past Medical History:   Diagnosis Date    Alzheimer disease ([facility] [address] [facility]     Arthritis     Atrial fibrillation ([facility]     GERD (gastroesophageal reflux disease)     Hyperlipidemia        Past Surgical History:   Procedure Laterality Date    ABDOMEN SURGERY      ANESTHESIA NERVE BLOCK Bilateral 2/6/2020    BILATERAL MEDIAL BRANCH L4-5 AND L5-S1 JOINTS performed by Alex Wilkerson DO at [address], LAPAROSCOPIC N/A 11/21/2019    DIAGNOSITIC LAPAROSCOPY CONVERTED TO EXPLORATORY LAPAROTOMY WITH CLOSURE OF ENTEROTOMY X1, CHOLANGIOGRAM, EGD performed by Sun Victor MD at [address] ECHO COMPL [address]  10/21/2013         [facility]. INJECTION PROCEDURE FOR SACROILIAC JOINT Right 12/13/2018    RIGHT SACROILIAC JOINT STEROID INJECTION UNDER X-RAY GUIDANCE performed by Alex Wilkerson DO at [address] [facility] [address] JOINT Left 1/17/2019    LEFT SACROILIAC JOINT STEROID INJECTION UNDER X-RAY GUIDANCE performed by Alex Wilkerson DO at [address] [facility], [facility]. INJECTION PROCEDURE FOR SACROILIAC JOINT Bilateral 9/19/2019    BILATERAL SACROILIAC JOINT INJECTION X-RAY performed by Alex Wilkerson DO at [facility] Right 12/13/2018    sacroiliac joint     NERVE BLOCK Left 01/17/2019    sacroiliac joint injection     NERVE BLOCK Bilateral 02/06/2020    medial branch block    STOMACH SURGERY N/A     d/t peptic ulcers       Family History   Problem Relation Age of Onset    Heart Disease Mother     Heart Disease Father        CareTeam (Including outside providers/suppliers regularly involved in providing care):   Patient Care Team:  Katie Cruz MD as PCP - Selma Fung MD as PCP - [facility] Empaneled Provider  Reinaldo Callahan MD as Consulting Physician (Cardiology)    Wt Readings from Last 3 Encounters:   10/22/20 120 lb (54.4 kg)   10/07/20 120 lb 3.2 oz (54.5 kg)   09/09/20 125 lb (56.7 kg)     Vitals:    10/22/20 1516   BP: 112/72   Pulse: 50   Temp: 97.3 °F (36.3 °C)   TempSrc: Temporal   SpO2: 93%   Weight: 120 lb (54.4 kg)   Height: 6' (1.829 m)     Body mass index is 16.27 kg/m². Based upon direct observation of the patient, evaluation of cognition reveals global memory impairment noted. General Appearance: alert and oriented to person, place and time, well developed and well- nourished, in no acute distress  Skin: warm and dry, no rash or erythema  Head: normocephalic and atraumatic  Eyes: pupils equal, round, and reactive to light, extraocular eye movements intact, conjunctivae normal  ENT: tympanic membrane, external ear and ear canal normal bilaterally, nose without deformity, nasal mucosa and turbinates normal without polyps  Neck: supple and non-tender without mass, no thyromegaly or thyroid nodules, no cervical lymphadenopathy  Pulmonary/Chest: clear to auscultation bilaterally- no wheezes, rales or rhonchi, normal air movement, no respiratory distress  Cardiovascular: normal rate, regular rhythm, normal S1 and S2, no murmurs, rubs, clicks, or gallops, distal pulses intact, no carotid bruits  Abdomen: soft, non-tender, non-distended, normal bowel sounds, no masses or organomegaly  Extremities: no cyanosis, clubbing or edema  Musculoskeletal: Increased low mid back pain, chaz for facet injections at pain clinic   Neurologic: reflexes normal and symmetric, no cranial nerve deficit, gait, coordination and speech normal    Patient's complete Health Risk Assessment and screening values have been reviewed and are found in Flowsheets. The following problems were reviewed today and where indicated follow up appointments were made and/or referrals ordered. Positive Risk Factor Screenings with Interventions:     Cognitive:   Words recalled: 0 Words Recalled  Clock Drawing Test (CDT) Score: (!) Abnormal(Alalzheimer's disease)  Total Score Interpretation: Positive Mini-Cog  Did the patient refuse to take the cognition test?: Yes  Cognitive Impairment Interventions:  · Pt is on treatment for alzheimer's    General Health and ACP:  General  In general, how would you say your health is?: Fair  In the past 7 days, have you experienced any of the following?  New or Increased Pain, New or Increased Fatigue, Loneliness, Social Isolation, Stress or Anger?: (!) New or Increased Pain  Do you get the social and emotional support that you need?: Yes  Do you have a Living Will?: Yes  Advance Directives     Power of  Living Will ACP-Advance Directive ACP-Power of     Not on File Not on File Filed [address] [facility] Risk Interventions:  · Pain issues: following with pain clinic    Health Habits/Nutrition:  Health Habits/Nutrition  Do you exercise for at least 20 minutes 2-3 times per week?: (!) No  Have you lost any weight without trying in the past 3 months?: (!) Yes  Do you eat fewer than 2 meals per day?: No  Have you seen a dentist within the past year?: (!) No  Body mass index: (!) 16.27  Health Habits/Nutrition Interventions:  · Inadequate physical activity:  patient is not ready to increase his/her physical activity level at this time  · Nutritional issues:  patient is not ready to address his/her nutritional/weight issues at this time  · Dental exam overdue:  patient declines dental evaluation    Hearing/Vision:  No exam data present  Hearing/Vision  Do you or your family notice any trouble with your hearing?: (!) Yes  Do you have difficulty driving, watching TV, or doing any of your daily activities because of your eyesight?: (!) Yes  Have you had an eye exam within the past year?: Yes  Hearing/Vision Interventions:  · Hearing concerns:  Pt just received new hearing aids  · Vision concerns:  had new glasses this yr    Safety:  Safety  Do you have working smoke detectors?: Yes  Have all throw rugs been removed or fastened?: Yes  Do you have non-slip mats or surfaces in all bathtubs/showers?: Yes  Do all of your stairways have a railing or Hepatitis A vaccine  Aged Out    Hepatitis B vaccine  Aged Out    Hib vaccine  Aged Out    Meningococcal (ACWY) vaccine  Aged Out     Recommendations for [employer] Due: see orders and patient instructions/AVS.  . Recommended screening schedule for the next 5-10 years is provided to the patient in written form: see Patient Instructions/AVS.    There are no diagnoses linked to this encounter.

## 2020-10-24 LAB
SARS-COV-2: NOT DETECTED
SOURCE: NORMAL

## 2020-10-29 ENCOUNTER — HOSPITAL ENCOUNTER (OUTPATIENT)
Age: 85
Setting detail: OUTPATIENT SURGERY
Discharge: HOME | End: 2020-10-29
Attending: PHYSICAL MEDICINE & REHABILITATION | Admitting: PHYSICAL MEDICINE & REHABILITATION
Payer: MEDICARE

## 2020-10-29 ENCOUNTER — HOSPITAL ENCOUNTER (OUTPATIENT)
Dept: OPERATING ROOM | Age: 85
Setting detail: OUTPATIENT SURGERY
Discharge: HOME | End: 2020-10-29
Attending: PHYSICAL MEDICINE & REHABILITATION
Payer: MEDICARE

## 2020-10-29 VITALS
DIASTOLIC BLOOD PRESSURE: 71 MMHG | BODY MASS INDEX: 18.96 KG/M2 | TEMPERATURE: 96.9 F | WEIGHT: 118 LBS | HEART RATE: 69 BPM | HEIGHT: 66 IN | RESPIRATION RATE: 16 BRPM | OXYGEN SATURATION: 96 % | SYSTOLIC BLOOD PRESSURE: 159 MMHG

## 2020-10-29 PROCEDURE — 64493 INJ PARAVERT F JNT L/S 1 LEV: CPT | Performed by: PHYSICAL MEDICINE & REHABILITATION

## 2020-10-29 PROCEDURE — 7100000010 HC PHASE II RECOVERY - FIRST 15 MIN: Performed by: PHYSICAL MEDICINE & REHABILITATION

## 2020-10-29 PROCEDURE — 3600000005 HC SURGERY LEVEL 5 BASE: Performed by: PHYSICAL MEDICINE & REHABILITATION

## 2020-10-29 PROCEDURE — 3209999900 FLUORO FOR SURGICAL PROCEDURES

## 2020-10-29 PROCEDURE — 6360000002 HC RX W HCPCS: Performed by: PHYSICAL MEDICINE & REHABILITATION

## 2020-10-29 PROCEDURE — 2709999900 HC NON-CHARGEABLE SUPPLY: Performed by: PHYSICAL MEDICINE & REHABILITATION

## 2020-10-29 PROCEDURE — 2500000003 HC RX 250 WO HCPCS: Performed by: PHYSICAL MEDICINE & REHABILITATION

## 2020-10-29 PROCEDURE — 64494 INJ PARAVERT F JNT L/S 2 LEV: CPT | Performed by: PHYSICAL MEDICINE & REHABILITATION

## 2020-10-29 PROCEDURE — 7100000011 HC PHASE II RECOVERY - ADDTL 15 MIN: Performed by: PHYSICAL MEDICINE & REHABILITATION

## 2020-10-29 RX ORDER — LIDOCAINE HYDROCHLORIDE 10 MG/ML
INJECTION, SOLUTION EPIDURAL; INFILTRATION; INTRACAUDAL; PERINEURAL PRN
Status: DISCONTINUED | OUTPATIENT
Start: 2020-10-29 | End: 2020-10-29 | Stop reason: ALTCHOICE

## 2020-10-29 RX ORDER — TRAMADOL HYDROCHLORIDE 50 MG/1
50 TABLET ORAL EVERY 6 HOURS PRN
COMMUNITY
End: 2021-01-26 | Stop reason: ALTCHOICE

## 2020-10-29 ASSESSMENT — PAIN SCALES - GENERAL
PAINLEVEL_OUTOF10: 0
PAINLEVEL_OUTOF10: 0

## 2020-10-29 ASSESSMENT — PAIN - FUNCTIONAL ASSESSMENT: PAIN_FUNCTIONAL_ASSESSMENT: 0-10

## 2020-10-29 ASSESSMENT — PAIN DESCRIPTION - DESCRIPTORS: DESCRIPTORS: ACHING

## 2020-10-29 NOTE — H&P
[place], [address] [facility]  [address] [facility] [place]  Phone: [phone]  Fax: [phone]     PCP: Matti Garcia MD  Date of visit: 10/7/20          Chief Complaint   Patient presents with    Back Pain       follow up          Interval:   Patient presents today for follow up visit regarding back pain. Her pain started 1 month ago. She underwent MBB and had 100% relief. The pain is located in the low back without radiating into the legs. It is rated a 10/10. Described as achy, sharp, shooting. Pain is better with rest. Worse with activity. There is no associated numbness/tingling. There is no weakness, there is no bowel/bladder changes.     The prior workup has included: Xray hips, MRI L spine       The prior treatment has included:  PT: made it worse   Chiropractic: none   Modalities: bengay with no relief   OTC Tylenol: yes with some relief   NSAIDS: yes with no relief, voltaren gel with relief   Membrane stabilizers: none    Muscle relaxers: none   Previous injections: 1/17/19 bilateral SI joint with 100% relief  9/19/19 bilateral SI joint injections    2/06/2020  # 1 Fluoroscopic guided lumbar medial branch blocks at [facility]: medial branch level: L3, L4, L5 Joint: Bilateral L4-5, L5-S1. - 100% relief. Previous surgery at this site: none              Allergies   Allergen Reactions    Iodine         I. V.                Current Outpatient Medications   Medication Sig Dispense Refill    memantine (NAMENDA) 10 MG tablet Take 1 tablet by mouth 2 times daily 180 tablet 0    apixaban (ELIQUIS) 2.5 MG TABS tablet TAKE 1 TABLET BY MOUTH  TWICE DAILY 180 tablet 0    donepezil (ARICEPT) 10 MG tablet Take 1 tablet by mouth nightly 90 tablet 0    furosemide (LASIX) 20 MG tablet TAKE 1 TABLET BY MOUTH 3  TIMES WEEKLY 36 tablet 0    citalopram (CELEXA) 10 MG tablet Take 1 tablet by mouth every morning 90 tablet 1    Compression Stockings MISC Wear during wake hours.  1 each 0    Glucosamine-Chondroit-Vit C-Mn (GLUCOSAMINE 1500 COMPLEX PO) Take by mouth        Cholecalciferol (VITAMIN D3) 2000 units CAPS Take by mouth        metoprolol tartrate (LOPRESSOR) 25 MG tablet Take 1 tablet by mouth 2 times daily 180 tablet 0    diclofenac sodium (VOLTAREN) 1 % GEL Apply 4 g topically 4 times daily as needed (pain) 480 g 5      No current facility-administered medications for this visit.               Past Medical History:   Diagnosis Date    Alzheimer disease ([address]      Arthritis      Atrial fibrillation (HCC)      GERD (gastroesophageal reflux disease)      Hyperlipidemia                 Past Surgical History:   Procedure Laterality Date    ABDOMEN SURGERY        ANESTHESIA NERVE BLOCK Bilateral 2/6/2020     BILATERAL MEDIAL BRANCH L4-5 AND L5-S1 JOINTS performed by Kyle Constantino DO at [facility], LAPAROSCOPIC N/A 11/21/2019     DIAGNOSITIC LAPAROSCOPY CONVERTED TO EXPLORATORY LAPAROTOMY WITH CLOSURE OF ENTEROTOMY X1, CHOLANGIOGRAM, EGD performed by Bonifacio Mayo MD at [address] [place] [address] Rd        ECHO COMPL W DOP COLOR FLOW   10/21/2013          [facility] INJECTION PROCEDURE FOR SACROILIAC JOINT Right 12/13/2018     RIGHT SACROILIAC JOINT STEROID INJECTION UNDER X-RAY GUIDANCE performed by Kyle Constantino DO at [facility] Left 1/17/2019     LEFT SACROILIAC JOINT STEROID INJECTION UNDER X-RAY GUIDANCE performed by Kyle Constantino DO at [facility] Bilateral 9/19/2019     BILATERAL SACROILIAC JOINT INJECTION X-RAY performed by Kyle Constantino DO at [address] [place] [address] Right 12/13/2018     sacroiliac joint     NERVE BLOCK Left 01/17/2019     sacroiliac joint injection     NERVE BLOCK Bilateral 02/06/2020     [place], , [address] [place] [address]   Board Certified Physical Medicine and Rehabilitation           The patient was counseled at length about the risks of sheila Covid-19 during their perioperative period and any recovery window from their procedure.  The patient was made aware that sheila Covid-19  may worsen their prognosis for recovering from their procedure  and lend to a higher morbidity and/or mortality risk.  All material risks, benefits, and reasonable alternatives including postponing the procedure were discussed.  The patient does wish to proceed with the procedure at this time.

## 2020-10-29 NOTE — OP NOTE
LUMBAR FACET JOINT INTRA-ARTICULAR INJECTION(S)      WITH FLUOROSCOPIC GUIDANCE     (Zygopophyseal Joint Injection)      Patient: Misty Rivers                                                    MRN: [id number]  : 1932                                              Date of procedure: 10/29/2020    Physician Performing Procedure: Christina Scott DO     Clinical Scenario: As per our office notes. Diagnosis:   1. Facet arthropathy        Injectate: A total of 4cc, consisting of 1cc of Kenalog (40mg/cc), the remainder comprised of 2% lidocaine without epinephrine. Levels Treated: Bilateral L4-5 and L5-S1 Facet Joints    Approach: Posterior oblique     Improvement after today's procedure: As per nursing record. Comments: None    Procedure: The patient was prepped and draped in a sterile fashion in the prone position after informed consent was signed and all patient questions were answered including the risks, benefits, alternative treatment options, and prognosis. The risks include but are not limited to infection, allergic reaction, nerve damage, paralysis, epidural hematoma, syncope, headache, pneumothorax, respiratory or cardiac arrest, and scar formation. The region overlying the above mentioned facet joints was localized under fluoroscopic visualization. A 3-4 cc. volume of 1% Lidocaine without Epinephrine was injected for local anesthesia. A 22 gauge, 3.5 inch spinal needle was inserted into each of the above mentioned facet joints. One cc of the steroid/anesthetic solution was then injected into each of the facet joints noted above. The patient tolerated the procedure well and was discharged after an appropriate period of observation. If there are any complications, the patient was instructed to call us. The patient is to folIow-up with the requesting physician within 4-6 weeks.

## 2020-11-12 ENCOUNTER — HOSPITAL ENCOUNTER (OUTPATIENT)
Dept: CT IMAGING | Age: 85
Discharge: HOME | End: 2020-11-12
Payer: MEDICARE

## 2020-11-12 ENCOUNTER — OFFICE VISIT (OUTPATIENT)
Dept: FAMILY MEDICINE CLINIC | Age: 85
End: 2020-11-12
Payer: MEDICARE

## 2020-11-12 ENCOUNTER — HOSPITAL ENCOUNTER (OUTPATIENT)
Age: 85
Discharge: HOME | End: 2020-11-12
Payer: MEDICARE

## 2020-11-12 VITALS
TEMPERATURE: 96.8 F | BODY MASS INDEX: 18 KG/M2 | RESPIRATION RATE: 16 BRPM | DIASTOLIC BLOOD PRESSURE: 68 MMHG | HEIGHT: 66 IN | OXYGEN SATURATION: 100 % | HEART RATE: 56 BPM | SYSTOLIC BLOOD PRESSURE: 102 MMHG | WEIGHT: 112 LBS

## 2020-11-12 LAB
ALBUMIN SERPL-MCNC: 4.3 G/DL (ref 3.5–5.2)
ALP BLD-CCNC: 100 U/L (ref 35–104)
ALT SERPL-CCNC: 13 U/L (ref 0–32)
ANION GAP SERPL CALCULATED.3IONS-SCNC: 6 MMOL/L (ref 7–16)
AST SERPL-CCNC: 22 U/L (ref 0–31)
BACTERIA: ABNORMAL /HPF
BASOPHILS ABSOLUTE: 0.03 E9/L (ref 0–0.2)
BASOPHILS RELATIVE PERCENT: 0.3 % (ref 0–2)
BILIRUB SERPL-MCNC: 0.4 MG/DL (ref 0–1.2)
BILIRUBIN URINE: NEGATIVE
BLOOD, URINE: NEGATIVE
BUN BLDV-MCNC: 22 MG/DL (ref 8–23)
CALCIUM SERPL-MCNC: 9.7 MG/DL (ref 8.6–10.2)
CHLORIDE BLD-SCNC: 102 MMOL/L (ref 98–107)
CLARITY: CLEAR
CO2: 32 MMOL/L (ref 22–29)
COLOR: YELLOW
CREAT SERPL-MCNC: 0.9 MG/DL (ref 0.5–1)
EOSINOPHILS ABSOLUTE: 0.19 E9/L (ref 0.05–0.5)
EOSINOPHILS RELATIVE PERCENT: 1.8 % (ref 0–6)
EPITHELIAL CELLS, UA: ABNORMAL /HPF
GFR AFRICAN AMERICAN: >60
GFR NON-AFRICAN AMERICAN: 59 ML/MIN/1.73
GLUCOSE BLD-MCNC: 103 MG/DL (ref 74–99)
GLUCOSE URINE: NEGATIVE MG/DL
HCT VFR BLD CALC: 44.6 % (ref 34–48)
HEMOGLOBIN: 14.4 G/DL (ref 11.5–15.5)
IMMATURE GRANULOCYTES #: 0.05 E9/L
IMMATURE GRANULOCYTES %: 0.5 % (ref 0–5)
KETONES, URINE: NEGATIVE MG/DL
LEUKOCYTE ESTERASE, URINE: ABNORMAL
LYMPHOCYTES ABSOLUTE: 2.72 E9/L (ref 1.5–4)
LYMPHOCYTES RELATIVE PERCENT: 25.9 % (ref 20–42)
MCH RBC QN AUTO: 31.6 PG (ref 26–35)
MCHC RBC AUTO-ENTMCNC: 32.3 % (ref 32–34.5)
MCV RBC AUTO: 97.8 FL (ref 80–99.9)
MONOCYTES ABSOLUTE: 0.84 E9/L (ref 0.1–0.95)
MONOCYTES RELATIVE PERCENT: 8 % (ref 2–12)
NEUTROPHILS ABSOLUTE: 6.66 E9/L (ref 1.8–7.3)
NEUTROPHILS RELATIVE PERCENT: 63.5 % (ref 43–80)
NITRITE, URINE: NEGATIVE
PDW BLD-RTO: 13.4 FL (ref 11.5–15)
PH UA: 7 (ref 5–9)
PLATELET # BLD: 321 E9/L (ref 130–450)
PMV BLD AUTO: 9.3 FL (ref 7–12)
POTASSIUM SERPL-SCNC: 4.7 MMOL/L (ref 3.5–5)
PROTEIN UA: NEGATIVE MG/DL
RBC # BLD: 4.56 E12/L (ref 3.5–5.5)
RBC UA: ABNORMAL /HPF (ref 0–2)
SODIUM BLD-SCNC: 140 MMOL/L (ref 132–146)
SPECIFIC GRAVITY UA: 1.01 (ref 1–1.03)
TOTAL PROTEIN: 7.4 G/DL (ref 6.4–8.3)
UROBILINOGEN, URINE: 0.2 E.U./DL
WBC # BLD: 10.5 E9/L (ref 4.5–11.5)
WBC UA: ABNORMAL /HPF (ref 0–5)

## 2020-11-12 PROCEDURE — G8427 DOCREV CUR MEDS BY ELIG CLIN: HCPCS | Performed by: INTERNAL MEDICINE

## 2020-11-12 PROCEDURE — 85025 COMPLETE CBC W/AUTO DIFF WBC: CPT

## 2020-11-12 PROCEDURE — 99213 OFFICE O/P EST LOW 20 MIN: CPT | Performed by: INTERNAL MEDICINE

## 2020-11-12 PROCEDURE — 74176 CT ABD & PELVIS W/O CONTRAST: CPT

## 2020-11-12 PROCEDURE — 6360000004 HC RX CONTRAST MEDICATION: Performed by: RADIOLOGY

## 2020-11-12 PROCEDURE — 80053 COMPREHEN METABOLIC PANEL: CPT

## 2020-11-12 PROCEDURE — G8484 FLU IMMUNIZE NO ADMIN: HCPCS | Performed by: INTERNAL MEDICINE

## 2020-11-12 PROCEDURE — 81001 URINALYSIS AUTO W/SCOPE: CPT

## 2020-11-12 PROCEDURE — 4040F PNEUMOC VAC/ADMIN/RCVD: CPT | Performed by: INTERNAL MEDICINE

## 2020-11-12 PROCEDURE — 1036F TOBACCO NON-USER: CPT | Performed by: INTERNAL MEDICINE

## 2020-11-12 PROCEDURE — 1090F PRES/ABSN URINE INCON ASSESS: CPT | Performed by: INTERNAL MEDICINE

## 2020-11-12 PROCEDURE — 36415 COLL VENOUS BLD VENIPUNCTURE: CPT

## 2020-11-12 PROCEDURE — G8419 CALC BMI OUT NRM PARAM NOF/U: HCPCS | Performed by: INTERNAL MEDICINE

## 2020-11-12 PROCEDURE — 1123F ACP DISCUSS/DSCN MKR DOCD: CPT | Performed by: INTERNAL MEDICINE

## 2020-11-12 RX ADMIN — IOHEXOL 50 ML: 240 INJECTION, SOLUTION INTRATHECAL; INTRAVASCULAR; INTRAVENOUS; ORAL at 14:38

## 2020-11-12 ASSESSMENT — ENCOUNTER SYMPTOMS
SINUS PAIN: 0
SHORTNESS OF BREATH: 0
SORE THROAT: 0
BLOOD IN STOOL: 0
EYE PAIN: 0
NAUSEA: 0
ABDOMINAL PAIN: 0
EYE DISCHARGE: 0

## 2020-11-12 NOTE — PROGRESS NOTES
Chief Complaint   Patient presents with    Abdominal Pain     Ongoing RUQ pain, patient had surgery by Dr. Martin Kennedy a year ago for a Cholecystectomy, they were unsuccessfull in removing the entire thing        HPI:  Patient is here for follow-up     Has pain in RUQ for few months  Not improving  No n/v/d  No fever or chills  No BM complaints  Appetite is low    Allergy and Medications are reviewed and updated. Past Medical History, Surgical History, and Family History has been reviewed and updated. Review of Systems:  Review of Systems   Constitutional: Negative for chills and fever. HENT: Negative for congestion, sinus pain and sore throat. Eyes: Negative for pain and discharge. Respiratory: Negative for shortness of breath (No new SOb). Cardiovascular: Negative for chest pain. Gastrointestinal: Negative for abdominal pain, blood in stool and nausea. Genitourinary: Negative for flank pain and frequency. Musculoskeletal: Negative for neck pain. Hematological: Does not bruise/bleed easily. Psychiatric/Behavioral: Negative for suicidal ideas. Vitals:    11/12/20 1102   BP: 102/68   Pulse: 56   Resp: 16   Temp: 96.8 °F (36 °C)   TempSrc: Temporal   SpO2: 100%   Weight: 112 lb (50.8 kg)   Height: 5' 6\" (1.676 m)       Physical Exam  Vitals signs reviewed. Constitutional:       Appearance: She is well-developed. HENT:      Head: Normocephalic and atraumatic. Mouth/Throat:      Pharynx: No oropharyngeal exudate. Eyes:      Conjunctiva/sclera: Conjunctivae normal.      Pupils: Pupils are equal, round, and reactive to light. Neck:      Vascular: No JVD. Cardiovascular:      Rate and Rhythm: Normal rate and regular rhythm. Pulmonary:      Effort: Pulmonary effort is normal.      Breath sounds: Normal breath sounds. No rales. Abdominal:      General: Bowel sounds are normal.      Palpations: Abdomen is soft. Musculoskeletal: Normal range of motion.    Lymphadenopathy: Cervical: No cervical adenopathy. Skin:     General: Skin is warm and dry. Neurological:      Mental Status: She is alert and oriented to person, place, and time.    Psychiatric:         Behavior: Behavior normal.          Labs :    Lab Results   Component Value Date    WBC 6.1 09/24/2020    HGB 12.4 09/24/2020    HCT 40.7 09/24/2020     09/24/2020    CHOL 177 07/18/2016    TRIG 80 07/18/2016    HDL 68 09/24/2020    ALT 9 09/24/2020    AST 20 09/24/2020     09/24/2020    K 4.5 09/24/2020     09/24/2020    CREATININE 1.0 09/24/2020    BUN 21 09/24/2020    CO2 23 09/24/2020    TSH 3.420 09/24/2020    GLUF 92 09/24/2020    LABA1C 5.7 (H) 03/01/2019     Lab Results   Component Value Date    COLORU Yellow 09/24/2020    NITRU Negative 09/24/2020    GLUCOSEU Negative 09/24/2020    KETUA Negative 09/24/2020    UROBILINOGEN 0.2 09/24/2020    BILIRUBINUR Negative 09/24/2020           ASSESSMENT     Patient Active Problem List    Diagnosis Date Noted    Lumbar spondylosis 02/06/2020    Calculus of gallbladder with acute cholecystitis without obstruction     Calculus of gallbladder with cholecystitis with biliary obstruction     Gallbladder disease     RUQ abdominal pain 11/20/2019    Calculus of gallbladder without cholecystitis without obstruction 11/20/2019    Syncope 11/20/2019    Depression 08/19/2019    Hyperlipidemia 08/19/2019    Sacroiliitis ([facility] [address] 11/26/2018    Arthritis 03/21/2018    Alzheimer's dementia without behavioral disturbance ([address] 02/21/2018    Vitamin B 12 deficiency 02/21/2018    Atrial fibrillation ([facility] 10/21/2013    Gastroesophageal reflux disease without esophagitis 10/21/2013        Diagnosis:     ICD-10-CM    1. RUQ abdominal pain  R10.11 Fior Larry MD, General Surgery, [facility]     CBC WITH AUTO DIFFERENTIAL     COMPREHENSIVE METABOLIC PANEL     Urinalysis with Microscopic     CT ABDOMEN PELVIS WO CONTRAST Additional Contrast? Oral     CANCELED: CT ABDOMEN PELVIS WO CONTRAST Additional Contrast? Oral       PLAN:        Will get Ct with oral - STAT  Ref to Dr Villa Hooks , cmp, UA    Low salt, Low Carb diet an low fat diet  Continue medications as advised and take them regularly      Discussed natural and expected course of this diagnosis and need to alert me if symptoms do not follow expected course, or if any worse. Smoking cessation if applicable, discussed with patient. There are no Patient Instructions on file for this visit. Return for As Scheduled earlier.

## 2020-11-19 ENCOUNTER — OFFICE VISIT (OUTPATIENT)
Dept: SURGERY | Age: 85
End: 2020-11-19
Payer: MEDICARE

## 2020-11-19 VITALS
TEMPERATURE: 97.2 F | WEIGHT: 112 LBS | HEART RATE: 69 BPM | SYSTOLIC BLOOD PRESSURE: 144 MMHG | HEIGHT: 66 IN | BODY MASS INDEX: 18 KG/M2 | DIASTOLIC BLOOD PRESSURE: 77 MMHG | OXYGEN SATURATION: 98 %

## 2020-11-19 PROCEDURE — G8419 CALC BMI OUT NRM PARAM NOF/U: HCPCS | Performed by: SURGERY

## 2020-11-19 PROCEDURE — 4040F PNEUMOC VAC/ADMIN/RCVD: CPT | Performed by: SURGERY

## 2020-11-19 PROCEDURE — 1036F TOBACCO NON-USER: CPT | Performed by: SURGERY

## 2020-11-19 PROCEDURE — G8484 FLU IMMUNIZE NO ADMIN: HCPCS | Performed by: SURGERY

## 2020-11-19 PROCEDURE — G8427 DOCREV CUR MEDS BY ELIG CLIN: HCPCS | Performed by: SURGERY

## 2020-11-19 PROCEDURE — 99213 OFFICE O/P EST LOW 20 MIN: CPT | Performed by: SURGERY

## 2020-11-19 PROCEDURE — 1123F ACP DISCUSS/DSCN MKR DOCD: CPT | Performed by: SURGERY

## 2020-11-19 PROCEDURE — 1090F PRES/ABSN URINE INCON ASSESS: CPT | Performed by: SURGERY

## 2020-11-19 RX ORDER — TRAMADOL HYDROCHLORIDE 50 MG/1
50 TABLET ORAL EVERY 6 HOURS PRN
Qty: 12 TABLET | Refills: 0 | Status: ON HOLD
Start: 2020-11-19 | End: 2020-11-23

## 2020-11-19 NOTE — PROGRESS NOTES
General Surgery History and Physical    Patient's Name/Date of Birth: [place] / 8/27/1932    Date: November 19, 2020     Surgeon: Kimo Hanna M.D.    PCP: Sandra Good MD     Chief Complaint: ruq pain     HPI:   [place] is a 80 y.o. female who presents for evaluation of ruq pain and I had attempted a lap treasure but due to a large amount of adhesions I was unable to safely complete and I aborted the case. She has subsequently developed much more severe pain and recent CT showed the same cholelithiasis without cholecytitis.  Timing is intermittent but getting more progressive, radiation to ruq, alleviated by nothing and started years ago and severity is 5-9/10      Past Medical History:   Diagnosis Date    Alzheimer disease ([facility] [address]     Arthritis     Atrial fibrillation ([facility] [address]     GERD (gastroesophageal reflux disease)     Hyperlipidemia        Past Surgical History:   Procedure Laterality Date    ABDOMEN SURGERY      ANESTHESIA NERVE BLOCK Bilateral 2/6/2020    BILATERAL MEDIAL BRANCH L4-5 AND L5-S1 JOINTS performed by Ann Russell DO at [address] [facility], LAPAROSCOPIC N/A 11/21/2019    DIAGNOSITIC LAPAROSCOPY CONVERTED TO EXPLORATORY LAPAROTOMY WITH CLOSURE OF ENTEROTOMY X1, CHOLANGIOGRAM, EGD performed by Alecia Cohen MD at [facility] ECHO COMPL [address]  10/21/2013         [place] INJECTION PROCEDURE FOR SACROILIAC JOINT Right 12/13/2018    RIGHT SACROILIAC JOINT STEROID INJECTION UNDER X-RAY GUIDANCE performed by Ann Russell DO at [facility] Left 1/17/2019    LEFT SACROILIAC JOINT STEROID INJECTION UNDER X-RAY GUIDANCE performed by Ann Russell DO at [facility] Bilateral 9/19/2019    BILATERAL SACROILIAC JOINT INJECTION X-RAY performed by Ann Russell DO at [facility]    HYSTERECTOMY      NERVE BLOCK Right 12/13/2018    sacroiliac joint     NERVE BLOCK Left 01/17/2019    sacroiliac joint injection     NERVE BLOCK Bilateral 02/06/2020    medial branch block    NERVE BLOCK Bilateral 10/29/2020    intra articular     NERVE BLOCK Bilateral 10/29/2020    BILATERAL L4-5 AND L5-S1 INTRA-ARTICULAR FACET STEROID INJECTION (CPT 18735,50094) performed by Apple Hutchison DO at [facility] N/A     d/t peptic ulcers       Current Outpatient Medications   Medication Sig Dispense Refill    traMADol (ULTRAM) 50 MG tablet Take 50 mg by mouth every 6 hours as needed for Pain.  furosemide (LASIX) 20 MG tablet TAKE 1 TABLET BY MOUTH 3  TIMES WEEKLY 36 tablet 1    citalopram (CELEXA) 10 MG tablet Take 1 tablet by mouth every morning 90 tablet 1    memantine (NAMENDA) 10 MG tablet Take 1 tablet by mouth 2 times daily 180 tablet 0    apixaban (ELIQUIS) 2.5 MG TABS tablet TAKE 1 TABLET BY MOUTH  TWICE DAILY 180 tablet 0    donepezil (ARICEPT) 10 MG tablet Take 1 tablet by mouth nightly 90 tablet 0    Compression Stockings MISC Wear during wake hours. 1 each 0    Glucosamine-Chondroit-Vit C-Mn (GLUCOSAMINE 1500 COMPLEX PO) Take by mouth      Cholecalciferol (VITAMIN D3) 2000 units CAPS Take by mouth      metoprolol tartrate (LOPRESSOR) 25 MG tablet Take 1 tablet by mouth 2 times daily 180 tablet 0    diclofenac sodium (VOLTAREN) 1 % GEL Apply 4 g topically 4 times daily as needed (pain) 480 g 5     No current facility-administered medications for this visit. Allergies   Allergen Reactions    Iodine      I.V. The patient has a family history that is negative for severe cardiovascular or respiratory issues, negative for reaction to anesthesia.     Social History     Socioeconomic History    Marital status:      Spouse name: Not on file    Number of children: 3    Years of education: Not on file    Highest education level: Not on file   Occupational History    Not on file   Social Needs    Financial resource strain: Not on file    Food insecurity     Worry: Not on file     Inability: Not on file    Transportation needs     Medical: Not on file     Non-medical: Not on file   Tobacco Use    Smoking status: Never Smoker    Smokeless tobacco: Never Used   Substance and Sexual Activity    Alcohol use: No     Comment: 2 cups of coffee a day     Drug use: No    Sexual activity: Not on file   Lifestyle    Physical activity     Days per week: Not on file     Minutes per session: Not on file    Stress: Not on file   Relationships    Social connections     Talks on phone: Not on file     Gets together: Not on file     Attends Jewish service: Not on file     Active member of club or organization: Not on file     Attends meetings of clubs or organizations: Not on file     Relationship status: Not on file    Intimate partner violence     Fear of current or ex partner: Not on file     Emotionally abused: Not on file     Physically abused: Not on file     Forced sexual activity: Not on file   Other Topics Concern    Not on file   Social History Narrative    Not on file           Review of Systems  Review of Systems -  General ROS: negative for - chills, fatigue or malaise  ENT ROS: negative for - hearing change, nasal congestion or nasal discharge  Allergy and Immunology ROS: negative for - hives, itchy/watery eyes or nasal congestion  Hematological and Lymphatic ROS: negative for - blood clots, blood transfusions, bruising or fatigue  Endocrine ROS: negative for - malaise/lethargy, mood swings, palpitations or polydipsia/polyuria  Respiratory ROS: negative for - sputum changes, stridor, tachypnea or wheezing  Cardiovascular ROS: negative for - irregular heartbeat, loss of consciousness, murmur or orthopnea  Gastrointestinal ROS: negative for - constipation, diarrhea, gas/bloating, heartburn or hematemesis  Genito-Urinary ROS: negative for -  genital discharge, genital ulcers or hematuria  Musculoskeletal ROS: negative for - gait disturbance, muscle pain or muscular weakness    Physical exam:   BP (!) 144/77   Pulse 69   Temp 97.2 °F (36.2 °C) (Temporal)   Ht 5' 6\" (1.676 m)   Wt 112 lb (50.8 kg)   SpO2 98%   BMI 18.08 kg/m²   General appearance:  NAD  Head: NCAT, PERRLA, EOMI, red conjunctiva  Neck: supple, no masses  Lungs: CTAB, equal chest rise bilateral  Heart: Reg rate  Abdomen: soft, minimally tender in RUQ, nondistended  Skin; no lesions  Gu: no cva tenderness  Extremities: extremities normal, atraumatic, no cyanosis or edema      Radiology:  CT abdomen/pelvis: cholelithiasis    Assessment:  80 y.o. female with symptomatic cholelithiasis and complicated surgical hx    Plan: Will refer to Dr. Harpreet Chester to evaluate for treatment.     Andreina Graham MD  8:49 AM  11/19/2020

## 2020-11-22 ENCOUNTER — HOSPITAL ENCOUNTER (INPATIENT)
Age: 85
LOS: 4 days | Discharge: HOME | DRG: 478 | End: 2020-11-26
Attending: FAMILY MEDICINE | Admitting: FAMILY MEDICINE
Payer: MEDICARE

## 2020-11-22 ENCOUNTER — HOSPITAL ENCOUNTER (EMERGENCY)
Age: 85
Discharge: ANOTHER HOSPITAL | End: 2020-11-22
Attending: EMERGENCY MEDICINE
Payer: MEDICARE

## 2020-11-22 VITALS
RESPIRATION RATE: 20 BRPM | HEART RATE: 77 BPM | SYSTOLIC BLOOD PRESSURE: 163 MMHG | BODY MASS INDEX: 17.75 KG/M2 | DIASTOLIC BLOOD PRESSURE: 78 MMHG | OXYGEN SATURATION: 95 % | TEMPERATURE: 97 F | WEIGHT: 110 LBS

## 2020-11-22 LAB
ALBUMIN SERPL-MCNC: 3.4 G/DL (ref 3.5–5.2)
ALP BLD-CCNC: 85 U/L (ref 35–104)
ALT SERPL-CCNC: 8 U/L (ref 0–32)
ANION GAP SERPL CALCULATED.3IONS-SCNC: 12 MMOL/L (ref 7–16)
AST SERPL-CCNC: 22 U/L (ref 0–31)
BASOPHILS ABSOLUTE: 0.02 E9/L (ref 0–0.2)
BASOPHILS RELATIVE PERCENT: 0.3 % (ref 0–2)
BILIRUB SERPL-MCNC: 0.2 MG/DL (ref 0–1.2)
BUN BLDV-MCNC: 27 MG/DL (ref 8–23)
CALCIUM SERPL-MCNC: 9.5 MG/DL (ref 8.6–10.2)
CHLORIDE BLD-SCNC: 106 MMOL/L (ref 98–107)
CO2: 23 MMOL/L (ref 22–29)
CREAT SERPL-MCNC: 0.9 MG/DL (ref 0.5–1)
EOSINOPHILS ABSOLUTE: 0.21 E9/L (ref 0.05–0.5)
EOSINOPHILS RELATIVE PERCENT: 3.2 % (ref 0–6)
GFR AFRICAN AMERICAN: >60
GFR NON-AFRICAN AMERICAN: 59 ML/MIN/1.73
GLUCOSE BLD-MCNC: 112 MG/DL (ref 74–99)
HCT VFR BLD CALC: 44 % (ref 34–48)
HEMOGLOBIN: 14.3 G/DL (ref 11.5–15.5)
IMMATURE GRANULOCYTES #: 0.03 E9/L
IMMATURE GRANULOCYTES %: 0.5 % (ref 0–5)
LACTIC ACID: 1.1 MMOL/L (ref 0.5–2.2)
LIPASE: 16 U/L (ref 13–60)
LYMPHOCYTES ABSOLUTE: 1.33 E9/L (ref 1.5–4)
LYMPHOCYTES RELATIVE PERCENT: 20 % (ref 20–42)
MCH RBC QN AUTO: 31.3 PG (ref 26–35)
MCHC RBC AUTO-ENTMCNC: 32.5 % (ref 32–34.5)
MCV RBC AUTO: 96.3 FL (ref 80–99.9)
MONOCYTES ABSOLUTE: 0.64 E9/L (ref 0.1–0.95)
MONOCYTES RELATIVE PERCENT: 9.6 % (ref 2–12)
NEUTROPHILS ABSOLUTE: 4.43 E9/L (ref 1.8–7.3)
NEUTROPHILS RELATIVE PERCENT: 66.4 % (ref 43–80)
PDW BLD-RTO: 13.5 FL (ref 11.5–15)
PLATELET # BLD: 308 E9/L (ref 130–450)
PMV BLD AUTO: 9.5 FL (ref 7–12)
POTASSIUM REFLEX MAGNESIUM: 4.7 MMOL/L (ref 3.5–5)
RBC # BLD: 4.57 E12/L (ref 3.5–5.5)
SODIUM BLD-SCNC: 141 MMOL/L (ref 132–146)
TOTAL PROTEIN: 6.7 G/DL (ref 6.4–8.3)
TROPONIN: <0.01 NG/ML (ref 0–0.03)
WBC # BLD: 6.7 E9/L (ref 4.5–11.5)

## 2020-11-22 PROCEDURE — 85025 COMPLETE CBC W/AUTO DIFF WBC: CPT

## 2020-11-22 PROCEDURE — 87040 BLOOD CULTURE FOR BACTERIA: CPT

## 2020-11-22 PROCEDURE — 83605 ASSAY OF LACTIC ACID: CPT

## 2020-11-22 PROCEDURE — 80053 COMPREHEN METABOLIC PANEL: CPT

## 2020-11-22 PROCEDURE — 84484 ASSAY OF TROPONIN QUANT: CPT

## 2020-11-22 PROCEDURE — 1200000000 HC SEMI PRIVATE

## 2020-11-22 PROCEDURE — 96374 THER/PROPH/DIAG INJ IV PUSH: CPT

## 2020-11-22 PROCEDURE — 99285 EMERGENCY DEPT VISIT HI MDM: CPT

## 2020-11-22 PROCEDURE — 6360000002 HC RX W HCPCS: Performed by: EMERGENCY MEDICINE

## 2020-11-22 PROCEDURE — 96375 TX/PRO/DX INJ NEW DRUG ADDON: CPT

## 2020-11-22 PROCEDURE — 83690 ASSAY OF LIPASE: CPT

## 2020-11-22 PROCEDURE — 2580000003 HC RX 258

## 2020-11-22 RX ORDER — 0.9 % SODIUM CHLORIDE 0.9 %
500 INTRAVENOUS SOLUTION INTRAVENOUS ONCE
Status: COMPLETED | OUTPATIENT
Start: 2020-11-22 | End: 2020-11-22

## 2020-11-22 RX ORDER — ONDANSETRON 2 MG/ML
4 INJECTION INTRAMUSCULAR; INTRAVENOUS ONCE
Status: COMPLETED | OUTPATIENT
Start: 2020-11-22 | End: 2020-11-22

## 2020-11-22 RX ORDER — FENTANYL CITRATE 50 UG/ML
50 INJECTION, SOLUTION INTRAMUSCULAR; INTRAVENOUS ONCE
Status: COMPLETED | OUTPATIENT
Start: 2020-11-22 | End: 2020-11-22

## 2020-11-22 RX ORDER — SODIUM CHLORIDE 9 MG/ML
INJECTION, SOLUTION INTRAVENOUS
Status: COMPLETED
Start: 2020-11-22 | End: 2020-11-22

## 2020-11-22 RX ADMIN — ONDANSETRON 4 MG: 2 INJECTION INTRAMUSCULAR; INTRAVENOUS at 18:32

## 2020-11-22 RX ADMIN — Medication: at 18:33

## 2020-11-22 RX ADMIN — SODIUM CHLORIDE: 9 INJECTION, SOLUTION INTRAVENOUS at 18:33

## 2020-11-22 RX ADMIN — FENTANYL CITRATE 50 MCG: 50 INJECTION, SOLUTION INTRAMUSCULAR; INTRAVENOUS at 18:32

## 2020-11-22 ASSESSMENT — PAIN DESCRIPTION - PAIN TYPE
TYPE: ACUTE PAIN
TYPE: ACUTE PAIN

## 2020-11-22 ASSESSMENT — PAIN DESCRIPTION - ORIENTATION: ORIENTATION: RIGHT

## 2020-11-22 ASSESSMENT — PAIN DESCRIPTION - LOCATION
LOCATION: ABDOMEN
LOCATION: ABDOMEN

## 2020-11-22 ASSESSMENT — ENCOUNTER SYMPTOMS
SHORTNESS OF BREATH: 0
COUGH: 0
ABDOMINAL PAIN: 1
BACK PAIN: 0

## 2020-11-22 ASSESSMENT — PAIN SCALES - GENERAL
PAINLEVEL_OUTOF10: 9
PAINLEVEL_OUTOF10: 9
PAINLEVEL_OUTOF10: 7

## 2020-11-22 ASSESSMENT — PAIN DESCRIPTION - FREQUENCY: FREQUENCY: CONTINUOUS

## 2020-11-22 NOTE — ED PROVIDER NOTES
This is an 80year old female with a PMH of Atrial Fibrillation and GERD who presents to the ED for evaluation of abdominal pain. Patient was diagnosed with choleycstitis and was seen by her surgeon. The patient was taken to surgery earlier in the month to have her gallbladder removed. The patient while she was having the prodecure peformed was unable to it performed as she had too many adhesions. The patient was referred to Dr. Alfreda Mo for further evaluation. The patient returns to the ED stating that her pain is severe and she has nausea. She states that she has been unable to tolerate PO intake for the past 3 days which prompted her return. The patient has no diarrhea, no fevers and no chills. The history is provided by the patient. No  was used. Review of Systems   Constitutional: Positive for appetite change. Negative for fever. HENT: Negative for congestion. Eyes: Negative for visual disturbance. Respiratory: Negative for cough and shortness of breath. Cardiovascular: Negative for chest pain. Gastrointestinal: Positive for abdominal pain. Endocrine: Negative for polyuria. Genitourinary: Negative for dysuria. Musculoskeletal: Negative for back pain. Skin: Negative for rash. Neurological: Negative for headaches. Hematological: Does not bruise/bleed easily. Psychiatric/Behavioral: Negative for confusion. Physical Exam  Vitals signs and nursing note reviewed. Constitutional:       General: She is not in acute distress. Appearance: She is well-developed. HENT:      Head: Normocephalic and atraumatic. Mouth/Throat:      Mouth: Mucous membranes are dry. Eyes:      Extraocular Movements: Extraocular movements intact. Pupils: Pupils are equal, round, and reactive to light. Neck:      Musculoskeletal: Normal range of motion and neck supple. Vascular: No JVD.    Cardiovascular:      Rate and Rhythm: Normal rate and regular who agreed to accept patient for Dr. Marge Lima    [BP]      ED Course User Index  [BP] Caroline Vega, DO       --------------------------------------------- PAST HISTORY ---------------------------------------------  Past Medical History:  has a past medical history of Alzheimer disease ([facility] [address] Arthritis, [facility] GERD (gastroesophageal reflux disease), and Hyperlipidemia. Past Surgical History:  has a past surgical history that includes Abdomen surgery; Appendectomy; Colonoscopy; Hysterectomy; ECHO Compl W Dop Color Flow (10/21/2013); Nerve Block (Right, 12/13/2018); Injection Procedure For Sacroiliac Joint (Right, 12/13/2018); Nerve Block (Left, 01/17/2019); Injection Procedure For Sacroiliac Joint (Left, 1/17/2019); Injection Procedure For Sacroiliac Joint (Bilateral, 9/19/2019); Stomach surgery (N/A); Cholecystectomy, laparoscopic (N/A, 11/21/2019); Nerve Block (Bilateral, 02/06/2020); Anesthesia Nerve Block (Bilateral, 2/6/2020); Nerve Block (Bilateral, 10/29/2020); and Nerve Block (Bilateral, 10/29/2020). Social History:  reports that she has never smoked. She has never used smokeless tobacco. She reports that she does not drink alcohol or use drugs. Family History: family history includes Heart Disease in her father and mother. The patients home medications have been reviewed.     Allergies: Iodine    -------------------------------------------------- RESULTS -------------------------------------------------    LABS:  Results for orders placed or performed during the hospital encounter of 11/22/20   Comprehensive Metabolic Panel w/ Reflex to MG   Result Value Ref Range    Sodium 141 132 - 146 mmol/L    Potassium reflex Magnesium 4.7 3.5 - 5.0 mmol/L    Chloride 106 98 - 107 mmol/L    CO2 23 22 - 29 mmol/L    Anion Gap 12 7 - 16 mmol/L    Glucose 112 (H) 74 - 99 mg/dL    BUN 27 (H) 8 - 23 mg/dL    CREATININE 0.9 0.5 - 1.0 mg/dL    GFR Non-African American 59 >=60 mL/min/1.73 GFR African American >60     Calcium 9.5 8.6 - 10.2 mg/dL    Total Protein 6.7 6.4 - 8.3 g/dL    Alb 3.4 (L) 3.5 - 5.2 g/dL    Total Bilirubin 0.2 0.0 - 1.2 mg/dL    Alkaline Phosphatase 85 35 - 104 U/L    ALT 8 0 - 32 U/L    AST 22 0 - 31 U/L   CBC Auto Differential   Result Value Ref Range    WBC 6.7 4.5 - 11.5 E9/L    RBC 4.57 3.50 - 5.50 E12/L    Hemoglobin 14.3 11.5 - 15.5 g/dL    Hematocrit 44.0 34.0 - 48.0 %    MCV 96.3 80.0 - 99.9 fL    MCH 31.3 26.0 - 35.0 pg    MCHC 32.5 32.0 - 34.5 %    RDW 13.5 11.5 - 15.0 fL    Platelets 058 294 - 919 E9/L    MPV 9.5 7.0 - 12.0 fL    Neutrophils % 66.4 43.0 - 80.0 %    Immature Granulocytes % 0.5 0.0 - 5.0 %    Lymphocytes % 20.0 20.0 - 42.0 %    Monocytes % 9.6 2.0 - 12.0 %    Eosinophils % 3.2 0.0 - 6.0 %    Basophils % 0.3 0.0 - 2.0 %    Neutrophils Absolute 4.43 1.80 - 7.30 E9/L    Immature Granulocytes # 0.03 E9/L    Lymphocytes Absolute 1.33 (L) 1.50 - 4.00 E9/L    Monocytes Absolute 0.64 0.10 - 0.95 E9/L    Eosinophils Absolute 0.21 0.05 - 0.50 E9/L    Basophils Absolute 0.02 0.00 - 0.20 E9/L   Troponin   Result Value Ref Range    Troponin <0.01 0.00 - 0.03 ng/mL   Lipase   Result Value Ref Range    Lipase 16 13 - 60 U/L   Lactic Acid, Plasma   Result Value Ref Range    Lactic Acid 1.1 0.5 - 2.2 mmol/L       RADIOLOGY:  No orders to display           ------------------------- NURSING NOTES AND VITALS REVIEWED ---------------------------  Date / Time Roomed:  11/22/2020  5:48 PM  ED Bed Assignment:  22/22    The nursing notes within the ED encounter and vital signs as below have been reviewed.      Patient Vitals for the past 24 hrs:   BP Temp Temp src Pulse Resp SpO2 Weight   11/22/20 2140 (!) 163/78 -- -- 77 20 95 % --   11/22/20 1832 -- -- -- -- -- -- 110 lb (49.9 kg)   11/22/20 1744 (!) 191/85 -- -- 60 20 95 % --   11/22/20 1742 -- 97 °F (36.1 °C) Infrared -- -- -- --       Oxygen Saturation Interpretation: Normal    ------------------------------------------ PROGRESS NOTES ------------------------------------------  Re-evaluation(s):  Time: 26  Patients symptoms are improving  Repeat physical examination is improved    Counseling:  I have spoken with the patient and discussed todays results, in addition to providing specific details for the plan of care and counseling regarding the diagnosis and prognosis. Their questions are answered at this time and they are agreeable with the plan of admission.    --------------------------------- ADDITIONAL PROVIDER NOTES ---------------------------------  Consultations:  Spoke with Dr. Ute Galvin  Discussed case. They will admit the patient. This patient's ED course included: a personal history and physicial examination, re-evaluation prior to disposition, multiple bedside re-evaluations, IV medications, cardiac monitoring, continuous pulse oximetry and complex medical decision making and emergency management    This patient has remained hemodynamically stable during their ED course. Diagnosis:  1. Cholecystitis        Disposition:  Patient's disposition: Admit to med/surg floor  Patient's condition is stable.           Manuel Rogers DO  11/23/20 0334

## 2020-11-23 ENCOUNTER — APPOINTMENT (OUTPATIENT)
Dept: MRI IMAGING | Age: 85
DRG: 478 | End: 2020-11-23
Attending: FAMILY MEDICINE
Payer: MEDICARE

## 2020-11-23 ENCOUNTER — APPOINTMENT (OUTPATIENT)
Dept: GENERAL RADIOLOGY | Age: 85
DRG: 478 | End: 2020-11-23
Attending: FAMILY MEDICINE
Payer: MEDICARE

## 2020-11-23 PROBLEM — M25.551 BILATERAL HIP PAIN: Status: ACTIVE | Noted: 2020-11-23

## 2020-11-23 PROBLEM — M25.562 PAIN IN BOTH KNEES: Status: ACTIVE | Noted: 2020-11-23

## 2020-11-23 PROBLEM — S32.020A CLOSED COMPRESSION FRACTURE OF L2 LUMBAR VERTEBRA, INITIAL ENCOUNTER (HCC): Status: ACTIVE | Noted: 2020-11-23

## 2020-11-23 PROBLEM — I16.0 HYPERTENSIVE URGENCY: Status: ACTIVE | Noted: 2020-11-23

## 2020-11-23 PROBLEM — R26.2 AMBULATORY DYSFUNCTION: Status: ACTIVE | Noted: 2020-11-23

## 2020-11-23 PROBLEM — M25.552 BILATERAL HIP PAIN: Status: ACTIVE | Noted: 2020-11-23

## 2020-11-23 PROBLEM — M25.561 PAIN IN BOTH KNEES: Status: ACTIVE | Noted: 2020-11-23

## 2020-11-23 PROBLEM — M54.50 MIDLINE LOW BACK PAIN: Status: ACTIVE | Noted: 2020-11-23

## 2020-11-23 LAB
ALBUMIN SERPL-MCNC: 3.4 G/DL (ref 3.5–5.2)
ALP BLD-CCNC: 69 U/L (ref 35–104)
ALT SERPL-CCNC: 6 U/L (ref 0–32)
ANION GAP SERPL CALCULATED.3IONS-SCNC: 12 MMOL/L (ref 7–16)
AST SERPL-CCNC: 15 U/L (ref 0–31)
BASOPHILS ABSOLUTE: 0.02 E9/L (ref 0–0.2)
BASOPHILS RELATIVE PERCENT: 0.3 % (ref 0–2)
BILIRUB SERPL-MCNC: <0.2 MG/DL (ref 0–1.2)
BUN BLDV-MCNC: 21 MG/DL (ref 8–23)
CALCIUM SERPL-MCNC: 9 MG/DL (ref 8.6–10.2)
CHLORIDE BLD-SCNC: 107 MMOL/L (ref 98–107)
CO2: 24 MMOL/L (ref 22–29)
CREAT SERPL-MCNC: 0.7 MG/DL (ref 0.5–1)
EKG ATRIAL RATE: 85 BPM
EKG P-R INTERVAL: 232 MS
EKG Q-T INTERVAL: 436 MS
EKG QRS DURATION: 78 MS
EKG QTC CALCULATION (BAZETT): 473 MS
EKG R AXIS: 9 DEGREES
EKG T AXIS: 4 DEGREES
EKG VENTRICULAR RATE: 71 BPM
EOSINOPHILS ABSOLUTE: 0.18 E9/L (ref 0.05–0.5)
EOSINOPHILS RELATIVE PERCENT: 2.6 % (ref 0–6)
GFR AFRICAN AMERICAN: >60
GFR NON-AFRICAN AMERICAN: >60 ML/MIN/1.73
GLUCOSE BLD-MCNC: 102 MG/DL (ref 74–99)
HCT VFR BLD CALC: 39.6 % (ref 34–48)
HEMOGLOBIN: 12.6 G/DL (ref 11.5–15.5)
IMMATURE GRANULOCYTES #: 0.03 E9/L
IMMATURE GRANULOCYTES %: 0.4 % (ref 0–5)
INR BLD: 1.1
LYMPHOCYTES ABSOLUTE: 1.33 E9/L (ref 1.5–4)
LYMPHOCYTES RELATIVE PERCENT: 19.2 % (ref 20–42)
MCH RBC QN AUTO: 31.1 PG (ref 26–35)
MCHC RBC AUTO-ENTMCNC: 31.8 % (ref 32–34.5)
MCV RBC AUTO: 97.8 FL (ref 80–99.9)
MONOCYTES ABSOLUTE: 0.61 E9/L (ref 0.1–0.95)
MONOCYTES RELATIVE PERCENT: 8.8 % (ref 2–12)
NEUTROPHILS ABSOLUTE: 4.76 E9/L (ref 1.8–7.3)
NEUTROPHILS RELATIVE PERCENT: 68.7 % (ref 43–80)
PDW BLD-RTO: 13.4 FL (ref 11.5–15)
PLATELET # BLD: 298 E9/L (ref 130–450)
PMV BLD AUTO: 9.9 FL (ref 7–12)
POTASSIUM REFLEX MAGNESIUM: 3.9 MMOL/L (ref 3.5–5)
PROTHROMBIN TIME: 12.3 SEC (ref 9.3–12.4)
RBC # BLD: 4.05 E12/L (ref 3.5–5.5)
SODIUM BLD-SCNC: 143 MMOL/L (ref 132–146)
TOTAL PROTEIN: 5.9 G/DL (ref 6.4–8.3)
WBC # BLD: 6.9 E9/L (ref 4.5–11.5)

## 2020-11-23 PROCEDURE — 2580000003 HC RX 258: Performed by: FAMILY MEDICINE

## 2020-11-23 PROCEDURE — 72148 MRI LUMBAR SPINE W/O DYE: CPT

## 2020-11-23 PROCEDURE — 93010 ELECTROCARDIOGRAM REPORT: CPT | Performed by: INTERNAL MEDICINE

## 2020-11-23 PROCEDURE — 2580000003 HC RX 258: Performed by: STUDENT IN AN ORGANIZED HEALTH CARE EDUCATION/TRAINING PROGRAM

## 2020-11-23 PROCEDURE — 96375 TX/PRO/DX INJ NEW DRUG ADDON: CPT

## 2020-11-23 PROCEDURE — 85025 COMPLETE CBC W/AUTO DIFF WBC: CPT

## 2020-11-23 PROCEDURE — 85610 PROTHROMBIN TIME: CPT

## 2020-11-23 PROCEDURE — 1200000000 HC SEMI PRIVATE

## 2020-11-23 PROCEDURE — 6370000000 HC RX 637 (ALT 250 FOR IP): Performed by: FAMILY MEDICINE

## 2020-11-23 PROCEDURE — 36415 COLL VENOUS BLD VENIPUNCTURE: CPT

## 2020-11-23 PROCEDURE — 6360000002 HC RX W HCPCS: Performed by: STUDENT IN AN ORGANIZED HEALTH CARE EDUCATION/TRAINING PROGRAM

## 2020-11-23 PROCEDURE — 93005 ELECTROCARDIOGRAM TRACING: CPT | Performed by: FAMILY MEDICINE

## 2020-11-23 PROCEDURE — 80053 COMPREHEN METABOLIC PANEL: CPT

## 2020-11-23 PROCEDURE — 99222 1ST HOSP IP/OBS MODERATE 55: CPT | Performed by: SURGERY

## 2020-11-23 PROCEDURE — 71045 X-RAY EXAM CHEST 1 VIEW: CPT

## 2020-11-23 PROCEDURE — 6360000002 HC RX W HCPCS: Performed by: CLINICAL NURSE SPECIALIST

## 2020-11-23 PROCEDURE — 2500000003 HC RX 250 WO HCPCS: Performed by: STUDENT IN AN ORGANIZED HEALTH CARE EDUCATION/TRAINING PROGRAM

## 2020-11-23 PROCEDURE — 96365 THER/PROPH/DIAG IV INF INIT: CPT

## 2020-11-23 PROCEDURE — 84145 PROCALCITONIN (PCT): CPT

## 2020-11-23 PROCEDURE — 96366 THER/PROPH/DIAG IV INF ADDON: CPT

## 2020-11-23 RX ORDER — DONEPEZIL HYDROCHLORIDE 5 MG/1
10 TABLET, FILM COATED ORAL NIGHTLY
Status: DISCONTINUED | OUTPATIENT
Start: 2020-11-23 | End: 2020-11-26 | Stop reason: HOSPADM

## 2020-11-23 RX ORDER — ACETAMINOPHEN 325 MG/1
650 TABLET ORAL EVERY 6 HOURS PRN
Status: DISCONTINUED | OUTPATIENT
Start: 2020-11-23 | End: 2020-11-26 | Stop reason: HOSPADM

## 2020-11-23 RX ORDER — SODIUM CHLORIDE 9 MG/ML
INJECTION, SOLUTION INTRAVENOUS CONTINUOUS
Status: ACTIVE | OUTPATIENT
Start: 2020-11-23 | End: 2020-11-23

## 2020-11-23 RX ORDER — ONDANSETRON 2 MG/ML
4 INJECTION INTRAMUSCULAR; INTRAVENOUS EVERY 6 HOURS PRN
Status: DISCONTINUED | OUTPATIENT
Start: 2020-11-23 | End: 2020-11-26 | Stop reason: HOSPADM

## 2020-11-23 RX ORDER — PROMETHAZINE HYDROCHLORIDE 25 MG/1
12.5 TABLET ORAL EVERY 6 HOURS PRN
Status: DISCONTINUED | OUTPATIENT
Start: 2020-11-23 | End: 2020-11-26 | Stop reason: HOSPADM

## 2020-11-23 RX ORDER — CITALOPRAM 20 MG/1
10 TABLET ORAL EVERY MORNING
Status: DISCONTINUED | OUTPATIENT
Start: 2020-11-23 | End: 2020-11-26 | Stop reason: HOSPADM

## 2020-11-23 RX ORDER — HYDRALAZINE HYDROCHLORIDE 20 MG/ML
5 INJECTION INTRAMUSCULAR; INTRAVENOUS EVERY 6 HOURS PRN
Status: DISCONTINUED | OUTPATIENT
Start: 2020-11-23 | End: 2020-11-23

## 2020-11-23 RX ORDER — POLYETHYLENE GLYCOL 3350 17 G/17G
17 POWDER, FOR SOLUTION ORAL DAILY PRN
Status: DISCONTINUED | OUTPATIENT
Start: 2020-11-23 | End: 2020-11-26 | Stop reason: HOSPADM

## 2020-11-23 RX ORDER — HYDRALAZINE HYDROCHLORIDE 20 MG/ML
5 INJECTION INTRAMUSCULAR; INTRAVENOUS EVERY 6 HOURS PRN
Status: DISCONTINUED | OUTPATIENT
Start: 2020-11-23 | End: 2020-11-26 | Stop reason: HOSPADM

## 2020-11-23 RX ORDER — LANOLIN ALCOHOL/MO/W.PET/CERES
3 CREAM (GRAM) TOPICAL NIGHTLY PRN
Status: DISCONTINUED | OUTPATIENT
Start: 2020-11-23 | End: 2020-11-26 | Stop reason: HOSPADM

## 2020-11-23 RX ORDER — SODIUM CHLORIDE 0.9 % (FLUSH) 0.9 %
10 SYRINGE (ML) INJECTION PRN
Status: DISCONTINUED | OUTPATIENT
Start: 2020-11-23 | End: 2020-11-26 | Stop reason: HOSPADM

## 2020-11-23 RX ORDER — SODIUM CHLORIDE 9 MG/ML
INJECTION, SOLUTION INTRAVENOUS CONTINUOUS
Status: DISCONTINUED | OUTPATIENT
Start: 2020-11-23 | End: 2020-11-23

## 2020-11-23 RX ORDER — ACETAMINOPHEN 650 MG/1
650 SUPPOSITORY RECTAL EVERY 6 HOURS PRN
Status: DISCONTINUED | OUTPATIENT
Start: 2020-11-23 | End: 2020-11-26 | Stop reason: HOSPADM

## 2020-11-23 RX ORDER — SODIUM CHLORIDE 0.9 % (FLUSH) 0.9 %
10 SYRINGE (ML) INJECTION EVERY 12 HOURS SCHEDULED
Status: DISCONTINUED | OUTPATIENT
Start: 2020-11-23 | End: 2020-11-26 | Stop reason: HOSPADM

## 2020-11-23 RX ORDER — MEMANTINE HYDROCHLORIDE 10 MG/1
10 TABLET ORAL 2 TIMES DAILY
Status: DISCONTINUED | OUTPATIENT
Start: 2020-11-23 | End: 2020-11-26 | Stop reason: HOSPADM

## 2020-11-23 RX ORDER — TRAMADOL HYDROCHLORIDE 50 MG/1
50 TABLET ORAL EVERY 6 HOURS PRN
Status: DISCONTINUED | OUTPATIENT
Start: 2020-11-23 | End: 2020-11-26 | Stop reason: HOSPADM

## 2020-11-23 RX ADMIN — METOPROLOL TARTRATE 25 MG: 25 TABLET, FILM COATED ORAL at 20:37

## 2020-11-23 RX ADMIN — HYDRALAZINE HYDROCHLORIDE 5 MG: 20 INJECTION, SOLUTION INTRAMUSCULAR; INTRAVENOUS at 13:04

## 2020-11-23 RX ADMIN — MEMANTINE HYDROCHLORIDE 10 MG: 10 TABLET, FILM COATED ORAL at 20:38

## 2020-11-23 RX ADMIN — DONEPEZIL HYDROCHLORIDE 10 MG: 5 TABLET, FILM COATED ORAL at 01:06

## 2020-11-23 RX ADMIN — METRONIDAZOLE 500 MG: 500 INJECTION, SOLUTION INTRAVENOUS at 18:47

## 2020-11-23 RX ADMIN — DONEPEZIL HYDROCHLORIDE 10 MG: 5 TABLET, FILM COATED ORAL at 20:37

## 2020-11-23 RX ADMIN — CEFTRIAXONE SODIUM 1 G: 1 INJECTION, POWDER, FOR SOLUTION INTRAMUSCULAR; INTRAVENOUS at 01:07

## 2020-11-23 RX ADMIN — SODIUM CHLORIDE: 9 INJECTION, SOLUTION INTRAVENOUS at 01:07

## 2020-11-23 RX ADMIN — MEMANTINE HYDROCHLORIDE 10 MG: 10 TABLET, FILM COATED ORAL at 01:06

## 2020-11-23 RX ADMIN — METRONIDAZOLE 500 MG: 500 INJECTION, SOLUTION INTRAVENOUS at 01:07

## 2020-11-23 RX ADMIN — MEMANTINE HYDROCHLORIDE 10 MG: 10 TABLET, FILM COATED ORAL at 09:41

## 2020-11-23 RX ADMIN — METOPROLOL TARTRATE 25 MG: 25 TABLET, FILM COATED ORAL at 09:41

## 2020-11-23 RX ADMIN — METRONIDAZOLE 500 MG: 500 INJECTION, SOLUTION INTRAVENOUS at 09:41

## 2020-11-23 RX ADMIN — TRAMADOL HYDROCHLORIDE 50 MG: 50 TABLET ORAL at 01:06

## 2020-11-23 RX ADMIN — ACETAMINOPHEN 650 MG: 325 TABLET ORAL at 20:37

## 2020-11-23 RX ADMIN — ACETAMINOPHEN 650 MG: 325 TABLET ORAL at 13:03

## 2020-11-23 RX ADMIN — Medication 3 MG: at 01:06

## 2020-11-23 RX ADMIN — CITALOPRAM 10 MG: 20 TABLET, FILM COATED ORAL at 09:42

## 2020-11-23 RX ADMIN — METOPROLOL TARTRATE 25 MG: 25 TABLET, FILM COATED ORAL at 01:07

## 2020-11-23 RX ADMIN — SODIUM CHLORIDE, PRESERVATIVE FREE 10 ML: 5 INJECTION INTRAVENOUS at 20:38

## 2020-11-23 ASSESSMENT — PAIN DESCRIPTION - DESCRIPTORS
DESCRIPTORS: ACHING;CRAMPING;TENDER
DESCRIPTORS: ACHING;CRAMPING;TENDER
DESCRIPTORS: SORE;DISCOMFORT;DULL

## 2020-11-23 ASSESSMENT — PAIN DESCRIPTION - ORIENTATION
ORIENTATION: RIGHT
ORIENTATION: MID
ORIENTATION: RIGHT

## 2020-11-23 ASSESSMENT — PAIN DESCRIPTION - PAIN TYPE
TYPE: ACUTE PAIN

## 2020-11-23 ASSESSMENT — PAIN DESCRIPTION - FREQUENCY
FREQUENCY: INTERMITTENT
FREQUENCY: INTERMITTENT

## 2020-11-23 ASSESSMENT — PAIN SCALES - GENERAL
PAINLEVEL_OUTOF10: 7
PAINLEVEL_OUTOF10: 0
PAINLEVEL_OUTOF10: 6
PAINLEVEL_OUTOF10: 4
PAINLEVEL_OUTOF10: 0
PAINLEVEL_OUTOF10: 4

## 2020-11-23 ASSESSMENT — PAIN DESCRIPTION - PROGRESSION: CLINICAL_PROGRESSION: NOT CHANGED

## 2020-11-23 ASSESSMENT — PAIN DESCRIPTION - LOCATION
LOCATION: ABDOMEN

## 2020-11-23 ASSESSMENT — PAIN - FUNCTIONAL ASSESSMENT: PAIN_FUNCTIONAL_ASSESSMENT: PREVENTS OR INTERFERES SOME ACTIVE ACTIVITIES AND ADLS

## 2020-11-23 ASSESSMENT — PAIN DESCRIPTION - ONSET: ONSET: ON-GOING

## 2020-11-23 NOTE — PROGRESS NOTES
OT consult received and appreciated. Chart reviewed. Will hold evaluation due to NS recommendations pending . Will evaluate at a later time. Thank you.    Jethro Paredes, OTR/L #[id number]

## 2020-11-23 NOTE — CONSULTS
SACROILIAC JOINT STEROID INJECTION UNDER X-RAY GUIDANCE performed by Lloyd Landis DO at [address] [place] [address] [address] JOINT Bilateral 9/19/2019    BILATERAL SACROILIAC JOINT INJECTION X-RAY performed by Lloyd Landis DO at [facility] Right 12/13/2018    sacroiliac joint     NERVE BLOCK Left 01/17/2019    sacroiliac joint injection     NERVE BLOCK Bilateral 02/06/2020    medial branch block    NERVE BLOCK Bilateral 10/29/2020    intra articular     NERVE BLOCK Bilateral 10/29/2020    BILATERAL L4-5 AND L5-S1 INTRA-ARTICULAR FACET STEROID INJECTION (CPT 74641,58376) performed by Lloyd Landis DO at [facility] N/A     d/t peptic ulcers       Medications Prior to Admission:    Prior to Admission medications    Medication Sig Start Date End Date Taking? Authorizing Provider   furosemide (LASIX) 20 MG tablet TAKE 1 TABLET BY MOUTH 3  TIMES WEEKLY 10/22/20  Yes Martha Gerber MD   citalopram (CELEXA) 10 MG tablet Take 1 tablet by mouth every morning 10/22/20  Yes Martha Gerber MD   memantine (NAMENDA) 10 MG tablet Take 1 tablet by mouth 2 times daily 9/28/20  Yes Martha Gerber MD   apixaban (ELIQUIS) 2.5 MG TABS tablet TAKE 1 TABLET BY MOUTH  TWICE DAILY 9/28/20  Yes Martha Gerber MD   donepezil (ARICEPT) 10 MG tablet Take 1 tablet by mouth nightly 8/11/20  Yes Liborio Leavitt MD   metoprolol tartrate (LOPRESSOR) 25 MG tablet Take 1 tablet by mouth 2 times daily 6/22/20 11/22/20 Yes Anita River MD   Glucosamine-Chondroit-Vit C-Mn (GLUCOSAMINE 1500 COMPLEX PO) Take by mouth   Yes Historical Provider, MD   Cholecalciferol (VITAMIN D3) 2000 units CAPS Take by mouth   Yes Historical Provider, MD   traMADol (ULTRAM) 50 MG tablet Take 50 mg by mouth every 6 hours as needed for Pain. Historical Provider, MD   Compression Stockings MISC Wear during wake hours.  3/11/20 Margaret Jacinto MD   diclofenac sodium (VOLTAREN) 1 % GEL Apply 4 g topically 4 times daily as needed (pain) 9/11/19 11/12/20  Liset Segal-Black, DO       Allergies   Allergen Reactions    Iodine      I.V. Family History   Problem Relation Age of Onset    Heart Disease Mother     Heart Disease Father        Social History     Tobacco Use    Smoking status: Never Smoker    Smokeless tobacco: Never Used   Substance Use Topics    Alcohol use: No     Comment: 2 cups of coffee a day     Drug use: No         Review of Systems   General ROS: negative  Hematological and Lymphatic ROS: negative  Respiratory ROS: negative  Cardiovascular ROS: negative  Gastrointestinal ROS: As above  Genito-Urinary ROS: negative  Musculoskeletal ROS: negative      PHYSICAL EXAM:    Vitals:    11/22/20 2330   BP: (!) 191/91   Pulse: 59   Resp: 18   Temp: 96.9 °F (36.1 °C)   SpO2: 95%       General Appearance:  awake, alert, oriented, in no acute distress  Skin:  Skin color, texture, turgor normal. No rashes or lesions. Head/face:  NCAT  Eyes:  No gross abnormalities. Lungs:  No increased work of breathing on room air  Heart:  Bradycardic and hypertensive  Abdomen:  Soft, exploratory laparotomy scar, TTP RUQ and epigastric area, non-distended. Extremities: Extremities warm to touch    LABS:    CBC  Recent Labs     11/22/20  1814   WBC 6.7   HGB 14.3   HCT 44.0        BMP  Recent Labs     11/22/20  1814      K 4.7      CO2 23   BUN 27*   CREATININE 0.9   CALCIUM 9.5     Liver Function  Recent Labs     11/22/20  1814   LIPASE 16   BILITOT 0.2   AST 22   ALT 8   ALKPHOS 85   PROT 6.7   LABALBU 3.4*     No results for input(s): LACTATE in the last 72 hours. No results for input(s): INR, PTT in the last 72 hours. Invalid input(s): PT    RADIOLOGY    No results found.       ASSESSMENT:  80 y.o. female with RUQ abdominal pain, likely due to chronic cholecystitis    PLAN:  - NPO  - IVFs  - Plan for non-operative management with antibiotics, Rocephin/Flagyl  - Pain control PRN  - Antiemetic PRN  - Replete lytes PRN  - Overall care per primary  - Discussed with Dr. Molly Moreno    Electronically signed by Amanda Glaser MD on 11/23/20 at 1:09 AM EST

## 2020-11-23 NOTE — PROGRESS NOTES
Daily meds went over with  Maggie Graham via telephone. Updated on status.  He will bring her hearing aid  in the am. Message sent to Dr. Alesia Ortega regarding patient's med rec and requesting pain meds

## 2020-11-23 NOTE — ED NOTES
Report called to Manav Dunham RN at [facility] at [facility].      Charity Meek RN  11/22/20 [id number]

## 2020-11-23 NOTE — PROGRESS NOTES
Neurosurgery consult called to Dr. Marquez Holloway regarding consult.  He stated he will be up later to see her

## 2020-11-23 NOTE — H&P
Hospital Medicine History & Physical      PCP: Dane Escoto MD    Date of Admission: 11/22/2020    Date of Service: Pt seen/examined on 11/23/2020 and Admitted to Inpatient with expected LOS greater than two midnights due to medical therapy. Chief Complaint: Abdominal pain      History Of Present Illness:      80 y.o. female who presented to [facility] from [facility] with past medical history of dementia, atrial fibrillation, osteoarthritis of multiple joints and DJD of the spine, sacroiliitis, GERD, depression, hyperlipidemia, B12 deficiency, cholelithiasis with cholecystitis status post diagnostic laparoscopy converted to exploratory laparotomy with closure of enterostomy, cholangiogram and GABO drain with EGD done in November 2019. Patient currently complains of pain around his bilateral waist/hip area. Pain is dull, severe, constant, radiates to the knees, worse with movement and associated with ambulatory dysfunction. She also reports right-sided abdominal pain which she states that is worse after eating. No nausea or vomiting. No fever. No change in bowel movement. No leg swelling. She complains of severe low back pain. She denies any recent falls. Patient has not had her Eliquis for a few days. She has not been eating for the past 3 days. Vital signs notable for blood pressure 191/91. Labs showed CBC, chemistry and troponin. CT scan of the abdomen shows hydropic gallbladder with gallstone no ductal dilatation. Age indeterminate L2 compression fracture. Diverticulosis. She is being admitted for further management.       Past Medical History:          Diagnosis Date    Alzheimer disease ([address]     Arthritis     Atrial fibrillation (HCC)     GERD (gastroesophageal reflux disease)     Hyperlipidemia        Past Surgical History:          Procedure Laterality Date    ABDOMEN SURGERY      ANESTHESIA NERVE BLOCK Bilateral 2/6/2020    BILATERAL MEDIAL BRANCH L4-5 AND L5-S1 JOINTS Genevieve Kyle MD   donepezil (ARICEPT) 10 MG tablet Take 1 tablet by mouth nightly 8/11/20  Yes Maritza Steinberg MD   metoprolol tartrate (LOPRESSOR) 25 MG tablet Take 1 tablet by mouth 2 times daily 6/22/20 11/22/20 Yes Casimiro Barrett MD   Glucosamine-Chondroit-Vit C-Mn (GLUCOSAMINE 1500 COMPLEX PO) Take by mouth   Yes Historical Provider, MD   Cholecalciferol (VITAMIN D3) 2000 units CAPS Take by mouth   Yes Historical Provider, MD   traMADol (ULTRAM) 50 MG tablet Take 50 mg by mouth every 6 hours as needed for Pain. Historical Provider, MD   Compression Stockings MISC Wear during wake hours. 3/11/20   Teddy Alexandra MD   diclofenac sodium (VOLTAREN) 1 % GEL Apply 4 g topically 4 times daily as needed (pain) 9/11/19 11/12/20  Liset Staley DO       Allergies:  Iodine    Social History:      The patient currently lives at home. TOBACCO:   reports that she has never smoked. She has never used smokeless tobacco.  ETOH:   reports no history of alcohol use. Family History:     Reviewed in detail Positive as follows:        Problem Relation Age of Onset    Heart Disease Mother     Heart Disease Father        REVIEW OF SYSTEMS:   Pertinent positives as noted in the HPI. All other systems reviewed and negative. PHYSICAL EXAM:    BP (!) 191/91   Pulse 59   Temp 96.9 °F (36.1 °C) (Temporal)   Resp 18   Ht 5' 6\" (1.676 m)   Wt 110 lb (49.9 kg)   SpO2 95%   BMI 17.75 kg/m²     General appearance: Elderly female, mild to moderate painful distress, appears stated age and cooperative. Afebrile. HEENT:  Normal cephalic, atraumatic without obvious deformity. Pupils equal, round, and reactive to light. Extra ocular muscles intact. Conjunctivae/corneas clear. Neck: Supple, with full range of motion. No jugular venous distention. Trachea midline. Respiratory:  Normal respiratory effort. Clear to auscultation, bilaterally without Rales/Wheezes/Rhonchi.   Cardiovascular:  irregular rate and rhythm with normal S1/S2 without murmurs, rubs or gallops. Abdomen: Soft, non-tender, non-distended with normal bowel sounds. Musculoskeletal:  No clubbing, cyanosis or edema bilaterally. Limited range of motion without deformity. Skin: Skin color, texture, turgor normal.  No rashes or lesions. Neurologic:   Cranial nerves: II-XII intact, grossly non-focal.  Psychiatric:  Alert and oriented, thought content appropriate, normal insight  Capillary Refill: Brisk,< 3 seconds   Peripheral Pulses: +2 palpable, equal bilaterally       Labs:     Recent Labs     11/22/20 1814   WBC 6.7   HGB 14.3   HCT 44.0        Recent Labs     11/22/20 1814      K 4.7      CO2 23   BUN 27*   CREATININE 0.9   CALCIUM 9.5     Recent Labs     11/22/20 1814   AST 22   ALT 8   BILITOT 0.2   ALKPHOS 85     No results for input(s): INR in the last 72 hours.   Recent Labs     11/22/20 1814   TROPONINI <0.01       Urinalysis:      Lab Results   Component Value Date    NITRU Negative 11/12/2020    WBCUA 0-1 11/12/2020    BACTERIA FEW 11/12/2020    RBCUA NONE 11/12/2020    RBCUA NONE 01/14/2013    BLOODU Negative 11/12/2020    SPECGRAV 1.010 11/12/2020    GLUCOSEU Negative 11/12/2020       Radiology:   Reviewed and documented    XR CHEST PORTABLE    (Results Pending)       ASSESSMENT:    Active Hospital Problems    Diagnosis Date Noted    Midline low back pain [M54.5] 11/23/2020    Bilateral hip pain [M25.551, M25.552] 11/23/2020    Pain in both knees [M25.561, M25.562] 11/23/2020    Ambulatory dysfunction [R26.2] 11/23/2020    Closed compression fracture of L2 lumbar vertebra, initial encounter ([facility] [S32.020A] 11/23/2020    Symptomatic cholelithiasis [K80.20]     Depression [F32.9] 08/19/2019    Hyperlipidemia [E78.5] 08/19/2019    Sacroiliitis ([facility] [M46.1] 11/26/2018    Arthritis [M19.90] 03/21/2018    Alzheimer's dementia without behavioral disturbance ([address] [G30.9, F02.80] 02/21/2018    Atrial fibrillation ([facility] [I48.91] 10/21/2013    Gastroesophageal reflux disease without esophagitis [K21.9] 10/21/2013     PLAN:  1. Symptomatic cholelithiasis, consult surgery. May benefit from cholecystectomy. IV Rocephin. Pain control. Hold anticoagulation pending surgery consult. If she is not getting any procedure, anticoagulation should be restarted immediately. 2.  Bilateral hip and knee pain, patient with history of osteoarthritis and sacroiliitis. Pain control. Physical therapy as tolerated  3. Intractable low back pain, L2 compression fracture that is age-indeterminate with 40% height loss. Consult neurosurgery. 4.  Hypertension, blood pressure is elevated, pain may be contributing. Resume home blood pressure medications and closely monitor. 5.  Depression. Continue current medication  6. Hyperlipidemia, statin  7. Dementia, stable  8. Atrial fibrillation currently rate controlled, hold Eliquis pending surgery evaluation. Resume as soon as possible. 9.  GERD, PPI. 10.  Ambulatory dysfunction. Physical therapy    DVT Prophylaxis: SCDs, resume Eliquis as soon as possible  Diet: Diet NPO Effective Now Exceptions are: Sips with Meds  Code Status: Full Code    PT/OT Eval Status: As tolerated    Dispo -inpatient/MedSurg     Chandrika Mccarthy MD    Thank you Charito Gonsalves MD for the opportunity to be involved in this patient's care.

## 2020-11-23 NOTE — PROGRESS NOTES
General surgery consult called to answering service, they stated that Dr. Genaro Prabhakar is on call. Message Dr. Jesus Vuong the resident also regarding consult.

## 2020-11-24 ENCOUNTER — ANESTHESIA EVENT (OUTPATIENT)
Dept: OPERATING ROOM | Age: 85
DRG: 478 | End: 2020-11-24
Payer: MEDICARE

## 2020-11-24 LAB — PROCALCITONIN: 0.06 NG/ML (ref 0–0.08)

## 2020-11-24 PROCEDURE — 97530 THERAPEUTIC ACTIVITIES: CPT

## 2020-11-24 PROCEDURE — 2580000003 HC RX 258: Performed by: FAMILY MEDICINE

## 2020-11-24 PROCEDURE — 1200000000 HC SEMI PRIVATE

## 2020-11-24 PROCEDURE — 6360000002 HC RX W HCPCS: Performed by: STUDENT IN AN ORGANIZED HEALTH CARE EDUCATION/TRAINING PROGRAM

## 2020-11-24 PROCEDURE — 96376 TX/PRO/DX INJ SAME DRUG ADON: CPT

## 2020-11-24 PROCEDURE — 6370000000 HC RX 637 (ALT 250 FOR IP): Performed by: FAMILY MEDICINE

## 2020-11-24 PROCEDURE — 97166 OT EVAL MOD COMPLEX 45 MIN: CPT

## 2020-11-24 PROCEDURE — 99232 SBSQ HOSP IP/OBS MODERATE 35: CPT | Performed by: SURGERY

## 2020-11-24 PROCEDURE — 2500000003 HC RX 250 WO HCPCS: Performed by: STUDENT IN AN ORGANIZED HEALTH CARE EDUCATION/TRAINING PROGRAM

## 2020-11-24 PROCEDURE — 97161 PT EVAL LOW COMPLEX 20 MIN: CPT

## 2020-11-24 PROCEDURE — 2580000003 HC RX 258: Performed by: STUDENT IN AN ORGANIZED HEALTH CARE EDUCATION/TRAINING PROGRAM

## 2020-11-24 PROCEDURE — 96366 THER/PROPH/DIAG IV INF ADDON: CPT

## 2020-11-24 PROCEDURE — 97535 SELF CARE MNGMENT TRAINING: CPT

## 2020-11-24 RX ORDER — AMOXICILLIN AND CLAVULANATE POTASSIUM 875; 125 MG/1; MG/1
1 TABLET, FILM COATED ORAL 2 TIMES DAILY
Qty: 14 TABLET | Refills: 0 | Status: SHIPPED | OUTPATIENT
Start: 2020-11-24 | End: 2020-12-01

## 2020-11-24 RX ADMIN — ACETAMINOPHEN 650 MG: 325 TABLET ORAL at 10:01

## 2020-11-24 RX ADMIN — SODIUM CHLORIDE, PRESERVATIVE FREE 10 ML: 5 INJECTION INTRAVENOUS at 21:36

## 2020-11-24 RX ADMIN — TRAMADOL HYDROCHLORIDE 50 MG: 50 TABLET ORAL at 18:37

## 2020-11-24 RX ADMIN — METRONIDAZOLE 500 MG: 500 INJECTION, SOLUTION INTRAVENOUS at 02:30

## 2020-11-24 RX ADMIN — CEFTRIAXONE SODIUM 2 G: 2 INJECTION, POWDER, FOR SOLUTION INTRAMUSCULAR; INTRAVENOUS at 02:30

## 2020-11-24 RX ADMIN — TRAMADOL HYDROCHLORIDE 50 MG: 50 TABLET ORAL at 02:49

## 2020-11-24 RX ADMIN — DONEPEZIL HYDROCHLORIDE 10 MG: 5 TABLET, FILM COATED ORAL at 21:36

## 2020-11-24 RX ADMIN — ACETAMINOPHEN 650 MG: 325 TABLET ORAL at 21:36

## 2020-11-24 RX ADMIN — METOPROLOL TARTRATE 25 MG: 25 TABLET, FILM COATED ORAL at 21:36

## 2020-11-24 RX ADMIN — METRONIDAZOLE 500 MG: 500 INJECTION, SOLUTION INTRAVENOUS at 17:30

## 2020-11-24 RX ADMIN — SODIUM CHLORIDE, PRESERVATIVE FREE 10 ML: 5 INJECTION INTRAVENOUS at 10:03

## 2020-11-24 RX ADMIN — METRONIDAZOLE 500 MG: 500 INJECTION, SOLUTION INTRAVENOUS at 10:03

## 2020-11-24 RX ADMIN — MEMANTINE HYDROCHLORIDE 10 MG: 10 TABLET, FILM COATED ORAL at 21:36

## 2020-11-24 RX ADMIN — CITALOPRAM 10 MG: 20 TABLET, FILM COATED ORAL at 10:02

## 2020-11-24 RX ADMIN — MEMANTINE HYDROCHLORIDE 10 MG: 10 TABLET, FILM COATED ORAL at 10:01

## 2020-11-24 ASSESSMENT — PAIN SCALES - GENERAL
PAINLEVEL_OUTOF10: 8
PAINLEVEL_OUTOF10: 5
PAINLEVEL_OUTOF10: 10
PAINLEVEL_OUTOF10: 0
PAINLEVEL_OUTOF10: 0
PAINLEVEL_OUTOF10: 6
PAINLEVEL_OUTOF10: 0
PAINLEVEL_OUTOF10: 1
PAINLEVEL_OUTOF10: 1

## 2020-11-24 ASSESSMENT — PAIN DESCRIPTION - PAIN TYPE
TYPE: ACUTE PAIN

## 2020-11-24 ASSESSMENT — PAIN DESCRIPTION - ONSET
ONSET: GRADUAL
ONSET: ON-GOING

## 2020-11-24 ASSESSMENT — PAIN DESCRIPTION - DESCRIPTORS
DESCRIPTORS: ACHING;DISCOMFORT
DESCRIPTORS: ACHING;DISCOMFORT;SORE
DESCRIPTORS: ACHING;DISCOMFORT
DESCRIPTORS: ACHING

## 2020-11-24 ASSESSMENT — PAIN SCALES - PAIN ASSESSMENT IN ADVANCED DEMENTIA (PAINAD)
NEGVOCALIZATION: 0
TOTALSCORE: 1
BREATHING: 0
CONSOLABILITY: 0
FACIALEXPRESSION: 0
BODYLANGUAGE: 1

## 2020-11-24 ASSESSMENT — PAIN DESCRIPTION - LOCATION
LOCATION: ABDOMEN;BACK
LOCATION: BACK

## 2020-11-24 ASSESSMENT — PAIN - FUNCTIONAL ASSESSMENT
PAIN_FUNCTIONAL_ASSESSMENT: PREVENTS OR INTERFERES SOME ACTIVE ACTIVITIES AND ADLS
PAIN_FUNCTIONAL_ASSESSMENT: PREVENTS OR INTERFERES SOME ACTIVE ACTIVITIES AND ADLS

## 2020-11-24 ASSESSMENT — PAIN DESCRIPTION - ORIENTATION: ORIENTATION: RIGHT

## 2020-11-24 ASSESSMENT — PAIN DESCRIPTION - PROGRESSION
CLINICAL_PROGRESSION: NOT CHANGED
CLINICAL_PROGRESSION: NOT CHANGED

## 2020-11-24 ASSESSMENT — PAIN DESCRIPTION - FREQUENCY
FREQUENCY: INTERMITTENT
FREQUENCY: INTERMITTENT

## 2020-11-24 NOTE — CARE COORDINATION
Met with the pt at the bedside. She was unable to hear me. Placed call to the pt's  and discussed discharge plan. The pt uses a walker at home. She will return home when medically stable. The pt has a charging device for her hearing aides, which her  brought to the hospital this past weekend.  Scotty Avina RN

## 2020-11-24 NOTE — PROGRESS NOTES
OCCUPATIONAL THERAPY INITIAL EVALUATION      Date:2020  Patient Name: Lacho James  MRN: [id number]  : 1932  Room: [address] [place] [address]  Referring Provider: Andrade Beaulieu MD      Evaluating OT: Francis Means OTR/L   License #  [id number]     AM-PAC Daily Activity Raw Score: 15/24    Recommended Adaptive Equipment:  TBD     Diagnosis:  Gallbladder disease/ cholelithiasis  Acute vs. Subacute compression fractures L1 & L2 per Dr. Dilan Tafoya.   Patient presented to ED for abdominal pain    Surgery:   Past Surgical History:   Procedure Laterality Date    ABDOMEN SURGERY      ANESTHESIA NERVE BLOCK Bilateral 2020    BILATERAL MEDIAL BRANCH L4-5 AND L5-S1 JOINTS performed by Maria Del Rosario Pickett DO at [address] [facility], LAPAROSCOPIC N/A 2019    DIAGNOSITIC LAPAROSCOPY CONVERTED TO EXPLORATORY LAPAROTOMY WITH CLOSURE OF ENTEROTOMY X1, CHOLANGIOGRAM, EGD performed by Tahira Gill MD at [address] [facility] [address] [facility]  10/21/2013         [facility], [facility]. INJECTION PROCEDURE FOR SACROILIAC JOINT Right 2018    RIGHT SACROILIAC JOINT STEROID INJECTION UNDER X-RAY GUIDANCE performed by Maria Del Rosario Pickett DO at [address] [facility] [address] [facility] [address] JOINT Left 2019    LEFT SACROILIAC JOINT STEROID INJECTION UNDER X-RAY GUIDANCE performed by Maria Del Rosario Pickett DO at [address] [facility] JOINT Bilateral 2019    BILATERAL SACROILIAC JOINT INJECTION X-RAY performed by Maria Del Rosario Pickett DO at [facility] Right 2018    sacroiliac joint     NERVE BLOCK Left 2019    sacroiliac joint injection     NERVE BLOCK Bilateral 2020    medial branch block    NERVE BLOCK Bilateral 10/29/2020    intra articular     NERVE BLOCK Bilateral 10/29/2020    BILATERAL L4-5 AND L5-S1 INTRA-ARTICULAR FACET STEROID INJECTION (CPT 51433,76691) performed by Yecenia Trinidad DO at [facility] N/A     d/t peptic ulcers      Pertinent Medical History:   Past Medical History:   Diagnosis Date    Alzheimer disease ([address]     Arthritis     Atrial fibrillation (HCC)     GERD (gastroesophageal reflux disease)     Hyperlipidemia       Precautions:  Falls, Full WBAT, Very [place], bed alarm, skin integrity TAPS, neutral spine for comfort     Home Living: Pt lives with  in a 1 story with basement, with 2 steps to enter and no HR. Bathroom setup: walk in shower, std. commode   Equipment owned: shower [place], ww    Prior Level of Function: Pt. States was Ind. with ADLs , Ind. with IADLs; ambulated ww  Driving: NA  Occupation: retired    Pain Level: min. C/o pain R low back  Cognition: A&O: to self & month, cues for year, place, situation; Follows 1-2 step directions   Memory:  Fair-   Sequencing:  Fair-   Problem solving:  Fair-   Judgement/safety:  Fair-     Functional Assessment:   Initial Eval Status  Date: 11- Treatment Status  Date: Short Term Goals = Long Term Goals  Treatment frequency: 3-5 days   Feeding Set-up  Mod I    Grooming SBA/ set up once seated  Modified [place]    UB Dressing Stand by Assist   Supervision    LB Dressing Moderate Assist at EOB  Supervision    Bathing Moderate Assist with sim. task  Supervision    Toileting Moderate Assist with hygiene & clothing mgmt. Supervision    Bed Mobility  Supine to sit: Minimal Assist   Sit to supine: Minimal Assist   Supine to sit: Modified [place]   Sit to supine: Modified [place]    Functional Transfers Minimal Assist with sit <> stand, SPT using ww. Mod A sit > stand from low surface of commode utilizing grab bar. Supervision    Functional Mobility Minimal Assist with ww for short functional distances  Supervision    Balance Sitting:     Static:  Sup    Dynamic:SBA  Standing: Min A     Activity Tolerance Fair- with lt.  Ax.  spO2 & HR remained WFL  Fair+ with lt./mod. ax. Visual/  Perceptual Glasses: yes        Safety Awareness Fair-                    Fair+/good     Hand dominance: R     Strength ROM Additional Info:    RUE  Grossly 4-/5  Grossly WFL Fair  and wfl FMC/dexterity noted during ADL tasks     LUE Grossly 4-/5  Grossly WFL Fair  and wfl FMC/dexterity noted during ADL tasks       Hearing: very [place], wears hearing aids but not charged currently  Sensation:  Pt. c/o no numbness/ tingling B UE  Tone: WFL   Edema: none noted BUE                            Comments: Upon arrival, patient supine in bed, cleared by Nursing to participate (per RN, pt. Has been up with Nursing to bathroom), agreeable to OT. Pt demonstrating fair understanding of education/techniques, requiring additional training / education. At end of session, patient returned to bed with alarm in place, all needs met, RN notified, with call light and phone within reach, all lines and tubes intact. Pt would benefit from continued skilled OT to increase safety and independence with completion of ADL/iADL tasks, functional transfers/mobility to improve independence and quality of life. Treatment:  OT services provided include: facilitation of safe ADLs, transfers & transfer training, skilled monitoring of vitals & pt.'s response to treatment, instruction/training on safe & adapted techniques within precautions for completion of ADLs, proper posture and/or positioning, facilitation of functional seated & standing tolerance & balance ax. during ADLs and functional ax., skilled cuing on hand placement & proper body mechanics during functional transfer/mobility training with focus on safety, technique, precautions. Pt.  also Instructed RE: safe transfers/mobility, ADL training, role of OT, E.C. techniques, treatment plan, recs. , prec.      Eval Complexity: moderate    · moderate Complexity  · History: Expanded chart review of medical records and additional review of of righting/equilibrium reactions, midline orientation, scapular stability/mobility, Normalization muscle     tone and facilitation active functional movement/Attention                         []Delirium prevention/treatment    [x]Positioning to improve functional independence/ TAPS in place     Rehab Potential:  Good for established goals     Patient / Family Goal: to return home soon      Patient and/or family were instructed on functional diagnosis, prognosis/goals and OT plan of care. Demonstrated fair understanding. Eval Complexity: moderate      Time In: 14:35  Time Out: 15:00  Total Treatment Time: 15    Min Units   OT Eval Low 64960       OT Eval Medium 99842  x    OT Eval High 47972       OT Re-Eval [id number]       Therapeutic Ex 09823       Therapeutic Activities 24986       ADL/Self Care 71739  15     Orthotic Management 36106       Neuro Re-Ed 62654       Non-Billable Time          Evaluation Time includes thorough review of current medical information, gathering information on past medical history/social history and prior level of function, completion of standardized testing/informal observation of tasks, assessment of data and education on plan of care and goals. Elayne Means, OTR/L   License #  [id number]

## 2020-11-24 NOTE — PROGRESS NOTES
Comprehensive Nutrition Assessment    Type and Reason for Visit:  Initial, Positive Nutrition Screen    Nutrition Recommendations/Plan: Continue current diet, Start Ensure HP BID    Nutrition Assessment:  Pt is at nutritional risk d/t admit w/ abd pain 2/2 chronic cholecystitis. Noted possible wt loss, hx dementia and noted compression fx L1 & L2. Will add ONS to optimize nutrition and monitor. Malnutrition Assessment:  Malnutrition Status: At risk for malnutrition (Comment)    Context:  Chronic Illness     Findings of the 6 clinical characteristics of malnutrition:  Energy Intake:  7 - 75% or less estimated energy requirements for 1 month or longer  Weight Loss:  Unable to assess     Body Fat Loss:  Unable to assess     Muscle Mass Loss:  Unable to assess    Fluid Accumulation:  No significant fluid accumulation     Strength:  Not Performed    Estimated Daily Nutrient Needs:  Energy (kcal):  ; Weight Used for Energy Requirements:  Current     Protein (g):  75-90; Weight Used for Protein Requirements:  Current(1.5-1.8)        Fluid (ml/day):  ; Method Used for Fluid Requirements:  1 ml/kcal      Nutrition Related Findings:  disoriented x2 w/ hx dementia, active BS, abd tenderness, no edema, fluids WDL      Wounds:  None       Current Nutrition Therapies:    DIET LOW FAT;     Anthropometric Measures:  · Height: 5' 6\" (167.6 cm)  · Current Body Weight: 110 lb (49.9 kg)   · Usual Body Weight: 120 lb (54.4 kg)(10/2020 actual per EMR)     · Ideal Body Weight: 130 lbs; % Ideal Body Weight 84.6 %   · BMI: 17.8  · BMI Categories: Underweight (BMI less than 22) age over 72       Nutrition Diagnosis:   · Inadequate oral intake related to altered GI function(chonic cholecystitis) as evidenced by intake 26-50%, poor intake prior to admission    Nutrition Interventions:   Food and/or Nutrient Delivery:  Continue Current Diet, Start Oral Nutrition Supplement(Ensure HP BID)  Nutrition Education/Counseling: Education not appropriate   Coordination of Nutrition Care:  Continue to monitor while inpatient    Goals:  pt to consume >50% meals/ONS       Nutrition Monitoring and Evaluation:   Food/Nutrient Intake Outcomes:  Food and Nutrient Intake, Supplement Intake  Physical Signs/Symptoms Outcomes:  Biochemical Data, GI Status, Fluid Status or Edema, Nutrition Focused Physical Findings, Skin, Weight     Discharge Planning:     Too soon to determine     Electronically signed by Merissa Stratton, MS, RD, LD on 11/24/20 at 1:19 PM EST    Contact: [phone]

## 2020-11-24 NOTE — ANESTHESIA PRE PROCEDURE
Department of Anesthesiology  Preprocedure Note       Name:  Willow Roque   Age:  80 y.o.  :  1932                                          MRN:  [id number]         Date:  2020      Surgeon: Tom López):  Maria De Jesus Armendariz MD    Procedure: Procedure(s):  KYPHOPLASTY , L1 L2,BONE BIOPSY, EPIDURAL INJECTION    Medications prior to admission:   Prior to Admission medications    Medication Sig Start Date End Date Taking? Authorizing Provider   amoxicillin-clavulanate (AUGMENTIN) 875-125 MG per tablet Take 1 tablet by mouth 2 times daily for 7 days 20 Yes Gijennyfer BROWN Gianfrate, DO   furosemide (LASIX) 20 MG tablet TAKE 1 TABLET BY MOUTH 3  TIMES WEEKLY 10/22/20  Yes Cirilo Arce MD   citalopram (CELEXA) 10 MG tablet Take 1 tablet by mouth every morning 10/22/20  Yes Cirilo Arce MD   memantine (NAMENDA) 10 MG tablet Take 1 tablet by mouth 2 times daily 20  Yes Cirilo Arce MD   apixaban (ELIQUIS) 2.5 MG TABS tablet TAKE 1 TABLET BY MOUTH  TWICE DAILY 20  Yes Cirilo Arce MD   donepezil (ARICEPT) 10 MG tablet Take 1 tablet by mouth nightly 20  Yes Mayte Macias MD   Glucosamine-Chondroit-Vit C-Mn (GLUCOSAMINE 1500 COMPLEX PO) Take 1 tablet by mouth daily    Yes Historical Provider, MD   Cholecalciferol (VITAMIN D3) 2000 units CAPS Take 2,000 Units by mouth daily    Yes Historical Provider, MD   metoprolol tartrate (LOPRESSOR) 25 MG tablet Take 25 mg by mouth 2 times daily    Historical Provider, MD   diclofenac sodium (VOLTAREN) 1 % GEL Apply 4 g topically 4 times daily as needed for Pain    Historical Provider, MD   traMADol (ULTRAM) 50 MG tablet Take 50 mg by mouth every 6 hours as needed for Pain. Historical Provider, MD   Compression Stockings MISC Wear during wake hours.  3/11/20   Cirilo Arce MD       Current medications:    Current Facility-Administered Medications   Medication Dose Route Frequency Provider Last Rate Last Dose    citalopram (CELEXA) tablet 10 mg  10 mg Oral QAM Peter Ponce MD   10 mg at 11/24/20 1002    donepezil (ARICEPT) tablet 10 mg  10 mg Oral Nightly Peter Ponce MD   10 mg at 11/23/20 2037    memantine (NAMENDA) tablet 10 mg  10 mg Oral BID Peter Ponce MD   10 mg at 11/24/20 1001    metoprolol tartrate (LOPRESSOR) tablet 25 mg  25 mg Oral BID Peter Ponce MD   25 mg at 11/23/20 2037    traMADol (ULTRAM) tablet 50 mg  50 mg Oral Q6H PRN Peter Ponce MD   50 mg at 11/24/20 0249    sodium chloride flush 0.9 % injection 10 mL  10 mL Intravenous 2 times per day Peter Ponce MD   10 mL at 11/24/20 1003    sodium chloride flush 0.9 % injection 10 mL  10 mL Intravenous PRN Peter Ponce MD        acetaminophen (TYLENOL) tablet 650 mg  650 mg Oral Q6H PRN Peter Ponce MD   650 mg at 11/24/20 1001    Or    acetaminophen (TYLENOL) suppository 650 mg  650 mg Rectal Q6H PRN Peter Ponce MD        polyethylene glycol (GLYCOLAX) packet 17 g  17 g Oral Daily PRN Peter Ponce MD        promethazine (PHENERGAN) tablet 12.5 mg  12.5 mg Oral Q6H PRN Peter Ponce MD        Or    ondansetron (ZOFRAN) injection 4 mg  4 mg Intravenous Q6H PRN Peter Ponce MD        melatonin tablet 3 mg  3 mg Oral Nightly PRN Peter Ponce MD   3 mg at 11/23/20 0106    metronidazole (FLAGYL) 500 mg in NaCl 100 mL IVPB premix  500 mg Intravenous Q8H Feroz Moreno  mL/hr at 11/24/20 1730 500 mg at 11/24/20 1730    cefTRIAXone (ROCEPHIN) 2 g in sterile water 19.2 mL IV syringe  2 g Intravenous Q24H Josh Chacon DO   2 g at 11/24/20 0230    hydrALAZINE (APRESOLINE) injection 5 mg  5 mg Intravenous Q6H PRN CherylHENRRY Garcia - CNS   5 mg at 11/23/20 1304       Allergies: Allergies   Allergen Reactions    Iodine      I.V.        Problem List:    Patient Active Problem List   Diagnosis Code    Atrial fibrillation (HCC) I48.91    Gastroesophageal reflux disease without esophagitis K21.9    Alzheimer's dementia without behavioral disturbance (HCC) G30.9, F02.80    Vitamin B 12 deficiency E53.8    Arthritis M19.90    Sacroiliitis (HCC) M46.1    Depression F32.9    Hyperlipidemia E78.5    RUQ abdominal pain R10.11    Calculus of gallbladder without cholecystitis without obstruction K80.20    Syncope R55    Calculus of gallbladder with cholecystitis with biliary obstruction K80.11    Symptomatic cholelithiasis K80.20    Calculus of gallbladder with acute cholecystitis without obstruction K80.00    Lumbar spondylosis M47.816    Midline low back pain M54.5    Bilateral hip pain M25.551, M25.552    Pain in both knees M25.561, M25.562    Ambulatory dysfunction R26.2    Closed compression fracture of L2 lumbar vertebra, initial encounter ([facility] [address] S32.020A    Hypertensive urgency I16.0    Chronic cholecystitis K81.1       Past Medical History:        Diagnosis Date    Alzheimer disease ([facility] [address]     Arthritis     Atrial fibrillation (HCC)     GERD (gastroesophageal reflux disease)     Hyperlipidemia        Past Surgical History:        Procedure Laterality Date    ABDOMEN SURGERY      ANESTHESIA NERVE BLOCK Bilateral 2/6/2020    BILATERAL MEDIAL BRANCH L4-5 AND L5-S1 JOINTS performed by Ann Russell DO at [address], LAPAROSCOPIC N/A 11/21/2019    DIAGNOSITIC LAPAROSCOPY CONVERTED TO EXPLORATORY LAPAROTOMY WITH CLOSURE OF ENTEROTOMY X1, CHOLANGIOGRAM, EGD performed by Alecia Cohen MD at [address] [facility] [address] [facility]  10/21/2013         [facility]. INJECTION PROCEDURE FOR SACROILIAC JOINT Right 12/13/2018    RIGHT SACROILIAC JOINT STEROID INJECTION UNDER X-RAY GUIDANCE performed by Ann Russell DO at [address] [facility] INJECTION PROCEDURE FOR SACROILIAC JOINT Left 1/17/2019    LEFT SACROILIAC JOINT STEROID INJECTION UNDER X-RAY GUIDANCE performed by Imani Kiran Component Value Date     11/23/2020    K 3.9 11/23/2020     11/23/2020    CO2 24 11/23/2020    BUN 21 11/23/2020    CREATININE 0.7 11/23/2020    GFRAA >60 11/23/2020    LABGLOM >60 11/23/2020    GLUCOSE 102 11/23/2020    GLUCOSE 90 07/13/2011    PROT 5.9 11/23/2020    CALCIUM 9.0 11/23/2020    BILITOT <0.2 11/23/2020    ALKPHOS 69 11/23/2020    AST 15 11/23/2020    ALT 6 11/23/2020       POC Tests: No results for input(s): POCGLU, POCNA, POCK, POCCL, POCBUN, POCHEMO, POCHCT in the last 72 hours. Coags:   Lab Results   Component Value Date    PROTIME 12.3 11/23/2020    INR 1.1 11/23/2020    APTT 64.8 10/22/2013       HCG (If Applicable): No results found for: PREGTESTUR, PREGSERUM, HCG, HCGQUANT     ABGs: No results found for: PHART, PO2ART, SBK4WZA, CSB4IGS, BEART, N3FJUKFB     Type & Screen (If Applicable):  No results found for: LABABO, LABRH    Drug/Infectious Status (If Applicable):  No results found for: HIV, HEPCAB    COVID-19 Screening (If Applicable):   Lab Results   Component Value Date    COVID19 Not Detected 10/22/2020         EKG 11/23/20  Narrative & Impression     Sinus rhythm with marked sinus arrhythmia with 1st degree AV block  Otherwise normal ECG  No previous ECGs available             ECHO 11/25/19 EF 55%   Summary   Compared to prior echo, changes noted. Technically adequate study. Normal left ventricular wall thickness. Ejection fraction is visually estimated at 55%. No regional wall motion abnormalities seen. E/A flow reversal noted. Suggestive of diastolic dysfunction. Mild mitral regurgitation is present. Physiologic and/or trace aortic regurgitation is noted. Mild tricuspid regurgitation. RVSP is 32 mmHg. Pulmonary hypertension is mild .     Anesthesia Evaluation  Patient summary reviewed and Nursing notes reviewed no history of anesthetic complications:   Airway: Mallampati: II  TM distance: <3 FB   Neck ROM: limited  Mouth opening: > = 3 FB Dental:    (+) upper dentures and lower dentures      Pulmonary:Negative Pulmonary ROS breath sounds clear to auscultation            Patient did not smoke on day of surgery. Cardiovascular:  Exercise tolerance: poor (<4 METS),   (+) hypertension:, dysrhythmias (Hx afib, 1st degree AV block):, hyperlipidemia      ECG reviewed  Rhythm: regular  Rate: normal  Echocardiogram reviewed    Cleared by cardiology     Beta Blocker:  Dose within 24 Hrs         Neuro/Psych:   (+) psychiatric history (Alzheimer's disease):depression/anxiety              ROS comment: Sacroiliitis  Lumbar spondylosis   Midline low back pain   Bilateral hip pain   Pain in both knees   Ambulatory dysfunction   Closed compression fracture of L2 lumbar vertebra, initial encounter      GI/Hepatic/Renal:   (+) GERD:,          ROS comment: RUQ abdominal pain   Symptomatic cholelithiasis  Chronic cholecystitis. Endo/Other:    (+) : arthritis: OA., .                 Abdominal:           Vascular: negative vascular ROS. Anesthesia Plan      general     ASA 3     ([place], A&Ox self only. Follows simple commands. Medical [facility] obtained from [facility]  Induction: inhalational and intravenous. MIPS: Postoperative opioids intended, Prophylactic antiemetics administered and Postoperative trial extubation. Plan/risks discussed with: Unable to discuss d/t altered mental status       Plan discussed with CRNA and attending. Mark Dunbar RN   11/24/2020      Pt seen, examined, chart reviewed, plan discussed.   Randy Humphries  11/24/2020  8:52 PM

## 2020-11-24 NOTE — PROGRESS NOTES
Physical Therapy  Physical Therapy Initial Assessment     Name: Srini Muniz  : 1932  MRN: [id number]    Referring Provider:  Albino Santizo MD    Date of Service: 2020    Evaluating PT:  Kimo García PT   Room #:  0721/6230-E  Diagnosis: Gallbladder disease [K82.9], compression fracture of L1 and L2 ,     PMHx/PSHx:   has a past medical history of Alzheimer disease ([facility] [address] Arthritis, Atrial fibrillation ([facility] [address] GERD (gastroesophageal reflux disease), and Hyperlipidemia. has a past surgical history that includes Abdomen surgery; Appendectomy; Colonoscopy; Hysterectomy; ECHO Compl W Dop Color Flow (10/21/2013); Nerve Block (Right, 2018); Injection Procedure For Sacroiliac Joint (Right, 2018); Nerve Block (Left, 2019); Injection Procedure For Sacroiliac Joint (Left, 2019); Injection Procedure For Sacroiliac Joint (Bilateral, 2019); Stomach surgery (N/A); Cholecystectomy, laparoscopic (N/A, 2019); Nerve Block (Bilateral, 2020); Anesthesia Nerve Block (Bilateral, 2020); Nerve Block (Bilateral, 10/29/2020); and Nerve Block (Bilateral, 10/29/2020). Procedure/Surgery:  none  Precautions:  Falls, spinal neutral mechanics,, FWB (full weight bearing),   Equipment Needs:  Wheeled Walker      SUBJECTIVE:    Patient lives with spouse  in a ranch home  with 3 steps to enter  ? Rail    Patient ambulated with wheeled walker  PTA. Equipment owned: Baltazar Rick,  [id number] [employer]    OBJECTIVE:   Initial Evaluation  Date: 20 Treatment Short Term/ Long Term   Goals   AM-PAC 6 Clicks      Was pt agreeable to Eval/treatment? yes     Does pt have pain? Moderate pain noted with mobility. Bed Mobility  Rolling: Min  Supine to sit: Min  Sit to supine: Min  Scooting: Min  Rolling: Ind  Supine to sit: Ind  Sit to supine: Ind  Scooting: Ind   Transfers Sit to stand: Min to fww  Mod A from commode surface.    Stand to sit: Min  Stand pivot: Min with fww  Sit to stand: Mod Ind  Stand to sit: Mod Ind  Stand pivot: Mod Ind   Ambulation    100 feet with fww Min A cues for safety. 150 feet with fww with Mod Ind. Stair negotiation: ascended and descended  NT  3 steps with 1 rail Min   ROM BUE:  See OT eval  BLE:  wfl     Strength BUE: See OT eval   RLE:  4-/5  LLE:   4-/5  4/5   Balance Sitting EOB:  Ind  Dynamic Standing:  Min  Sitting EOB:  Ind  Dynamic Standing: Mod Ind     Patient is Alert & Oriented x person, place, time and situation and follows directions   Sensation:  Pt denies numbness and tingling to extremities  Edema:  none    Vitals:  Blood Pressure at rest - Blood Pressure post session -   Heart Rate at rest - Heart Rate post session -   SPO2 at rest 93% SPO2 post session -%     Therapeutic Exercises:  none    Patient education  Patient educated on role of Physical Therapy, risks of immobility, safety and plan of care     Patient response to education:   Pt verbalized understanding Pt demonstrated skill Pt requires further education in this area   yes yes reminders     ASSESSMENT:    Comments:  Patient was seen this date for PT evaluation. . Patient was agreeable to intervention. Results of the functional assessment are noted above. Gait completed with fww with cues for sequencing. At end of session, patient in bed with  call light and phone within reach,  all lines and tubes intact, nursing notified. This patient can benefit from the continuation of skilled PT  to maximize functional level and return to PLOF.      Treatment:  Patient practiced and was instructed in the following treatment:    · Bed mobility training - pt given verbal and tactile cues to facilitate proper sequencing and safety during rolling and supine>sit as well as provided with physical assistance to complete task    · Assistive device training - pt educated on using [employer] during gait, [employer] approximation/negotiation, and hand placement during sit<>stand to [employer]  · STS and transfer training - educated on hand/foot placement, safety, and sequencing during STS and pivot transfers using assistive device  · Gait training - verbal cues for [employer] approximation/negotiation, upright posture, and safety during 90 and 180 degree turns during gait        Pt's/ family goals   1. Home. Patient and or family understand(s) diagnosis, prognosis, and plan of care. yes    PLAN OF CARE:    PT care will be provided in accordance with the objectives noted above. Exercises and functional mobility practice will be used as well as appropriate assistive devices or modalities to obtain goals. Patient and family education will also be administered as needed. Current Treatment Recommendations     [x] Strengthening     [x] ROM   [] Balance Training   [x] Endurance Training   [x] Transfer Training   [x] Gait Training   [] Stair Training   [] Positioning   [] Safety and Education Training   [] Patient/Caregiver Education   [] HEP  [] Other     Frequency of treatments: 2-5x/week x 1-2 weeks. Time in  1445  Time out  1505    Total Treatment Time  20 minutes     Evaluation Time includes thorough review of current medical information, gathering information on past medical history/social history and prior level of function, completion of standardized testing/informal observation of tasks, assessment of data and education on plan of care and goals.     CPT codes:  [] Low Complexity PT evaluation 36619  [x] Moderate Complexity PT evaluation 50866  [] High Complexity PT evaluation 56890  [] PT Re-evaluation 78060  [] Gait training 03370 - minutes  [] Manual therapy 46931 - minutes  [x] Therapeutic activities 05622 10 minutes  [] Therapeutic exercises 96023 - minutes  [] Neuromuscular reeducation 80965 - minutes       Radha Schumacher, [address] [facility]

## 2020-11-24 NOTE — CONSULTS
B SURGERY  CONSULT NOTE  11/23/2020     Physician Consulted: Dr. Ramon Schultz  Reason for Consult: Chronic cholecystitis  Referring Physician: Dr. Zhao Pinto is a 80 y.o. female transfer from [address] [place], [place] [address] with PMHx of Afib and cholecystitis who presents for evaluation of abdominal pain. She states that she has been seen for this before in the past (11/2019) and had a diagnostic lap, converted open with closure of an enterotomy and cholangiogram. She states they were unable to perform the cholecystectomy due to dense adhesions in her abdomen. She says she always has RUQ pain, but it worsened about a month ago. She states the pain is sharp, constant, radiates across her upper abdomen, and is unrelated to food. She denies any N/V or GERD. She also denies any changes in her urine or bowel appearance. She has never had an EGD or colonoscopy. Her only other abdominal surgery was an open appendectomy.  She is on Eliquis, last dose Wednesday 11/18.        Past Medical History        Past Medical History:   Diagnosis Date    Alzheimer disease ([address]      Arthritis      Atrial fibrillation (HCC)      GERD (gastroesophageal reflux disease)      Hyperlipidemia             Past Surgical History         Past Surgical History:   Procedure Laterality Date    ABDOMEN SURGERY        ANESTHESIA NERVE BLOCK Bilateral 2/6/2020     BILATERAL MEDIAL BRANCH L4-5 AND L5-S1 JOINTS performed by Eriberto Velazco DO at [address], LAPAROSCOPIC N/A 11/21/2019     DIAGNOSITIC LAPAROSCOPY CONVERTED TO EXPLORATORY LAPAROTOMY WITH CLOSURE OF ENTEROTOMY X1, CHOLANGIOGRAM, EGD performed by Regina Hernandez MD at [address] [facility] ECHO COMPL [address] [facility]    10/21/2013          [facility] INJECTION PROCEDURE FOR SACROILIAC JOINT Right 12/13/2018     RIGHT SACROILIAC JOINT STEROID INJECTION UNDER X-RAY GUIDANCE performed by Eriberto Velazco DO at [address] [facility] OR     INJECTION PROCEDURE FOR SACROILIAC JOINT Left 1/17/2019     LEFT SACROILIAC JOINT STEROID INJECTION UNDER X-RAY GUIDANCE performed by Cassandra Tello DO at [address] [facility] [address] JOINT Bilateral 9/19/2019     BILATERAL SACROILIAC JOINT INJECTION X-RAY performed by Cassandra Tello DO at [address] [facility] Right 12/13/2018     sacroiliac joint     NERVE BLOCK Left 01/17/2019     sacroiliac joint injection     NERVE BLOCK Bilateral 02/06/2020     medial branch block    NERVE BLOCK Bilateral 10/29/2020     intra articular     NERVE BLOCK Bilateral 10/29/2020     BILATERAL L4-5 AND L5-S1 INTRA-ARTICULAR FACET STEROID INJECTION (CPT 68543,65637) performed by Cassandra Tello DO at [facility] N/A       d/t peptic ulcers           Medications Prior to Admission:    Home Medications           Prior to Admission medications    Medication Sig Start Date End Date Taking?  Authorizing Provider   furosemide (LASIX) 20 MG tablet TAKE 1 TABLET BY MOUTH 3  TIMES WEEKLY 10/22/20   Yes Jackie Osler, MD   citalopram (CELEXA) 10 MG tablet Take 1 tablet by mouth every morning 10/22/20   Yes Jackie Osler, MD   memantine (NAMENDA) 10 MG tablet Take 1 tablet by mouth 2 times daily 9/28/20   Yes Jackie Osler, MD   apixaban (ELIQUIS) 2.5 MG TABS tablet TAKE 1 TABLET BY MOUTH  TWICE DAILY 9/28/20   Yes Jackie Osler, MD   donepezil (ARICEPT) 10 MG tablet Take 1 tablet by mouth nightly 8/11/20   Yes Sarika Maradiaga MD   metoprolol tartrate (LOPRESSOR) 25 MG tablet Take 1 tablet by mouth 2 times daily 6/22/20 11/22/20 Yes Alexia Cunningham MD   Glucosamine-Chondroit-Vit C-Mn (GLUCOSAMINE 1500 COMPLEX PO) Take by mouth     Yes Historical Provider, MD   Cholecalciferol (VITAMIN D3) 2000 units CAPS Take by mouth     Yes Historical Provider, MD   traMADol (ULTRAM) 50 MG tablet Take 50 mg by PTT in the last 72 hours.     Invalid input(s): PT     RADIOLOGY     No results found.        ASSESSMENT:  80 y.o. female with RUQ abdominal pain, likely due to chronic cholecystitis     PLAN:  - NPO  - IVFs  - Plan for non-operative management with antibiotics, Rocephin/Flagyl  - Pain control PRN  - Antiemetic PRN  - Replete lytes PRN  - Overall care per primary  - Discussed with Dr. Sharon Garcia     Electronically signed by Vandana Ojeda MD on 11/23/20 at 1:09 AM EST      Attending Physician Statement:    Chief Complaint: No chief complaint on file. I have examined the patient and performed the key aspects of physical exam, reviewed the record (including all pertinent and new radiology images and laboratory findings), and discussed the case with the surgical team.  I agree with the assessment and plan with the following additions, corrections, and changes. 14pt review of symptoms completed and negative except as mentioned. Operative note from Dr. Kadi Cortés reviewed from prior. Last CT of the abdomen also reviewed. There is difficult dissection and significant adhesions. Exploratory laparotomy also showed difficulty defining the anatomy at that time it appears. 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 Jillian Jean-Baptiste op note. Also, appears that she has a gastrojejunostomy. Difficult case at this point. She has 0 tenderness on exam.  Her LFTs and white count are unremarkable. At this point recommend discontinuing p.o. antibiotics and having her follow-up in the next week or so with Dr. Sharon Garcia to discuss further. Rogelio Villalobos MD  11/23/2020  1530    NOTE: This report, in part or full, may have been transcribed using voice recognition software.  Every effort was made to ensure accuracy; however, inadvertent computerized transcription errors may be present. Please excuse any transcriptional grammatical or spelling errors that may have escaped my editorial review.     Addendum: Transcription error - CONTINUE PO ABX AT DISCHARGE

## 2020-11-24 NOTE — PROGRESS NOTES
GENERAL SURGERY  DAILY PROGRESS NOTE  11/24/2020    Subjective:  Patient resting comfortably in bed this morning. Was arousable however she did not answer any questions. Did not appear to have any pain to abdominal palpation. In no acute distress. Spoke with the  yesterday, and he states that he would want the patient to have the open cholecystectomy, and he believes the patient would want to have the open cholecystectomy. Open cholecystectomy scheduled for 11/30, however with COVID pandemic elective surgeries are being canceled. Objective:  BP (!) 147/87   Pulse 78   Temp 98.4 °F (36.9 °C) (Temporal)   Resp 16   Ht 5' 6\" (1.676 m)   Wt 110 lb (49.9 kg)   SpO2 95%   BMI 17.75 kg/m²     Gen: alert, oriented, no apparent distress  HEENT: NCAT, anicteric  CV: RR  Pulm: nonlabored breathing on room air. Abdomen: Soft, nondistended, nontender to palpation. Upper midline scar.   Extremities: moving all extremities, no peripheral edema  Skin: warm and dry    Assessment/Plan:  80 y.o. female with RUQ abdominal pain, likely due to chronic cholecystitis    -Low-fat diet  - Non-operative management with antibiotics currently and elective open cholecystectomy on 11/30 scheduled, however elective cases canceled due to [place] 19 pandemic  - Patient is okay to receive acute treatment for compression fracture of L1 and L2 in light of recent elective surgery cancellation  - Rocephin/Flagyl  - Pain control PRN  - Antiemetic PRN  - Replete lytes PRN  - Overall care per primary       Electronically signed by Alexander Gannon DO on 11/24/2020 at 6:15 AM     Attending Physician Statement:    Chief Complaint: Chronic cholecystitis    I have examined the patient and performed the key aspects of physical exam, reviewed the record (including all pertinent and new radiology images and laboratory findings), and discussed the case with the surgical team.  I agree with the assessment and plan with the following additions, corrections, and changes. 14pt review of symptoms completed and negative except as mentioned. No acute issues, abdomen benign. CBC and LFTs unremarkable. Okay to discharge with p.o. antibiotic and follow-up with Dr. Marcella Moncada  in the office to discuss further. In addition see consult note attestation from yesterday. Yuriy Craig MD  11/24/20  11:55 AM    NOTE: This report, in part or full, may have been transcribed using voice recognition software. Every effort was made to ensure accuracy; however, inadvertent computerized transcription errors may be present. Please excuse any transcriptional grammatical or spelling errors that may have escaped my editorial review.

## 2020-11-24 NOTE — PROGRESS NOTES
IMPRESSION:  1. Compression fracture of L1 and L2, which could cause low back pain  and may be groin pain. She has multiple medical comorbidities including  multiple abdominal surgeries. 2.  It is hard to define the type of pain she has and with the history  of dementia and hard of hearing, difficult to reconcile her symptoms. The patient does have acute to subacute compression fracture of L1-L2,  which could give her back pain and some hip pain. 3.  The patient is being evaluated by General Surgery, Dr. Kylie Valadez. We will await decision and we will discussed the finding with the  patient's family for further management. Reviewed the MRI scan of Lumbar Spine   Acute to subacute compression fracture at the superior endplate of L1 with    abnormal bone marrow signal and 25% height loss. 2 mm retropulsion of    posterior cortex of L1. Acute to subacute compression fracture of L2 with abnormal bone marrow signal    and 40% height loss.  2 mm retropulsion of posterior cortex of L2. Increased T2 signal in the bilateral sacral ala, may be related to acute to    subacute nondisplaced insufficiency fractures. Degenerative disc disease as described above, exacerbating congenitally    narrow lumbar spinal canal.    Spinal canal narrowing, mild-to-moderate at L4-5, mild at L3-4. Foraminal narrowing, moderate to severe at left L5-S1, mild to moderate at    left L4-5. At least some of her pain could be coming from L1 & L2 compression fractures. Recommend Kyphoplasty for the same unless General surgery has more answers than I do.   After discussing with her , will plan kyphoplasty for tomorrow

## 2020-11-24 NOTE — PROGRESS NOTES
Hospitalist Progress Note      SYNOPSIS: Patient admitted on 2020 for Symptomatic cholelithiasis presented to [facility] from [address] with past medical history of dementia, atrial fibrillation, osteoarthritis of multiple joints and DJD of the spine, sacroiliitis, GERD, depression, hyperlipidemia, B12 deficiency, cholelithiasis with cholecystitis status post diagnostic laparoscopy converted to exploratory laparotomy with closure of enterostomy, cholangiogram and GABO drain with EGD done in 2019. Patient currently complains of pain around his bilateral waist/hip area. Pain is dull, severe, constant, radiates to the knees, worse with movement and associated with ambulatory dysfunction. She also reports right-sided abdominal pain which she states that is worse after eating. No nausea or vomiting. No fever. No change in bowel movement. No leg swelling. She complains of severe low back pain. She denies any recent falls. Patient has not had her Eliquis for a few days. She has not been eating for the past 3 days. CT scan of the abdomen shows hydropic gallbladder with gallstone no ductal dilatation. Age indeterminate L2 compression fracture. Diverticulosis. SUBJECTIVE:  Stable overnight. No other overnight issues reported. Patient seen and examined  Records reviewed. Patient extremely hard of hearing, hard to communicate      Temp (24hrs), Av.5 °F (36.4 °C), Min:96.5 °F (35.8 °C), Max:98.4 °F (36.9 °C)    DIET: DIET LOW FAT;  CODE: Full Code    Intake/Output Summary (Last 24 hours) at 2020 0804  Last data filed at 2020 4515  Gross per 24 hour   Intake 240 ml   Output --   Net 240 ml       Review of Systems  All bolded are positive; please see HPI  General:  Fever, chills, diaphoresis, fatigue, malaise, night sweats, weight loss  Psychological:  Anxiety, disorientation, hallucinations. ENT:  Epistaxis, headaches, vertigo, visual changes.  [place] [facility]  Cardiovascular:  Chest pain, irregular heartbeats, palpitations, paroxysmal nocturnal dyspnea. Respiratory:  Shortness of breath, coughing, sputum production, hemoptysis, wheezing, orthopnea. Gastrointestinal:  Nausea, vomiting, diarrhea, heartburn, constipation, abdominal pain, hematemesis, hematochezia, melena, acholic stools  Genito-Urinary:  Dysuria, urgency, frequency, hematuria  Musculoskeletal:  Joint pain, joint stiffness, joint swelling, muscle pain Back pain  Neurology:  Headache, focal neurological deficits, weakness, numbness, paresthesia  Derm:  Rashes, ulcers, excoriations, bruising  Extremities:  Decreased ROM, peripheral edema, mottling      OBJECTIVE:    BP (!) 147/87   Pulse 78   Temp 98.4 °F (36.9 °C) (Temporal)   Resp 16   Ht 5' 6\" (1.676 m)   Wt 110 lb (49.9 kg)   SpO2 95%   BMI 17.75 kg/m²     General appearance:  awake, alert, and oriented to person, place, time, and purpose; appears stated age and cooperative; no apparent distress no labored breathing  HEENT:  Conjunctivae/corneas clear. Very [place]  Neck: Supple. No jugular venous distention. Respiratory: symmetrical; clear to auscultation bilaterally; no wheezes; no rhonchi; no rales  Cardiovascular: rhythm regular; rate controlled; no murmurs  Abdomen: Soft, nontender, nondistended  Extremities:  peripheral pulses present; no peripheral edema; no ulcers  Musculoskeletal: No clubbing, cyanosis, no bilateral lower extremity edema. Brisk capillary refill. Skin:  No rashes  on visible skin  Neurologic: awake, alert and following commands     ASSESSMENT and PLAN:  · Symptomatic chronic cholelithiasis- General surgery consult. Originally scheduled for open [facility] 11/30 however due to elective surgeries being cancelled due to covid this was cancelled. Per general surgery, at this point can be transitioned to po antibiotics and having her follow-up in the next week or so with Dr. Molly Moreno to discuss further.   · Bilateral hip and knee pain, patient with history of osteoarthritis and sacroiliitis. Pain control. Physical therapy as tolerated  · Intractable low back pain, L2 compression fracture that is age-indeterminate with 40% height loss. Awaiting neurosurgery plan  · Hypertension, Resume home blood pressure medications and closely monitor. · Depression-  Continue current medication  · Hyperlipidemia- statin  · Dementia- stable  · Atrial fibrillation currently rate controlled, hold Eliquis pending surgery evaluation. Resume as soon as possible. Awaiting neurosurgery plan. SCDs for no  · GERD- PPI.   · Ambulatory dysfunction-  Physical therapy         DISPOSITION: Awaiting neurosurgery plan    Medications:  REVIEWED DAILY    Infusion Medications   Scheduled Medications    citalopram  10 mg Oral QAM    donepezil  10 mg Oral Nightly    memantine  10 mg Oral BID    metoprolol tartrate  25 mg Oral BID    sodium chloride flush  10 mL Intravenous 2 times per day    metroNIDAZOLE  500 mg Intravenous Q8H    cefTRIAXone (ROCEPHIN) IV  2 g Intravenous Q24H     PRN Meds: traMADol, sodium chloride flush, acetaminophen **OR** acetaminophen, polyethylene glycol, promethazine **OR** ondansetron, melatonin, hydrALAZINE    Labs:     Recent Labs     11/22/20  1814 11/23/20  0538   WBC 6.7 6.9   HGB 14.3 12.6   HCT 44.0 39.6    298       Recent Labs     11/22/20  1814 11/23/20  0538    143   K 4.7 3.9    107   CO2 23 24   BUN 27* 21   CREATININE 0.9 0.7   CALCIUM 9.5 9.0       Recent Labs     11/22/20  1814 11/23/20  0538   PROT 6.7 5.9*   ALKPHOS 85 69   ALT 8 6   AST 22 15   BILITOT 0.2 <0.2   LIPASE 16  --        Recent Labs     11/23/20  0538   INR 1.1       Recent Labs     11/22/20 1814   TROPONINI <0.01       Chronic labs:    Lab Results   Component Value Date    CHOL 177 07/18/2016    TRIG 80 07/18/2016    HDL 68 09/24/2020    LDLCALC 78 09/24/2020    TSH 3.420 09/24/2020    INR 1.1 11/23/2020    LABA1C 5.7 (H) 03/01/2019       Radiology: REVIEWED DAILY    +++++++++++++++++++++++++++++++++++++++++++++++++  Alicia Das Physician - 2020 [place], [place]  +++++++++++++++++++++++++++++++++++++++++++++++++  NOTE: This report was transcribed using voice recognition software. Every effort was made to ensure accuracy; however, inadvertent computerized transcription errors may be present.

## 2020-11-25 ENCOUNTER — TELEPHONE (OUTPATIENT)
Dept: HEMATOLOGY | Age: 85
End: 2020-11-25

## 2020-11-25 ENCOUNTER — APPOINTMENT (OUTPATIENT)
Dept: GENERAL RADIOLOGY | Age: 85
DRG: 478 | End: 2020-11-25
Attending: FAMILY MEDICINE
Payer: MEDICARE

## 2020-11-25 ENCOUNTER — ANESTHESIA (OUTPATIENT)
Dept: OPERATING ROOM | Age: 85
DRG: 478 | End: 2020-11-25
Payer: MEDICARE

## 2020-11-25 VITALS — DIASTOLIC BLOOD PRESSURE: 101 MMHG | SYSTOLIC BLOOD PRESSURE: 161 MMHG | OXYGEN SATURATION: 100 %

## 2020-11-25 PROCEDURE — 0QU03JZ SUPPLEMENT LUMBAR VERTEBRA WITH SYNTHETIC SUBSTITUTE, PERCUTANEOUS APPROACH: ICD-10-PCS | Performed by: NEUROLOGICAL SURGERY

## 2020-11-25 PROCEDURE — 96366 THER/PROPH/DIAG IV INF ADDON: CPT

## 2020-11-25 PROCEDURE — 88307 TISSUE EXAM BY PATHOLOGIST: CPT

## 2020-11-25 PROCEDURE — C1894 INTRO/SHEATH, NON-LASER: HCPCS | Performed by: NEUROLOGICAL SURGERY

## 2020-11-25 PROCEDURE — C1713 ANCHOR/SCREW BN/BN,TIS/BN: HCPCS | Performed by: NEUROLOGICAL SURGERY

## 2020-11-25 PROCEDURE — 72100 X-RAY EXAM L-S SPINE 2/3 VWS: CPT

## 2020-11-25 PROCEDURE — 3600000014 HC SURGERY LEVEL 4 ADDTL 15MIN: Performed by: NEUROLOGICAL SURGERY

## 2020-11-25 PROCEDURE — 2720000010 HC SURG SUPPLY STERILE: Performed by: NEUROLOGICAL SURGERY

## 2020-11-25 PROCEDURE — 88311 DECALCIFY TISSUE: CPT

## 2020-11-25 PROCEDURE — 6360000002 HC RX W HCPCS: Performed by: NEUROLOGICAL SURGERY

## 2020-11-25 PROCEDURE — 2580000003 HC RX 258: Performed by: ANESTHESIOLOGIST ASSISTANT

## 2020-11-25 PROCEDURE — 2709999900 HC NON-CHARGEABLE SUPPLY: Performed by: NEUROLOGICAL SURGERY

## 2020-11-25 PROCEDURE — 6370000000 HC RX 637 (ALT 250 FOR IP): Performed by: FAMILY MEDICINE

## 2020-11-25 PROCEDURE — 3700000001 HC ADD 15 MINUTES (ANESTHESIA): Performed by: NEUROLOGICAL SURGERY

## 2020-11-25 PROCEDURE — 3600000004 HC SURGERY LEVEL 4 BASE: Performed by: NEUROLOGICAL SURGERY

## 2020-11-25 PROCEDURE — 2580000003 HC RX 258: Performed by: STUDENT IN AN ORGANIZED HEALTH CARE EDUCATION/TRAINING PROGRAM

## 2020-11-25 PROCEDURE — 7100000001 HC PACU RECOVERY - ADDTL 15 MIN: Performed by: NEUROLOGICAL SURGERY

## 2020-11-25 PROCEDURE — 1200000000 HC SEMI PRIVATE

## 2020-11-25 PROCEDURE — 7100000000 HC PACU RECOVERY - FIRST 15 MIN: Performed by: NEUROLOGICAL SURGERY

## 2020-11-25 PROCEDURE — 2500000003 HC RX 250 WO HCPCS: Performed by: NEUROLOGICAL SURGERY

## 2020-11-25 PROCEDURE — 6370000000 HC RX 637 (ALT 250 FOR IP): Performed by: NEUROLOGICAL SURGERY

## 2020-11-25 PROCEDURE — 3209999900 FLUORO FOR SURGICAL PROCEDURES

## 2020-11-25 PROCEDURE — 3E0S33Z INTRODUCTION OF ANTI-INFLAMMATORY INTO EPIDURAL SPACE, PERCUTANEOUS APPROACH: ICD-10-PCS | Performed by: NEUROLOGICAL SURGERY

## 2020-11-25 PROCEDURE — 2580000003 HC RX 258: Performed by: NEUROLOGICAL SURGERY

## 2020-11-25 PROCEDURE — 0QB03ZX EXCISION OF LUMBAR VERTEBRA, PERCUTANEOUS APPROACH, DIAGNOSTIC: ICD-10-PCS | Performed by: NEUROLOGICAL SURGERY

## 2020-11-25 PROCEDURE — 6360000002 HC RX W HCPCS: Performed by: ANESTHESIOLOGIST ASSISTANT

## 2020-11-25 PROCEDURE — 2580000003 HC RX 258: Performed by: FAMILY MEDICINE

## 2020-11-25 PROCEDURE — 2500000003 HC RX 250 WO HCPCS: Performed by: STUDENT IN AN ORGANIZED HEALTH CARE EDUCATION/TRAINING PROGRAM

## 2020-11-25 PROCEDURE — 3700000000 HC ANESTHESIA ATTENDED CARE: Performed by: NEUROLOGICAL SURGERY

## 2020-11-25 PROCEDURE — 96376 TX/PRO/DX INJ SAME DRUG ADON: CPT

## 2020-11-25 PROCEDURE — 2500000003 HC RX 250 WO HCPCS: Performed by: ANESTHESIOLOGIST ASSISTANT

## 2020-11-25 PROCEDURE — 6360000002 HC RX W HCPCS: Performed by: STUDENT IN AN ORGANIZED HEALTH CARE EDUCATION/TRAINING PROGRAM

## 2020-11-25 DEVICE — BONE CEMENT C01B HV-R WITH MIXER  US
Type: IMPLANTABLE DEVICE | Site: SPINE LUMBAR | Status: FUNCTIONAL
Brand: KYPHON® HV-R® BONE CEMENT AND KYPHON® MIXER PACK

## 2020-11-25 RX ORDER — MEPERIDINE HYDROCHLORIDE 25 MG/ML
12.5 INJECTION INTRAMUSCULAR; INTRAVENOUS; SUBCUTANEOUS EVERY 5 MIN PRN
Status: DISCONTINUED | OUTPATIENT
Start: 2020-11-25 | End: 2020-11-25 | Stop reason: HOSPADM

## 2020-11-25 RX ORDER — METHYLPREDNISOLONE ACETATE 80 MG/ML
INJECTION, SUSPENSION INTRA-ARTICULAR; INTRALESIONAL; INTRAMUSCULAR; SOFT TISSUE PRN
Status: DISCONTINUED | OUTPATIENT
Start: 2020-11-25 | End: 2020-11-25 | Stop reason: ALTCHOICE

## 2020-11-25 RX ORDER — PROPOFOL 10 MG/ML
INJECTION, EMULSION INTRAVENOUS PRN
Status: DISCONTINUED | OUTPATIENT
Start: 2020-11-25 | End: 2020-11-25 | Stop reason: SDUPTHER

## 2020-11-25 RX ORDER — DEXAMETHASONE SODIUM PHOSPHATE 10 MG/ML
INJECTION, SOLUTION INTRAMUSCULAR; INTRAVENOUS PRN
Status: DISCONTINUED | OUTPATIENT
Start: 2020-11-25 | End: 2020-11-25 | Stop reason: SDUPTHER

## 2020-11-25 RX ORDER — PHENYLEPHRINE HCL IN 0.9% NACL 1 MG/10 ML
SYRINGE (ML) INTRAVENOUS PRN
Status: DISCONTINUED | OUTPATIENT
Start: 2020-11-25 | End: 2020-11-25 | Stop reason: SDUPTHER

## 2020-11-25 RX ORDER — LIDOCAINE HYDROCHLORIDE 20 MG/ML
INJECTION, SOLUTION INTRAVENOUS PRN
Status: DISCONTINUED | OUTPATIENT
Start: 2020-11-25 | End: 2020-11-25 | Stop reason: SDUPTHER

## 2020-11-25 RX ORDER — LIDOCAINE HYDROCHLORIDE AND EPINEPHRINE 10; 10 MG/ML; UG/ML
INJECTION, SOLUTION INFILTRATION; PERINEURAL PRN
Status: DISCONTINUED | OUTPATIENT
Start: 2020-11-25 | End: 2020-11-25 | Stop reason: ALTCHOICE

## 2020-11-25 RX ORDER — SODIUM CHLORIDE 9 MG/ML
INJECTION, SOLUTION INTRAVENOUS CONTINUOUS PRN
Status: DISCONTINUED | OUTPATIENT
Start: 2020-11-25 | End: 2020-11-25 | Stop reason: SDUPTHER

## 2020-11-25 RX ORDER — ROCURONIUM BROMIDE 10 MG/ML
INJECTION, SOLUTION INTRAVENOUS PRN
Status: DISCONTINUED | OUTPATIENT
Start: 2020-11-25 | End: 2020-11-25 | Stop reason: SDUPTHER

## 2020-11-25 RX ORDER — FENTANYL CITRATE 50 UG/ML
INJECTION, SOLUTION INTRAMUSCULAR; INTRAVENOUS PRN
Status: DISCONTINUED | OUTPATIENT
Start: 2020-11-25 | End: 2020-11-25 | Stop reason: SDUPTHER

## 2020-11-25 RX ORDER — ONDANSETRON 2 MG/ML
INJECTION INTRAMUSCULAR; INTRAVENOUS PRN
Status: DISCONTINUED | OUTPATIENT
Start: 2020-11-25 | End: 2020-11-25 | Stop reason: SDUPTHER

## 2020-11-25 RX ORDER — GLYCOPYRROLATE 1 MG/5 ML
SYRINGE (ML) INTRAVENOUS PRN
Status: DISCONTINUED | OUTPATIENT
Start: 2020-11-25 | End: 2020-11-25 | Stop reason: SDUPTHER

## 2020-11-25 RX ADMIN — FENTANYL CITRATE 25 MCG: 50 INJECTION, SOLUTION INTRAMUSCULAR; INTRAVENOUS at 08:13

## 2020-11-25 RX ADMIN — TRAMADOL HYDROCHLORIDE 50 MG: 50 TABLET ORAL at 18:53

## 2020-11-25 RX ADMIN — DEXAMETHASONE SODIUM PHOSPHATE 10 MG: 10 INJECTION, SOLUTION INTRAMUSCULAR; INTRAVENOUS at 07:42

## 2020-11-25 RX ADMIN — SODIUM CHLORIDE: 9 INJECTION, SOLUTION INTRAVENOUS at 07:33

## 2020-11-25 RX ADMIN — LIDOCAINE HYDROCHLORIDE 80 MG: 20 INJECTION, SOLUTION INTRAVENOUS at 07:41

## 2020-11-25 RX ADMIN — Medication 0.2 MG: at 07:59

## 2020-11-25 RX ADMIN — TRAMADOL HYDROCHLORIDE 50 MG: 50 TABLET ORAL at 04:43

## 2020-11-25 RX ADMIN — SODIUM CHLORIDE, PRESERVATIVE FREE 10 ML: 5 INJECTION INTRAVENOUS at 02:02

## 2020-11-25 RX ADMIN — Medication 0.1 MG: at 07:57

## 2020-11-25 RX ADMIN — Medication 100 MCG: at 07:57

## 2020-11-25 RX ADMIN — ONDANSETRON HYDROCHLORIDE 4 MG: 2 INJECTION, SOLUTION INTRAMUSCULAR; INTRAVENOUS at 08:13

## 2020-11-25 RX ADMIN — SUGAMMADEX 100 MG: 100 INJECTION, SOLUTION INTRAVENOUS at 08:23

## 2020-11-25 RX ADMIN — Medication 50 MCG: at 08:02

## 2020-11-25 RX ADMIN — PROPOFOL 30 MG: 10 INJECTION, EMULSION INTRAVENOUS at 07:49

## 2020-11-25 RX ADMIN — METRONIDAZOLE 500 MG: 500 INJECTION, SOLUTION INTRAVENOUS at 02:09

## 2020-11-25 RX ADMIN — MEMANTINE HYDROCHLORIDE 10 MG: 10 TABLET, FILM COATED ORAL at 20:18

## 2020-11-25 RX ADMIN — DONEPEZIL HYDROCHLORIDE 10 MG: 5 TABLET, FILM COATED ORAL at 20:18

## 2020-11-25 RX ADMIN — CEFTRIAXONE SODIUM 2 G: 2 INJECTION, POWDER, FOR SOLUTION INTRAMUSCULAR; INTRAVENOUS at 02:01

## 2020-11-25 RX ADMIN — METOPROLOL TARTRATE 25 MG: 25 TABLET, FILM COATED ORAL at 20:18

## 2020-11-25 RX ADMIN — FENTANYL CITRATE 50 MCG: 50 INJECTION, SOLUTION INTRAMUSCULAR; INTRAVENOUS at 07:52

## 2020-11-25 RX ADMIN — PROPOFOL 100 MG: 10 INJECTION, EMULSION INTRAVENOUS at 07:41

## 2020-11-25 RX ADMIN — ROCURONIUM BROMIDE 20 MG: 10 INJECTION, SOLUTION INTRAVENOUS at 07:42

## 2020-11-25 RX ADMIN — SODIUM CHLORIDE, PRESERVATIVE FREE 10 ML: 5 INJECTION INTRAVENOUS at 20:18

## 2020-11-25 RX ADMIN — FENTANYL CITRATE 50 MCG: 50 INJECTION, SOLUTION INTRAMUSCULAR; INTRAVENOUS at 07:41

## 2020-11-25 ASSESSMENT — PULMONARY FUNCTION TESTS
PIF_VALUE: 17
PIF_VALUE: 16
PIF_VALUE: 16
PIF_VALUE: 3
PIF_VALUE: 16
PIF_VALUE: 17
PIF_VALUE: 16
PIF_VALUE: 18
PIF_VALUE: 16
PIF_VALUE: 18
PIF_VALUE: 21
PIF_VALUE: 15
PIF_VALUE: 15
PIF_VALUE: 4
PIF_VALUE: 16
PIF_VALUE: 3
PIF_VALUE: 16
PIF_VALUE: 5
PIF_VALUE: 9
PIF_VALUE: 17
PIF_VALUE: 18
PIF_VALUE: 5
PIF_VALUE: 15
PIF_VALUE: 16
PIF_VALUE: 4
PIF_VALUE: 17
PIF_VALUE: 4
PIF_VALUE: 18
PIF_VALUE: 18
PIF_VALUE: 19

## 2020-11-25 ASSESSMENT — PAIN SCALES - GENERAL
PAINLEVEL_OUTOF10: 7
PAINLEVEL_OUTOF10: 0
PAINLEVEL_OUTOF10: 0
PAINLEVEL_OUTOF10: 6
PAINLEVEL_OUTOF10: 0

## 2020-11-25 ASSESSMENT — PAIN DESCRIPTION - ONSET: ONSET: GRADUAL

## 2020-11-25 ASSESSMENT — PAIN DESCRIPTION - PROGRESSION: CLINICAL_PROGRESSION: NOT CHANGED

## 2020-11-25 ASSESSMENT — PAIN DESCRIPTION - ORIENTATION: ORIENTATION: LOWER

## 2020-11-25 ASSESSMENT — PAIN - FUNCTIONAL ASSESSMENT: PAIN_FUNCTIONAL_ASSESSMENT: PREVENTS OR INTERFERES SOME ACTIVE ACTIVITIES AND ADLS

## 2020-11-25 ASSESSMENT — PAIN DESCRIPTION - DESCRIPTORS: DESCRIPTORS: ACHING;DISCOMFORT;SORE

## 2020-11-25 ASSESSMENT — PAIN DESCRIPTION - FREQUENCY: FREQUENCY: INTERMITTENT

## 2020-11-25 ASSESSMENT — PAIN DESCRIPTION - LOCATION
LOCATION: BACK
LOCATION: BACK

## 2020-11-25 ASSESSMENT — PAIN DESCRIPTION - PAIN TYPE
TYPE: SURGICAL PAIN
TYPE: ACUTE PAIN

## 2020-11-25 NOTE — BRIEF OP NOTE
Brief Postoperative Note      Patient: Pascale Combs  YOB: 1932  MRN: [id number]    Date of Procedure: 11/25/2020    Pre-Op Diagnosis: Compression Fractures L1 & L2    Post-Op Diagnosis: Same       Procedure(s):  KYPHOPLASTY , L1 L2,BONE BIOPSY, EPIDURAL INJECTION    Surgeon(s):  Crystal Breen MD    Assistant:  * No surgical staff found *    Anesthesia: General    Estimated Blood Loss (mL): Minimal    Complications: None    Specimens:   ID Type Source Tests Collected by Time Destination   A : L1, L2 BONE BIOPSY Tissue Spine SURGICAL PATHOLOGY Crystal Breen MD 11/25/2020 0805        Implants:  Implant Name Type Inv.  Item Serial No.  Lot No. LRB No. Used Action   KIT CEMENT BONE W/KYPHON MIXER TUBE [facility] Cement KIT CEMENT BONE W/KYPHON MIXER TUBE [address] [facility] [id number] N/A 1 Implanted         Drains:   Closed/Suction Drain Right RLQ;Abdomen (Active)       Findings: As expected    Electronically signed by Crystal Breen MD on 11/25/2020 at 8:36 AM

## 2020-11-25 NOTE — PROGRESS NOTES
Hospitalist Progress Note      SYNOPSIS: Patient admitted on 2020 for Symptomatic cholelithiasis presented to [facility] from [address] with past medical history of dementia, atrial fibrillation, osteoarthritis of multiple joints and DJD of the spine, sacroiliitis, GERD, depression, hyperlipidemia, B12 deficiency, cholelithiasis with cholecystitis status post diagnostic laparoscopy converted to exploratory laparotomy with closure of enterostomy, cholangiogram and GABO drain with EGD done in 2019. Patient currently complains of pain around his bilateral waist/hip area. Pain is dull, severe, constant, radiates to the knees, worse with movement and associated with ambulatory dysfunction. She also reports right-sided abdominal pain which she states that is worse after eating. No nausea or vomiting. No fever. No change in bowel movement. No leg swelling. She complains of severe low back pain. She denies any recent falls. Patient has not had her Eliquis for a few days. She has not been eating for the past 3 days. CT scan of the abdomen shows hydropic gallbladder with gallstone no ductal dilatation. Age indeterminate L2 compression fracture. Diverticulosis. SUBJECTIVE:  Stable overnight. No other overnight issues reported. Patient seen and examined  Records reviewed.    Patient extremely hard of hearing, hard to communicate  Had kyphoplasty today  States she feels her back pain is better      Temp (24hrs), Av.2 °F (36.2 °C), Min:96.7 °F (35.9 °C), Max:98 °F (36.7 °C)    DIET: Diet NPO, After Midnight Exceptions are: Sips with Meds  CODE: Full Code    Intake/Output Summary (Last 24 hours) at 2020 0801  Last data filed at 2020 0202  Gross per 24 hour   Intake 820 ml   Output --   Net 820 ml       Review of Systems  All bolded are positive; please see HPI  General:  Fever, chills, diaphoresis, fatigue, malaise, night sweats, weight loss  Psychological:  Anxiety, disorientation, next week or so with Dr. Gilman Sandhoff to discuss further. · Bilateral hip and knee pain, patient with history of osteoarthritis and sacroiliitis. Pain control. Physical therapy as tolerated  · Intractable low back pain, L2 compression fracture that is age-indeterminate with 40% height loss. Had kyphoplasty 11/25  · Hypertension, Resume home blood pressure medications and closely monitor. · Depression-  Continue current medication  · Hyperlipidemia- statin  · Dementia- stable  · Atrial fibrillation currently rate controlled, hold Eliquis pending surgery evaluation. Resume as soon as possible. Awaiting neurosurgery plan. SCDs for now  · GERD- PPI. · Ambulatory dysfunction-  Physical therapy         DISPOSITION: Continue current plan of care. Monitor and will need restarted on Eliquis soon when ok with neurosurgery.  Possible dc tomorrow    Medications:  REVIEWED DAILY    Infusion Medications   Scheduled Medications    ceFAZolin  1 g Intravenous See Admin Instructions    citalopram  10 mg Oral QAM    donepezil  10 mg Oral Nightly    memantine  10 mg Oral BID    metoprolol tartrate  25 mg Oral BID    sodium chloride flush  10 mL Intravenous 2 times per day    metroNIDAZOLE  500 mg Intravenous Q8H    cefTRIAXone (ROCEPHIN) IV  2 g Intravenous Q24H     PRN Meds: meperidine, HYDROmorphone, traMADol, sodium chloride flush, acetaminophen **OR** acetaminophen, polyethylene glycol, promethazine **OR** ondansetron, melatonin, hydrALAZINE    Labs:     Recent Labs     11/22/20 1814 11/23/20  0538   WBC 6.7 6.9   HGB 14.3 12.6   HCT 44.0 39.6    298       Recent Labs     11/22/20 1814 11/23/20  0538    143   K 4.7 3.9    107   CO2 23 24   BUN 27* 21   CREATININE 0.9 0.7   CALCIUM 9.5 9.0       Recent Labs     11/22/20 1814 11/23/20  0538   PROT 6.7 5.9*   ALKPHOS 85 69   ALT 8 6   AST 22 15   BILITOT 0.2 <0.2   LIPASE 16  --        Recent Labs     11/23/20  0538   INR 1.1       Recent Labs

## 2020-11-25 NOTE — ANESTHESIA PRE PROCEDURE
Department of Anesthesiology  Preprocedure Note       Name:  Mary Vicente   Age:  80 y.o.  :  1932                                          MRN:  [id number]         Date:  2020      Surgeon: Haleigh Salinas):  Keny Holloway MD    Procedure: Procedure(s):  KYPHOPLASTY , L1 L2,BONE BIOPSY, EPIDURAL INJECTION    Medications prior to admission:   Prior to Admission medications    Medication Sig Start Date End Date Taking? Authorizing Provider   amoxicillin-clavulanate (AUGMENTIN) 875-125 MG per tablet Take 1 tablet by mouth 2 times daily for 7 days 20 Yes Stanley BROWN Gianfrate, DO   furosemide (LASIX) 20 MG tablet TAKE 1 TABLET BY MOUTH 3  TIMES WEEKLY 10/22/20  Yes Brian Daly MD   citalopram (CELEXA) 10 MG tablet Take 1 tablet by mouth every morning 10/22/20  Yes Brian Daly MD   memantine (NAMENDA) 10 MG tablet Take 1 tablet by mouth 2 times daily 20  Yes Brian Daly MD   apixaban (ELIQUIS) 2.5 MG TABS tablet TAKE 1 TABLET BY MOUTH  TWICE DAILY 20  Yes Brian Daly MD   donepezil (ARICEPT) 10 MG tablet Take 1 tablet by mouth nightly 20  Yes Catracho Thomas MD   Glucosamine-Chondroit-Vit C-Mn (GLUCOSAMINE 1500 COMPLEX PO) Take 1 tablet by mouth daily    Yes Historical Provider, MD   Cholecalciferol (VITAMIN D3) 2000 units CAPS Take 2,000 Units by mouth daily    Yes Historical Provider, MD   metoprolol tartrate (LOPRESSOR) 25 MG tablet Take 25 mg by mouth 2 times daily    Historical Provider, MD   diclofenac sodium (VOLTAREN) 1 % GEL Apply 4 g topically 4 times daily as needed for Pain    Historical Provider, MD   traMADol (ULTRAM) 50 MG tablet Take 50 mg by mouth every 6 hours as needed for Pain. Historical Provider, MD   Compression Stockings MISC Wear during wake hours.  3/11/20   Brian Daly MD       Current medications:    Current Facility-Administered Medications   Medication Dose Route Frequency Provider Last Rate Last Dose    ceFAZolin (ANCEF) 1 g in sterile water 10 mL IV syringe  1 g Intravenous See Admin Instructions Asaf Patel MD        citalopram (CELEXA) tablet 10 mg  10 mg Oral QAM Rocky Lester MD   10 mg at 11/24/20 1002    donepezil (ARICEPT) tablet 10 mg  10 mg Oral Nightly oRcky Lester MD   10 mg at 11/24/20 2136    memantine (NAMENDA) tablet 10 mg  10 mg Oral BID Rocky Lester MD   10 mg at 11/24/20 2136    metoprolol tartrate (LOPRESSOR) tablet 25 mg  25 mg Oral BID Rocky Lester MD   25 mg at 11/24/20 2136    traMADol (ULTRAM) tablet 50 mg  50 mg Oral Q6H PRN Rocky Lester MD   50 mg at 11/25/20 0443    sodium chloride flush 0.9 % injection 10 mL  10 mL Intravenous 2 times per day Rocky Lester MD   10 mL at 11/24/20 2136    sodium chloride flush 0.9 % injection 10 mL  10 mL Intravenous PRN Rocky Lester MD   10 mL at 11/25/20 0202    acetaminophen (TYLENOL) tablet 650 mg  650 mg Oral Q6H PRN Rokcy Lester MD   650 mg at 11/24/20 2136    Or    acetaminophen (TYLENOL) suppository 650 mg  650 mg Rectal Q6H PRN Rocky Lester MD        polyethylene glycol (GLYCOLAX) packet 17 g  17 g Oral Daily PRN Rocky Lester MD        promethazine (PHENERGAN) tablet 12.5 mg  12.5 mg Oral Q6H PRN Rocky Lester MD        Or    ondansetron (ZOFRAN) injection 4 mg  4 mg Intravenous Q6H PRN Rocky Lester MD        melatonin tablet 3 mg  3 mg Oral Nightly PRN Rocky Lester MD   3 mg at 11/23/20 0106    metronidazole (FLAGYL) 500 mg in NaCl 100 mL IVPB premix  500 mg Intravenous Q8H Nikos Hood MD   Stopped at 11/25/20 0310    cefTRIAXone (ROCEPHIN) 2 g in sterile water 19.2 mL IV syringe  2 g Intravenous Q24H Josh Chacon DO   2 g at 11/25/20 0201    hydrALAZINE (APRESOLINE) injection 5 mg  5 mg Intravenous Q6H PRN Eltank Burks, HENRRY - CNS   5 mg at 11/23/20 1304       Allergies: Allergies   Allergen Reactions    Iodine      I.V.        Problem List: Patient Active Problem List   Diagnosis Code    Atrial fibrillation (HCC) I48.91    Gastroesophageal reflux disease without esophagitis K21.9    Alzheimer's dementia without behavioral disturbance (HCC) G30.9, F02.80    Vitamin B 12 deficiency E53.8    Arthritis M19.90    Sacroiliitis ([facility] M46.1    Depression F32.9    Hyperlipidemia E78.5    RUQ abdominal pain R10.11    Calculus of gallbladder without cholecystitis without obstruction K80.20    Syncope R55    Calculus of gallbladder with cholecystitis with biliary obstruction K80.11    Symptomatic cholelithiasis K80.20    Calculus of gallbladder with acute cholecystitis without obstruction K80.00    Lumbar spondylosis M47.816    Midline low back pain M54.5    Bilateral hip pain M25.551, M25.552    Pain in both knees M25.561, M25.562    Ambulatory dysfunction R26.2    Closed compression [facility] of L2 lumbar vertebra[facility] S32.020A    Hypertensive urgency I16.0    Chronic cholecystitis K81.1       Past Medical History:        Diagnosis Date    Alzheimer disease ([facility]     Arthritis     Atrial fibrillation (HCC)     GERD (gastroesophageal reflux disease)     Hyperlipidemia        Past Surgical History:        Procedure Laterality Date    ABDOMEN SURGERY      ANESTHESIA NERVE BLOCK Bilateral 2/6/2020    BILATERAL MEDIAL BRANCH L4-5 AND L5-S1 JOINTS performed by Conor Walsh DO at [facility], LAPAROSCOPIC N/A 11/21/2019    DIAGNOSITIC LAPAROSCOPY CONVERTED TO EXPLORATORY LAPAROTOMY WITH CLOSURE OF ENTEROTOMY X1, CHOLANGIOGRAM, EGD performed by Mirna Sims MD at [facility] ECHO COMPL W DOP COLOR FLOW  10/21/2013         HC INJECTION PROCEDURE FOR SACROILIAC JOINT Right 12/13/2018    RIGHT SACROILIAC JOINT STEROID INJECTION UNDER X-RAY GUIDANCE performed by Conor Walsh DO at [address] [place] [address] SACROILIAC JOINT Left 1/17/2019    LEFT SACROILIAC JOINT STEROID INJECTION UNDER X-RAY GUIDANCE performed by Apple Hutchison DO at [address] [place] [address] [address] JOINT Bilateral 9/19/2019    BILATERAL SACROILIAC JOINT INJECTION X-RAY performed by Apple Hutchison DO at [facility] Right 12/13/2018    sacroiliac joint     NERVE BLOCK Left 01/17/2019    sacroiliac joint injection     NERVE BLOCK Bilateral 02/06/2020    medial branch block    NERVE BLOCK Bilateral 10/29/2020    intra articular     NERVE BLOCK Bilateral 10/29/2020    BILATERAL L4-5 AND L5-S1 INTRA-ARTICULAR FACET STEROID INJECTION (CPT 25942,32696) performed by Apple Hutchison DO at [facility] N/A     d/t peptic ulcers       Social History:    Social History     Tobacco Use    Smoking status: Never Smoker    Smokeless tobacco: Never Used   Substance Use Topics    Alcohol use: No     Comment: 2 cups of coffee a day                                 Counseling given: Not Answered      Vital Signs (Current):   Vitals:    11/24/20 1312 11/24/20 1523 11/24/20 2133 11/25/20 0455   BP:  (!) 187/83 (!) 180/95 (!) 161/92   Pulse:  75 107 86   Resp:  16 16 20   Temp:  96.8 °F (36 °C) 98 °F (36.7 °C) 96.7 °F (35.9 °C)   TempSrc:  Temporal Temporal Temporal   SpO2:  95% 96% 95%   Weight:       Height: 5' 6\" (1.676 m)                                                 BP Readings from Last 3 Encounters:   11/25/20 (!) 161/92   11/22/20 (!) 163/78   11/19/20 (!) 144/77       NPO Status: Time of last liquid consumption: 0443(sip of water with pain med )                        Time of last solid consumption: 2100                        Date of last liquid consumption: 11/25/20                        Date of last solid food consumption: 11/24/20    BMI:   Wt Readings from Last 3 Encounters:   11/22/20 110 lb (49.9 kg)   11/22/20 110 lb (49.9 kg) 11/19/20 112 lb (50.8 kg)     Body mass index is 17.75 kg/m². CBC:   Lab Results   Component Value Date    WBC 6.9 11/23/2020    RBC 4.05 11/23/2020    HGB 12.6 11/23/2020    HCT 39.6 11/23/2020    MCV 97.8 11/23/2020    RDW 13.4 11/23/2020     11/23/2020       CMP:   Lab Results   Component Value Date     11/23/2020    K 3.9 11/23/2020     11/23/2020    CO2 24 11/23/2020    BUN 21 11/23/2020    CREATININE 0.7 11/23/2020    GFRAA >60 11/23/2020    LABGLOM >60 11/23/2020    GLUCOSE 102 11/23/2020    GLUCOSE 90 07/13/2011    PROT 5.9 11/23/2020    CALCIUM 9.0 11/23/2020    BILITOT <0.2 11/23/2020    ALKPHOS 69 11/23/2020    AST 15 11/23/2020    ALT 6 11/23/2020       POC Tests: No results for input(s): POCGLU, POCNA, POCK, POCCL, POCBUN, POCHEMO, POCHCT in the last 72 hours. Coags:   Lab Results   Component Value Date    PROTIME 12.3 11/23/2020    INR 1.1 11/23/2020    APTT 64.8 10/22/2013       HCG (If Applicable): No results found for: PREGTESTUR, PREGSERUM, HCG, HCGQUANT     ABGs: No results found for: PHART, PO2ART, PFT2QLX, LRK3AJQ, BEART, Z4JKTODQ     Type & Screen (If Applicable):  No results found for: LABABO, LABRH    Drug/Infectious Status (If Applicable):  No results found for: HIV, HEPCAB    COVID-19 Screening (If Applicable):   Lab Results   Component Value Date    COVID19 Not Detected 10/22/2020         Anesthesia Evaluation  Patient summary reviewed  Airway: Mallampati: Unable to assess / NA        Dental:          Pulmonary:Negative Pulmonary ROS and normal exam  breath sounds clear to auscultation                             Cardiovascular:    (+) hypertension:, dysrhythmias: atrial fibrillation, hyperlipidemia      ECG reviewed  Rhythm: regular  Rate: normal  Echocardiogram reviewed         Beta Blocker:  Not on Beta Blocker      ROS comment: EKG: Normal Sinus Rhythm 71, first degree heart block. ECHO:   Compared to prior echo, changes noted.  Technically adequate study.   Normal left ventricular wall thickness. Ejection fraction is visually estimated at 55%. No regional wall motion abnormalities seen. E/A flow reversal noted. Suggestive of diastolic dysfunction. Mild mitral regurgitation is present. Physiologic and/or trace aortic regurgitation is noted. Mild tricuspid regurgitation. RVSP is 32 mmHg. Pulmonary hypertension is mild . Neuro/Psych:   (+) psychiatric history:            GI/Hepatic/Renal:   (+) GERD:,           Endo/Other:    (+) : arthritis:., .                 Abdominal:           Vascular: negative vascular ROS. Anesthesia Plan      general     ASA 3       Induction: intravenous. BIS    Anesthetic plan and risks discussed with patient. Plan discussed with CRNA.     Attending anesthesiologist reviewed and agrees with Kellie Graham MD   11/25/2020

## 2020-11-25 NOTE — TELEPHONE ENCOUNTER
Zachery Ramirez from [facility] called [facility]. surgery the patient has scheduled for Monday 11/30/2020, wanted to know if she should stay on the schedule or be taken off. I spoke with dr Nishi Philip who stated that the patient can be taken off the schedule that we will wait for surgery.  I called to let kathleen know and I also called surgery scheduling and spoke with deng to let her know it was ok to take the patient off  Electronically signed by Abiola Cortes MA on 11/25/2020 at 12:57 PM

## 2020-11-26 VITALS
RESPIRATION RATE: 18 BRPM | OXYGEN SATURATION: 95 % | WEIGHT: 110 LBS | TEMPERATURE: 97.8 F | HEART RATE: 73 BPM | BODY MASS INDEX: 17.68 KG/M2 | DIASTOLIC BLOOD PRESSURE: 82 MMHG | SYSTOLIC BLOOD PRESSURE: 153 MMHG | HEIGHT: 66 IN

## 2020-11-26 LAB
ANION GAP SERPL CALCULATED.3IONS-SCNC: 14 MMOL/L (ref 7–16)
BUN BLDV-MCNC: 16 MG/DL (ref 8–23)
CALCIUM SERPL-MCNC: 9.4 MG/DL (ref 8.6–10.2)
CHLORIDE BLD-SCNC: 106 MMOL/L (ref 98–107)
CO2: 25 MMOL/L (ref 22–29)
CREAT SERPL-MCNC: 0.7 MG/DL (ref 0.5–1)
GFR AFRICAN AMERICAN: >60
GFR NON-AFRICAN AMERICAN: >60 ML/MIN/1.73
GLUCOSE BLD-MCNC: 108 MG/DL (ref 74–99)
HCT VFR BLD CALC: 43.3 % (ref 34–48)
HEMOGLOBIN: 13.9 G/DL (ref 11.5–15.5)
MCH RBC QN AUTO: 31.3 PG (ref 26–35)
MCHC RBC AUTO-ENTMCNC: 32.1 % (ref 32–34.5)
MCV RBC AUTO: 97.5 FL (ref 80–99.9)
PDW BLD-RTO: 13.5 FL (ref 11.5–15)
PLATELET # BLD: 322 E9/L (ref 130–450)
PMV BLD AUTO: 9.9 FL (ref 7–12)
POTASSIUM SERPL-SCNC: 3.8 MMOL/L (ref 3.5–5)
RBC # BLD: 4.44 E12/L (ref 3.5–5.5)
SODIUM BLD-SCNC: 145 MMOL/L (ref 132–146)
WBC # BLD: 10.1 E9/L (ref 4.5–11.5)

## 2020-11-26 PROCEDURE — 6370000000 HC RX 637 (ALT 250 FOR IP): Performed by: INTERNAL MEDICINE

## 2020-11-26 PROCEDURE — 36415 COLL VENOUS BLD VENIPUNCTURE: CPT

## 2020-11-26 PROCEDURE — 2580000003 HC RX 258: Performed by: NEUROLOGICAL SURGERY

## 2020-11-26 PROCEDURE — 80048 BASIC METABOLIC PNL TOTAL CA: CPT

## 2020-11-26 PROCEDURE — 96366 THER/PROPH/DIAG IV INF ADDON: CPT

## 2020-11-26 PROCEDURE — 6360000002 HC RX W HCPCS: Performed by: NEUROLOGICAL SURGERY

## 2020-11-26 PROCEDURE — 96376 TX/PRO/DX INJ SAME DRUG ADON: CPT

## 2020-11-26 PROCEDURE — 2500000003 HC RX 250 WO HCPCS: Performed by: NEUROLOGICAL SURGERY

## 2020-11-26 PROCEDURE — 6370000000 HC RX 637 (ALT 250 FOR IP): Performed by: NEUROLOGICAL SURGERY

## 2020-11-26 PROCEDURE — 85027 COMPLETE CBC AUTOMATED: CPT

## 2020-11-26 RX ORDER — AMLODIPINE BESYLATE 5 MG/1
5 TABLET ORAL DAILY
Status: DISCONTINUED | OUTPATIENT
Start: 2020-11-26 | End: 2020-11-26 | Stop reason: HOSPADM

## 2020-11-26 RX ORDER — AMLODIPINE BESYLATE 5 MG/1
10 TABLET ORAL DAILY
Qty: 30 TABLET | Refills: 0 | Status: SHIPPED | OUTPATIENT
Start: 2020-11-26 | End: 2020-11-26

## 2020-11-26 RX ORDER — AMLODIPINE BESYLATE 5 MG/1
10 TABLET ORAL DAILY
Qty: 30 TABLET | Refills: 0 | Status: SHIPPED | OUTPATIENT
Start: 2020-11-26 | End: 2020-12-16

## 2020-11-26 RX ADMIN — SODIUM CHLORIDE, PRESERVATIVE FREE 10 ML: 5 INJECTION INTRAVENOUS at 09:00

## 2020-11-26 RX ADMIN — METRONIDAZOLE 500 MG: 500 INJECTION, SOLUTION INTRAVENOUS at 09:00

## 2020-11-26 RX ADMIN — METRONIDAZOLE 500 MG: 500 INJECTION, SOLUTION INTRAVENOUS at 02:04

## 2020-11-26 RX ADMIN — MEMANTINE HYDROCHLORIDE 10 MG: 10 TABLET, FILM COATED ORAL at 08:58

## 2020-11-26 RX ADMIN — HYDRALAZINE HYDROCHLORIDE 5 MG: 20 INJECTION, SOLUTION INTRAMUSCULAR; INTRAVENOUS at 10:01

## 2020-11-26 RX ADMIN — CEFTRIAXONE SODIUM 2 G: 2 INJECTION, POWDER, FOR SOLUTION INTRAMUSCULAR; INTRAVENOUS at 02:04

## 2020-11-26 RX ADMIN — METOPROLOL TARTRATE 25 MG: 25 TABLET, FILM COATED ORAL at 09:00

## 2020-11-26 RX ADMIN — AMLODIPINE BESYLATE 5 MG: 5 TABLET ORAL at 10:22

## 2020-11-26 RX ADMIN — CITALOPRAM 10 MG: 20 TABLET, FILM COATED ORAL at 08:58

## 2020-11-26 ASSESSMENT — PAIN SCALES - GENERAL: PAINLEVEL_OUTOF10: 0

## 2020-11-26 NOTE — OP NOTE
[address]                  [address] [place],  [facility]                                OPERATIVE REPORT    PATIENT NAME: Jane Fuentes                 :        1932  MED REC NO:   [id number]                            ROOM:       5409  ACCOUNT NO:   [de-identified]                           ADMIT DATE: 2020  PROVIDER:     Roger Browning MD    DATE OF PROCEDURE:  2020    SURGEON:  Roger Browning MD    PREOPERATIVE DIAGNOSES:  Compression fracture, L1 and L2; intractable  pain; rule out metastasis. POSTOPERATIVE DIAGNOSES:  Compression fracture, L1 and L2; intractable  pain; rule out metastasis. PROCEDURE:  Balloon osteoplasty, that is kyphoplasty, L1 and L2; bone  biopsy L1 and L2;  injection of epidural steroid. TECHNIQUE:  The patient was placed on the operating room table in supine  position and after satisfactory endotracheal general anesthesia had been  administered, the patient was turned into prone position on the  operating room table. The lower back was prepared with Betadine scrub  and solution and routinely draped. Using AP and lateral fluoroscopy,  the levels of L1 and L2 were marked on the back. A point which was 3 cm  from the midline at the level of L2 on the left side was infiltrated  with 1% lidocaine solution. A 3-mm incision was made over this point. Through this, a trocar and cannula from the Kyphon kit was inserted  under fluoroscopic guidance through the pedicle of L2 into the body of  L2. As the tip of the needle was lying at the junction of pedicle and  the body, trocar was removed. Bone biopsy needle was inserted and  advanced into the body of L2. A piece of bone was taken and sent to  Pathology. Then the balloon was inserted into body of L2 and inflated  to a volume of 2.5 mL. The balloon crossed the midline, not  necessitating insertion of second needle at this level.     In a similar fashion, a point which was 3 cm from the midline on the  right side at the level of L1 was infiltrated with 1% lidocaine  solution. A 3-mm incision made over this point. Through this, a trocar  and cannula followed by the biopsy needle, followed by the balloon. The  balloon was inflated to a volume of 2 mL. The balloon crossed the  midline, not necessitating insertion of a second needle at this level. Then, the balloon was removed and bone cement was injected using  injecting tubes. 6 mL of the bone cement was injected into the body of  L1 and 7.5 into the body of L2 under fluoroscopic guidance. The  injection was satisfactory. At the conclusion of the injection,  injecting tubes were removed and the cannulas were removed. Steri-Strips applied over the incision area. The patient was left in  this position for 10 minutes. During this time, a Tuohy needle from the  epidural anesthesia tray was inserted into the epidural space at the  level of L3-L4. Once the needle was lying in the epidural space,  stylets were removed, epidural catheter was inserted and advanced into  the epidural space. Hub was connected to the catheter and 80 mg of  Depo-Medrol was injected as a nerve block. Then, the catheter and the  needle were removed. Band-Aid applied over the area of insertion of  needle, and the patient sent to recovery room in satisfactory state. The patient tolerated the procedure well. Blood loss was minimum. The  specimen was a bone biopsy of L1 and L2.         Cassia Mead MD    D: 11/25/2020 8:23:51       T: 11/25/2020 9:26:21     [email or web address]  Job#: [id number]     Doc#: [id number]

## 2020-11-26 NOTE — DISCHARGE SUMMARY
Discharge Summary    Admit date: 11/22/2020    Discharge date and time: No discharge date for patient encounter. Admitting Physician: Santino Christine MD     Consultants: General surgery, neurosurgery    Admission Diagnoses:  Symptomatic cholelithiasis    Discharge Diagnoses and Hospital Course:  · Symptomatic chronic cholelithiasis- General surgery consulted. Originally scheduled for open treasure 11/30 however due to elective surgeries being cancelled due to covid this was cancelled. Per general surgery, at this point can be transitioned to po antibiotics and having her follow-up in the next week or so with Dr. Kevon Whitfield discuss further. · Bilateral hip and knee pain, patient with history of osteoarthritis and sacroiliitis.  Pain control.     · Intractable low back pain, L2 compression fracture that is age-indeterminate, resolved with 40% height loss.  Had kyphoplasty 11/25 with neurosurgery. Can restart eliquis  · Hypertension, Resume home blood pressure medications. Added amlodipine. · Depression-  Continue current medication  · Hyperlipidemia- statin  · Dementia- stable  · Atrial fibrillation currently rate controlled, on eliwuis  · GERD- PPI. · Ambulatory dysfunction-  Much improved, ambulating in room without assistance    Discharge Exam:  Vitals:    11/26/20 1024   BP: (!) 153/82   Pulse:    Resp:    Temp:    SpO2:        General appearance:  awake, alert, and oriented to person, place, time, and purpose; appears stated age and cooperative; no apparent distress no labored breathing  HEENT:  Conjunctivae/corneas clear. Very [place]  Neck: Supple. No jugular venous distention.    Respiratory: symmetrical; clear to auscultation bilaterally; no wheezes; no rhonchi; no rales  Cardiovascular: rhythm regular; rate controlled; no murmurs  Abdomen: Soft, nontender, nondistended  Extremities:  peripheral pulses present; no peripheral edema; no ulcers  Musculoskeletal: No clubbing, cyanosis, no bilateral lower extremity edema. Brisk capillary refill. Skin:  No rashes  on visible skin  Neurologic: awake, alert and following commands     Disposition: home  The patient's condition is fair. At this time the patient is without objective evidence of an acute process requiring continuing hospitalization or inpatient management. They are stable for discharge with outpatient follow-up. I have spoken with the patient and discussed the results of the current hospitalization, in addition to providing specific details for the plan of care and counseling regarding the diagnosis and prognosis. The plan has been discussed in detail and they are aware of the specific conditions for emergent return, as well as the importance of follow-up. Their questions are answered at this time and they are agreeable with the plan for discharge to home     Patient Instructions: Follow up with PCP within one week. Follow up with general surgery as instructed. Needs follow up by PCP for hypertension    Future Appointments   Date Time Provider Marcello Brock   12/16/2020 11:10 AM Rhett Mcrae MD [facility]   12/17/2020  2:30 PM Michelle Lomas MD [facility]       Discharge Medications:     Medication List      START taking these medications    amLODIPine 5 MG tablet  Commonly known as:  NORVASC  Take 2 tablets by mouth daily     amoxicillin-clavulanate 875-125 MG per tablet  Commonly known as:  AUGMENTIN  Take 1 tablet by mouth 2 times daily for 7 days        CONTINUE taking these medications    apixaban 2.5 MG Tabs tablet  Commonly known as:  Eliquis  TAKE 1 TABLET BY MOUTH  TWICE DAILY     citalopram 10 MG tablet  Commonly known as:  CELEXA  Take 1 tablet by mouth every morning     Compression Stockings Misc  Wear during wake hours.      diclofenac sodium 1 % Gel  Commonly known as:  VOLTAREN     donepezil 10 MG tablet  Commonly known as:  ARICEPT  Take 1 tablet by mouth nightly     furosemide 20 MG tablet  Commonly known as: LASIX  TAKE 1 TABLET BY MOUTH 3  TIMES WEEKLY     GLUCOSAMINE 1500 COMPLEX PO     memantine 10 MG tablet  Commonly known as:  Namenda  Take 1 tablet by mouth 2 times daily     metoprolol tartrate 25 MG tablet  Commonly known as:  LOPRESSOR     traMADol 50 MG tablet  Commonly known as:  ULTRAM     Vitamin D3 50 MCG (2000 UT) Caps           Where to Get Your Medications      These medications were sent to [facility], [address] [place] [address]    Phone:  [phone]   · amoxicillin-clavulanate 875-125 MG per tablet     These medications were sent to [address], [address] [facility] [address] [phone]  [address] [facility]    Phone:  [phone]   · amLODIPine 5 MG tablet         Activity: activity as tolerated    Diet: regular diet, low salt    Wound Care: keep wound clean and dry    Follow-up:    · This patient is instructed to follow-up with her primary care physician. · Patient is instructed to follow-up with the consults listed above as directed by them. · They are instructed to resume home medications and take new medications as indicated in the list above. · If the patient has a recurrence of symptoms, they are instructed to go to the ED. Preparing for this patient's discharge, including paperwork, orders, instructions, and meeting with patient did require > 30 minutes.     Dustin Landrum DO   11:32 AM  11/26/2020

## 2020-11-26 NOTE — PROGRESS NOTES
PO#1  Kyphoplasty  Doing well. The patient may be discharged from neurosurgical standpoint. Follow up in the office in 3-4 weeks.  [phone]

## 2020-11-27 LAB
BLOOD CULTURE, ROUTINE: NORMAL
CULTURE, BLOOD 2: NORMAL

## 2020-12-01 RX ORDER — DONEPEZIL HYDROCHLORIDE 10 MG/1
TABLET, FILM COATED ORAL
Qty: 90 TABLET | Refills: 3 | OUTPATIENT
Start: 2020-12-01

## 2020-12-10 RX ORDER — FUROSEMIDE 20 MG/1
TABLET ORAL
Qty: 39 TABLET | Refills: 3 | OUTPATIENT
Start: 2020-12-10

## 2020-12-15 NOTE — PROGRESS NOTES
DO at [facility] Right 12/13/2018    sacroiliac joint     NERVE BLOCK Left 01/17/2019    sacroiliac joint injection     NERVE BLOCK Bilateral 02/06/2020    medial branch block    NERVE BLOCK Bilateral 10/29/2020    intra articular     NERVE BLOCK Bilateral 10/29/2020    BILATERAL L4-5 AND L5-S1 INTRA-ARTICULAR FACET STEROID INJECTION (CPT 19734,50276) performed by Tish Kent DO at [address] N/A 11/25/2020    KYPHOPLASTY , L1 L2,BONE BIOPSY, EPIDURAL INJECTION performed by Astrid Doss MD at [facility]     d/t peptic ulcers         Current Outpatient Medications   Medication Sig Dispense Refill    metoprolol tartrate (LOPRESSOR) 25 MG tablet Take 25 mg by mouth 2 times daily      diclofenac sodium (VOLTAREN) 1 % GEL Apply 4 g topically 4 times daily as needed for Pain      traMADol (ULTRAM) 50 MG tablet Take 50 mg by mouth every 6 hours as needed for Pain.  furosemide (LASIX) 20 MG tablet TAKE 1 TABLET BY MOUTH 3  TIMES WEEKLY 36 tablet 1    citalopram (CELEXA) 10 MG tablet Take 1 tablet by mouth every morning 90 tablet 1    memantine (NAMENDA) 10 MG tablet Take 1 tablet by mouth 2 times daily 180 tablet 0    apixaban (ELIQUIS) 2.5 MG TABS tablet TAKE 1 TABLET BY MOUTH  TWICE DAILY 180 tablet 0    donepezil (ARICEPT) 10 MG tablet Take 1 tablet by mouth nightly 90 tablet 0    Compression Stockings MISC Wear during wake hours. 1 each 0    Glucosamine-Chondroit-Vit C-Mn (GLUCOSAMINE 1500 COMPLEX PO) Take 1 tablet by mouth daily       Cholecalciferol (VITAMIN D3) 2000 units CAPS Take 2,000 Units by mouth daily       HYDROcodone-acetaminophen (NORCO) 5-325 MG per tablet Take 1 tablet by mouth every 8 hours as needed for Pain for up to 3 days. Intended supply: 3 days. Take lowest dose possible to manage pain 20 tablet 0     No current facility-administered medications for this visit.           Allergies thickness. Ejection fraction 55%. No regional wall motion abnormalities seen. E/A flow reversal noted. Suggestive of diastolic dysfunction. Mild mitral regurgitation is present. Physiologic and/or trace aortic regurgitation is noted. Mild tricuspid regurgitation. RVSP is 32 mmHg. Pulmonary hypertension is mild. Cardiology Labs: BMP:    Lab Results   Component Value Date     11/26/2020    K 3.8 11/26/2020    K 3.9 11/23/2020     11/26/2020    CO2 25 11/26/2020    BUN 16 11/26/2020    CREATININE 0.7 11/26/2020     CMP:    Lab Results   Component Value Date     11/26/2020    K 3.8 11/26/2020    K 3.9 11/23/2020     11/26/2020    CO2 25 11/26/2020    BUN 16 11/26/2020    CREATININE 0.7 11/26/2020    PROT 5.9 11/23/2020     CBC:    Lab Results   Component Value Date    WBC 10.1 11/26/2020    RBC 4.44 11/26/2020    HGB 13.9 11/26/2020    HCT 43.3 11/26/2020    MCV 97.5 11/26/2020    RDW 13.5 11/26/2020     11/26/2020     PT/INR:  No results found for: PTINR  PT/INR Warfarin:  No components found for: PTPATWAR, PTINRWAR  PTT:    Lab Results   Component Value Date    APTT 64.8 10/22/2013     PTT Heparin:  No components found for: APTTHEP  Magnesium:    Lab Results   Component Value Date    MG 1.7 11/26/2019     TSH:    Lab Results   Component Value Date    TSH 3.420 09/24/2020     TROPONIN:  No components found for: TROP  BNP:  No results found for: BNP  FASTING LIPID PANEL:    Lab Results   Component Value Date    CHOL 177 07/18/2016    HDL 68 09/24/2020    TRIG 80 07/18/2016     No orders to display     I have personally reviewed the laboratory, cardiac diagnostic and radiographic testing as outlined above:      IMPRESSION:  1. Paroxysmal atrial fibrillation: In sinus rhythm will continue current treatment  2. Nonrheumatic mitral (valve) insufficiency: Mild  3. Nonrheumatic tricuspid (valve) insufficiency: Mild  4. PACs  5. Chronic anticoagulation    RECOMMENDATIONS:   1. Continue current treatment   2. Increase risk of bleeding due to being on anti-coagulation, symptoms and signs of bleeding discussed with patient, patient was advised to seek medical attention at the earliest symptoms or signs of bleeding. 3.  Follow-up with Dr. Fiona Rincon as scheduled  4. Follow-up with Dr. Ron Moreno in 6 months, sooner if symptomatic for any reason    I have reviewed my findings and recommendations with patient    Electronically signed by Mariza Courtney MD on 12/20/2020 at 11:03 PM  NOTE: This report was transcribed using voice recognition software. Every effort was made to ensure accuracy; however, inadvertent computerized transcription errors may be present.

## 2020-12-16 ENCOUNTER — OFFICE VISIT (OUTPATIENT)
Dept: CARDIOLOGY CLINIC | Age: 85
End: 2020-12-16
Payer: MEDICARE

## 2020-12-16 VITALS
WEIGHT: 116 LBS | HEIGHT: 66 IN | DIASTOLIC BLOOD PRESSURE: 60 MMHG | HEART RATE: 54 BPM | BODY MASS INDEX: 18.64 KG/M2 | SYSTOLIC BLOOD PRESSURE: 136 MMHG

## 2020-12-16 PROCEDURE — G8420 CALC BMI NORM PARAMETERS: HCPCS | Performed by: INTERNAL MEDICINE

## 2020-12-16 PROCEDURE — 4040F PNEUMOC VAC/ADMIN/RCVD: CPT | Performed by: INTERNAL MEDICINE

## 2020-12-16 PROCEDURE — 93000 ELECTROCARDIOGRAM COMPLETE: CPT | Performed by: INTERNAL MEDICINE

## 2020-12-16 PROCEDURE — 1036F TOBACCO NON-USER: CPT | Performed by: INTERNAL MEDICINE

## 2020-12-16 PROCEDURE — 99213 OFFICE O/P EST LOW 20 MIN: CPT | Performed by: INTERNAL MEDICINE

## 2020-12-16 PROCEDURE — 1111F DSCHRG MED/CURRENT MED MERGE: CPT | Performed by: INTERNAL MEDICINE

## 2020-12-16 PROCEDURE — G8484 FLU IMMUNIZE NO ADMIN: HCPCS | Performed by: INTERNAL MEDICINE

## 2020-12-16 PROCEDURE — 1090F PRES/ABSN URINE INCON ASSESS: CPT | Performed by: INTERNAL MEDICINE

## 2020-12-16 PROCEDURE — 1123F ACP DISCUSS/DSCN MKR DOCD: CPT | Performed by: INTERNAL MEDICINE

## 2020-12-16 PROCEDURE — G8427 DOCREV CUR MEDS BY ELIG CLIN: HCPCS | Performed by: INTERNAL MEDICINE

## 2020-12-17 ENCOUNTER — OFFICE VISIT (OUTPATIENT)
Dept: FAMILY MEDICINE CLINIC | Age: 85
End: 2020-12-17
Payer: MEDICARE

## 2020-12-17 VITALS
RESPIRATION RATE: 16 BRPM | TEMPERATURE: 94.2 F | HEART RATE: 59 BPM | OXYGEN SATURATION: 99 % | BODY MASS INDEX: 18.02 KG/M2 | WEIGHT: 112.1 LBS | HEIGHT: 66 IN | DIASTOLIC BLOOD PRESSURE: 80 MMHG | SYSTOLIC BLOOD PRESSURE: 120 MMHG

## 2020-12-17 PROCEDURE — 1036F TOBACCO NON-USER: CPT | Performed by: INTERNAL MEDICINE

## 2020-12-17 PROCEDURE — 1123F ACP DISCUSS/DSCN MKR DOCD: CPT | Performed by: INTERNAL MEDICINE

## 2020-12-17 PROCEDURE — G8427 DOCREV CUR MEDS BY ELIG CLIN: HCPCS | Performed by: INTERNAL MEDICINE

## 2020-12-17 PROCEDURE — G8484 FLU IMMUNIZE NO ADMIN: HCPCS | Performed by: INTERNAL MEDICINE

## 2020-12-17 PROCEDURE — 99213 OFFICE O/P EST LOW 20 MIN: CPT | Performed by: INTERNAL MEDICINE

## 2020-12-17 PROCEDURE — 1111F DSCHRG MED/CURRENT MED MERGE: CPT | Performed by: INTERNAL MEDICINE

## 2020-12-17 PROCEDURE — 4040F PNEUMOC VAC/ADMIN/RCVD: CPT | Performed by: INTERNAL MEDICINE

## 2020-12-17 PROCEDURE — G8419 CALC BMI OUT NRM PARAM NOF/U: HCPCS | Performed by: INTERNAL MEDICINE

## 2020-12-17 PROCEDURE — 1090F PRES/ABSN URINE INCON ASSESS: CPT | Performed by: INTERNAL MEDICINE

## 2020-12-17 ASSESSMENT — ENCOUNTER SYMPTOMS
SHORTNESS OF BREATH: 0
SINUS PAIN: 0
EYE DISCHARGE: 0
BLOOD IN STOOL: 0
NAUSEA: 0
ABDOMINAL PAIN: 0
SORE THROAT: 0
BACK PAIN: 1
EYE PAIN: 0

## 2020-12-17 NOTE — PROGRESS NOTES
Chief Complaint   Patient presents with    Follow-up       HPI:  Patient is here for follow-up     Pt is feeling okay    Since last visit, Pt initially admitted to [facility] for Symptomatic Gall stone for surgery. She was transfer to [place] [address] for Dr Pauline Herndon. Pt also found to have Compression L1- L2. Had Kyphoplasty and epidural by Dr Susan Amezcua surgery is pending    Here for f/u     Pt feels okay. Pt does not remember much about her hospitalization ( Memory )     Pt says her RUQ pain less  No n/v    Allergy and Medications are reviewed and updated. Past Medical History, Surgical History, and Family History has been reviewed and updated. Review of Systems:  Review of Systems   Constitutional: Negative for chills and fever. HENT: Negative for congestion, sinus pain and sore throat. Eyes: Negative for pain and discharge. Respiratory: Negative for shortness of breath (No new SOb). Cardiovascular: Negative for chest pain. Gastrointestinal: Negative for abdominal pain, blood in stool and nausea. Genitourinary: Negative for flank pain and frequency. Musculoskeletal: Positive for back pain. Negative for neck pain. Hematological: Does not bruise/bleed easily. Psychiatric/Behavioral: Negative for suicidal ideas. Vitals:    12/17/20 1413   BP: 120/80   Pulse: 59   Resp: 16   Temp: 94.2 °F (34.6 °C)   TempSrc: Temporal   SpO2: 99%   Weight: 112 lb 1.6 oz (50.8 kg)   Height: 5' 6\" (1.676 m)       Physical Exam  Vitals signs reviewed. Constitutional:       Appearance: She is well-developed. HENT:      Head: Normocephalic and atraumatic. Mouth/Throat:      Pharynx: No oropharyngeal exudate. Eyes:      Conjunctiva/sclera: Conjunctivae normal.      Pupils: Pupils are equal, round, and reactive to light. Neck:      Vascular: No JVD. Cardiovascular:      Rate and Rhythm: Normal rate and regular rhythm.    Pulmonary:      Effort: Pulmonary effort is normal. Breath sounds: Normal breath sounds. No rales. Abdominal:      General: Bowel sounds are normal.      Palpations: Abdomen is soft. Musculoskeletal: Normal range of motion. Lymphadenopathy:      Cervical: No cervical adenopathy. Skin:     General: Skin is warm and dry. Neurological:      Mental Status: She is alert and oriented to person, place, and time.    Psychiatric:         Behavior: Behavior normal.          Labs :    Lab Results   Component Value Date    WBC 10.1 11/26/2020    HGB 13.9 11/26/2020    HCT 43.3 11/26/2020     11/26/2020    CHOL 177 07/18/2016    TRIG 80 07/18/2016    HDL 68 09/24/2020    ALT 6 11/23/2020    AST 15 11/23/2020     11/26/2020    K 3.8 11/26/2020     11/26/2020    CREATININE 0.7 11/26/2020    BUN 16 11/26/2020    CO2 25 11/26/2020    TSH 3.420 09/24/2020    INR 1.1 11/23/2020    GLUF 92 09/24/2020    LABA1C 5.7 (H) 03/01/2019     Lab Results   Component Value Date    COLORU Yellow 11/12/2020    NITRU Negative 11/12/2020    GLUCOSEU Negative 11/12/2020    KETUA Negative 11/12/2020    UROBILINOGEN 0.2 11/12/2020    BILIRUBINUR Negative 11/12/2020             ASSESSMENT     Patient Active Problem List    Diagnosis Date Noted    Chronic cholecystitis     Midline low back pain 11/23/2020    Bilateral hip pain 11/23/2020    Pain in both knees 11/23/2020    Ambulatory dysfunction 11/23/2020    Closed compression fracture of L2 lumbar vertebra, initial encounter ([address] 11/23/2020    Hypertensive urgency 11/23/2020    Lumbar spondylosis 02/06/2020    Calculus of gallbladder with acute cholecystitis without obstruction     Calculus of gallbladder with cholecystitis with biliary obstruction     Symptomatic cholelithiasis     RUQ abdominal pain 11/20/2019    Calculus of gallbladder without cholecystitis without obstruction 11/20/2019    Syncope 11/20/2019    Depression 08/19/2019    Hyperlipidemia 08/19/2019    Sacroiliitis ([address] 11/26/2018  Arthritis 03/21/2018    Alzheimer's dementia without behavioral disturbance ([facility] 02/21/2018    Vitamin B 12 deficiency 02/21/2018    Atrial fibrillation (HCC) 10/21/2013    Gastroesophageal reflux disease without esophagitis 10/21/2013        Diagnosis:     ICD-10-CM    1. RUQ abdominal pain  R10.11    2. Atrial fibrillation, unspecified type [facility]) -Nov 2019 - Dr Divya Gipson - on Eliquis  I48.91    3. Alzheimer's dementia without behavioral disturbance, unspecified timing of dementia onset ([facility]  G30.9     F02.80    4. Other hyperlipidemia  E78.49    5. S/P kyphoplasty  Z98.890        PLAN:      Stable    Pt is stable on current medical treatment. Continue current treatment plan    Side effects/Adverse effects/Precautions are reviewed with patient. Low salt, Low Carb diet an low fat diet  Continue medications as advised and take them regularly  Regular exercises as advised    Discussed natural and expected course of this diagnosis and need to alert me if symptoms do not follow expected course, or if any worse. Smoking cessation if applicable, discussed with patient. There are no Patient Instructions on file for this visit. Return in about 2 months (around 2/17/2021).

## 2020-12-18 ENCOUNTER — HOSPITAL ENCOUNTER (OUTPATIENT)
Age: 85
Discharge: HOME | End: 2020-12-20
Payer: MEDICARE

## 2020-12-18 ENCOUNTER — HOSPITAL ENCOUNTER (OUTPATIENT)
Dept: GENERAL RADIOLOGY | Age: 85
Discharge: HOME | End: 2020-12-20
Payer: MEDICARE

## 2020-12-18 PROCEDURE — 72070 X-RAY EXAM THORAC SPINE 2VWS: CPT

## 2020-12-18 PROCEDURE — 72100 X-RAY EXAM L-S SPINE 2/3 VWS: CPT

## 2020-12-22 ENCOUNTER — TELEPHONE (OUTPATIENT)
Dept: HEMATOLOGY | Age: 85
End: 2020-12-22

## 2020-12-22 NOTE — TELEPHONE ENCOUNTER
pts  called and cancelled the patients appt for 12/29/2020, he stated that the patient needed to have her MRI done and that the appt with dr Alfreda Mo could be rescheduled. I did offer the patients  01/12/2021whparth was our next time in our [place] office, but he stated he has a dr appt that day and wont have enough time to get the patient there. So 01/26/21 was given to the patients  and that worked for him.  I offered [place]ma[place] appt, but the patient is not familiar with the area and wanted [place]  Electronically signed by Eden Monroe MA on 12/22/2020 at 1:37 PM

## 2020-12-29 ENCOUNTER — HOSPITAL ENCOUNTER (OUTPATIENT)
Dept: MRI IMAGING | Age: 85
Discharge: HOME | End: 2020-12-31
Payer: MEDICARE

## 2020-12-29 DIAGNOSIS — S32.010D COMPRESSION FRACTURE OF L1 VERTEBRA WITH ROUTINE HEALING, SUBSEQUENT ENCOUNTER: ICD-10-CM

## 2020-12-29 PROCEDURE — 72148 MRI LUMBAR SPINE W/O DYE: CPT

## 2021-01-01 ENCOUNTER — OFFICE VISIT (OUTPATIENT)
Dept: FAMILY MEDICINE CLINIC | Age: 86
End: 2021-01-01
Payer: MEDICARE

## 2021-01-01 VITALS
SYSTOLIC BLOOD PRESSURE: 110 MMHG | BODY MASS INDEX: 19.29 KG/M2 | DIASTOLIC BLOOD PRESSURE: 63 MMHG | HEIGHT: 66 IN | OXYGEN SATURATION: 100 % | TEMPERATURE: 97.4 F | HEART RATE: 73 BPM | WEIGHT: 120 LBS | RESPIRATION RATE: 16 BRPM

## 2021-01-01 VITALS
RESPIRATION RATE: 20 BRPM | BODY MASS INDEX: 19.09 KG/M2 | TEMPERATURE: 96.7 F | OXYGEN SATURATION: 98 % | HEIGHT: 66 IN | HEART RATE: 78 BPM | SYSTOLIC BLOOD PRESSURE: 93 MMHG | WEIGHT: 118.8 LBS | DIASTOLIC BLOOD PRESSURE: 63 MMHG

## 2021-01-01 VITALS
HEART RATE: 64 BPM | DIASTOLIC BLOOD PRESSURE: 80 MMHG | SYSTOLIC BLOOD PRESSURE: 126 MMHG | BODY MASS INDEX: 18.85 KG/M2 | OXYGEN SATURATION: 91 % | HEIGHT: 66 IN | WEIGHT: 117.3 LBS | TEMPERATURE: 97.2 F | RESPIRATION RATE: 16 BRPM

## 2021-01-01 VITALS
TEMPERATURE: 96.9 F | DIASTOLIC BLOOD PRESSURE: 76 MMHG | RESPIRATION RATE: 16 BRPM | WEIGHT: 119.3 LBS | HEART RATE: 56 BPM | HEIGHT: 66 IN | SYSTOLIC BLOOD PRESSURE: 124 MMHG | BODY MASS INDEX: 19.17 KG/M2 | OXYGEN SATURATION: 98 %

## 2021-01-01 DIAGNOSIS — I48.91 ATRIAL FIBRILLATION, UNSPECIFIED TYPE (HCC): ICD-10-CM

## 2021-01-01 DIAGNOSIS — Z98.890 S/P KYPHOPLASTY: ICD-10-CM

## 2021-01-01 DIAGNOSIS — I48.91 ATRIAL FIBRILLATION, UNSPECIFIED TYPE (HCC): Primary | ICD-10-CM

## 2021-01-01 DIAGNOSIS — E78.49 OTHER HYPERLIPIDEMIA: ICD-10-CM

## 2021-01-01 DIAGNOSIS — G30.9 ALZHEIMER'S DEMENTIA WITHOUT BEHAVIORAL DISTURBANCE, UNSPECIFIED TIMING OF DEMENTIA ONSET: ICD-10-CM

## 2021-01-01 DIAGNOSIS — R26.9 GAIT DIFFICULTY: ICD-10-CM

## 2021-01-01 DIAGNOSIS — F32.A DEPRESSION, UNSPECIFIED DEPRESSION TYPE: ICD-10-CM

## 2021-01-01 DIAGNOSIS — F02.80 ALZHEIMER'S DEMENTIA WITHOUT BEHAVIORAL DISTURBANCE, UNSPECIFIED TIMING OF DEMENTIA ONSET: ICD-10-CM

## 2021-01-01 DIAGNOSIS — Z00.00 ROUTINE GENERAL MEDICAL EXAMINATION AT A HEALTH CARE FACILITY: ICD-10-CM

## 2021-01-01 DIAGNOSIS — Z23 FLU VACCINE NEED: Primary | ICD-10-CM

## 2021-01-01 DIAGNOSIS — K58.0 IRRITABLE BOWEL SYNDROME WITH DIARRHEA: ICD-10-CM

## 2021-01-01 LAB
ALBUMIN SERPL-MCNC: 3.8 G/DL (ref 3.5–5.2)
ALP BLD-CCNC: 69 U/L (ref 35–104)
ALT SERPL-CCNC: 8 U/L (ref 0–32)
ANION GAP SERPL CALCULATED.3IONS-SCNC: 11 MMOL/L (ref 7–16)
AST SERPL-CCNC: 21 U/L (ref 0–31)
BASOPHILS ABSOLUTE: 0.01 E9/L (ref 0–0.2)
BASOPHILS RELATIVE PERCENT: 0.2 % (ref 0–2)
BILIRUB SERPL-MCNC: 0.3 MG/DL (ref 0–1.2)
BUN BLDV-MCNC: 19 MG/DL (ref 6–23)
CALCIUM SERPL-MCNC: 9.6 MG/DL (ref 8.6–10.2)
CHLORIDE BLD-SCNC: 104 MMOL/L (ref 98–107)
CHOLESTEROL, FASTING: 183 MG/DL (ref 0–199)
CO2: 24 MMOL/L (ref 22–29)
CREAT SERPL-MCNC: 1 MG/DL (ref 0.5–1)
EOSINOPHILS ABSOLUTE: 0.1 E9/L (ref 0.05–0.5)
EOSINOPHILS RELATIVE PERCENT: 1.8 % (ref 0–6)
GFR AFRICAN AMERICAN: >60
GFR NON-AFRICAN AMERICAN: 52 ML/MIN/1.73
GLUCOSE FASTING: 87 MG/DL (ref 74–99)
HCT VFR BLD CALC: 40 % (ref 34–48)
HDLC SERPL-MCNC: 71 MG/DL
HEMOGLOBIN: 12 G/DL (ref 11.5–15.5)
IMMATURE GRANULOCYTES #: 0.02 E9/L
IMMATURE GRANULOCYTES %: 0.4 % (ref 0–5)
LDL CHOLESTEROL CALCULATED: 91 MG/DL (ref 0–99)
LYMPHOCYTES ABSOLUTE: 1.97 E9/L (ref 1.5–4)
LYMPHOCYTES RELATIVE PERCENT: 34.7 % (ref 20–42)
MCH RBC QN AUTO: 30.6 PG (ref 26–35)
MCHC RBC AUTO-ENTMCNC: 30 % (ref 32–34.5)
MCV RBC AUTO: 102 FL (ref 80–99.9)
MONOCYTES ABSOLUTE: 0.61 E9/L (ref 0.1–0.95)
MONOCYTES RELATIVE PERCENT: 10.7 % (ref 2–12)
NEUTROPHILS ABSOLUTE: 2.97 E9/L (ref 1.8–7.3)
NEUTROPHILS RELATIVE PERCENT: 52.2 % (ref 43–80)
PDW BLD-RTO: 14.6 FL (ref 11.5–15)
PLATELET # BLD: 292 E9/L (ref 130–450)
PMV BLD AUTO: 10.2 FL (ref 7–12)
POTASSIUM SERPL-SCNC: 4.8 MMOL/L (ref 3.5–5)
RBC # BLD: 3.92 E12/L (ref 3.5–5.5)
SODIUM BLD-SCNC: 139 MMOL/L (ref 132–146)
TOTAL PROTEIN: 6.4 G/DL (ref 6.4–8.3)
TRIGLYCERIDE, FASTING: 105 MG/DL (ref 0–149)
TSH SERPL DL<=0.05 MIU/L-ACNC: 2.08 UIU/ML (ref 0.27–4.2)
VLDLC SERPL CALC-MCNC: 21 MG/DL
WBC # BLD: 5.7 E9/L (ref 4.5–11.5)

## 2021-01-01 PROCEDURE — G0008 ADMIN INFLUENZA VIRUS VAC: HCPCS | Performed by: INTERNAL MEDICINE

## 2021-01-01 PROCEDURE — 1123F ACP DISCUSS/DSCN MKR DOCD: CPT | Performed by: INTERNAL MEDICINE

## 2021-01-01 PROCEDURE — 1090F PRES/ABSN URINE INCON ASSESS: CPT | Performed by: INTERNAL MEDICINE

## 2021-01-01 PROCEDURE — 1036F TOBACCO NON-USER: CPT | Performed by: INTERNAL MEDICINE

## 2021-01-01 PROCEDURE — 99214 OFFICE O/P EST MOD 30 MIN: CPT | Performed by: INTERNAL MEDICINE

## 2021-01-01 PROCEDURE — G8420 CALC BMI NORM PARAMETERS: HCPCS | Performed by: INTERNAL MEDICINE

## 2021-01-01 PROCEDURE — 90694 VACC AIIV4 NO PRSRV 0.5ML IM: CPT | Performed by: INTERNAL MEDICINE

## 2021-01-01 PROCEDURE — 4040F PNEUMOC VAC/ADMIN/RCVD: CPT | Performed by: INTERNAL MEDICINE

## 2021-01-01 PROCEDURE — 99213 OFFICE O/P EST LOW 20 MIN: CPT | Performed by: INTERNAL MEDICINE

## 2021-01-01 PROCEDURE — G8427 DOCREV CUR MEDS BY ELIG CLIN: HCPCS | Performed by: INTERNAL MEDICINE

## 2021-01-01 PROCEDURE — G0439 PPPS, SUBSEQ VISIT: HCPCS | Performed by: INTERNAL MEDICINE

## 2021-01-01 PROCEDURE — G8484 FLU IMMUNIZE NO ADMIN: HCPCS | Performed by: INTERNAL MEDICINE

## 2021-01-01 RX ORDER — DONEPEZIL HYDROCHLORIDE 10 MG/1
10 TABLET, FILM COATED ORAL NIGHTLY
Qty: 90 TABLET | Refills: 0 | Status: SHIPPED
Start: 2021-01-01 | End: 2021-01-01

## 2021-01-01 RX ORDER — FUROSEMIDE 20 MG/1
TABLET ORAL
Qty: 36 TABLET | Refills: 1 | Status: SHIPPED
Start: 2021-01-01 | End: 2021-01-01

## 2021-01-01 RX ORDER — MEMANTINE HYDROCHLORIDE 10 MG/1
10 TABLET ORAL 2 TIMES DAILY
Qty: 180 TABLET | Refills: 1 | Status: SHIPPED | OUTPATIENT
Start: 2021-01-01

## 2021-01-01 RX ORDER — MEMANTINE HYDROCHLORIDE 10 MG/1
10 TABLET ORAL 2 TIMES DAILY
Qty: 180 TABLET | Refills: 0 | Status: SHIPPED
Start: 2021-01-01 | End: 2021-01-01

## 2021-01-01 RX ORDER — FUROSEMIDE 20 MG/1
TABLET ORAL
Qty: 39 TABLET | Refills: 2 | Status: SHIPPED | OUTPATIENT
Start: 2021-01-01

## 2021-01-01 RX ORDER — DONEPEZIL HYDROCHLORIDE 10 MG/1
TABLET, FILM COATED ORAL
Qty: 90 TABLET | Refills: 0 | Status: SHIPPED
Start: 2021-01-01 | End: 2021-01-01 | Stop reason: SDUPTHER

## 2021-01-01 RX ORDER — FUROSEMIDE 20 MG/1
TABLET ORAL
Qty: 39 TABLET | Refills: 2 | Status: CANCELLED | OUTPATIENT
Start: 2021-01-01

## 2021-01-01 ASSESSMENT — ENCOUNTER SYMPTOMS
SINUS PAIN: 0
SHORTNESS OF BREATH: 0
SHORTNESS OF BREATH: 0
BACK PAIN: 1
EYE DISCHARGE: 0
NAUSEA: 0
EYE PAIN: 0
SHORTNESS OF BREATH: 0
ABDOMINAL PAIN: 0
BLOOD IN STOOL: 0
SINUS PAIN: 0
EYE DISCHARGE: 0
SORE THROAT: 0
ABDOMINAL PAIN: 0
EYE PAIN: 0
SORE THROAT: 0
BLOOD IN STOOL: 0
NAUSEA: 0
SORE THROAT: 0
EYE DISCHARGE: 0
BACK PAIN: 1
BLOOD IN STOOL: 0
ABDOMINAL PAIN: 0
EYE PAIN: 0
NAUSEA: 0
SINUS PAIN: 0

## 2021-01-01 ASSESSMENT — PATIENT HEALTH QUESTIONNAIRE - PHQ9
SUM OF ALL RESPONSES TO PHQ QUESTIONS 1-9: 0
1. LITTLE INTEREST OR PLEASURE IN DOING THINGS: 0
2. FEELING DOWN, DEPRESSED OR HOPELESS: 0
1. LITTLE INTEREST OR PLEASURE IN DOING THINGS: 1
SUM OF ALL RESPONSES TO PHQ QUESTIONS 1-9: 0
SUM OF ALL RESPONSES TO PHQ QUESTIONS 1-9: 1
SUM OF ALL RESPONSES TO PHQ QUESTIONS 1-9: 1
SUM OF ALL RESPONSES TO PHQ9 QUESTIONS 1 & 2: 1
2. FEELING DOWN, DEPRESSED OR HOPELESS: 0
SUM OF ALL RESPONSES TO PHQ QUESTIONS 1-9: 0
SUM OF ALL RESPONSES TO PHQ9 QUESTIONS 1 & 2: 0

## 2021-01-01 ASSESSMENT — LIFESTYLE VARIABLES
HOW OFTEN DO YOU HAVE A DRINK CONTAINING ALCOHOL: NEVER
HOW OFTEN DO YOU HAVE A DRINK CONTAINING ALCOHOL: 0
AUDIT-C TOTAL SCORE: INCOMPLETE
AUDIT TOTAL SCORE: INCOMPLETE

## 2021-01-04 DIAGNOSIS — I48.91 ATRIAL FIBRILLATION, UNSPECIFIED TYPE (HCC): ICD-10-CM

## 2021-01-04 RX ORDER — DONEPEZIL HYDROCHLORIDE 10 MG/1
10 TABLET, FILM COATED ORAL NIGHTLY
Qty: 90 TABLET | Refills: 0 | Status: SHIPPED
Start: 2021-01-04 | End: 2021-01-01 | Stop reason: SDUPTHER

## 2021-01-04 RX ORDER — MEMANTINE HYDROCHLORIDE 10 MG/1
10 TABLET ORAL 2 TIMES DAILY
Qty: 180 TABLET | Refills: 0 | Status: SHIPPED
Start: 2021-01-04 | End: 2021-01-01 | Stop reason: SDUPTHER

## 2021-01-25 RX ORDER — FUROSEMIDE 20 MG/1
TABLET ORAL
Qty: 36 TABLET | Refills: 1 | Status: SHIPPED
Start: 2021-01-25 | End: 2021-01-01 | Stop reason: SDUPTHER

## 2021-01-26 ENCOUNTER — OFFICE VISIT (OUTPATIENT)
Dept: SURGERY | Age: 86
End: 2021-01-26
Payer: MEDICARE

## 2021-01-26 VITALS
HEIGHT: 66 IN | WEIGHT: 120.3 LBS | SYSTOLIC BLOOD PRESSURE: 140 MMHG | DIASTOLIC BLOOD PRESSURE: 82 MMHG | RESPIRATION RATE: 18 BRPM | TEMPERATURE: 97.9 F | BODY MASS INDEX: 19.33 KG/M2 | HEART RATE: 80 BPM

## 2021-01-26 DIAGNOSIS — K81.1 CHRONIC CHOLECYSTITIS: Primary | ICD-10-CM

## 2021-01-26 PROCEDURE — 1123F ACP DISCUSS/DSCN MKR DOCD: CPT | Performed by: TRANSPLANT SURGERY

## 2021-01-26 PROCEDURE — 1090F PRES/ABSN URINE INCON ASSESS: CPT | Performed by: TRANSPLANT SURGERY

## 2021-01-26 PROCEDURE — 1036F TOBACCO NON-USER: CPT | Performed by: TRANSPLANT SURGERY

## 2021-01-26 PROCEDURE — 4040F PNEUMOC VAC/ADMIN/RCVD: CPT | Performed by: TRANSPLANT SURGERY

## 2021-01-26 PROCEDURE — G8420 CALC BMI NORM PARAMETERS: HCPCS | Performed by: TRANSPLANT SURGERY

## 2021-01-26 PROCEDURE — G8427 DOCREV CUR MEDS BY ELIG CLIN: HCPCS | Performed by: TRANSPLANT SURGERY

## 2021-01-26 PROCEDURE — 99213 OFFICE O/P EST LOW 20 MIN: CPT | Performed by: TRANSPLANT SURGERY

## 2021-01-26 PROCEDURE — G8484 FLU IMMUNIZE NO ADMIN: HCPCS | Performed by: TRANSPLANT SURGERY

## 2021-01-26 ASSESSMENT — ENCOUNTER SYMPTOMS
BACK PAIN: 1
SHORTNESS OF BREATH: 0
ABDOMINAL PAIN: 0
VOMITING: 0
NAUSEA: 0
CONSTIPATION: 0
EYE PAIN: 0
PHOTOPHOBIA: 0
BLOOD IN STOOL: 0
EYE DISCHARGE: 0
DIARRHEA: 0

## 2021-01-26 NOTE — PROGRESS NOTES
Hepatobiliary and Pancreatic Surgery Attending History and Physical    Patient's Name/Date of Birth: [place] /8/27/1932 (80 y.o.)    Date: January 26, 2021     CC: chronic cholecystitis    HPI:  Patient is a very pleasant 80year old female whom underwent a a diagnostic laparoscopy with Dr. Janiya Kyle in 11/19 for chronic cholecystitis. She had enterotomy's with closure and the cholecystectomy was aborted. She also had a recent kyphoplasty in December 2020 and therefore her gallbladder was not performed. Her last attack for her gallbladder was prior to December. She has no lost any weight. She has not changed her diet. She is not having any pain after eating. Her biggest issue is her back where she is going to undergo an epidural next week.      Past Medical History:   Diagnosis Date    Alzheimer disease ([address]     Arthritis     Atrial fibrillation (HCC)     GERD (gastroesophageal reflux disease)     Hyperlipidemia        Past Surgical History:   Procedure Laterality Date    ABDOMEN SURGERY      ANESTHESIA NERVE BLOCK Bilateral 2/6/2020    BILATERAL MEDIAL BRANCH L4-5 AND L5-S1 JOINTS [facility] [address] [facility], LAPAROSCOPIC N/A 11/21/2019    DIAGNOSITIC LAPAROSCOPY CONVERTED TO EXPLORATORY LAPAROTOMY WITH CLOSURE OF ENTEROTOMY X1, CHOLANGIOGRAM, EGD performed by Mellisa Adler MD at [address] [facility] ECHO COMPL [address] [facility]   10/21/2013         [facility] [place]. INJECTION PROCEDURE FOR SACROILIAC JOINT Right 12/13/2018    RIGHT SACROILIAC JOINT STEROID INJECTION UNDER X-RAY GUIDANCE performed by Olivia Jaime DO at [address] [facility] JOINT Left 1/17/2019    LEFT SACROILIAC JOINT STEROID INJECTION UNDER X-RAY GUIDANCE performed by Olivia Jaime DO at [address] [facility] JOINT Bilateral 9/19/2019    BILATERAL SACROILIAC JOINT INJECTION X-RAY performed by Sarabjit Arroyo DO at [facility] Right 12/13/2018    sacroiliac joint     NERVE BLOCK Left 01/17/2019    sacroiliac joint injection     NERVE BLOCK Bilateral 02/06/2020    medial branch block    NERVE BLOCK Bilateral 10/29/2020    intra articular     NERVE BLOCK Bilateral 10/29/2020    BILATERAL L4-5 AND L5-S1 INTRA-ARTICULAR FACET STEROID INJECTION (CPT 84387,42757) performed by Sarabjit Arroyo DO at [address] N/A 11/25/2020    KYPHOPLASTY , L1 L2,BONE BIOPSY, EPIDURAL INJECTION performed by Francis Betts MD at [address] [facility]     d/t peptic ulcers       Current Outpatient Medications   Medication Sig Dispense Refill    furosemide (LASIX) 20 MG tablet TAKE 1 TABLET BY MOUTH 3  TIMES WEEKLY 36 tablet 1    apixaban (ELIQUIS) 2.5 MG TABS tablet TAKE 1 TABLET BY MOUTH  TWICE DAILY 180 tablet 0    donepezil (ARICEPT) 10 MG tablet Take 1 tablet by mouth nightly 90 tablet 0    memantine (NAMENDA) 10 MG tablet Take 1 tablet by mouth 2 times daily 180 tablet 0    metoprolol tartrate (LOPRESSOR) 25 MG tablet Take 1 tablet by mouth 2 times daily 180 tablet 0    diclofenac sodium (VOLTAREN) 1 % GEL Apply 4 g topically 4 times daily as needed for Pain      traMADol (ULTRAM) 50 MG tablet Take 50 mg by mouth every 6 hours as needed for Pain.  citalopram (CELEXA) 10 MG tablet Take 1 tablet by mouth every morning 90 tablet 1    Compression Stockings MISC Wear during wake hours. 1 each 0    Glucosamine-Chondroit-Vit C-Mn (GLUCOSAMINE 1500 COMPLEX PO) Take 1 tablet by mouth daily       Cholecalciferol (VITAMIN D3) 2000 units CAPS Take 2,000 Units by mouth daily        No current facility-administered medications for this visit. Allergies   Allergen Reactions    Iodine      I.V.        Family History   Problem Relation Age of Onset    Heart Disease Mother     Heart Disease Father Social History     Socioeconomic History    Marital status:      Spouse name: Not on file    Number of children: 3    Years of education: Not on file    Highest education level: Not on file   Occupational History    Not on file   Social Needs    Financial resource strain: Not on file    Food insecurity     Worry: Not on file     Inability: Not on file   [employer] needs     Medical: Not on file     Non-medical: Not on file   Tobacco Use    Smoking status: Never Smoker    Smokeless tobacco: Never Used   Substance and Sexual Activity    Alcohol use: No     Comment: 2 cups of coffee a day     Drug use: No    Sexual activity: Not on file   Lifestyle    Physical activity     Days per week: Not on file     Minutes per session: Not on file    Stress: Not on file   Relationships    Social connections     Talks on phone: Not on file     Gets together: Not on file     Attends Religion service: Not on file     Active member of club or organization: Not on file     Attends meetings of clubs or organizations: Not on file     Relationship status: Not on file    Intimate partner violence     Fear of current or ex partner: Not on file     Emotionally abused: Not on file     Physically abused: Not on file     Forced sexual activity: Not on file   Other Topics Concern    Not on file   Social History Narrative    Not on file       ROS:   Review of Systems   Constitutional: Negative for chills, diaphoresis and fever. HENT: Positive for hearing loss. Negative for congestion, ear discharge, ear pain, nosebleeds and tinnitus. Eyes: Negative for photophobia, pain and discharge. Respiratory: Negative for shortness of breath. Cardiovascular: Negative for palpitations and leg swelling. Gastrointestinal: Negative for abdominal pain, blood in stool, constipation, diarrhea, nausea and vomiting. Endocrine: Negative for polydipsia. Genitourinary: Negative for frequency, hematuria and urgency. Musculoskeletal: Positive for back pain. Negative for neck pain. Skin: Negative for rash. Allergic/Immunologic: Negative for environmental allergies. Neurological: Negative for tremors and seizures. Psychiatric/Behavioral: Negative for hallucinations and suicidal ideas. The patient is not nervous/anxious. Physical Exam:  BP (!) 140/82 (Site: Left Upper Arm, Position: Sitting, Cuff Size: Medium Adult)   Pulse 80   Temp 97.9 °F (36.6 °C) (Temporal)   Resp 18   Ht 5' 6\" (1.676 m)   Wt 120 lb 4.8 oz (54.6 kg)   BMI 19.42 kg/m²       PSYCH: mood and affect normal, alert and oriented x 3  CONSTITUTIONAL: No apparent distress, comfortable  EYES: Sclera white, pupils equal round and reactive to light  ENMT:  Hearing normal, trachea midline, ears externally intact  LYMPH: no lympadenopathy in neck. Nolympadenopathy in groins  RESP: Breath sounds were clear and equal with no rales, wheezes, or rhonchi. Respiratory effort was normal with no retractions or use of accessory muscles. CV: Heart soundswere normal with a regular rate and rhythm. No pedal edema  GI/ Abdomen: Soft, nondistended, nontender, no guarding, no peritoneal signs    MSK: no clubbing/ no cyanosis/ gaitnormal       Assessment/Plan:  Chronic cholecystitis with failed laparoscopic approach  - she is not having any pain  - we discussed surgery, they would like to not do anything unless she is having a problem  - I think this is completely reasonable. 20 Minutes of which greater than 50% was spent counseling or coordinating her care. Thank you for the consultation allowing me to take part in Ms. Yuen's care.      Electronically signed by Lachelle García MD on 1/26/2021 at 12:53 PM

## 2021-02-02 ENCOUNTER — HOSPITAL ENCOUNTER (OUTPATIENT)
Dept: GENERAL RADIOLOGY | Age: 86
Discharge: HOME | End: 2021-02-04
Payer: MEDICARE

## 2021-02-02 DIAGNOSIS — M54.50 LOW BACK PAIN, UNSPECIFIED BACK PAIN LATERALITY, UNSPECIFIED CHRONICITY, UNSPECIFIED WHETHER SCIATICA PRESENT: ICD-10-CM

## 2021-02-02 PROCEDURE — 64483 NJX AA&/STRD TFRM EPI L/S 1: CPT

## 2021-02-18 ENCOUNTER — OFFICE VISIT (OUTPATIENT)
Dept: FAMILY MEDICINE CLINIC | Age: 86
End: 2021-02-18
Payer: MEDICARE

## 2021-02-18 VITALS
DIASTOLIC BLOOD PRESSURE: 60 MMHG | BODY MASS INDEX: 18.16 KG/M2 | WEIGHT: 113 LBS | HEIGHT: 66 IN | TEMPERATURE: 97.6 F | SYSTOLIC BLOOD PRESSURE: 110 MMHG

## 2021-02-18 DIAGNOSIS — E78.49 OTHER HYPERLIPIDEMIA: ICD-10-CM

## 2021-02-18 DIAGNOSIS — I48.91 ATRIAL FIBRILLATION, UNSPECIFIED TYPE (HCC): Primary | ICD-10-CM

## 2021-02-18 DIAGNOSIS — Z98.890 S/P KYPHOPLASTY: ICD-10-CM

## 2021-02-18 DIAGNOSIS — G30.9 ALZHEIMER'S DEMENTIA WITHOUT BEHAVIORAL DISTURBANCE, UNSPECIFIED TIMING OF DEMENTIA ONSET: ICD-10-CM

## 2021-02-18 DIAGNOSIS — F02.80 ALZHEIMER'S DEMENTIA WITHOUT BEHAVIORAL DISTURBANCE, UNSPECIFIED TIMING OF DEMENTIA ONSET: ICD-10-CM

## 2021-02-18 DIAGNOSIS — F32.A DEPRESSION, UNSPECIFIED DEPRESSION TYPE: ICD-10-CM

## 2021-02-18 PROCEDURE — 99214 OFFICE O/P EST MOD 30 MIN: CPT | Performed by: INTERNAL MEDICINE

## 2021-02-18 PROCEDURE — 1123F ACP DISCUSS/DSCN MKR DOCD: CPT | Performed by: INTERNAL MEDICINE

## 2021-02-18 PROCEDURE — G8419 CALC BMI OUT NRM PARAM NOF/U: HCPCS | Performed by: INTERNAL MEDICINE

## 2021-02-18 PROCEDURE — 1036F TOBACCO NON-USER: CPT | Performed by: INTERNAL MEDICINE

## 2021-02-18 PROCEDURE — 1090F PRES/ABSN URINE INCON ASSESS: CPT | Performed by: INTERNAL MEDICINE

## 2021-02-18 PROCEDURE — G8484 FLU IMMUNIZE NO ADMIN: HCPCS | Performed by: INTERNAL MEDICINE

## 2021-02-18 PROCEDURE — 4040F PNEUMOC VAC/ADMIN/RCVD: CPT | Performed by: INTERNAL MEDICINE

## 2021-02-18 PROCEDURE — G8427 DOCREV CUR MEDS BY ELIG CLIN: HCPCS | Performed by: INTERNAL MEDICINE

## 2021-02-18 SDOH — ECONOMIC STABILITY: TRANSPORTATION INSECURITY
IN THE PAST 12 MONTHS, HAS LACK OF TRANSPORTATION KEPT YOU FROM MEETINGS, WORK, OR FROM GETTING THINGS NEEDED FOR DAILY LIVING?: NO

## 2021-02-18 SDOH — ECONOMIC STABILITY: FOOD INSECURITY: WITHIN THE PAST 12 MONTHS, THE FOOD YOU BOUGHT JUST DIDN'T LAST AND YOU DIDN'T HAVE MONEY TO GET MORE.: NEVER TRUE

## 2021-02-18 ASSESSMENT — ENCOUNTER SYMPTOMS
NAUSEA: 0
EYE DISCHARGE: 0
BLOOD IN STOOL: 0
EYE PAIN: 0
ABDOMINAL PAIN: 0
SINUS PAIN: 0
BACK PAIN: 1
SORE THROAT: 0
SHORTNESS OF BREATH: 0

## 2021-02-18 NOTE — PROGRESS NOTES
Chief Complaint   Patient presents with    Gastroesophageal Reflux     med refills       HPI:  Patient is here for follow-up     For follow up    Here with     Since last visit - seen by Surgeon. As she does not have any symptoms from GB at present,decided for - watchful expectancy -     Pt also has increase back pain. Had Epidural x3 by Dr Sudhir Clayton. Pt and  reports that she did not have much improvement in her pain/symptoms        Allergy and Medications are reviewed and updated. Past Medical History, Surgical History, and Family History has been reviewed and updated. Review of Systems:  Review of Systems   Constitutional: Negative for chills and fever. HENT: Negative for congestion, sinus pain and sore throat. Eyes: Negative for pain and discharge. Respiratory: Negative for shortness of breath (No new SOb). Cardiovascular: Negative for chest pain. Gastrointestinal: Negative for abdominal pain, blood in stool and nausea. Genitourinary: Negative for flank pain and frequency. Musculoskeletal: Positive for back pain. Negative for neck pain. Hematological: Does not bruise/bleed easily. Psychiatric/Behavioral: Negative for suicidal ideas. Vitals:    02/18/21 1417   BP: 110/60   Site: Left Upper Arm   Position: Sitting   Cuff Size: Medium Adult   Temp: 97.6 °F (36.4 °C)   TempSrc: Temporal   Weight: 113 lb (51.3 kg)   Height: 5' 6\" (1.676 m)       Physical Exam  Vitals signs reviewed. Constitutional:       Appearance: She is well-developed. HENT:      Head: Normocephalic and atraumatic. Mouth/Throat:      Pharynx: No oropharyngeal exudate. Eyes:      Conjunctiva/sclera: Conjunctivae normal.      Pupils: Pupils are equal, round, and reactive to light. Neck:      Vascular: No JVD. Cardiovascular:      Rate and Rhythm: Normal rate and regular rhythm. Pulmonary:      Effort: Pulmonary effort is normal.      Breath sounds: Normal breath sounds. No rales. Abdominal:      General: Bowel sounds are normal.      Palpations: Abdomen is soft. Musculoskeletal: Normal range of motion. Lymphadenopathy:      Cervical: No cervical adenopathy. Skin:     General: Skin is warm and dry. Neurological:      Mental Status: She is alert and oriented to person, place, and time.    Psychiatric:         Behavior: Behavior normal.          Labs :    Lab Results   Component Value Date    WBC 10.1 11/26/2020    HGB 13.9 11/26/2020    HCT 43.3 11/26/2020     11/26/2020    CHOL 177 07/18/2016    TRIG 80 07/18/2016    HDL 68 09/24/2020    ALT 6 11/23/2020    AST 15 11/23/2020     11/26/2020    K 3.8 11/26/2020     11/26/2020    CREATININE 0.7 11/26/2020    BUN 16 11/26/2020    CO2 25 11/26/2020    TSH 3.420 09/24/2020    INR 1.1 11/23/2020    GLUF 92 09/24/2020    LABA1C 5.7 (H) 03/01/2019     Lab Results   Component Value Date    COLORU Yellow 11/12/2020    NITRU Negative 11/12/2020    GLUCOSEU Negative 11/12/2020    KETUA Negative 11/12/2020    UROBILINOGEN 0.2 11/12/2020    BILIRUBINUR Negative 11/12/2020             ASSESSMENT     Patient Active Problem List    Diagnosis Date Noted    Chronic cholecystitis     Midline low back pain 11/23/2020    Bilateral hip pain 11/23/2020    Pain in both knees 11/23/2020    Ambulatory dysfunction 11/23/2020    Closed compression fracture of L2 lumbar vertebra, initial encounter [facility] 11/23/2020    Hypertensive urgency 11/23/2020    Lumbar spondylosis 02/06/2020    Calculus of gallbladder with acute cholecystitis without obstruction     Calculus of gallbladder with cholecystitis with biliary obstruction     Symptomatic cholelithiasis     RUQ abdominal pain 11/20/2019    Calculus of gallbladder without cholecystitis without obstruction 11/20/2019    Syncope 11/20/2019    Depression 08/19/2019    Hyperlipidemia 08/19/2019    Sacroiliitis ([address] 11/26/2018    Arthritis 03/21/2018  Alzheimer's dementia without behavioral disturbance ([facility] 02/21/2018    Vitamin B 12 deficiency 02/21/2018    Atrial fibrillation (HCC) 10/21/2013    Gastroesophageal reflux disease without esophagitis 10/21/2013        Diagnosis:     ICD-10-CM    1. Atrial fibrillation, unspecified type [facility]) -Nov 2019 - Dr Yeung Minus - on Eliquis  I48.91    2. Other hyperlipidemia  E78.49    3. Alzheimer's dementia without behavioral disturbance, unspecified timing of dementia onset ([facility]  G30.9     F02.80    4. S/P kyphoplasty  Z98.890    5. Depression, unspecified depression type  F32.9        PLAN:      Discussed with pt and    Adv to contact [address] surgeon about her pain after Epidural    No abdominal pain     No n/V    Adv to contact us/her GI surgeon of any symptoms of GB issues         Pt is stable on current medical treatment. Continue current treatment plan    Side effects/Adverse effects/Precautions are reviewed with patient. Low salt, Low Carb diet an low fat diet  Continue medications as advised and take them regularly  Regular exercises as advised    Discussed natural and expected course of this diagnosis and need to alert me if symptoms do not follow expected course, or if any worse. Smoking cessation if applicable, discussed with patient. Had first dose of Covid vaccine          There are no Patient Instructions on file for this visit. Return in about 2 months (around 4/18/2021).

## 2021-04-19 NOTE — TELEPHONE ENCOUNTER
Last Appointment   2/18/2021  Next Appointment  4/21/2021    [facility] [id number] [facility]

## 2021-04-21 NOTE — PROGRESS NOTES
Chief Complaint   Patient presents with    Gastroesophageal Reflux     no recent labs    [facility]       HPI:  Patient is here for follow-up     Feeling ok    No abd pain/N/V    Back pain -same , Had epidural    Allergy and Medications are reviewed and updated. Past Medical History, Surgical History, and Family History has been reviewed and updated. Review of Systems:  Review of Systems   Constitutional: Negative for chills and fever. HENT: Negative for congestion, sinus pain and sore throat. Eyes: Negative for pain and discharge. Respiratory: Negative for shortness of breath (No new SOb). Cardiovascular: Negative for chest pain. Gastrointestinal: Negative for abdominal pain, blood in stool and nausea. Genitourinary: Negative for flank pain and frequency. Musculoskeletal: Positive for back pain. Negative for neck pain. Hematological: Does not bruise/bleed easily. Psychiatric/Behavioral: Negative for suicidal ideas. Vitals:    04/21/21 1457   BP: 93/63   Site: Left Upper Arm   Position: Sitting   Cuff Size: Medium Adult   Pulse: 78   Resp: 20   Temp: 96.7 °F (35.9 °C)   TempSrc: Temporal   SpO2: 98%   Weight: 118 lb 12.8 oz (53.9 kg)   Height: 5' 6\" (1.676 m)       Physical Exam  Vitals signs reviewed. Constitutional:       Appearance: She is well-developed. HENT:      Head: Normocephalic and atraumatic. Mouth/Throat:      Pharynx: No oropharyngeal exudate. Eyes:      Conjunctiva/sclera: Conjunctivae normal.      Pupils: Pupils are equal, round, and reactive to light. Neck:      Vascular: No JVD. Cardiovascular:      Rate and Rhythm: Normal rate and regular rhythm. Pulmonary:      Effort: Pulmonary effort is normal.      Breath sounds: Normal breath sounds. No rales. Abdominal:      General: Bowel sounds are normal.      Palpations: Abdomen is soft. Musculoskeletal: Normal range of motion.    Lymphadenopathy:      Cervical: No cervical adenopathy. Skin:     General: Skin is warm and dry. Neurological:      Mental Status: She is alert and oriented to person, place, and time.    Psychiatric:         Behavior: Behavior normal.          Labs :    Lab Results   Component Value Date    WBC 10.1 11/26/2020    HGB 13.9 11/26/2020    HCT 43.3 11/26/2020     11/26/2020    CHOL 177 07/18/2016    TRIG 80 07/18/2016    HDL 68 09/24/2020    ALT 6 11/23/2020    AST 15 11/23/2020     11/26/2020    K 3.8 11/26/2020     11/26/2020    CREATININE 0.7 11/26/2020    BUN 16 11/26/2020    CO2 25 11/26/2020    TSH 3.420 09/24/2020    INR 1.1 11/23/2020    GLUF 92 09/24/2020    LABA1C 5.7 (H) 03/01/2019     Lab Results   Component Value Date    COLORU Yellow 11/12/2020    NITRU Negative 11/12/2020    GLUCOSEU Negative 11/12/2020    KETUA Negative 11/12/2020    UROBILINOGEN 0.2 11/12/2020    BILIRUBINUR Negative 11/12/2020           ASSESSMENT     Patient Active Problem List    Diagnosis Date Noted    Chronic cholecystitis     Midline low back pain 11/23/2020    Bilateral hip pain 11/23/2020    Pain in both knees 11/23/2020    Ambulatory dysfunction 11/23/2020    Closed compression fracture of L2 lumbar vertebra, initial encounter ([facility] 11/23/2020    Hypertensive urgency 11/23/2020    Lumbar spondylosis 02/06/2020    Calculus of gallbladder with acute cholecystitis without obstruction     Calculus of gallbladder with cholecystitis with biliary obstruction     Symptomatic cholelithiasis     RUQ abdominal pain 11/20/2019    Calculus of gallbladder without cholecystitis without obstruction 11/20/2019    Syncope 11/20/2019    Depression 08/19/2019    Hyperlipidemia 08/19/2019    Sacroiliitis ([address] 11/26/2018    Arthritis 03/21/2018    Alzheimer's dementia without behavioral disturbance ([address] 02/21/2018    Vitamin B 12 deficiency 02/21/2018    Atrial fibrillation ([address] 10/21/2013    Gastroesophageal reflux disease without esophagitis

## 2021-06-23 PROBLEM — R55 SYNCOPE: Status: RESOLVED | Noted: 2019-11-20 | Resolved: 2021-01-01

## 2021-06-23 PROBLEM — I16.0 HYPERTENSIVE URGENCY: Status: RESOLVED | Noted: 2020-11-23 | Resolved: 2021-01-01

## 2021-06-23 PROBLEM — R10.11 RUQ ABDOMINAL PAIN: Status: RESOLVED | Noted: 2019-11-20 | Resolved: 2021-01-01

## 2021-06-23 PROBLEM — M46.1 SACROILIITIS (HCC): Status: RESOLVED | Noted: 2018-11-26 | Resolved: 2021-01-01

## 2021-06-23 NOTE — PROGRESS NOTES
Chief Complaint   Patient presents with    Discuss Labs       HPI:  Patient is here for follow-up     Feeling well    Here with her     Labs are done - here to review        Allergy and Medications are reviewed and updated. Past Medical History, Surgical History, and Family History has been reviewed and updated. Review of Systems:  Review of Systems   Constitutional: Negative for chills and fever. HENT: Negative for congestion, sinus pain and sore throat. Eyes: Negative for pain and discharge. Respiratory: Negative for shortness of breath (No new SOb). Cardiovascular: Negative for chest pain. Gastrointestinal: Negative for abdominal pain, blood in stool and nausea. Genitourinary: Negative for flank pain and frequency. Musculoskeletal: Negative for neck pain. Hematological: Does not bruise/bleed easily. Psychiatric/Behavioral: Negative for suicidal ideas. Vitals:    06/23/21 1532   BP: 124/76   Pulse: 56   Resp: 16   Temp: 96.9 °F (36.1 °C)   TempSrc: Temporal   SpO2: 98%   Weight: 119 lb 4.8 oz (54.1 kg)   Height: 5' 6\" (1.676 m)       Physical Exam  Vitals reviewed. Constitutional:       Appearance: She is well-developed. HENT:      Head: Normocephalic and atraumatic. Mouth/Throat:      Pharynx: No oropharyngeal exudate. Eyes:      Conjunctiva/sclera: Conjunctivae normal.      Pupils: Pupils are equal, round, and reactive to light. Neck:      Vascular: No JVD. Cardiovascular:      Rate and Rhythm: Normal rate and regular rhythm. Pulmonary:      Effort: Pulmonary effort is normal.      Breath sounds: Normal breath sounds. No rales. Abdominal:      General: Bowel sounds are normal.      Palpations: Abdomen is soft. Musculoskeletal:         General: Normal range of motion. Lymphadenopathy:      Cervical: No cervical adenopathy. Skin:     General: Skin is warm and dry.    Neurological:      Mental Status: She is alert and oriented to person, place, and time.   Psychiatric:         Behavior: Behavior normal.          Labs :    Lab Results   Component Value Date    WBC 5.7 06/21/2021    HGB 12.0 06/21/2021    HCT 40.0 06/21/2021     06/21/2021    CHOL 177 07/18/2016    TRIG 80 07/18/2016    HDL 71 06/21/2021    ALT 8 06/21/2021    AST 21 06/21/2021     06/21/2021    K 4.8 06/21/2021     06/21/2021    CREATININE 1.0 06/21/2021    BUN 19 06/21/2021    CO2 24 06/21/2021    TSH 2.080 06/21/2021    INR 1.1 11/23/2020    GLUF 87 06/21/2021    LABA1C 5.7 (H) 03/01/2019     Lab Results   Component Value Date    COLORU Yellow 11/12/2020    NITRU Negative 11/12/2020    GLUCOSEU Negative 11/12/2020    KETUA Negative 11/12/2020    UROBILINOGEN 0.2 11/12/2020    BILIRUBINUR Negative 11/12/2020             ASSESSMENT     Patient Active Problem List    Diagnosis Date Noted    Midline low back pain 11/23/2020    Bilateral hip pain 11/23/2020    Pain in both knees 11/23/2020    Ambulatory dysfunction 11/23/2020    Closed compression fracture of L2 lumbar vertebra, initial encounter ([facility] 11/23/2020    Lumbar spondylosis 02/06/2020    Calculus of gallbladder without cholecystitis without obstruction 11/20/2019    Depression 08/19/2019    Hyperlipidemia 08/19/2019    Arthritis 03/21/2018    Alzheimer's dementia without behavioral disturbance ([facility] 02/21/2018    Vitamin B 12 deficiency 02/21/2018    [facility] 10/21/2013    Gastroesophageal reflux disease without esophagitis 10/21/2013        Diagnosis:     ICD-10-CM    1. Atrial fibrillation, unspecified type [facility]) -Nov 2019 - Dr Mandy Wade - on Eliquis  I48.91    2. Other hyperlipidemia  E78.49    3. Alzheimer's dementia without behavioral disturbance, unspecified timing of dementia onset ([facility]  G30.9     F02.80    4. S/P kyphoplasty  Z98.890    5. Depression, unspecified depression type  F32.9    6.  Irritable bowel syndrome with diarrhea  K58.0        PLAN:      Pt is feeling well    Doing reasonably well    Labs are reviewed    CBC  CMP  LIPID  TSH     Pt is stable on current medical treatment. Continue current treatment plan    Side effects/Adverse effects/Precautions are reviewed with patient. Low salt, Low Carb diet an low fat diet  Continue medications as advised and take them regularly  Regular exercises as advised    Discussed natural and expected course of this diagnosis and need to alert me if symptoms do not follow expected course, or if any worse. Smoking cessation if applicable, discussed with patient. Patient Instructions   The medication list included in this document is our record of what you are currently taking, including any changes that were made at today's visit.  If you find any differences when compared to your medications at home, or have any questions that were not answered at your visit, please contact the office. Return in about 2 months (around 8/23/2021).

## 2021-08-25 NOTE — PROGRESS NOTES
Chief Complaint   Patient presents with    Atrial Fibrillation    Medication Refill       HPI:  Patient is here for follow-up     Pt is here with      is reporting more pain in Lower back    Following with Neurosurgery for pain    Has been adv for Rubie Mcardle- Reminded her to use walker         Allergy and Medications are reviewed and updated. Past Medical History, Surgical History, and Family History has been reviewed and updated. Review of Systems:  Review of Systems   Constitutional: Negative for chills and fever. HENT: Negative for congestion, sinus pain and sore throat. Eyes: Negative for pain and discharge. Respiratory: Negative for shortness of breath (No new SOb). Cardiovascular: Negative for chest pain. Gastrointestinal: Negative for abdominal pain, blood in stool and nausea. Genitourinary: Negative for flank pain and frequency. Musculoskeletal: Positive for arthralgias, back pain and gait problem. Negative for neck pain. Hematological: Does not bruise/bleed easily. Psychiatric/Behavioral: Negative for suicidal ideas. Vitals:    08/25/21 1555   BP: 126/80   Pulse: 64   Resp: 16   Temp: 97.2 °F (36.2 °C)   TempSrc: Temporal   SpO2: 91%   Weight: 117 lb 4.8 oz (53.2 kg)   Height: 5' 6\" (1.676 m)       Physical Exam  Vitals reviewed. Constitutional:       Appearance: She is well-developed. HENT:      Head: Normocephalic and atraumatic. Mouth/Throat:      Pharynx: No oropharyngeal exudate. Eyes:      Conjunctiva/sclera: Conjunctivae normal.      Pupils: Pupils are equal, round, and reactive to light. Neck:      Vascular: No JVD. Cardiovascular:      Rate and Rhythm: Normal rate and regular rhythm. Pulmonary:      Effort: Pulmonary effort is normal.      Breath sounds: Normal breath sounds. No rales. Abdominal:      General: Bowel sounds are normal.      Palpations: Abdomen is soft. Musculoskeletal:         General: Normal range of motion. Lymphadenopathy:      Cervical: No cervical adenopathy. Skin:     General: Skin is warm and dry. Neurological:      Mental Status: She is alert. Mental status is at baseline. Psychiatric:         Behavior: Behavior normal.          Labs :    Lab Results   Component Value Date    WBC 5.7 06/21/2021    HGB 12.0 06/21/2021    HCT 40.0 06/21/2021     06/21/2021    CHOL 177 07/18/2016    TRIG 80 07/18/2016    HDL 71 06/21/2021    ALT 8 06/21/2021    AST 21 06/21/2021     06/21/2021    K 4.8 06/21/2021     06/21/2021    CREATININE 1.0 06/21/2021    BUN 19 06/21/2021    CO2 24 06/21/2021    TSH 2.080 06/21/2021    INR 1.1 11/23/2020    GLUF 87 06/21/2021    LABA1C 5.7 (H) 03/01/2019     Lab Results   Component Value Date    COLORU Yellow 11/12/2020    NITRU Negative 11/12/2020    GLUCOSEU Negative 11/12/2020    KETUA Negative 11/12/2020    UROBILINOGEN 0.2 11/12/2020    BILIRUBINUR Negative 11/12/2020             ASSESSMENT     Patient Active Problem List    Diagnosis Date Noted    Midline low back pain 11/23/2020    Bilateral hip pain 11/23/2020    Pain in both knees 11/23/2020    Ambulatory dysfunction 11/23/2020    Closed compression fracture of L2 lumbar vertebra, initial encounter ([facility] [address] 11/23/2020    Lumbar spondylosis 02/06/2020    Calculus of gallbladder without cholecystitis without obstruction 11/20/2019    Depression 08/19/2019    Hyperlipidemia 08/19/2019    Arthritis 03/21/2018    Alzheimer's dementia without behavioral disturbance ([facility] [address] 02/21/2018    Vitamin B 12 deficiency 02/21/2018    Atrial fibrillation ([facility] [address] 10/21/2013    Gastroesophageal reflux disease without esophagitis 10/21/2013        Diagnosis:     ICD-10-CM    1. Atrial fibrillation, unspecified type [facility]) -Nov 2019 - Dr Loco Gagnon - on Eliquis  I48.91    2. Other hyperlipidemia  E78.49    3. Alzheimer's dementia without behavioral disturbance, unspecified timing of dementia onset ([address]  G30.9     F02.80    4. S/P kyphoplasty  Z98.890    5. Depression, unspecified depression type  F32.9    6. Gait difficulty  R26.9        PLAN:        Discussed with  and wife    Fall precautions- reviewed    Walker for balance , gait and safety    Verbalized understanding    Neurosurgery notes reviewed        Pt is stable on current medical treatment. Continue current treatment plan    Side effects/Adverse effects/Precautions are reviewed with patient. Low salt, Low Carb diet an low fat diet  Continue medications as advised and take them regularly  Regular exercises as advised    Discussed natural and expected course of this diagnosis and need to alert me if symptoms do not follow expected course, or if any worse. Smoking cessation if applicable, discussed with patient. Patient Instructions   The medication list included in this document is our record of what you are currently taking, including any changes that were made at today's visit.  If you find any differences when compared to your medications at home, or have any questions that were not answered at your visit, please contact the office. Return in about 2 months (around 10/25/2021).

## 2021-10-25 NOTE — PROGRESS NOTES
Medicare Annual Wellness Visit  Name: Srikanth Pi Date: 10/25/2021   MRN: <[id number]> Sex: Female   Age: 80 y.o. Ethnicity: Non- / Non    : 1932 Race: White (non-)      Edward Benoit is here for Medicare AWV and Other (Gave pt flu shto )    Screenings for behavioral, psychosocial and functional/safety risks, and cognitive dysfunction are all negative except as indicated below. These results, as well as other patient data from the [address] form, are documented in Flowsheets linked to this Encounter. Allergies   Allergen Reactions    Iodine      I.V.         Prior to Visit Medications    Medication Sig Taking?  Authorizing Provider   memantine (NAMENDA) 10 MG tablet Take 1 tablet by mouth 2 times daily Yes Molly Pimentel MD   metoprolol tartrate (LOPRESSOR) 25 MG tablet Take 1 tablet by mouth 2 times daily Yes Molly Pimentel MD   donepezil (ARICEPT) 10 MG tablet TAKE 1 TABLET BY MOUTH AT  NIGHT Yes Molly Pimentel MD   furosemide (LASIX) 20 MG tablet TAKE 1 TABLET BY MOUTH 3  TIMES WEEKLY  Patient taking differently: 20 mg three times a week  Yes Molly Pimentel MD   citalopram (CELEXA) 10 MG tablet TAKE 1 TABLET BY MOUTH IN  THE MORNING  Patient taking differently: 10 mg every morning  Yes Molly Pimentel MD   apixaban (ELIQUIS) 2.5 MG TABS tablet TAKE 1 TABLET BY MOUTH  TWICE DAILY Yes Molly Pimentel MD   Glucosamine-Chondroit-Vit C-Mn (GLUCOSAMINE 1500 COMPLEX PO) Take 1 tablet by mouth daily  Yes Historical Provider, MD   Cholecalciferol (VITAMIN D3) 2000 units CAPS Take 2,000 Units by mouth daily  Yes Historical Provider, MD         Past Medical History:   Diagnosis Date    Arthritis     Atrial fibrillation ([facility] [address]     Calculus of gallbladder with acute cholecystitis without obstruction     Calculus of gallbladder with cholecystitis with biliary obstruction     Hyperlipidemia     Syncope 2019       Past Surgical History:   Procedure Laterality Date    ABDOMEN SURGERY      ANESTHESIA NERVE BLOCK Bilateral 2/6/2020    BILATERAL MEDIAL BRANCH L4-5 AND L5-S1 JOINTS performed by Margarito Iyer DO at [address], LAPAROSCOPIC N/A 11/21/2019    DIAGNOSITIC LAPAROSCOPY CONVERTED TO EXPLORATORY LAPAROTOMY WITH CLOSURE OF ENTEROTOMY X1, CHOLANGIOGRAM, EGD performed by Mayda Nogueira MD at [address] [facility] ECHO COMPL [address] [facility]   10/21/2013         [facility] INJECTION PROCEDURE FOR SACROILIAC JOINT Right 12/13/2018    RIGHT SACROILIAC JOINT STEROID INJECTION UNDER X-RAY GUIDANCE performed by Margarito Iyer DO at [address] [facility] [address] JOINT Left 1/17/2019    LEFT SACROILIAC JOINT STEROID INJECTION UNDER X-RAY GUIDANCE performed by Margarito Iyer DO at [address] [facility], [facility]. INJECTION PROCEDURE FOR SACROILIAC JOINT Bilateral 9/19/2019    BILATERAL SACROILIAC JOINT INJECTION X-RAY performed by Margarito Iyer DO at [facility] Right 12/13/2018    sacroiliac joint     NERVE BLOCK Left 01/17/2019    sacroiliac joint injection     NERVE BLOCK Bilateral 02/06/2020    medial branch block    NERVE BLOCK Bilateral 10/29/2020    intra articular     NERVE BLOCK Bilateral 10/29/2020    BILATERAL L4-5 AND L5-S1 INTRA-ARTICULAR FACET STEROID INJECTION (CPT 18923,77337) performed by Margarito Iyer DO at [address] [facility]  N/A 11/25/2020    KYPHOPLASTY , L1 L2,BONE BIOPSY, EPIDURAL INJECTION performed by Silvia Baig MD at [address] [facility]. N/A     d/t peptic ulcers         Family History   Problem Relation Age of Onset    Heart Disease Mother     Heart Disease Father        CareTeam (Including outside providers/suppliers regularly involved in providing care):   Patient Care Team:  Ehsan Chowdhury MD as PCP - General  Catracho Magana Glen Hays MD as PCP - REHABILITATION [facility] Empaneled Provider  Damien Hager MD as Consulting Physician (Cardiology)    Wt Readings from Last 3 Encounters:   10/25/21 120 lb (54.4 kg)   08/25/21 117 lb 4.8 oz (53.2 kg)   06/23/21 119 lb 4.8 oz (54.1 kg)     Vitals:    10/25/21 1600   BP: 110/63   Pulse: 73   Resp: 16   Temp: 97.4 °F (36.3 °C)   TempSrc: Temporal   SpO2: 100%   Weight: 120 lb (54.4 kg)   Height: 5' 6\" (1.676 m)     Body mass index is 19.37 kg/m². Based upon direct observation of the patient, evaluation of cognition reveals remote memory intact, recent memory impaired. Patient's complete Health Risk Assessment and screening values have been reviewed and are found in Flowsheets. The following problems were reviewed today and where indicated follow up appointments were made and/or referrals ordered. Positive Risk Factor Screenings with Interventions:     Fall Risk:  2 or more falls in past year?: (!) yes  Fall with injury in past year?: no  Fall Risk Interventions:    · Home safety tips provided          General Health and ACP:  General  In general, how would you say your health is?: (!) Poor  In the past 7 days, have you experienced any of the following?  New or Increased Pain, New or Increased Fatigue, Loneliness, Social Isolation, Stress or Anger?: None of These  Do you get the social and emotional support that you need?: Yes  Do you have a Living Will?: Yes  Advance Directives     Power of [address] [place] [address] Will ACP-Advance Directive ACP-Power of     Not on File Not on File Not on File Not on File      General Health Risk Interventions:  · Poor self-assessment of health status: Over all ok, but declining health - age related     Health Habits/Nutrition:  Health Habits/Nutrition  Do you exercise for at least 20 minutes 2-3 times per week?: (!) No  Have you lost any weight without trying in the past 3 months?: No  Do you eat only one meal per day?: No  Have you seen the dentist within the past year?: N/A - wear dentures  Body mass index: 19.37  Health Habits/Nutrition Interventions:  · Inadequate physical activity:  patient is not ready to increase his/her physical activity level at this time    Hearing/Vision:  No exam data present  Hearing/Vision  Do you or your family notice any trouble with your hearing that hasn't been managed with hearing aids?: No  Do you have difficulty driving, watching TV, or doing any of your daily activities because of your eyesight?: No  Have you had an eye exam within the past year?: (!) No  Hearing/Vision Interventions:  · Vision concerns:  patient encouraged to make appointment with his/her eye specialist      Personalized Preventive Plan   Current Health Maintenance Status  Immunization History   Administered Date(s) Administered    COVID-19, Moderna, PF, 100mcg/0.5mL 01/27/2021, 02/24/2021    Influenza Vaccine, unspecified formulation 09/10/2012, 10/09/2013, 09/02/2014, 10/05/2015, 09/14/2016, 11/09/2016    Influenza Virus Vaccine 09/10/2012, 10/09/2013, 09/02/2014, 10/05/2015, 09/14/2016, 11/09/2016    Influenza, High Dose (Fluzone 65 yrs and older) 10/01/2018, 10/03/2018    Influenza, MDCK, Preservative free 10/18/2017    Influenza, Quadv, adjuvanted, 65 yrs +, IM, PF (Fluad) 10/22/2020    Influenza, Triv, inactivated, subunit, adjuvanted, IM (Fluad 65 yrs and older) 10/21/2019    Pneumococcal Conjugate 13-valent (Nick Pancake) 01/10/2017, 04/18/2019    Pneumococcal Polysaccharide ([id number]) 11/01/2012    Tdap (Boostrix, Adacel) 07/18/2019        Health Maintenance   Topic Date Due    Shingles Vaccine (1 of 2) Never done    Flu vaccine (1) 09/01/2021    Annual Wellness Visit (AWV)  10/23/2021    Potassium monitoring  06/21/2022    Creatinine monitoring  06/21/2022    DTaP/Tdap/Td vaccine (2 - Td or Tdap) 07/18/2029    Pneumococcal 65+ years Vaccine  Completed    COVID-19 Vaccine  Completed    Hepatitis A vaccine  Aged Out    Hepatitis B vaccine  Aged Out  Hib vaccine  Aged Out    Meningococcal (ACWY) vaccine  Aged Out     Recommendations for [employer] Due: see orders and patient instructions/AVS.  . Recommended screening schedule for the next 5-10 years is provided to the patient in written form: see Patient Klaudia Park was seen today for medicare awv and other.     Diagnoses and all orders for this visit:    Flu vaccine need  -     INFLUENZA, QUADV, ADJUVANTED, 65 YRS =, IM, PF, PREFILL SYR, 0.5ML (FLUAD)    Routine general medical examination at a health care facility

## 2022-01-01 ENCOUNTER — APPOINTMENT (OUTPATIENT)
Dept: CT IMAGING | Age: 87
DRG: 445 | End: 2022-01-01
Payer: MEDICARE

## 2022-01-01 ENCOUNTER — APPOINTMENT (OUTPATIENT)
Dept: GENERAL RADIOLOGY | Age: 87
DRG: 482 | End: 2022-01-01
Payer: MEDICARE

## 2022-01-01 ENCOUNTER — APPOINTMENT (OUTPATIENT)
Dept: NUCLEAR MEDICINE | Age: 87
DRG: 445 | End: 2022-01-01
Payer: MEDICARE

## 2022-01-01 ENCOUNTER — HOSPITAL ENCOUNTER (INPATIENT)
Age: 87
LOS: 5 days | Discharge: NURSING, REHAB OR LONG-TERM CARE | DRG: 445 | End: 2022-02-03
Attending: EMERGENCY MEDICINE | Admitting: INTERNAL MEDICINE
Payer: MEDICARE

## 2022-01-01 ENCOUNTER — OFFICE VISIT (OUTPATIENT)
Dept: ORTHOPEDIC SURGERY | Age: 87
End: 2022-01-01
Payer: MEDICARE

## 2022-01-01 ENCOUNTER — HOSPITAL ENCOUNTER (INPATIENT)
Age: 87
LOS: 4 days | Discharge: HOME | DRG: 482 | End: 2022-01-24
Attending: EMERGENCY MEDICINE | Admitting: FAMILY MEDICINE
Payer: MEDICARE

## 2022-01-01 ENCOUNTER — APPOINTMENT (OUTPATIENT)
Dept: ULTRASOUND IMAGING | Age: 87
DRG: 445 | End: 2022-01-01
Payer: MEDICARE

## 2022-01-01 ENCOUNTER — APPOINTMENT (OUTPATIENT)
Dept: GENERAL RADIOLOGY | Age: 87
DRG: 445 | End: 2022-01-01
Payer: MEDICARE

## 2022-01-01 ENCOUNTER — ANESTHESIA (OUTPATIENT)
Dept: OPERATING ROOM | Age: 87
DRG: 482 | End: 2022-01-01
Payer: MEDICARE

## 2022-01-01 ENCOUNTER — APPOINTMENT (OUTPATIENT)
Dept: CT IMAGING | Age: 87
DRG: 482 | End: 2022-01-01
Payer: MEDICARE

## 2022-01-01 ENCOUNTER — CARE COORDINATION (OUTPATIENT)
Dept: CASE MANAGEMENT | Age: 87
End: 2022-01-01

## 2022-01-01 ENCOUNTER — OFFICE VISIT (OUTPATIENT)
Dept: FAMILY MEDICINE CLINIC | Age: 87
End: 2022-01-01
Payer: MEDICARE

## 2022-01-01 ENCOUNTER — ANESTHESIA EVENT (OUTPATIENT)
Dept: OPERATING ROOM | Age: 87
DRG: 482 | End: 2022-01-01
Payer: MEDICARE

## 2022-01-01 VITALS
TEMPERATURE: 98.1 F | BODY MASS INDEX: 19.77 KG/M2 | WEIGHT: 123 LBS | DIASTOLIC BLOOD PRESSURE: 61 MMHG | HEIGHT: 66 IN | RESPIRATION RATE: 16 BRPM | OXYGEN SATURATION: 99 % | SYSTOLIC BLOOD PRESSURE: 143 MMHG | HEART RATE: 79 BPM

## 2022-01-01 VITALS
DIASTOLIC BLOOD PRESSURE: 64 MMHG | WEIGHT: 119.3 LBS | OXYGEN SATURATION: 98 % | HEIGHT: 66 IN | SYSTOLIC BLOOD PRESSURE: 110 MMHG | BODY MASS INDEX: 19.17 KG/M2 | HEART RATE: 52 BPM | TEMPERATURE: 97.5 F

## 2022-01-01 VITALS
DIASTOLIC BLOOD PRESSURE: 112 MMHG | OXYGEN SATURATION: 100 % | RESPIRATION RATE: 1 BRPM | SYSTOLIC BLOOD PRESSURE: 202 MMHG

## 2022-01-01 VITALS — BODY MASS INDEX: 19.77 KG/M2 | TEMPERATURE: 98 F | WEIGHT: 123 LBS | HEIGHT: 66 IN

## 2022-01-01 VITALS
WEIGHT: 119.3 LBS | RESPIRATION RATE: 20 BRPM | OXYGEN SATURATION: 91 % | DIASTOLIC BLOOD PRESSURE: 55 MMHG | TEMPERATURE: 98.4 F | SYSTOLIC BLOOD PRESSURE: 115 MMHG | BODY MASS INDEX: 19.26 KG/M2 | HEART RATE: 73 BPM

## 2022-01-01 DIAGNOSIS — Z98.890 S/P KYPHOPLASTY: ICD-10-CM

## 2022-01-01 DIAGNOSIS — G30.9 ALZHEIMER'S DEMENTIA WITHOUT BEHAVIORAL DISTURBANCE, UNSPECIFIED TIMING OF DEMENTIA ONSET: ICD-10-CM

## 2022-01-01 DIAGNOSIS — Z91.81 AT HIGH RISK FOR FALLS: ICD-10-CM

## 2022-01-01 DIAGNOSIS — R52 PAIN: ICD-10-CM

## 2022-01-01 DIAGNOSIS — S72.401A CLOSED FRACTURE OF DISTAL END OF RIGHT FEMUR, UNSPECIFIED FRACTURE MORPHOLOGY, INITIAL ENCOUNTER (HCC): Primary | ICD-10-CM

## 2022-01-01 DIAGNOSIS — S72.134A: Primary | ICD-10-CM

## 2022-01-01 DIAGNOSIS — N17.9 AKI (ACUTE KIDNEY INJURY) (HCC): Primary | ICD-10-CM

## 2022-01-01 DIAGNOSIS — I48.91 ATRIAL FIBRILLATION, UNSPECIFIED TYPE (HCC): Primary | ICD-10-CM

## 2022-01-01 DIAGNOSIS — F32.A DEPRESSION, UNSPECIFIED DEPRESSION TYPE: ICD-10-CM

## 2022-01-01 DIAGNOSIS — E78.49 OTHER HYPERLIPIDEMIA: ICD-10-CM

## 2022-01-01 DIAGNOSIS — S72.421D CLOSED BICONDYLAR FRACTURE OF RIGHT FEMUR WITH ROUTINE HEALING, SUBSEQUENT ENCOUNTER: Primary | ICD-10-CM

## 2022-01-01 DIAGNOSIS — T14.8XXA FRACTURE: ICD-10-CM

## 2022-01-01 DIAGNOSIS — S72.431D CLOSED BICONDYLAR FRACTURE OF RIGHT FEMUR WITH ROUTINE HEALING, SUBSEQUENT ENCOUNTER: Primary | ICD-10-CM

## 2022-01-01 DIAGNOSIS — K57.32 DIVERTICULITIS OF COLON: ICD-10-CM

## 2022-01-01 DIAGNOSIS — S72.401B TYPE I OR II OPEN FRACTURE OF DISTAL END OF RIGHT FEMUR, UNSPECIFIED FRACTURE MORPHOLOGY, INITIAL ENCOUNTER (HCC): ICD-10-CM

## 2022-01-01 DIAGNOSIS — Z51.5 HOSPICE CARE PATIENT: Primary | ICD-10-CM

## 2022-01-01 DIAGNOSIS — R41.82 ALTERED MENTAL STATUS, UNSPECIFIED ALTERED MENTAL STATUS TYPE: ICD-10-CM

## 2022-01-01 DIAGNOSIS — F02.80 ALZHEIMER'S DEMENTIA WITHOUT BEHAVIORAL DISTURBANCE, UNSPECIFIED TIMING OF DEMENTIA ONSET: ICD-10-CM

## 2022-01-01 LAB
ABO/RH: NORMAL
ALBUMIN SERPL-MCNC: 2.4 G/DL (ref 3.5–5.2)
ALBUMIN SERPL-MCNC: 2.7 G/DL (ref 3.5–5.2)
ALBUMIN SERPL-MCNC: 3 G/DL (ref 3.5–5.2)
ALBUMIN SERPL-MCNC: 3.2 G/DL (ref 3.5–5.2)
ALBUMIN SERPL-MCNC: 3.6 G/DL (ref 3.5–5.2)
ALP BLD-CCNC: 113 U/L (ref 35–104)
ALP BLD-CCNC: 131 U/L (ref 35–104)
ALP BLD-CCNC: 79 U/L (ref 35–104)
ALP BLD-CCNC: 83 U/L (ref 35–104)
ALP BLD-CCNC: 90 U/L (ref 35–104)
ALP BLD-CCNC: 96 U/L (ref 35–104)
ALP BLD-CCNC: 97 U/L (ref 35–104)
ALT SERPL-CCNC: 11 U/L (ref 0–32)
ALT SERPL-CCNC: 12 U/L (ref 0–32)
ALT SERPL-CCNC: 13 U/L (ref 0–32)
ALT SERPL-CCNC: 17 U/L (ref 0–32)
ALT SERPL-CCNC: 9 U/L (ref 0–32)
ANION GAP SERPL CALCULATED.3IONS-SCNC: 10 MMOL/L (ref 7–16)
ANION GAP SERPL CALCULATED.3IONS-SCNC: 11 MMOL/L (ref 7–16)
ANION GAP SERPL CALCULATED.3IONS-SCNC: 12 MMOL/L (ref 7–16)
ANION GAP SERPL CALCULATED.3IONS-SCNC: 12 MMOL/L (ref 7–16)
ANION GAP SERPL CALCULATED.3IONS-SCNC: 16 MMOL/L (ref 7–16)
ANION GAP SERPL CALCULATED.3IONS-SCNC: 7 MMOL/L (ref 7–16)
ANION GAP SERPL CALCULATED.3IONS-SCNC: 8 MMOL/L (ref 7–16)
ANTIBODY SCREEN: NORMAL
APTT: 27.2 SEC (ref 24.5–35.1)
AST SERPL-CCNC: 20 U/L (ref 0–31)
AST SERPL-CCNC: 20 U/L (ref 0–31)
AST SERPL-CCNC: 21 U/L (ref 0–31)
AST SERPL-CCNC: 22 U/L (ref 0–31)
AST SERPL-CCNC: 27 U/L (ref 0–31)
AST SERPL-CCNC: 30 U/L (ref 0–31)
AST SERPL-CCNC: 39 U/L (ref 0–31)
BACTERIA: ABNORMAL /HPF
BASOPHILS ABSOLUTE: 0 E9/L (ref 0–0.2)
BASOPHILS ABSOLUTE: 0.01 E9/L (ref 0–0.2)
BASOPHILS ABSOLUTE: 0.02 E9/L (ref 0–0.2)
BASOPHILS RELATIVE PERCENT: 0.1 % (ref 0–2)
BASOPHILS RELATIVE PERCENT: 0.2 % (ref 0–2)
BILIRUB SERPL-MCNC: 0.2 MG/DL (ref 0–1.2)
BILIRUB SERPL-MCNC: 0.3 MG/DL (ref 0–1.2)
BILIRUB SERPL-MCNC: 0.4 MG/DL (ref 0–1.2)
BILIRUB SERPL-MCNC: 0.4 MG/DL (ref 0–1.2)
BILIRUB SERPL-MCNC: 0.5 MG/DL (ref 0–1.2)
BILIRUBIN DIRECT: <0.2 MG/DL (ref 0–0.3)
BILIRUBIN URINE: NEGATIVE
BILIRUBIN, INDIRECT: ABNORMAL MG/DL (ref 0–1)
BLOOD, URINE: ABNORMAL
BUN BLDV-MCNC: 10 MG/DL (ref 6–23)
BUN BLDV-MCNC: 13 MG/DL (ref 6–23)
BUN BLDV-MCNC: 15 MG/DL (ref 6–23)
BUN BLDV-MCNC: 15 MG/DL (ref 6–23)
BUN BLDV-MCNC: 17 MG/DL (ref 6–23)
BUN BLDV-MCNC: 22 MG/DL (ref 6–23)
BUN BLDV-MCNC: 25 MG/DL (ref 6–23)
BUN BLDV-MCNC: 26 MG/DL (ref 6–23)
BUN BLDV-MCNC: 42 MG/DL (ref 6–23)
BUN BLDV-MCNC: 61 MG/DL (ref 6–23)
BUN BLDV-MCNC: 90 MG/DL (ref 6–23)
BURR CELLS: ABNORMAL
CALCIUM SERPL-MCNC: 8.2 MG/DL (ref 8.6–10.2)
CALCIUM SERPL-MCNC: 8.3 MG/DL (ref 8.6–10.2)
CALCIUM SERPL-MCNC: 8.4 MG/DL (ref 8.6–10.2)
CALCIUM SERPL-MCNC: 8.5 MG/DL (ref 8.6–10.2)
CALCIUM SERPL-MCNC: 8.6 MG/DL (ref 8.6–10.2)
CALCIUM SERPL-MCNC: 8.6 MG/DL (ref 8.6–10.2)
CALCIUM SERPL-MCNC: 8.7 MG/DL (ref 8.6–10.2)
CALCIUM SERPL-MCNC: 8.8 MG/DL (ref 8.6–10.2)
CALCIUM SERPL-MCNC: 9 MG/DL (ref 8.6–10.2)
CHLORIDE BLD-SCNC: 102 MMOL/L (ref 98–107)
CHLORIDE BLD-SCNC: 104 MMOL/L (ref 98–107)
CHLORIDE BLD-SCNC: 105 MMOL/L (ref 98–107)
CHLORIDE BLD-SCNC: 105 MMOL/L (ref 98–107)
CHLORIDE BLD-SCNC: 108 MMOL/L (ref 98–107)
CHLORIDE BLD-SCNC: 110 MMOL/L (ref 98–107)
CHLORIDE BLD-SCNC: 111 MMOL/L (ref 98–107)
CHLORIDE BLD-SCNC: 112 MMOL/L (ref 98–107)
CHLORIDE BLD-SCNC: 112 MMOL/L (ref 98–107)
CHP ED QC CHECK: NORMAL
CLARITY: CLEAR
CO2: 22 MMOL/L (ref 22–29)
CO2: 23 MMOL/L (ref 22–29)
CO2: 24 MMOL/L (ref 22–29)
CO2: 24 MMOL/L (ref 22–29)
CO2: 25 MMOL/L (ref 22–29)
CO2: 26 MMOL/L (ref 22–29)
COLOR: YELLOW
CREAT SERPL-MCNC: 0.6 MG/DL (ref 0.5–1)
CREAT SERPL-MCNC: 0.7 MG/DL (ref 0.5–1)
CREAT SERPL-MCNC: 0.7 MG/DL (ref 0.5–1)
CREAT SERPL-MCNC: 0.8 MG/DL (ref 0.5–1)
CREAT SERPL-MCNC: 0.8 MG/DL (ref 0.5–1)
CREAT SERPL-MCNC: 0.9 MG/DL (ref 0.5–1)
CREAT SERPL-MCNC: 1 MG/DL (ref 0.5–1)
CREAT SERPL-MCNC: 1.3 MG/DL (ref 0.5–1)
CREAT SERPL-MCNC: 3.8 MG/DL (ref 0.5–1)
EKG ATRIAL RATE: 100 BPM
EKG ATRIAL RATE: 60 BPM
EKG ATRIAL RATE: 80 BPM
EKG P AXIS: -62 DEGREES
EKG P-R INTERVAL: 144 MS
EKG P-R INTERVAL: 188 MS
EKG Q-T INTERVAL: 354 MS
EKG Q-T INTERVAL: 426 MS
EKG Q-T INTERVAL: 460 MS
EKG QRS DURATION: 70 MS
EKG QRS DURATION: 72 MS
EKG QRS DURATION: 72 MS
EKG QTC CALCULATION (BAZETT): 442 MS
EKG QTC CALCULATION (BAZETT): 460 MS
EKG QTC CALCULATION (BAZETT): 491 MS
EKG R AXIS: -6 DEGREES
EKG R AXIS: 23 DEGREES
EKG R AXIS: 58 DEGREES
EKG T AXIS: -3 DEGREES
EKG T AXIS: -55 DEGREES
EKG T AXIS: 42 DEGREES
EKG VENTRICULAR RATE: 60 BPM
EKG VENTRICULAR RATE: 80 BPM
EKG VENTRICULAR RATE: 94 BPM
EOSINOPHILS ABSOLUTE: 0.07 E9/L (ref 0.05–0.5)
EOSINOPHILS ABSOLUTE: 0.09 E9/L (ref 0.05–0.5)
EOSINOPHILS ABSOLUTE: 0.11 E9/L (ref 0.05–0.5)
EOSINOPHILS ABSOLUTE: 0.3 E9/L (ref 0.05–0.5)
EOSINOPHILS ABSOLUTE: 0.3 E9/L (ref 0.05–0.5)
EOSINOPHILS RELATIVE PERCENT: 0.6 % (ref 0–6)
EOSINOPHILS RELATIVE PERCENT: 1 % (ref 0–6)
EOSINOPHILS RELATIVE PERCENT: 1.2 % (ref 0–6)
EOSINOPHILS RELATIVE PERCENT: 1.7 % (ref 0–6)
EOSINOPHILS RELATIVE PERCENT: 3.1 % (ref 0–6)
EPITHELIAL CELLS, UA: ABNORMAL /HPF
GFR AFRICAN AMERICAN: 14
GFR AFRICAN AMERICAN: 47
GFR AFRICAN AMERICAN: >60
GFR NON-AFRICAN AMERICAN: 11 ML/MIN/1.73
GFR NON-AFRICAN AMERICAN: 39 ML/MIN/1.73
GFR NON-AFRICAN AMERICAN: 52 ML/MIN/1.73
GFR NON-AFRICAN AMERICAN: 59 ML/MIN/1.73
GFR NON-AFRICAN AMERICAN: >60 ML/MIN/1.73
GLUCOSE BLD-MCNC: 100 MG/DL (ref 74–99)
GLUCOSE BLD-MCNC: 101 MG/DL (ref 74–99)
GLUCOSE BLD-MCNC: 106 MG/DL (ref 74–99)
GLUCOSE BLD-MCNC: 112 MG/DL (ref 74–99)
GLUCOSE BLD-MCNC: 116 MG/DL (ref 74–99)
GLUCOSE BLD-MCNC: 117 MG/DL (ref 74–99)
GLUCOSE BLD-MCNC: 124 MG/DL (ref 74–99)
GLUCOSE BLD-MCNC: 124 MG/DL (ref 74–99)
GLUCOSE BLD-MCNC: 126 MG/DL (ref 74–99)
GLUCOSE BLD-MCNC: 136 MG/DL
GLUCOSE BLD-MCNC: 137 MG/DL (ref 74–99)
GLUCOSE BLD-MCNC: 98 MG/DL (ref 74–99)
GLUCOSE URINE: NEGATIVE MG/DL
HCT VFR BLD CALC: 31 % (ref 34–48)
HCT VFR BLD CALC: 31.2 % (ref 34–48)
HCT VFR BLD CALC: 33 % (ref 34–48)
HCT VFR BLD CALC: 33 % (ref 34–48)
HCT VFR BLD CALC: 33.8 % (ref 34–48)
HCT VFR BLD CALC: 34.2 % (ref 34–48)
HCT VFR BLD CALC: 35 % (ref 34–48)
HCT VFR BLD CALC: 35.3 % (ref 34–48)
HCT VFR BLD CALC: 35.4 % (ref 34–48)
HCT VFR BLD CALC: 37.6 % (ref 34–48)
HCT VFR BLD CALC: 38.7 % (ref 34–48)
HEMOCCULT STL QL: NORMAL
HEMOGLOBIN: 10 G/DL (ref 11.5–15.5)
HEMOGLOBIN: 10.3 G/DL (ref 11.5–15.5)
HEMOGLOBIN: 10.4 G/DL (ref 11.5–15.5)
HEMOGLOBIN: 10.4 G/DL (ref 11.5–15.5)
HEMOGLOBIN: 10.5 G/DL (ref 11.5–15.5)
HEMOGLOBIN: 10.9 G/DL (ref 11.5–15.5)
HEMOGLOBIN: 11.1 G/DL (ref 11.5–15.5)
HEMOGLOBIN: 11.1 G/DL (ref 11.5–15.5)
HEMOGLOBIN: 12.1 G/DL (ref 11.5–15.5)
HEMOGLOBIN: 12.2 G/DL (ref 11.5–15.5)
HEMOGLOBIN: 9.5 G/DL (ref 11.5–15.5)
IMMATURE GRANULOCYTES #: 0.05 E9/L
IMMATURE GRANULOCYTES #: 0.07 E9/L
IMMATURE GRANULOCYTES #: 0.1 E9/L
IMMATURE GRANULOCYTES #: 0.15 E9/L
IMMATURE GRANULOCYTES %: 0.6 % (ref 0–5)
IMMATURE GRANULOCYTES %: 0.6 % (ref 0–5)
IMMATURE GRANULOCYTES %: 1 % (ref 0–5)
IMMATURE GRANULOCYTES %: 1.7 % (ref 0–5)
INR BLD: 1.1
INR BLD: 1.1
INR BLD: 1.4
KETONES, URINE: NEGATIVE MG/DL
LACTIC ACID: 1.1 MMOL/L (ref 0.5–2.2)
LEUKOCYTE ESTERASE, URINE: NEGATIVE
LIPASE: 30 U/L (ref 13–60)
LYMPHOCYTES ABSOLUTE: 1.39 E9/L (ref 1.5–4)
LYMPHOCYTES ABSOLUTE: 1.43 E9/L (ref 1.5–4)
LYMPHOCYTES ABSOLUTE: 1.69 E9/L (ref 1.5–4)
LYMPHOCYTES ABSOLUTE: 1.77 E9/L (ref 1.5–4)
LYMPHOCYTES ABSOLUTE: 2.31 E9/L (ref 1.5–4)
LYMPHOCYTES RELATIVE PERCENT: 10.2 % (ref 20–42)
LYMPHOCYTES RELATIVE PERCENT: 14.3 % (ref 20–42)
LYMPHOCYTES RELATIVE PERCENT: 15.9 % (ref 20–42)
LYMPHOCYTES RELATIVE PERCENT: 18.5 % (ref 20–42)
LYMPHOCYTES RELATIVE PERCENT: 19.5 % (ref 20–42)
MAGNESIUM: 1.8 MG/DL (ref 1.6–2.6)
MAGNESIUM: 1.8 MG/DL (ref 1.6–2.6)
MAGNESIUM: 2.1 MG/DL (ref 1.6–2.6)
MAGNESIUM: 2.1 MG/DL (ref 1.6–2.6)
MAGNESIUM: 2.2 MG/DL (ref 1.6–2.6)
MCH RBC QN AUTO: 29.7 PG (ref 26–35)
MCH RBC QN AUTO: 30.2 PG (ref 26–35)
MCH RBC QN AUTO: 30.2 PG (ref 26–35)
MCH RBC QN AUTO: 30.4 PG (ref 26–35)
MCH RBC QN AUTO: 30.5 PG (ref 26–35)
MCH RBC QN AUTO: 30.7 PG (ref 26–35)
MCH RBC QN AUTO: 30.8 PG (ref 26–35)
MCH RBC QN AUTO: 30.8 PG (ref 26–35)
MCH RBC QN AUTO: 31 PG (ref 26–35)
MCH RBC QN AUTO: 31.4 PG (ref 26–35)
MCH RBC QN AUTO: 31.6 PG (ref 26–35)
MCHC RBC AUTO-ENTMCNC: 30.4 % (ref 32–34.5)
MCHC RBC AUTO-ENTMCNC: 30.6 % (ref 32–34.5)
MCHC RBC AUTO-ENTMCNC: 31.1 % (ref 32–34.5)
MCHC RBC AUTO-ENTMCNC: 31.1 % (ref 32–34.5)
MCHC RBC AUTO-ENTMCNC: 31.2 % (ref 32–34.5)
MCHC RBC AUTO-ENTMCNC: 31.4 % (ref 32–34.5)
MCHC RBC AUTO-ENTMCNC: 31.4 % (ref 32–34.5)
MCHC RBC AUTO-ENTMCNC: 31.5 % (ref 32–34.5)
MCHC RBC AUTO-ENTMCNC: 31.5 % (ref 32–34.5)
MCHC RBC AUTO-ENTMCNC: 32.1 % (ref 32–34.5)
MCHC RBC AUTO-ENTMCNC: 32.2 % (ref 32–34.5)
MCV RBC AUTO: 95.9 FL (ref 80–99.9)
MCV RBC AUTO: 97.1 FL (ref 80–99.9)
MCV RBC AUTO: 97.3 FL (ref 80–99.9)
MCV RBC AUTO: 97.6 FL (ref 80–99.9)
MCV RBC AUTO: 97.7 FL (ref 80–99.9)
MCV RBC AUTO: 98.7 FL (ref 80–99.9)
MCV RBC AUTO: 98.9 FL (ref 80–99.9)
MCV RBC AUTO: 98.9 FL (ref 80–99.9)
MCV RBC AUTO: 99 FL (ref 80–99.9)
METER GLUCOSE: 136 MG/DL (ref 74–99)
MONOCYTES ABSOLUTE: 0.82 E9/L (ref 0.1–0.95)
MONOCYTES ABSOLUTE: 0.96 E9/L (ref 0.1–0.95)
MONOCYTES ABSOLUTE: 1.04 E9/L (ref 0.1–0.95)
MONOCYTES ABSOLUTE: 1.14 E9/L (ref 0.1–0.95)
MONOCYTES ABSOLUTE: 1.77 E9/L (ref 0.1–0.95)
MONOCYTES RELATIVE PERCENT: 10.2 % (ref 2–12)
MONOCYTES RELATIVE PERCENT: 12 % (ref 2–12)
MONOCYTES RELATIVE PERCENT: 9.1 % (ref 2–12)
MONOCYTES RELATIVE PERCENT: 9.1 % (ref 2–12)
MONOCYTES RELATIVE PERCENT: 9.9 % (ref 2–12)
NEUTROPHILS ABSOLUTE: 13.81 E9/L (ref 1.8–7.3)
NEUTROPHILS ABSOLUTE: 5.77 E9/L (ref 1.8–7.3)
NEUTROPHILS ABSOLUTE: 6.47 E9/L (ref 1.8–7.3)
NEUTROPHILS ABSOLUTE: 6.93 E9/L (ref 1.8–7.3)
NEUTROPHILS ABSOLUTE: 8.88 E9/L (ref 1.8–7.3)
NEUTROPHILS RELATIVE PERCENT: 66.7 % (ref 43–80)
NEUTROPHILS RELATIVE PERCENT: 71.1 % (ref 43–80)
NEUTROPHILS RELATIVE PERCENT: 71.5 % (ref 43–80)
NEUTROPHILS RELATIVE PERCENT: 71.9 % (ref 43–80)
NEUTROPHILS RELATIVE PERCENT: 78 % (ref 43–80)
NITRITE, URINE: NEGATIVE
OVALOCYTES: ABNORMAL
PDW BLD-RTO: 13.1 FL (ref 11.5–15)
PDW BLD-RTO: 13.2 FL (ref 11.5–15)
PDW BLD-RTO: 13.5 FL (ref 11.5–15)
PDW BLD-RTO: 13.6 FL (ref 11.5–15)
PDW BLD-RTO: 13.6 FL (ref 11.5–15)
PDW BLD-RTO: 13.7 FL (ref 11.5–15)
PDW BLD-RTO: 13.8 FL (ref 11.5–15)
PH UA: 5.5 (ref 5–9)
PHOSPHORUS: 2.9 MG/DL (ref 2.5–4.5)
PHOSPHORUS: 3.5 MG/DL (ref 2.5–4.5)
PHOSPHORUS: 3.6 MG/DL (ref 2.5–4.5)
PLATELET # BLD: 226 E9/L (ref 130–450)
PLATELET # BLD: 228 E9/L (ref 130–450)
PLATELET # BLD: 246 E9/L (ref 130–450)
PLATELET # BLD: 247 E9/L (ref 130–450)
PLATELET # BLD: 256 E9/L (ref 130–450)
PLATELET # BLD: 436 E9/L (ref 130–450)
PLATELET # BLD: 440 E9/L (ref 130–450)
PLATELET # BLD: 443 E9/L (ref 130–450)
PLATELET # BLD: 479 E9/L (ref 130–450)
PLATELET # BLD: 491 E9/L (ref 130–450)
PLATELET # BLD: 508 E9/L (ref 130–450)
PMV BLD AUTO: 10 FL (ref 7–12)
PMV BLD AUTO: 10.1 FL (ref 7–12)
PMV BLD AUTO: 10.4 FL (ref 7–12)
PMV BLD AUTO: 10.5 FL (ref 7–12)
PMV BLD AUTO: 10.5 FL (ref 7–12)
PMV BLD AUTO: 10.7 FL (ref 7–12)
PMV BLD AUTO: 9.3 FL (ref 7–12)
PMV BLD AUTO: 9.6 FL (ref 7–12)
PMV BLD AUTO: 9.6 FL (ref 7–12)
PMV BLD AUTO: 9.7 FL (ref 7–12)
PMV BLD AUTO: 9.9 FL (ref 7–12)
POIKILOCYTES: ABNORMAL
POLYCHROMASIA: ABNORMAL
POTASSIUM REFLEX MAGNESIUM: 3.6 MMOL/L (ref 3.5–5)
POTASSIUM REFLEX MAGNESIUM: 4.4 MMOL/L (ref 3.5–5)
POTASSIUM REFLEX MAGNESIUM: 4.7 MMOL/L (ref 3.5–5)
POTASSIUM REFLEX MAGNESIUM: 5.7 MMOL/L (ref 3.5–5)
POTASSIUM SERPL-SCNC: 2.9 MMOL/L (ref 3.5–5)
POTASSIUM SERPL-SCNC: 3.4 MMOL/L (ref 3.5–5)
POTASSIUM SERPL-SCNC: 3.5 MMOL/L (ref 3.5–5)
POTASSIUM SERPL-SCNC: 3.9 MMOL/L (ref 3.5–5)
POTASSIUM SERPL-SCNC: 4 MMOL/L (ref 3.5–5)
POTASSIUM SERPL-SCNC: 4.2 MMOL/L (ref 3.5–5)
POTASSIUM SERPL-SCNC: 4.5 MMOL/L (ref 3.5–5)
POTASSIUM SERPL-SCNC: 4.8 MMOL/L (ref 3.5–5)
PROTEIN UA: NEGATIVE MG/DL
PROTHROMBIN TIME: 12.6 SEC (ref 9.3–12.4)
PROTHROMBIN TIME: 13.1 SEC (ref 9.3–12.4)
PROTHROMBIN TIME: 15.7 SEC (ref 9.3–12.4)
RBC # BLD: 3.13 E12/L (ref 3.5–5.5)
RBC # BLD: 3.16 E12/L (ref 3.5–5.5)
RBC # BLD: 3.38 E12/L (ref 3.5–5.5)
RBC # BLD: 3.39 E12/L (ref 3.5–5.5)
RBC # BLD: 3.48 E12/L (ref 3.5–5.5)
RBC # BLD: 3.5 E12/L (ref 3.5–5.5)
RBC # BLD: 3.54 E12/L (ref 3.5–5.5)
RBC # BLD: 3.58 E12/L (ref 3.5–5.5)
RBC # BLD: 3.68 E12/L (ref 3.5–5.5)
RBC # BLD: 3.85 E12/L (ref 3.5–5.5)
RBC # BLD: 3.96 E12/L (ref 3.5–5.5)
RBC UA: ABNORMAL /HPF (ref 0–2)
SARS-COV-2, NAAT: NOT DETECTED
SARS-COV-2, NAAT: NOT DETECTED
SODIUM BLD-SCNC: 137 MMOL/L (ref 132–146)
SODIUM BLD-SCNC: 139 MMOL/L (ref 132–146)
SODIUM BLD-SCNC: 141 MMOL/L (ref 132–146)
SODIUM BLD-SCNC: 142 MMOL/L (ref 132–146)
SODIUM BLD-SCNC: 146 MMOL/L (ref 132–146)
SODIUM BLD-SCNC: 146 MMOL/L (ref 132–146)
SODIUM BLD-SCNC: 147 MMOL/L (ref 132–146)
SPECIFIC GRAVITY UA: 1.01 (ref 1–1.03)
TARGET CELLS: ABNORMAL
TOTAL PROTEIN: 5.2 G/DL (ref 6.4–8.3)
TOTAL PROTEIN: 5.7 G/DL (ref 6.4–8.3)
TOTAL PROTEIN: 5.7 G/DL (ref 6.4–8.3)
TOTAL PROTEIN: 5.8 G/DL (ref 6.4–8.3)
TOTAL PROTEIN: 5.9 G/DL (ref 6.4–8.3)
TOTAL PROTEIN: 6.1 G/DL (ref 6.4–8.3)
TOTAL PROTEIN: 6.2 G/DL (ref 6.4–8.3)
TROPONIN, HIGH SENSITIVITY: 11 NG/L (ref 0–9)
TROPONIN, HIGH SENSITIVITY: 16 NG/L (ref 0–9)
UROBILINOGEN, URINE: 0.2 E.U./DL
WBC # BLD: 10.4 E9/L (ref 4.5–11.5)
WBC # BLD: 11.3 E9/L (ref 4.5–11.5)
WBC # BLD: 11.8 E9/L (ref 4.5–11.5)
WBC # BLD: 12.5 E9/L (ref 4.5–11.5)
WBC # BLD: 14.4 E9/L (ref 4.5–11.5)
WBC # BLD: 17.7 E9/L (ref 4.5–11.5)
WBC # BLD: 7.4 E9/L (ref 4.5–11.5)
WBC # BLD: 8.7 E9/L (ref 4.5–11.5)
WBC # BLD: 8.7 E9/L (ref 4.5–11.5)
WBC # BLD: 9 E9/L (ref 4.5–11.5)
WBC # BLD: 9.7 E9/L (ref 4.5–11.5)
WBC UA: ABNORMAL /HPF (ref 0–5)

## 2022-01-01 PROCEDURE — 6370000000 HC RX 637 (ALT 250 FOR IP): Performed by: FAMILY MEDICINE

## 2022-01-01 PROCEDURE — 96374 THER/PROPH/DIAG INJ IV PUSH: CPT

## 2022-01-01 PROCEDURE — 6360000002 HC RX W HCPCS: Performed by: INTERNAL MEDICINE

## 2022-01-01 PROCEDURE — 1200000000 HC SEMI PRIVATE

## 2022-01-01 PROCEDURE — 6370000000 HC RX 637 (ALT 250 FOR IP): Performed by: STUDENT IN AN ORGANIZED HEALTH CARE EDUCATION/TRAINING PROGRAM

## 2022-01-01 PROCEDURE — 93005 ELECTROCARDIOGRAM TRACING: CPT | Performed by: STUDENT IN AN ORGANIZED HEALTH CARE EDUCATION/TRAINING PROGRAM

## 2022-01-01 PROCEDURE — 99223 1ST HOSP IP/OBS HIGH 75: CPT | Performed by: ORTHOPAEDIC SURGERY

## 2022-01-01 PROCEDURE — 2500000003 HC RX 250 WO HCPCS: Performed by: INTERNAL MEDICINE

## 2022-01-01 PROCEDURE — 97530 THERAPEUTIC ACTIVITIES: CPT

## 2022-01-01 PROCEDURE — 6370000000 HC RX 637 (ALT 250 FOR IP): Performed by: INTERNAL MEDICINE

## 2022-01-01 PROCEDURE — 7100000000 HC PACU RECOVERY - FIRST 15 MIN: Performed by: ORTHOPAEDIC SURGERY

## 2022-01-01 PROCEDURE — 36415 COLL VENOUS BLD VENIPUNCTURE: CPT

## 2022-01-01 PROCEDURE — APPSS30 APP SPLIT SHARED TIME 16-30 MINUTES: Performed by: NURSE PRACTITIONER

## 2022-01-01 PROCEDURE — 70450 CT HEAD/BRAIN W/O DYE: CPT

## 2022-01-01 PROCEDURE — C1713 ANCHOR/SCREW BN/BN,TIS/BN: HCPCS | Performed by: ORTHOPAEDIC SURGERY

## 2022-01-01 PROCEDURE — 85027 COMPLETE CBC AUTOMATED: CPT

## 2022-01-01 PROCEDURE — 83735 ASSAY OF MAGNESIUM: CPT

## 2022-01-01 PROCEDURE — 80048 BASIC METABOLIC PNL TOTAL CA: CPT

## 2022-01-01 PROCEDURE — 97110 THERAPEUTIC EXERCISES: CPT

## 2022-01-01 PROCEDURE — 85025 COMPLETE CBC W/AUTO DIFF WBC: CPT

## 2022-01-01 PROCEDURE — 85610 PROTHROMBIN TIME: CPT

## 2022-01-01 PROCEDURE — 80076 HEPATIC FUNCTION PANEL: CPT

## 2022-01-01 PROCEDURE — 93005 ELECTROCARDIOGRAM TRACING: CPT

## 2022-01-01 PROCEDURE — 73552 X-RAY EXAM OF FEMUR 2/>: CPT

## 2022-01-01 PROCEDURE — 80053 COMPREHEN METABOLIC PANEL: CPT

## 2022-01-01 PROCEDURE — 2580000003 HC RX 258: Performed by: FAMILY MEDICINE

## 2022-01-01 PROCEDURE — 74176 CT ABD & PELVIS W/O CONTRAST: CPT

## 2022-01-01 PROCEDURE — 73700 CT LOWER EXTREMITY W/O DYE: CPT

## 2022-01-01 PROCEDURE — 73030 X-RAY EXAM OF SHOULDER: CPT

## 2022-01-01 PROCEDURE — 76705 ECHO EXAM OF ABDOMEN: CPT

## 2022-01-01 PROCEDURE — 2700000000 HC OXYGEN THERAPY PER DAY

## 2022-01-01 PROCEDURE — 0QSB06Z REPOSITION RIGHT LOWER FEMUR WITH INTRAMEDULLARY INTERNAL FIXATION DEVICE, OPEN APPROACH: ICD-10-PCS | Performed by: ORTHOPAEDIC SURGERY

## 2022-01-01 PROCEDURE — 6360000002 HC RX W HCPCS: Performed by: EMERGENCY MEDICINE

## 2022-01-01 PROCEDURE — 99222 1ST HOSP IP/OBS MODERATE 55: CPT | Performed by: INTERNAL MEDICINE

## 2022-01-01 PROCEDURE — 99233 SBSQ HOSP IP/OBS HIGH 50: CPT | Performed by: INTERNAL MEDICINE

## 2022-01-01 PROCEDURE — 99024 POSTOP FOLLOW-UP VISIT: CPT | Performed by: ORTHOPAEDIC SURGERY

## 2022-01-01 PROCEDURE — 84100 ASSAY OF PHOSPHORUS: CPT

## 2022-01-01 PROCEDURE — 99232 SBSQ HOSP IP/OBS MODERATE 35: CPT | Performed by: INTERNAL MEDICINE

## 2022-01-01 PROCEDURE — 2580000003 HC RX 258: Performed by: INTERNAL MEDICINE

## 2022-01-01 PROCEDURE — 93005 ELECTROCARDIOGRAM TRACING: CPT | Performed by: FAMILY MEDICINE

## 2022-01-01 PROCEDURE — 3209999900 FLUORO FOR SURGICAL PROCEDURES

## 2022-01-01 PROCEDURE — 6360000002 HC RX W HCPCS: Performed by: STUDENT IN AN ORGANIZED HEALTH CARE EDUCATION/TRAINING PROGRAM

## 2022-01-01 PROCEDURE — G8427 DOCREV CUR MEDS BY ELIG CLIN: HCPCS | Performed by: INTERNAL MEDICINE

## 2022-01-01 PROCEDURE — 2580000003 HC RX 258: Performed by: STUDENT IN AN ORGANIZED HEALTH CARE EDUCATION/TRAINING PROGRAM

## 2022-01-01 PROCEDURE — 51702 INSERT TEMP BLADDER CATH: CPT

## 2022-01-01 PROCEDURE — 2720000010 HC SURG SUPPLY STERILE: Performed by: ORTHOPAEDIC SURGERY

## 2022-01-01 PROCEDURE — 2500000003 HC RX 250 WO HCPCS: Performed by: ANESTHESIOLOGY

## 2022-01-01 PROCEDURE — 86850 RBC ANTIBODY SCREEN: CPT

## 2022-01-01 PROCEDURE — 97161 PT EVAL LOW COMPLEX 20 MIN: CPT | Performed by: PHYSICAL THERAPIST

## 2022-01-01 PROCEDURE — 1090F PRES/ABSN URINE INCON ASSESS: CPT | Performed by: INTERNAL MEDICINE

## 2022-01-01 PROCEDURE — 99223 1ST HOSP IP/OBS HIGH 75: CPT | Performed by: INTERNAL MEDICINE

## 2022-01-01 PROCEDURE — 87635 SARS-COV-2 COVID-19 AMP PRB: CPT

## 2022-01-01 PROCEDURE — 6360000002 HC RX W HCPCS: Performed by: NURSE PRACTITIONER

## 2022-01-01 PROCEDURE — 99232 SBSQ HOSP IP/OBS MODERATE 35: CPT | Performed by: ORTHOPAEDIC SURGERY

## 2022-01-01 PROCEDURE — 3600000013 HC SURGERY LEVEL 3 ADDTL 15MIN: Performed by: ORTHOPAEDIC SURGERY

## 2022-01-01 PROCEDURE — 71045 X-RAY EXAM CHEST 1 VIEW: CPT

## 2022-01-01 PROCEDURE — 99284 EMERGENCY DEPT VISIT MOD MDM: CPT

## 2022-01-01 PROCEDURE — 84484 ASSAY OF TROPONIN QUANT: CPT

## 2022-01-01 PROCEDURE — 93010 ELECTROCARDIOGRAM REPORT: CPT | Performed by: INTERNAL MEDICINE

## 2022-01-01 PROCEDURE — 99232 SBSQ HOSP IP/OBS MODERATE 35: CPT | Performed by: SURGERY

## 2022-01-01 PROCEDURE — 6360000002 HC RX W HCPCS: Performed by: FAMILY MEDICINE

## 2022-01-01 PROCEDURE — 2709999900 HC NON-CHARGEABLE SUPPLY: Performed by: ORTHOPAEDIC SURGERY

## 2022-01-01 PROCEDURE — 82962 GLUCOSE BLOOD TEST: CPT

## 2022-01-01 PROCEDURE — 97165 OT EVAL LOW COMPLEX 30 MIN: CPT

## 2022-01-01 PROCEDURE — 97116 GAIT TRAINING THERAPY: CPT

## 2022-01-01 PROCEDURE — 7100000001 HC PACU RECOVERY - ADDTL 15 MIN: Performed by: ORTHOPAEDIC SURGERY

## 2022-01-01 PROCEDURE — 86901 BLOOD TYPING SEROLOGIC RH(D): CPT

## 2022-01-01 PROCEDURE — 99239 HOSP IP/OBS DSCHRG MGMT >30: CPT | Performed by: INTERNAL MEDICINE

## 2022-01-01 PROCEDURE — 27502 TREATMENT OF THIGH FRACTURE: CPT | Performed by: ORTHOPAEDIC SURGERY

## 2022-01-01 PROCEDURE — 99221 1ST HOSP IP/OBS SF/LOW 40: CPT | Performed by: SURGERY

## 2022-01-01 PROCEDURE — 2580000003 HC RX 258: Performed by: NURSE PRACTITIONER

## 2022-01-01 PROCEDURE — 2500000003 HC RX 250 WO HCPCS: Performed by: NURSE ANESTHETIST, CERTIFIED REGISTERED

## 2022-01-01 PROCEDURE — 6360000002 HC RX W HCPCS: Performed by: NURSE ANESTHETIST, CERTIFIED REGISTERED

## 2022-01-01 PROCEDURE — 99285 EMERGENCY DEPT VISIT HI MDM: CPT

## 2022-01-01 PROCEDURE — 3700000001 HC ADD 15 MINUTES (ANESTHESIA): Performed by: ORTHOPAEDIC SURGERY

## 2022-01-01 PROCEDURE — 1036F TOBACCO NON-USER: CPT | Performed by: INTERNAL MEDICINE

## 2022-01-01 PROCEDURE — 85730 THROMBOPLASTIN TIME PARTIAL: CPT

## 2022-01-01 PROCEDURE — G8420 CALC BMI NORM PARAMETERS: HCPCS | Performed by: INTERNAL MEDICINE

## 2022-01-01 PROCEDURE — 3700000000 HC ANESTHESIA ATTENDED CARE: Performed by: ORTHOPAEDIC SURGERY

## 2022-01-01 PROCEDURE — 99232 SBSQ HOSP IP/OBS MODERATE 35: CPT | Performed by: STUDENT IN AN ORGANIZED HEALTH CARE EDUCATION/TRAINING PROGRAM

## 2022-01-01 PROCEDURE — 99231 SBSQ HOSP IP/OBS SF/LOW 25: CPT | Performed by: FAMILY MEDICINE

## 2022-01-01 PROCEDURE — 6370000000 HC RX 637 (ALT 250 FOR IP): Performed by: NURSE PRACTITIONER

## 2022-01-01 PROCEDURE — 73560 X-RAY EXAM OF KNEE 1 OR 2: CPT

## 2022-01-01 PROCEDURE — 83690 ASSAY OF LIPASE: CPT

## 2022-01-01 PROCEDURE — 83605 ASSAY OF LACTIC ACID: CPT

## 2022-01-01 PROCEDURE — 1123F ACP DISCUSS/DSCN MKR DOCD: CPT | Performed by: INTERNAL MEDICINE

## 2022-01-01 PROCEDURE — 3600000003 HC SURGERY LEVEL 3 BASE: Performed by: ORTHOPAEDIC SURGERY

## 2022-01-01 PROCEDURE — 99222 1ST HOSP IP/OBS MODERATE 55: CPT | Performed by: FAMILY MEDICINE

## 2022-01-01 PROCEDURE — 99214 OFFICE O/P EST MOD 30 MIN: CPT | Performed by: INTERNAL MEDICINE

## 2022-01-01 PROCEDURE — 6370000000 HC RX 637 (ALT 250 FOR IP): Performed by: EMERGENCY MEDICINE

## 2022-01-01 PROCEDURE — APPSS60 APP SPLIT SHARED TIME 46-60 MINUTES: Performed by: NURSE PRACTITIONER

## 2022-01-01 PROCEDURE — 86900 BLOOD TYPING SEROLOGIC ABO: CPT

## 2022-01-01 PROCEDURE — 81001 URINALYSIS AUTO W/SCOPE: CPT

## 2022-01-01 PROCEDURE — 27506 TREATMENT OF THIGH FRACTURE: CPT | Performed by: ORTHOPAEDIC SURGERY

## 2022-01-01 PROCEDURE — G8484 FLU IMMUNIZE NO ADMIN: HCPCS | Performed by: INTERNAL MEDICINE

## 2022-01-01 PROCEDURE — 4040F PNEUMOC VAC/ADMIN/RCVD: CPT | Performed by: INTERNAL MEDICINE

## 2022-01-01 PROCEDURE — 2580000003 HC RX 258: Performed by: NURSE ANESTHETIST, CERTIFIED REGISTERED

## 2022-01-01 PROCEDURE — 97763 ORTHC/PROSTC MGMT SBSQ ENC: CPT | Performed by: ORTHOPAEDIC SURGERY

## 2022-01-01 PROCEDURE — APPSS60 APP SPLIT SHARED TIME 46-60 MINUTES: Performed by: PHYSICIAN ASSISTANT

## 2022-01-01 PROCEDURE — 97530 THERAPEUTIC ACTIVITIES: CPT | Performed by: PHYSICAL THERAPIST

## 2022-01-01 PROCEDURE — 2500000003 HC RX 250 WO HCPCS: Performed by: STUDENT IN AN ORGANIZED HEALTH CARE EDUCATION/TRAINING PROGRAM

## 2022-01-01 PROCEDURE — 84132 ASSAY OF SERUM POTASSIUM: CPT

## 2022-01-01 DEVICE — SCREW LCK 5X32MM W/T25 STRDRV F/IM NAIL STRL: Type: IMPLANTABLE DEVICE | Site: FEMUR | Status: FUNCTIONAL

## 2022-01-01 DEVICE — ENDCAP ORTH DIA12MM 0MM EXTN ST TI SPRL BLDE LOK T40: Type: IMPLANTABLE DEVICE | Site: FEMUR | Status: FUNCTIONAL

## 2022-01-01 DEVICE — IMPLANTABLE DEVICE: Type: IMPLANTABLE DEVICE | Site: FEMUR | Status: FUNCTIONAL

## 2022-01-01 DEVICE — ENDCAP ORTH EXTN 0MM FEM G TI CANN T40 STARDRV RECESS FOR: Type: IMPLANTABLE DEVICE | Site: FEMUR | Status: FUNCTIONAL

## 2022-01-01 DEVICE — SCREW BNE L36MM DIA5MM TIB LT GRN TI ST CANN LOK FULL THRD: Type: IMPLANTABLE DEVICE | Site: FEMUR | Status: FUNCTIONAL

## 2022-01-01 DEVICE — SCREW BNE L66MM DIA5MM COR DIA4.3MM FEM TI ST LOK DBL LD: Type: IMPLANTABLE DEVICE | Site: FEMUR | Status: FUNCTIONAL

## 2022-01-01 RX ORDER — NEOSTIGMINE METHYLSULFATE 1 MG/ML
INJECTION, SOLUTION INTRAVENOUS PRN
Status: DISCONTINUED | OUTPATIENT
Start: 2022-01-01 | End: 2022-01-01 | Stop reason: SDUPTHER

## 2022-01-01 RX ORDER — HEPARIN SODIUM 5000 [USP'U]/ML
5000 INJECTION, SOLUTION INTRAVENOUS; SUBCUTANEOUS EVERY 8 HOURS SCHEDULED
Status: DISCONTINUED | OUTPATIENT
Start: 2022-01-01 | End: 2022-01-01 | Stop reason: HOSPADM

## 2022-01-01 RX ORDER — SENNA PLUS 8.6 MG/1
1 TABLET ORAL DAILY PRN
Status: DISCONTINUED | OUTPATIENT
Start: 2022-01-01 | End: 2022-01-01 | Stop reason: HOSPADM

## 2022-01-01 RX ORDER — ONDANSETRON 2 MG/ML
INJECTION INTRAMUSCULAR; INTRAVENOUS PRN
Status: DISCONTINUED | OUTPATIENT
Start: 2022-01-01 | End: 2022-01-01 | Stop reason: SDUPTHER

## 2022-01-01 RX ORDER — HYDROCODONE BITARTRATE AND ACETAMINOPHEN 5; 325 MG/1; MG/1
1 TABLET ORAL ONCE
Status: COMPLETED | OUTPATIENT
Start: 2022-01-01 | End: 2022-01-01

## 2022-01-01 RX ORDER — SODIUM CHLORIDE 0.9 % (FLUSH) 0.9 %
10 SYRINGE (ML) INJECTION EVERY 12 HOURS SCHEDULED
Status: DISCONTINUED | OUTPATIENT
Start: 2022-01-01 | End: 2022-01-01 | Stop reason: HOSPADM

## 2022-01-01 RX ORDER — SENNA PLUS 8.6 MG/1
1 TABLET ORAL DAILY PRN
DISCHARGE
Start: 2022-01-01 | End: 2022-01-01

## 2022-01-01 RX ORDER — ACETAMINOPHEN 325 MG/1
650 TABLET ORAL EVERY 6 HOURS PRN
Status: DISCONTINUED | OUTPATIENT
Start: 2022-01-01 | End: 2022-01-01 | Stop reason: HOSPADM

## 2022-01-01 RX ORDER — POLYETHYLENE GLYCOL 3350 17 G/17G
17 POWDER, FOR SOLUTION ORAL DAILY PRN
Qty: 527 G | Refills: 1 | DISCHARGE
Start: 2022-01-01 | End: 2022-01-01

## 2022-01-01 RX ORDER — PROMETHAZINE HYDROCHLORIDE 25 MG/1
12.5 TABLET ORAL EVERY 6 HOURS PRN
Status: DISCONTINUED | OUTPATIENT
Start: 2022-01-01 | End: 2022-01-01 | Stop reason: HOSPADM

## 2022-01-01 RX ORDER — SODIUM CHLORIDE 0.9 % (FLUSH) 0.9 %
5-40 SYRINGE (ML) INJECTION EVERY 12 HOURS SCHEDULED
Status: DISCONTINUED | OUTPATIENT
Start: 2022-01-01 | End: 2022-01-01 | Stop reason: HOSPADM

## 2022-01-01 RX ORDER — ACETAMINOPHEN 650 MG/1
650 SUPPOSITORY RECTAL EVERY 6 HOURS PRN
Status: DISCONTINUED | OUTPATIENT
Start: 2022-01-01 | End: 2022-01-01 | Stop reason: HOSPADM

## 2022-01-01 RX ORDER — CEFAZOLIN SODIUM 1 G/3ML
INJECTION, POWDER, FOR SOLUTION INTRAMUSCULAR; INTRAVENOUS PRN
Status: DISCONTINUED | OUTPATIENT
Start: 2022-01-01 | End: 2022-01-01 | Stop reason: SDUPTHER

## 2022-01-01 RX ORDER — FENTANYL CITRATE 50 UG/ML
INJECTION, SOLUTION INTRAMUSCULAR; INTRAVENOUS
Status: DISCONTINUED
Start: 2022-01-01 | End: 2022-01-01 | Stop reason: WASHOUT

## 2022-01-01 RX ORDER — SODIUM CHLORIDE 9 MG/ML
INJECTION, SOLUTION INTRAVENOUS CONTINUOUS
Status: DISCONTINUED | OUTPATIENT
Start: 2022-01-01 | End: 2022-01-01

## 2022-01-01 RX ORDER — CIPROFLOXACIN 500 MG/1
500 TABLET, FILM COATED ORAL EVERY 12 HOURS SCHEDULED
Qty: 20 TABLET | Refills: 0 | DISCHARGE
Start: 2022-01-01 | End: 2022-01-01

## 2022-01-01 RX ORDER — FENTANYL CITRATE 50 UG/ML
25 INJECTION, SOLUTION INTRAMUSCULAR; INTRAVENOUS EVERY 5 MIN PRN
Status: DISCONTINUED | OUTPATIENT
Start: 2022-01-01 | End: 2022-01-01

## 2022-01-01 RX ORDER — CITALOPRAM 10 MG/1
TABLET ORAL
Qty: 90 TABLET | Refills: 1 | Status: SHIPPED | OUTPATIENT
Start: 2022-01-01

## 2022-01-01 RX ORDER — AMLODIPINE BESYLATE 5 MG/1
5 TABLET ORAL DAILY
Status: DISCONTINUED | OUTPATIENT
Start: 2022-01-01 | End: 2022-01-01 | Stop reason: HOSPADM

## 2022-01-01 RX ORDER — LABETALOL HYDROCHLORIDE 5 MG/ML
5 INJECTION, SOLUTION INTRAVENOUS EVERY 10 MIN PRN
Status: DISCONTINUED | OUTPATIENT
Start: 2022-01-01 | End: 2022-01-01 | Stop reason: HOSPADM

## 2022-01-01 RX ORDER — FENTANYL CITRATE 50 UG/ML
INJECTION, SOLUTION INTRAMUSCULAR; INTRAVENOUS PRN
Status: DISCONTINUED | OUTPATIENT
Start: 2022-01-01 | End: 2022-01-01 | Stop reason: SDUPTHER

## 2022-01-01 RX ORDER — METRONIDAZOLE 500 MG/1
500 TABLET ORAL EVERY 8 HOURS SCHEDULED
Qty: 15 TABLET | Refills: 0 | DISCHARGE
Start: 2022-01-01 | End: 2022-01-01

## 2022-01-01 RX ORDER — POTASSIUM CHLORIDE 20 MEQ/1
40 TABLET, EXTENDED RELEASE ORAL ONCE
Status: COMPLETED | OUTPATIENT
Start: 2022-01-01 | End: 2022-01-01

## 2022-01-01 RX ORDER — CITALOPRAM 10 MG/1
10 TABLET ORAL DAILY
Status: DISCONTINUED | OUTPATIENT
Start: 2022-01-01 | End: 2022-01-01 | Stop reason: HOSPADM

## 2022-01-01 RX ORDER — MORPHINE SULFATE 5 MG/ML
5 INJECTION, SOLUTION INTRAMUSCULAR; INTRAVENOUS ONCE
Status: COMPLETED | OUTPATIENT
Start: 2022-01-01 | End: 2022-01-01

## 2022-01-01 RX ORDER — POLYETHYLENE GLYCOL 3350 17 G/17G
17 POWDER, FOR SOLUTION ORAL ONCE
Status: COMPLETED | OUTPATIENT
Start: 2022-01-01 | End: 2022-01-01

## 2022-01-01 RX ORDER — METRONIDAZOLE 500 MG/1
500 TABLET ORAL EVERY 8 HOURS SCHEDULED
Status: DISCONTINUED | OUTPATIENT
Start: 2022-01-01 | End: 2022-01-01 | Stop reason: HOSPADM

## 2022-01-01 RX ORDER — HYDROCORTISONE ACETATE 0.5 %
1 CREAM (GRAM) TOPICAL DAILY
Status: DISCONTINUED | OUTPATIENT
Start: 2022-01-01 | End: 2022-01-01 | Stop reason: CLARIF

## 2022-01-01 RX ORDER — ONDANSETRON 2 MG/ML
4 INJECTION INTRAMUSCULAR; INTRAVENOUS EVERY 6 HOURS PRN
Status: DISCONTINUED | OUTPATIENT
Start: 2022-01-01 | End: 2022-01-01 | Stop reason: HOSPADM

## 2022-01-01 RX ORDER — SODIUM CHLORIDE 9 MG/ML
25 INJECTION, SOLUTION INTRAVENOUS PRN
Status: DISCONTINUED | OUTPATIENT
Start: 2022-01-01 | End: 2022-01-01 | Stop reason: HOSPADM

## 2022-01-01 RX ORDER — DONEPEZIL HYDROCHLORIDE 10 MG/1
10 TABLET, FILM COATED ORAL NIGHTLY
Status: DISCONTINUED | OUTPATIENT
Start: 2022-01-01 | End: 2022-01-01 | Stop reason: HOSPADM

## 2022-01-01 RX ORDER — NALOXONE HYDROCHLORIDE 0.4 MG/ML
0.4 INJECTION, SOLUTION INTRAMUSCULAR; INTRAVENOUS; SUBCUTANEOUS ONCE
Status: COMPLETED | OUTPATIENT
Start: 2022-01-01 | End: 2022-01-01

## 2022-01-01 RX ORDER — POLYETHYLENE GLYCOL 3350 17 G/17G
17 POWDER, FOR SOLUTION ORAL DAILY PRN
Status: DISCONTINUED | OUTPATIENT
Start: 2022-01-01 | End: 2022-01-01 | Stop reason: HOSPADM

## 2022-01-01 RX ORDER — ONDANSETRON 4 MG/1
4 TABLET, ORALLY DISINTEGRATING ORAL EVERY 8 HOURS PRN
Status: DISCONTINUED | OUTPATIENT
Start: 2022-01-01 | End: 2022-01-01 | Stop reason: HOSPADM

## 2022-01-01 RX ORDER — SODIUM CHLORIDE 450 MG/100ML
INJECTION, SOLUTION INTRAVENOUS CONTINUOUS
Status: DISCONTINUED | OUTPATIENT
Start: 2022-01-01 | End: 2022-01-01

## 2022-01-01 RX ORDER — PROPOFOL 10 MG/ML
INJECTION, EMULSION INTRAVENOUS PRN
Status: DISCONTINUED | OUTPATIENT
Start: 2022-01-01 | End: 2022-01-01 | Stop reason: SDUPTHER

## 2022-01-01 RX ORDER — MEMANTINE HYDROCHLORIDE 10 MG/1
10 TABLET ORAL 2 TIMES DAILY
Status: DISCONTINUED | OUTPATIENT
Start: 2022-01-01 | End: 2022-01-01 | Stop reason: HOSPADM

## 2022-01-01 RX ORDER — LIDOCAINE HYDROCHLORIDE 20 MG/ML
INJECTION, SOLUTION INTRAVENOUS PRN
Status: DISCONTINUED | OUTPATIENT
Start: 2022-01-01 | End: 2022-01-01 | Stop reason: SDUPTHER

## 2022-01-01 RX ORDER — CEFAZOLIN SODIUM 2 G/50ML
2000 SOLUTION INTRAVENOUS
Status: DISCONTINUED | OUTPATIENT
Start: 2022-01-01 | End: 2022-01-01

## 2022-01-01 RX ORDER — SODIUM CHLORIDE 0.9 % (FLUSH) 0.9 %
10 SYRINGE (ML) INJECTION PRN
Status: DISCONTINUED | OUTPATIENT
Start: 2022-01-01 | End: 2022-01-01 | Stop reason: HOSPADM

## 2022-01-01 RX ORDER — 0.9 % SODIUM CHLORIDE 0.9 %
1000 INTRAVENOUS SOLUTION INTRAVENOUS ONCE
Status: COMPLETED | OUTPATIENT
Start: 2022-01-01 | End: 2022-01-01

## 2022-01-01 RX ORDER — SODIUM CHLORIDE 0.9 % (FLUSH) 0.9 %
5-40 SYRINGE (ML) INJECTION PRN
Status: DISCONTINUED | OUTPATIENT
Start: 2022-01-01 | End: 2022-01-01 | Stop reason: HOSPADM

## 2022-01-01 RX ORDER — POTASSIUM CHLORIDE 20 MEQ/1
40 TABLET, EXTENDED RELEASE ORAL 2 TIMES DAILY
Status: COMPLETED | OUTPATIENT
Start: 2022-01-01 | End: 2022-01-01

## 2022-01-01 RX ORDER — GLYCOPYRROLATE 1 MG/5 ML
SYRINGE (ML) INTRAVENOUS PRN
Status: DISCONTINUED | OUTPATIENT
Start: 2022-01-01 | End: 2022-01-01 | Stop reason: SDUPTHER

## 2022-01-01 RX ORDER — OXYCODONE HYDROCHLORIDE AND ACETAMINOPHEN 5; 325 MG/1; MG/1
1 TABLET ORAL EVERY 4 HOURS PRN
Status: DISCONTINUED | OUTPATIENT
Start: 2022-01-01 | End: 2022-01-01

## 2022-01-01 RX ORDER — POTASSIUM CHLORIDE 7.45 MG/ML
10 INJECTION INTRAVENOUS ONCE
Status: COMPLETED | OUTPATIENT
Start: 2022-01-01 | End: 2022-01-01

## 2022-01-01 RX ORDER — CITALOPRAM 20 MG/1
10 TABLET ORAL EVERY MORNING
Status: DISCONTINUED | OUTPATIENT
Start: 2022-01-01 | End: 2022-01-01 | Stop reason: HOSPADM

## 2022-01-01 RX ORDER — SODIUM CHLORIDE, SODIUM LACTATE, POTASSIUM CHLORIDE, CALCIUM CHLORIDE 600; 310; 30; 20 MG/100ML; MG/100ML; MG/100ML; MG/100ML
INJECTION, SOLUTION INTRAVENOUS CONTINUOUS PRN
Status: DISCONTINUED | OUTPATIENT
Start: 2022-01-01 | End: 2022-01-01 | Stop reason: SDUPTHER

## 2022-01-01 RX ORDER — METRONIDAZOLE 500 MG/1
500 TABLET ORAL EVERY 8 HOURS SCHEDULED
Qty: 30 TABLET | Refills: 0 | DISCHARGE
Start: 2022-01-01 | End: 2022-01-01

## 2022-01-01 RX ORDER — FUROSEMIDE 20 MG/1
20 TABLET ORAL DAILY
Status: DISCONTINUED | OUTPATIENT
Start: 2022-01-01 | End: 2022-01-01 | Stop reason: HOSPADM

## 2022-01-01 RX ORDER — CIPROFLOXACIN 500 MG/1
500 TABLET, FILM COATED ORAL EVERY 12 HOURS SCHEDULED
Qty: 10 TABLET | Refills: 0 | DISCHARGE
Start: 2022-01-01 | End: 2022-01-01

## 2022-01-01 RX ORDER — OXYCODONE HYDROCHLORIDE AND ACETAMINOPHEN 5; 325 MG/1; MG/1
1 TABLET ORAL EVERY 6 HOURS PRN
Qty: 28 TABLET | Refills: 0 | Status: SHIPPED | OUTPATIENT
Start: 2022-01-01 | End: 2022-01-01

## 2022-01-01 RX ORDER — ROCURONIUM BROMIDE 10 MG/ML
INJECTION, SOLUTION INTRAVENOUS PRN
Status: DISCONTINUED | OUTPATIENT
Start: 2022-01-01 | End: 2022-01-01 | Stop reason: SDUPTHER

## 2022-01-01 RX ORDER — VITAMIN B COMPLEX
2000 TABLET ORAL DAILY
Status: DISCONTINUED | OUTPATIENT
Start: 2022-01-01 | End: 2022-01-01 | Stop reason: HOSPADM

## 2022-01-01 RX ORDER — SODIUM CHLORIDE 9 MG/ML
INJECTION, SOLUTION INTRAVENOUS CONTINUOUS
Status: DISCONTINUED | OUTPATIENT
Start: 2022-01-01 | End: 2022-01-01 | Stop reason: HOSPADM

## 2022-01-01 RX ORDER — OXYCODONE HYDROCHLORIDE AND ACETAMINOPHEN 5; 325 MG/1; MG/1
1 TABLET ORAL EVERY 6 HOURS PRN
Status: DISCONTINUED | OUTPATIENT
Start: 2022-01-01 | End: 2022-01-01 | Stop reason: HOSPADM

## 2022-01-01 RX ORDER — DEXAMETHASONE SODIUM PHOSPHATE 10 MG/ML
INJECTION, SOLUTION INTRAMUSCULAR; INTRAVENOUS PRN
Status: DISCONTINUED | OUTPATIENT
Start: 2022-01-01 | End: 2022-01-01 | Stop reason: SDUPTHER

## 2022-01-01 RX ORDER — MORPHINE SULFATE 2 MG/ML
2 INJECTION, SOLUTION INTRAMUSCULAR; INTRAVENOUS
Status: DISCONTINUED | OUTPATIENT
Start: 2022-01-01 | End: 2022-01-01 | Stop reason: HOSPADM

## 2022-01-01 RX ORDER — DONEPEZIL HYDROCHLORIDE 10 MG/1
TABLET, FILM COATED ORAL
Qty: 90 TABLET | Refills: 0 | Status: SHIPPED | OUTPATIENT
Start: 2022-01-01

## 2022-01-01 RX ORDER — MAGNESIUM SULFATE IN WATER 40 MG/ML
2000 INJECTION, SOLUTION INTRAVENOUS ONCE
Status: COMPLETED | OUTPATIENT
Start: 2022-01-01 | End: 2022-01-01

## 2022-01-01 RX ORDER — CIPROFLOXACIN 500 MG/1
500 TABLET, FILM COATED ORAL EVERY 12 HOURS SCHEDULED
Status: DISCONTINUED | OUTPATIENT
Start: 2022-01-01 | End: 2022-01-01 | Stop reason: HOSPADM

## 2022-01-01 RX ORDER — HYDROCODONE BITARTRATE AND ACETAMINOPHEN 5; 325 MG/1; MG/1
1 TABLET ORAL EVERY 4 HOURS PRN
Qty: 90 TABLET | Refills: 0 | Status: SHIPPED | OUTPATIENT
Start: 2022-01-01 | End: 2022-03-20

## 2022-01-01 RX ORDER — AMLODIPINE BESYLATE 5 MG/1
5 TABLET ORAL DAILY
Qty: 30 TABLET | Refills: 0 | DISCHARGE
Start: 2022-01-01

## 2022-01-01 RX ORDER — MORPHINE SULFATE 2 MG/ML
1 INJECTION, SOLUTION INTRAMUSCULAR; INTRAVENOUS
Status: DISCONTINUED | OUTPATIENT
Start: 2022-01-01 | End: 2022-01-01 | Stop reason: HOSPADM

## 2022-01-01 RX ORDER — DONEPEZIL HYDROCHLORIDE 5 MG/1
10 TABLET, FILM COATED ORAL NIGHTLY
Status: DISCONTINUED | OUTPATIENT
Start: 2022-01-01 | End: 2022-01-01 | Stop reason: HOSPADM

## 2022-01-01 RX ADMIN — HEPARIN SODIUM 5000 UNITS: 5000 INJECTION INTRAVENOUS; SUBCUTANEOUS at 22:56

## 2022-01-01 RX ADMIN — MEMANTINE HYDROCHLORIDE 10 MG: 10 TABLET ORAL at 21:26

## 2022-01-01 RX ADMIN — DONEPEZIL HYDROCHLORIDE 10 MG: 10 TABLET, FILM COATED ORAL at 20:29

## 2022-01-01 RX ADMIN — METRONIDAZOLE 500 MG: 500 INJECTION, SOLUTION INTRAVENOUS at 00:58

## 2022-01-01 RX ADMIN — CITALOPRAM HYDROBROMIDE 10 MG: 10 TABLET ORAL at 12:41

## 2022-01-01 RX ADMIN — Medication 2000 UNITS: at 08:16

## 2022-01-01 RX ADMIN — POTASSIUM CHLORIDE 40 MEQ: 1500 TABLET, EXTENDED RELEASE ORAL at 20:45

## 2022-01-01 RX ADMIN — METOPROLOL TARTRATE 25 MG: 25 TABLET, FILM COATED ORAL at 00:20

## 2022-01-01 RX ADMIN — METRONIDAZOLE 500 MG: 500 TABLET ORAL at 06:03

## 2022-01-01 RX ADMIN — LIDOCAINE HYDROCHLORIDE 40 MG: 20 INJECTION, SOLUTION INTRAVENOUS at 21:08

## 2022-01-01 RX ADMIN — MORPHINE SULFATE 2 MG: 2 INJECTION, SOLUTION INTRAMUSCULAR; INTRAVENOUS at 09:12

## 2022-01-01 RX ADMIN — CITALOPRAM HYDROBROMIDE 10 MG: 20 TABLET ORAL at 08:16

## 2022-01-01 RX ADMIN — LABETALOL HYDROCHLORIDE 5 MG: 5 INJECTION INTRAVENOUS at 05:37

## 2022-01-01 RX ADMIN — OXYCODONE AND ACETAMINOPHEN 1 TABLET: 5; 325 TABLET ORAL at 16:13

## 2022-01-01 RX ADMIN — DONEPEZIL HYDROCHLORIDE 10 MG: 5 TABLET, FILM COATED ORAL at 20:49

## 2022-01-01 RX ADMIN — CITALOPRAM HYDROBROMIDE 10 MG: 10 TABLET ORAL at 09:04

## 2022-01-01 RX ADMIN — Medication 2000 UNITS: at 09:52

## 2022-01-01 RX ADMIN — METRONIDAZOLE 500 MG: 500 TABLET ORAL at 22:15

## 2022-01-01 RX ADMIN — DONEPEZIL HYDROCHLORIDE 10 MG: 5 TABLET, FILM COATED ORAL at 21:05

## 2022-01-01 RX ADMIN — OXYCODONE AND ACETAMINOPHEN 1 TABLET: 5; 325 TABLET ORAL at 04:47

## 2022-01-01 RX ADMIN — WATER 1000 MG: 1 INJECTION INTRAMUSCULAR; INTRAVENOUS; SUBCUTANEOUS at 00:58

## 2022-01-01 RX ADMIN — LABETALOL HYDROCHLORIDE 5 MG: 5 INJECTION INTRAVENOUS at 23:02

## 2022-01-01 RX ADMIN — LABETALOL HYDROCHLORIDE 5 MG: 5 INJECTION INTRAVENOUS at 06:44

## 2022-01-01 RX ADMIN — AMLODIPINE BESYLATE 5 MG: 5 TABLET ORAL at 09:47

## 2022-01-01 RX ADMIN — APIXABAN 2.5 MG: 2.5 TABLET, FILM COATED ORAL at 20:46

## 2022-01-01 RX ADMIN — METRONIDAZOLE 500 MG: 500 TABLET ORAL at 13:50

## 2022-01-01 RX ADMIN — METRONIDAZOLE 500 MG: 500 INJECTION, SOLUTION INTRAVENOUS at 12:41

## 2022-01-01 RX ADMIN — NALOXONE HYDROCHLORIDE 0.4 MG: 0.4 INJECTION, SOLUTION INTRAMUSCULAR; INTRAVENOUS; SUBCUTANEOUS at 21:47

## 2022-01-01 RX ADMIN — DONEPEZIL HYDROCHLORIDE 10 MG: 10 TABLET, FILM COATED ORAL at 21:26

## 2022-01-01 RX ADMIN — METOPROLOL TARTRATE 25 MG: 25 TABLET, FILM COATED ORAL at 09:08

## 2022-01-01 RX ADMIN — HEPARIN SODIUM 5000 UNITS: 5000 INJECTION INTRAVENOUS; SUBCUTANEOUS at 21:05

## 2022-01-01 RX ADMIN — METOPROLOL TARTRATE 25 MG: 25 TABLET, FILM COATED ORAL at 10:22

## 2022-01-01 RX ADMIN — HEPARIN SODIUM 5000 UNITS: 5000 INJECTION INTRAVENOUS; SUBCUTANEOUS at 22:16

## 2022-01-01 RX ADMIN — SODIUM CHLORIDE 1000 ML: 9 INJECTION, SOLUTION INTRAVENOUS at 21:47

## 2022-01-01 RX ADMIN — WATER 1000 MG: 1 INJECTION INTRAMUSCULAR; INTRAVENOUS; SUBCUTANEOUS at 12:51

## 2022-01-01 RX ADMIN — HYDROCODONE BITARTRATE AND ACETAMINOPHEN 1 TABLET: 5; 325 TABLET ORAL at 15:02

## 2022-01-01 RX ADMIN — METRONIDAZOLE 500 MG: 500 TABLET ORAL at 20:45

## 2022-01-01 RX ADMIN — FENTANYL CITRATE 50 MCG: 50 INJECTION, SOLUTION INTRAMUSCULAR; INTRAVENOUS at 21:14

## 2022-01-01 RX ADMIN — Medication 0.6 MG: at 22:21

## 2022-01-01 RX ADMIN — AMLODIPINE BESYLATE 5 MG: 5 TABLET ORAL at 09:50

## 2022-01-01 RX ADMIN — METOPROLOL TARTRATE 25 MG: 25 TABLET, FILM COATED ORAL at 09:04

## 2022-01-01 RX ADMIN — SODIUM CHLORIDE: 4.5 INJECTION, SOLUTION INTRAVENOUS at 06:48

## 2022-01-01 RX ADMIN — CIPROFLOXACIN HYDROCHLORIDE 500 MG: 500 TABLET, FILM COATED ORAL at 09:04

## 2022-01-01 RX ADMIN — Medication 10 ML: at 22:01

## 2022-01-01 RX ADMIN — CIPROFLOXACIN HYDROCHLORIDE 500 MG: 500 TABLET, FILM COATED ORAL at 20:44

## 2022-01-01 RX ADMIN — MEMANTINE HYDROCHLORIDE 10 MG: 10 TABLET ORAL at 08:16

## 2022-01-01 RX ADMIN — METOPROLOL TARTRATE 25 MG: 25 TABLET, FILM COATED ORAL at 10:42

## 2022-01-01 RX ADMIN — WATER 1000 MG: 1 INJECTION INTRAMUSCULAR; INTRAVENOUS; SUBCUTANEOUS at 20:46

## 2022-01-01 RX ADMIN — MEMANTINE HYDROCHLORIDE 10 MG: 10 TABLET ORAL at 09:47

## 2022-01-01 RX ADMIN — APIXABAN 2.5 MG: 2.5 TABLET, FILM COATED ORAL at 08:17

## 2022-01-01 RX ADMIN — APIXABAN 2.5 MG: 2.5 TABLET, FILM COATED ORAL at 20:29

## 2022-01-01 RX ADMIN — FENTANYL CITRATE 50 MCG: 50 INJECTION, SOLUTION INTRAMUSCULAR; INTRAVENOUS at 22:38

## 2022-01-01 RX ADMIN — AMLODIPINE BESYLATE 5 MG: 5 TABLET ORAL at 09:08

## 2022-01-01 RX ADMIN — Medication 2000 UNITS: at 08:17

## 2022-01-01 RX ADMIN — AMLODIPINE BESYLATE 5 MG: 5 TABLET ORAL at 09:04

## 2022-01-01 RX ADMIN — Medication 3 MG: at 22:21

## 2022-01-01 RX ADMIN — FUROSEMIDE 20 MG: 20 TABLET ORAL at 09:08

## 2022-01-01 RX ADMIN — POTASSIUM CHLORIDE 40 MEQ: 1500 TABLET, EXTENDED RELEASE ORAL at 10:22

## 2022-01-01 RX ADMIN — CITALOPRAM HYDROBROMIDE 10 MG: 10 TABLET ORAL at 09:08

## 2022-01-01 RX ADMIN — Medication 2000 UNITS: at 09:47

## 2022-01-01 RX ADMIN — HEPARIN SODIUM 5000 UNITS: 5000 INJECTION INTRAVENOUS; SUBCUTANEOUS at 06:35

## 2022-01-01 RX ADMIN — WATER 1000 MG: 1 INJECTION INTRAMUSCULAR; INTRAVENOUS; SUBCUTANEOUS at 17:56

## 2022-01-01 RX ADMIN — Medication 10 ML: at 22:58

## 2022-01-01 RX ADMIN — APIXABAN 2.5 MG: 2.5 TABLET, FILM COATED ORAL at 21:26

## 2022-01-01 RX ADMIN — OXYCODONE AND ACETAMINOPHEN 1 TABLET: 5; 325 TABLET ORAL at 10:43

## 2022-01-01 RX ADMIN — MORPHINE SULFATE 2 MG: 2 INJECTION, SOLUTION INTRAMUSCULAR; INTRAVENOUS at 05:06

## 2022-01-01 RX ADMIN — METRONIDAZOLE 500 MG: 500 INJECTION, SOLUTION INTRAVENOUS at 09:00

## 2022-01-01 RX ADMIN — METOPROLOL TARTRATE 25 MG: 25 TABLET, FILM COATED ORAL at 20:37

## 2022-01-01 RX ADMIN — METOPROLOL TARTRATE 25 MG: 25 TABLET, FILM COATED ORAL at 21:26

## 2022-01-01 RX ADMIN — POTASSIUM CHLORIDE 10 MEQ: 7.46 INJECTION, SOLUTION INTRAVENOUS at 13:36

## 2022-01-01 RX ADMIN — CEFAZOLIN 2000 MG: 1 INJECTION, POWDER, FOR SOLUTION INTRAMUSCULAR; INTRAVENOUS at 21:08

## 2022-01-01 RX ADMIN — AMLODIPINE BESYLATE 5 MG: 5 TABLET ORAL at 10:22

## 2022-01-01 RX ADMIN — MEMANTINE HYDROCHLORIDE 10 MG: 10 TABLET ORAL at 08:17

## 2022-01-01 RX ADMIN — HEPARIN SODIUM 5000 UNITS: 5000 INJECTION INTRAVENOUS; SUBCUTANEOUS at 05:49

## 2022-01-01 RX ADMIN — AMLODIPINE BESYLATE 5 MG: 5 TABLET ORAL at 09:30

## 2022-01-01 RX ADMIN — ONDANSETRON 4 MG: 2 INJECTION INTRAMUSCULAR; INTRAVENOUS at 22:22

## 2022-01-01 RX ADMIN — HEPARIN SODIUM 5000 UNITS: 5000 INJECTION INTRAVENOUS; SUBCUTANEOUS at 13:51

## 2022-01-01 RX ADMIN — DONEPEZIL HYDROCHLORIDE 10 MG: 10 TABLET, FILM COATED ORAL at 00:35

## 2022-01-01 RX ADMIN — WATER 1000 MG: 1 INJECTION INTRAMUSCULAR; INTRAVENOUS; SUBCUTANEOUS at 05:19

## 2022-01-01 RX ADMIN — HEPARIN SODIUM 5000 UNITS: 5000 INJECTION INTRAVENOUS; SUBCUTANEOUS at 13:50

## 2022-01-01 RX ADMIN — METOPROLOL TARTRATE 25 MG: 25 TABLET, FILM COATED ORAL at 08:17

## 2022-01-01 RX ADMIN — MORPHINE SULFATE 2 MG: 2 INJECTION, SOLUTION INTRAMUSCULAR; INTRAVENOUS at 12:23

## 2022-01-01 RX ADMIN — SODIUM CHLORIDE: 4.5 INJECTION, SOLUTION INTRAVENOUS at 16:31

## 2022-01-01 RX ADMIN — METRONIDAZOLE 500 MG: 500 INJECTION, SOLUTION INTRAVENOUS at 10:17

## 2022-01-01 RX ADMIN — ACETAMINOPHEN 650 MG: 325 TABLET ORAL at 17:56

## 2022-01-01 RX ADMIN — SODIUM CHLORIDE: 9 INJECTION, SOLUTION INTRAVENOUS at 13:13

## 2022-01-01 RX ADMIN — Medication 10 ML: at 21:00

## 2022-01-01 RX ADMIN — OXYCODONE AND ACETAMINOPHEN 1 TABLET: 5; 325 TABLET ORAL at 09:59

## 2022-01-01 RX ADMIN — LABETALOL HYDROCHLORIDE 5 MG: 5 INJECTION INTRAVENOUS at 23:10

## 2022-01-01 RX ADMIN — CIPROFLOXACIN HYDROCHLORIDE 500 MG: 500 TABLET, FILM COATED ORAL at 09:30

## 2022-01-01 RX ADMIN — POTASSIUM CHLORIDE 40 MEQ: 1500 TABLET, EXTENDED RELEASE ORAL at 09:29

## 2022-01-01 RX ADMIN — AMLODIPINE BESYLATE 5 MG: 5 TABLET ORAL at 12:41

## 2022-01-01 RX ADMIN — METOPROLOL TARTRATE 25 MG: 25 TABLET, FILM COATED ORAL at 09:30

## 2022-01-01 RX ADMIN — SENNOSIDES 8.6 MG: 8.6 TABLET, COATED ORAL at 08:17

## 2022-01-01 RX ADMIN — MEMANTINE HYDROCHLORIDE 10 MG: 10 TABLET ORAL at 00:35

## 2022-01-01 RX ADMIN — HEPARIN SODIUM 5000 UNITS: 5000 INJECTION INTRAVENOUS; SUBCUTANEOUS at 06:03

## 2022-01-01 RX ADMIN — Medication 10 ML: at 09:03

## 2022-01-01 RX ADMIN — METOPROLOL TARTRATE 25 MG: 25 TABLET, FILM COATED ORAL at 12:41

## 2022-01-01 RX ADMIN — FENTANYL CITRATE 100 MCG: 50 INJECTION, SOLUTION INTRAMUSCULAR; INTRAVENOUS at 21:47

## 2022-01-01 RX ADMIN — POLYETHYLENE GLYCOL 3350 17 G: 17 POWDER, FOR SOLUTION ORAL at 16:25

## 2022-01-01 RX ADMIN — METRONIDAZOLE 500 MG: 500 INJECTION, SOLUTION INTRAVENOUS at 22:51

## 2022-01-01 RX ADMIN — Medication 10 ML: at 10:00

## 2022-01-01 RX ADMIN — SODIUM CHLORIDE: 9 INJECTION, SOLUTION INTRAVENOUS at 13:52

## 2022-01-01 RX ADMIN — HEPARIN SODIUM 5000 UNITS: 5000 INJECTION INTRAVENOUS; SUBCUTANEOUS at 20:50

## 2022-01-01 RX ADMIN — CITALOPRAM HYDROBROMIDE 10 MG: 20 TABLET ORAL at 09:47

## 2022-01-01 RX ADMIN — Medication 10 ML: at 14:24

## 2022-01-01 RX ADMIN — HEPARIN SODIUM 5000 UNITS: 5000 INJECTION INTRAVENOUS; SUBCUTANEOUS at 05:51

## 2022-01-01 RX ADMIN — METOPROLOL TARTRATE 25 MG: 25 TABLET, FILM COATED ORAL at 00:34

## 2022-01-01 RX ADMIN — DONEPEZIL HYDROCHLORIDE 10 MG: 5 TABLET, FILM COATED ORAL at 22:58

## 2022-01-01 RX ADMIN — OXYCODONE AND ACETAMINOPHEN 1 TABLET: 5; 325 TABLET ORAL at 08:42

## 2022-01-01 RX ADMIN — MORPHINE SULFATE 5 MG: 5 INJECTION, SOLUTION INTRAMUSCULAR; INTRAVENOUS at 20:21

## 2022-01-01 RX ADMIN — METOPROLOL TARTRATE 25 MG: 25 TABLET, FILM COATED ORAL at 20:49

## 2022-01-01 RX ADMIN — ACETAMINOPHEN 650 MG: 325 TABLET ORAL at 15:29

## 2022-01-01 RX ADMIN — MAGNESIUM SULFATE HEPTAHYDRATE 2000 MG: 2 INJECTION, SOLUTION INTRAVENOUS at 09:57

## 2022-01-01 RX ADMIN — SENNOSIDES 8.6 MG: 8.6 TABLET, COATED ORAL at 15:20

## 2022-01-01 RX ADMIN — CITALOPRAM HYDROBROMIDE 10 MG: 10 TABLET ORAL at 10:22

## 2022-01-01 RX ADMIN — OXYCODONE AND ACETAMINOPHEN 1 TABLET: 5; 325 TABLET ORAL at 16:06

## 2022-01-01 RX ADMIN — APIXABAN 2.5 MG: 2.5 TABLET, FILM COATED ORAL at 09:30

## 2022-01-01 RX ADMIN — METRONIDAZOLE 500 MG: 500 INJECTION, SOLUTION INTRAVENOUS at 01:36

## 2022-01-01 RX ADMIN — SODIUM CHLORIDE: 9 INJECTION, SOLUTION INTRAVENOUS at 02:09

## 2022-01-01 RX ADMIN — METOPROLOL TARTRATE 25 MG: 25 TABLET, FILM COATED ORAL at 09:52

## 2022-01-01 RX ADMIN — ONDANSETRON 4 MG: 2 INJECTION INTRAMUSCULAR; INTRAVENOUS at 09:41

## 2022-01-01 RX ADMIN — CITALOPRAM HYDROBROMIDE 10 MG: 10 TABLET ORAL at 09:30

## 2022-01-01 RX ADMIN — METOPROLOL TARTRATE 25 MG: 25 TABLET, FILM COATED ORAL at 20:29

## 2022-01-01 RX ADMIN — DONEPEZIL HYDROCHLORIDE 10 MG: 5 TABLET, FILM COATED ORAL at 20:36

## 2022-01-01 RX ADMIN — CIPROFLOXACIN HYDROCHLORIDE 500 MG: 500 TABLET, FILM COATED ORAL at 20:37

## 2022-01-01 RX ADMIN — MEMANTINE HYDROCHLORIDE 10 MG: 10 TABLET ORAL at 20:29

## 2022-01-01 RX ADMIN — SODIUM CHLORIDE: 9 INJECTION, SOLUTION INTRAVENOUS at 02:29

## 2022-01-01 RX ADMIN — HEPARIN SODIUM 5000 UNITS: 5000 INJECTION INTRAVENOUS; SUBCUTANEOUS at 13:36

## 2022-01-01 RX ADMIN — DEXAMETHASONE SODIUM PHOSPHATE 10 MG: 10 INJECTION INTRAMUSCULAR; INTRAVENOUS at 21:14

## 2022-01-01 RX ADMIN — PROPOFOL 80 MG: 10 INJECTION, EMULSION INTRAVENOUS at 21:08

## 2022-01-01 RX ADMIN — ACETAMINOPHEN 650 MG: 325 TABLET ORAL at 13:59

## 2022-01-01 RX ADMIN — Medication 10 ML: at 12:42

## 2022-01-01 RX ADMIN — HEPARIN SODIUM 5000 UNITS: 5000 INJECTION INTRAVENOUS; SUBCUTANEOUS at 14:06

## 2022-01-01 RX ADMIN — OXYCODONE AND ACETAMINOPHEN 1 TABLET: 5; 325 TABLET ORAL at 18:13

## 2022-01-01 RX ADMIN — SODIUM CHLORIDE, POTASSIUM CHLORIDE, SODIUM LACTATE AND CALCIUM CHLORIDE: 600; 310; 30; 20 INJECTION, SOLUTION INTRAVENOUS at 21:04

## 2022-01-01 RX ADMIN — MEMANTINE HYDROCHLORIDE 10 MG: 10 TABLET ORAL at 20:46

## 2022-01-01 RX ADMIN — DONEPEZIL HYDROCHLORIDE 10 MG: 10 TABLET, FILM COATED ORAL at 00:20

## 2022-01-01 RX ADMIN — Medication 10 ML: at 10:23

## 2022-01-01 RX ADMIN — ROCURONIUM BROMIDE 35 MG: 10 INJECTION INTRAVENOUS at 21:08

## 2022-01-01 RX ADMIN — MEMANTINE HYDROCHLORIDE 10 MG: 10 TABLET ORAL at 00:20

## 2022-01-01 RX ADMIN — METRONIDAZOLE 500 MG: 500 INJECTION, SOLUTION INTRAVENOUS at 16:25

## 2022-01-01 RX ADMIN — APIXABAN 2.5 MG: 2.5 TABLET, FILM COATED ORAL at 09:47

## 2022-01-01 RX ADMIN — DONEPEZIL HYDROCHLORIDE 10 MG: 10 TABLET, FILM COATED ORAL at 20:50

## 2022-01-01 RX ADMIN — METOPROLOL TARTRATE 25 MG: 25 TABLET, FILM COATED ORAL at 20:46

## 2022-01-01 RX ADMIN — WATER 1000 MG: 1 INJECTION INTRAMUSCULAR; INTRAVENOUS; SUBCUTANEOUS at 18:12

## 2022-01-01 RX ADMIN — Medication 10 ML: at 08:15

## 2022-01-01 RX ADMIN — CITALOPRAM HYDROBROMIDE 10 MG: 20 TABLET ORAL at 09:51

## 2022-01-01 RX ADMIN — MORPHINE SULFATE 2 MG: 2 INJECTION, SOLUTION INTRAMUSCULAR; INTRAVENOUS at 14:32

## 2022-01-01 RX ADMIN — METRONIDAZOLE 500 MG: 500 INJECTION, SOLUTION INTRAVENOUS at 01:14

## 2022-01-01 RX ADMIN — METOPROLOL TARTRATE 25 MG: 25 TABLET, FILM COATED ORAL at 22:57

## 2022-01-01 RX ADMIN — SODIUM CHLORIDE: 9 INJECTION, SOLUTION INTRAVENOUS at 00:21

## 2022-01-01 RX ADMIN — CITALOPRAM HYDROBROMIDE 10 MG: 20 TABLET ORAL at 08:17

## 2022-01-01 RX ADMIN — HEPARIN SODIUM 5000 UNITS: 5000 INJECTION INTRAVENOUS; SUBCUTANEOUS at 14:01

## 2022-01-01 RX ADMIN — LABETALOL HYDROCHLORIDE 5 MG: 5 INJECTION INTRAVENOUS at 23:20

## 2022-01-01 RX ADMIN — FENTANYL CITRATE 50 MCG: 50 INJECTION, SOLUTION INTRAMUSCULAR; INTRAVENOUS at 21:08

## 2022-01-01 RX ADMIN — METRONIDAZOLE 500 MG: 500 TABLET ORAL at 13:37

## 2022-01-01 RX ADMIN — Medication 5 ML: at 00:22

## 2022-01-01 RX ADMIN — MEMANTINE HYDROCHLORIDE 10 MG: 10 TABLET ORAL at 09:50

## 2022-01-01 ASSESSMENT — PULMONARY FUNCTION TESTS
PIF_VALUE: 14
PIF_VALUE: 14
PIF_VALUE: 13
PIF_VALUE: 2
PIF_VALUE: 14
PIF_VALUE: 13
PIF_VALUE: 2
PIF_VALUE: 13
PIF_VALUE: 14
PIF_VALUE: 13
PIF_VALUE: 14
PIF_VALUE: 14
PIF_VALUE: 13
PIF_VALUE: 14
PIF_VALUE: 13
PIF_VALUE: 15
PIF_VALUE: 13
PIF_VALUE: 14
PIF_VALUE: 1
PIF_VALUE: 14
PIF_VALUE: 13
PIF_VALUE: 14
PIF_VALUE: 16
PIF_VALUE: 13
PIF_VALUE: 13
PIF_VALUE: 14
PIF_VALUE: 14
PIF_VALUE: 3
PIF_VALUE: 14
PIF_VALUE: 13
PIF_VALUE: 14
PIF_VALUE: 13
PIF_VALUE: 14
PIF_VALUE: 14
PIF_VALUE: 13
PIF_VALUE: 13
PIF_VALUE: 14
PIF_VALUE: 12
PIF_VALUE: 13
PIF_VALUE: 1
PIF_VALUE: 11
PIF_VALUE: 14
PIF_VALUE: 13
PIF_VALUE: 13
PIF_VALUE: 14
PIF_VALUE: 13
PIF_VALUE: 14
PIF_VALUE: 14
PIF_VALUE: 13
PIF_VALUE: 14
PIF_VALUE: 15
PIF_VALUE: 18
PIF_VALUE: 27
PIF_VALUE: 13
PIF_VALUE: 15
PIF_VALUE: 13
PIF_VALUE: 27
PIF_VALUE: 13
PIF_VALUE: 14
PIF_VALUE: 13
PIF_VALUE: 19
PIF_VALUE: 29
PIF_VALUE: 14
PIF_VALUE: 12

## 2022-01-01 ASSESSMENT — PAIN DESCRIPTION - PAIN TYPE
TYPE: SURGICAL PAIN
TYPE: ACUTE PAIN
TYPE: ACUTE PAIN;SURGICAL PAIN
TYPE: SURGICAL PAIN

## 2022-01-01 ASSESSMENT — PAIN DESCRIPTION - ORIENTATION
ORIENTATION: RIGHT

## 2022-01-01 ASSESSMENT — PAIN SCALES - PAIN ASSESSMENT IN ADVANCED DEMENTIA (PAINAD)
NEGVOCALIZATION: 0
FACIALEXPRESSION: 0
BODYLANGUAGE: 0
NEGVOCALIZATION: 0
BODYLANGUAGE: 0
FACIALEXPRESSION: 0
TOTALSCORE: 0
NEGVOCALIZATION: 0
BODYLANGUAGE: 0
CONSOLABILITY: 0
TOTALSCORE: 0
BREATHING: 0
FACIALEXPRESSION: 0
BREATHING: 0
BREATHING: 0
FACIALEXPRESSION: 0
TOTALSCORE: 0
CONSOLABILITY: 0
BODYLANGUAGE: 0
TOTALSCORE: 0
CONSOLABILITY: 0
BODYLANGUAGE: 0
CONSOLABILITY: 0
BREATHING: 0
NEGVOCALIZATION: 0

## 2022-01-01 ASSESSMENT — PAIN SCALES - GENERAL
PAINLEVEL_OUTOF10: 7
PAINLEVEL_OUTOF10: 6
PAINLEVEL_OUTOF10: 0
PAINLEVEL_OUTOF10: 0
PAINLEVEL_OUTOF10: 7
PAINLEVEL_OUTOF10: 8
PAINLEVEL_OUTOF10: 7
PAINLEVEL_OUTOF10: 0
PAINLEVEL_OUTOF10: 7
PAINLEVEL_OUTOF10: 0
PAINLEVEL_OUTOF10: 7
PAINLEVEL_OUTOF10: 0
PAINLEVEL_OUTOF10: 9
PAINLEVEL_OUTOF10: 8
PAINLEVEL_OUTOF10: 0
PAINLEVEL_OUTOF10: 0
PAINLEVEL_OUTOF10: 2
PAINLEVEL_OUTOF10: 3
PAINLEVEL_OUTOF10: 2
PAINLEVEL_OUTOF10: 8
PAINLEVEL_OUTOF10: 10
PAINLEVEL_OUTOF10: 0
PAINLEVEL_OUTOF10: 0
PAINLEVEL_OUTOF10: 6
PAINLEVEL_OUTOF10: 5
PAINLEVEL_OUTOF10: 5
PAINLEVEL_OUTOF10: 10
PAINLEVEL_OUTOF10: 0
PAINLEVEL_OUTOF10: 10
PAINLEVEL_OUTOF10: 2
PAINLEVEL_OUTOF10: 0
PAINLEVEL_OUTOF10: 1
PAINLEVEL_OUTOF10: 7
PAINLEVEL_OUTOF10: 0
PAINLEVEL_OUTOF10: 9

## 2022-01-01 ASSESSMENT — PAIN DESCRIPTION - LOCATION
LOCATION: LEG
LOCATION: KNEE
LOCATION: LEG
LOCATION: HIP
LOCATION: HIP

## 2022-01-01 ASSESSMENT — PAIN DESCRIPTION - FREQUENCY
FREQUENCY: CONTINUOUS

## 2022-01-01 ASSESSMENT — PAIN - FUNCTIONAL ASSESSMENT
PAIN_FUNCTIONAL_ASSESSMENT: PREVENTS OR INTERFERES SOME ACTIVE ACTIVITIES AND ADLS

## 2022-01-01 ASSESSMENT — ENCOUNTER SYMPTOMS
ABDOMINAL PAIN: 0
BACK PAIN: 0
SORE THROAT: 0
EYE PAIN: 0
SHORTNESS OF BREATH: 0
EYE DISCHARGE: 0
SINUS PAIN: 0
NAUSEA: 0
SHORTNESS OF BREATH: 0
NAUSEA: 0
BLOOD IN STOOL: 0

## 2022-01-01 ASSESSMENT — PAIN DESCRIPTION - PROGRESSION
CLINICAL_PROGRESSION: GRADUALLY IMPROVING
CLINICAL_PROGRESSION: GRADUALLY IMPROVING
CLINICAL_PROGRESSION: GRADUALLY WORSENING
CLINICAL_PROGRESSION: GRADUALLY WORSENING
CLINICAL_PROGRESSION: GRADUALLY IMPROVING
CLINICAL_PROGRESSION: GRADUALLY WORSENING

## 2022-01-01 ASSESSMENT — PAIN SCALES - WONG BAKER
WONGBAKER_NUMERICALRESPONSE: 8
WONGBAKER_NUMERICALRESPONSE: 8
WONGBAKER_NUMERICALRESPONSE: 0
WONGBAKER_NUMERICALRESPONSE: 6
WONGBAKER_NUMERICALRESPONSE: 0
WONGBAKER_NUMERICALRESPONSE: 2
WONGBAKER_NUMERICALRESPONSE: 2

## 2022-01-01 ASSESSMENT — PAIN DESCRIPTION - DESCRIPTORS
DESCRIPTORS: DISCOMFORT
DESCRIPTORS: SHARP
DESCRIPTORS: ACHING;DISCOMFORT;DULL
DESCRIPTORS: ACHING;DISCOMFORT;DULL
DESCRIPTORS: SORE
DESCRIPTORS: ACHING;DISCOMFORT;DULL

## 2022-01-01 ASSESSMENT — PAIN DESCRIPTION - ONSET
ONSET: ON-GOING

## 2022-01-19 PROBLEM — I49.3 PVC (PREMATURE VENTRICULAR CONTRACTION): Status: ACTIVE | Noted: 2022-01-01

## 2022-01-19 PROBLEM — M25.552 BILATERAL HIP PAIN: Status: RESOLVED | Noted: 2020-11-23 | Resolved: 2022-01-01

## 2022-01-19 PROBLEM — D72.823 LEUKEMOID REACTION: Status: ACTIVE | Noted: 2022-01-01

## 2022-01-19 PROBLEM — S72.401A CLOSED FRACTURE OF DISTAL END OF RIGHT FEMUR (HCC): Status: ACTIVE | Noted: 2022-01-01

## 2022-01-19 PROBLEM — M25.551 BILATERAL HIP PAIN: Status: RESOLVED | Noted: 2020-11-23 | Resolved: 2022-01-01

## 2022-01-19 PROBLEM — M85.80 OSTEOPENIA AFTER MENOPAUSE: Status: ACTIVE | Noted: 2022-01-01

## 2022-01-19 PROBLEM — Z78.0 OSTEOPENIA AFTER MENOPAUSE: Status: ACTIVE | Noted: 2022-01-01

## 2022-01-19 NOTE — ED NOTES
Attempted to ambulate pt, pt unable to bear weight on right knee. MD aware, orders received.       Hortensia Domínguez, RN  01/19/22 1529

## 2022-01-19 NOTE — ED PROVIDER NOTES
Patient presents to the ED for evaluation of a fall that occurred at home. Patient states that she was walking outside and missed a step. She fell approximately 2-3 steps. She states that she did not hit her head but took most of the injury into her right knee as well as her right shoulder. She denies any neck or back pain. There was no chest pain, shortness of breath, or dizziness preceding the fall. Currently only concerned about the right knee and shoulder pain. Has not had anything recently for pain control. Currently rates her level of pain a 9 out of 10. Severe in severity. Pain is worse with any movement and improves with rest.  Was unable to bear weight on it initially. Patient arrived to the ED by EMS for further evaluation. Patient is on Coumadin. Review of Systems   Eyes: Negative for visual disturbance. Respiratory: Negative for shortness of breath. Cardiovascular: Negative for chest pain. Gastrointestinal: Negative for nausea. Musculoskeletal: Negative for back pain, gait problem, joint swelling and neck pain. Skin: Negative for wound. Neurological: Negative for dizziness, syncope, light-headedness and headaches. Hematological: Bruises/bleeds easily. Psychiatric/Behavioral: Negative for confusion. Physical Exam  Vitals and nursing note reviewed. Constitutional:       General: She is not in acute distress. Appearance: She is well-developed and normal weight. HENT:      Head: Normocephalic and atraumatic. Eyes:      Conjunctiva/sclera: Conjunctivae normal.   Cardiovascular:      Rate and Rhythm: Normal rate and regular rhythm. Heart sounds: Normal heart sounds. No murmur heard. Pulmonary:      Effort: Pulmonary effort is normal. No respiratory distress. Breath sounds: Normal breath sounds. No wheezing or rales. Abdominal:      General: Bowel sounds are normal.      Palpations: Abdomen is soft. Tenderness:  There is no abdominal tenderness. There is no guarding or rebound. Musculoskeletal:         General: Swelling and tenderness ( Patient does have mild edema and moderate tenderness of the right knee. Tenderness is anteriorly located. No overlying erythema or ecchymosis. ) present. Cervical back: Normal range of motion and neck supple. No tenderness ( no midline ttp). Comments: Reduced range of motion secondary to pain and swelling. No increase in joint laxity as compared to the left knee. No midline tenderness of the thoracic or lumbar spine. Skin:     General: Skin is warm and dry. Findings: No bruising or erythema. Neurological:      Mental Status: She is alert and oriented to person, place, and time. Comments: Sensation grossly intact. No focal neurological deficits. No ataxia with finger-to-nose. Procedures     MDM   Patient presents to the ED for evaluation of a fall that occurred at home. Patient injured her right knee as well as her shoulder. X-rays of the right shoulder showed no acute fracture or dislocation. Initial imaging of the right knee was unremarkable showing no fracture or dislocation. Patient was unable to bear any weight on it therefore a CT was obtained which showed a nondisplaced fracture of the distal femoral shaft extending to the level of the lateral patellofemoral joint. I did consult orthopedics to obtain additional imaging and would like the patient to be admitted to medicine. Patient also had a CT of the head which showed no acute intracranial abnormalities. ED Course as of 01/19/22 2052 Wed Jan 19, 2022   1640 Discussed with patient that CT head as well as x-ray of the right shoulder and knee are negative. No acute injuries appreciated. We will see if she can ambulate prior to discharge. [MS]   9167 Patient unable to bear weight on right knee. States that it keeps buckling on her. Will order CT to assess for any occult fracture.   If this is negative we will place her in a knee immobilizer and reattempt ambulation. [MS]   1949 CT does show that the patient has a femoral fracture distally. Additional pain meds ordered and consult has been placed to orthopedics. Patient does follow-up with Dr. Tee Mahoney. [MS]   2046 Orthopedics has been consulted. They would like additional films and they would like the patient to be admitted to medicine. They will see the patient. [MS]      ED Course User Index  [MS] Martha Jordin, DO       --------------------------------------------- PAST HISTORY ---------------------------------------------  Past Medical History:  has a past medical history of Arthritis, Atrial fibrillation ([address] Calculus of gallbladder with acute cholecystitis without obstruction, Calculus of gallbladder with cholecystitis with biliary obstruction, Hyperlipidemia, and Syncope. Past Surgical History:  has a past surgical history that includes Abdomen surgery; Appendectomy; Colonoscopy; Hysterectomy; ECHO Compl W Dop Color Flow (10/21/2013); Nerve Block (Right, 12/13/2018); Injection Procedure For Sacroiliac Joint (Right, 12/13/2018); Nerve Block (Left, 01/17/2019); Injection Procedure For Sacroiliac Joint (Left, 1/17/2019); Injection Procedure For Sacroiliac Joint (Bilateral, 9/19/2019); Stomach surgery (N/A); Cholecystectomy, laparoscopic (N/A, 11/21/2019); Nerve Block (Bilateral, 02/06/2020); Anesthesia Nerve Block (Bilateral, 2/6/2020); Nerve Block (Bilateral, 10/29/2020); Nerve Block (Bilateral, 10/29/2020); and Spine surgery (N/A, 11/25/2020). Social History:  reports that she has never smoked. She has never used smokeless tobacco. She reports that she does not drink alcohol and does not use drugs. Family History: family history includes Heart Disease in her father and mother. The patients home medications have been reviewed.     Allergies: Iodine    -------------------------------------------------- RESULTS -------------------------------------------------    LABS:  Results for orders placed or performed during the hospital encounter of 01/19/22   Protime-INR   Result Value Ref Range    Protime 13.1 (H) 9.3 - 12.4 sec    INR 1.1        RADIOLOGY:  CT KNEE RIGHT WO CONTRAST   Final Result   1. NONDISPLACED FRACTURES of the distal femoral shaft, extending to the level   of the lateral patellofemoral joint. The no extension into the femoral   condyles or the tibiofemoral joint. 2. Demineralized bones. RECOMMENDATIONS:   Unavailable         CT HEAD WO CONTRAST   Final Result   1. There is no acute intracranial abnormality. Specifically, there is no   intracranial hemorrhage. 2. Atrophy and periventricular leukomalacia,   . XR KNEE RIGHT (1-2 VIEWS)   Final Result   1. There is no fracture or dislocation of the right knee   2. Degenerative changes the right knee. XR SHOULDER RIGHT (MIN 2 VIEWS)   Final Result   No acute fractures or dislocations in the right shoulder. XR FEMUR RIGHT (MIN 2 VIEWS)    (Results Pending)             ------------------------- NURSING NOTES AND VITALS REVIEWED ---------------------------  Date / Time Roomed:  1/19/2022  2:47 PM  ED Bed Assignment:  20/20    The nursing notes within the ED encounter and vital signs as below have been reviewed. Patient Vitals for the past 24 hrs:   BP Temp Temp src Pulse Resp SpO2   01/19/22 2031 (!) 161/77 -- -- 60 16 95 %   01/19/22 1502 (!) 144/75 -- -- 65 -- --   01/19/22 1449 (!) 172/85 97.7 °F (36.5 °C) Oral 63 16 98 %       Oxygen Saturation Interpretation: Normal    ------------------------------------------ PROGRESS NOTES ------------------------------------------  Re-evaluation(s):  Please refer to ED course above    Counseling:  I have spoken with the patient and discussed todays results, in addition to providing specific details for the plan of care and counseling regarding the diagnosis and prognosis.   Their questions are answered at this time and they are agreeable with the plan of admission.    --------------------------------- ADDITIONAL PROVIDER NOTES ---------------------------------  Consultations:  Time: 2049. Spoke with Dr. Aissatou Stiles. Discussed case. She will admit the patient. This patient's ED course included: a personal history and physicial examination, re-evaluation prior to disposition, multiple bedside re-evaluations, IV medications and complex medical decision making and emergency management    This patient has remained hemodynamically stable during their ED course. Diagnosis:  1. Closed fracture of distal end of right femur, unspecified fracture morphology, initial encounter ([facility] [address]        Disposition:  Patient's disposition: Admit to med/surg floor  Patient's condition is fair.             William Wu DO  01/19/22 2053

## 2022-01-19 NOTE — ED NOTES
Bed: 02  Expected date:   Expected time:   Means of arrival:   Comments:  Louise LAWLER 935, RN  01/19/22 7599

## 2022-01-20 PROBLEM — S72.134A: Status: ACTIVE | Noted: 2022-01-01

## 2022-01-20 NOTE — CONSULTS
Department of Orthopedic Surgery  Resident Consult Note          Reason for Consult: Right Hip Pain    HISTORY OF PRESENT ILLNESS:      Orthopedic surgery consulted on a 72-year-old female presenting to [address] [place] [address] emergency department after sustaining a fall causing right hip pain. Patient is extremely hard of hearing. Patient is alert, however disoriented to person place and location. History is extremely unreliable. Patient does elicit pain response when performing physical examination on the right knee. When prompted to locate source of pain patient appropriately pointing to her right knee. Patient denies any numbness and tingling about the affected extremity. Patient denies fever and chills nausea and vomiting, chest pain or shortness of breath.        Tobacco use: History unreliable  Alcohol use: history unreliable  Illicit drug use: History unreliable  Past Medical History:        Diagnosis Date    Arthritis     Atrial fibrillation ([address]     Calculus of gallbladder with acute cholecystitis without obstruction     Calculus of gallbladder with cholecystitis with biliary obstruction     Hyperlipidemia     Syncope 11/20/2019     Past Surgical History:        Procedure Laterality Date    ABDOMEN SURGERY      ANESTHESIA NERVE BLOCK Bilateral 2/6/2020    BILATERAL MEDIAL BRANCH L4-5 AND L5-S1 JOINTS performed by Jodie Aleman DO at [address], LAPAROSCOPIC N/A 11/21/2019    DIAGNOSITIC LAPAROSCOPY CONVERTED TO EXPLORATORY LAPAROTOMY WITH CLOSURE OF ENTEROTOMY X1, CHOLANGIOGRAM, EGD performed by Natalee Baxter MD at [address] ECHO COMPL [address] [facility]   10/21/2013         HC INJECTION PROCEDURE FOR SACROILIAC JOINT Right 12/13/2018    RIGHT SACROILIAC JOINT STEROID INJECTION UNDER X-RAY GUIDANCE performed by Jodie Aleman DO at [facility] Left 1/17/2019    LEFT SACROILIAC JOINT STEROID INJECTION UNDER X-RAY GUIDANCE performed by Ebony Lazaro DO at [address] [facility] [address] JOINT Bilateral 9/19/2019    BILATERAL SACROILIAC JOINT INJECTION X-RAY performed by Ebony Lazaro DO at [facility] Right 12/13/2018    sacroiliac joint     NERVE BLOCK Left 01/17/2019    sacroiliac joint injection     NERVE BLOCK Bilateral 02/06/2020    medial branch block    NERVE BLOCK Bilateral 10/29/2020    intra articular     NERVE BLOCK Bilateral 10/29/2020    BILATERAL L4-5 AND L5-S1 INTRA-ARTICULAR FACET STEROID INJECTION (CPT 14907,58564) performed by Ebony Lazaro DO at [address] N/A 11/25/2020    KYPHOPLASTY , L1 L2,BONE BIOPSY, EPIDURAL INJECTION performed by Georgia Rose MD at [address] [facility]     d/t peptic ulcers     Current Medications:   Current Facility-Administered Medications: morphine sulfate (PF) injection 5 mg, 5 mg, IntraVENous, Once  Allergies:  Iodine    Social History:   TOBACCO:   reports that she has never smoked. She has never used smokeless tobacco.  ETOH:   reports no history of alcohol use. DRUGS:   reports no history of drug use.   ACTIVITIES OF DAILY LIVING:    OCCUPATION:    Family History:       Problem Relation Age of Onset    Heart Disease Mother     Heart Disease Father        REVIEW OF SYSTEMS:  CONSTITUTIONAL:  negative for  fevers, chills  EYES:  negative for blurred vision, visual disturbance  HEENT:  negative for  hearing loss, voice change  RESPIRATORY:  negative for  dyspnea, wheezing  CARDIOVASCULAR:  negative for  chest pain, palpitations  GASTROINTESTINAL:  negative for nausea, vomiting  GENITOURINARY:  negative for frequency, urinary incontinence  HEMATOLOGIC/LYMPHATIC:  negative for bleeding and petechiae  MUSCULOSKELETAL:  positive for  pain and decreased range of motion  NEUROLOGICAL:  negative for headaches, dizziness  BEHAVIOR/PSYCH:  negative for increased agitation and anxiety    PHYSICAL EXAM:    VITALS:  BP (!) 144/75   Pulse 65   Temp 97.7 °F (36.5 °C) (Oral)   Resp 16   SpO2 98%   CONSTITUTIONAL:  awake, alert, cooperative, no apparent distress and appears stated age  MUSCULOSKELETAL:  Right lower Extremity:  Patient affected extremity rested in knee immobilizer. Shortened and externally rotated  +Log roll  + Heel Strike  Tenderness to palpation about the anterior knee. Suspected moderate in nature based on pain response. -TTP over hip and Ankle  Skin intact with no signs of degloving. No evidence of ecchymosis, abrasions or skin tears  Compartments soft and compressible, calf non-tender  +DP & PT pulses, Brisk Cap refill, Toes warm and perfused  Sensation grossly intact superficial/deep peroneal,saphenous,sural,tibial n. distributions  · +GS/TA/EHL. Secondary Exam:   Bilateral UE: No obvious signs of trauma. -TTP to fingers, hand, wrist, forearm, elbow, humerus, shoulder or clavicle. · leftLE: No obvious signs of trauma. -TTP to foot, ankle, leg, knee, thigh, hip. · Pelvis: -TTP, -Log roll, -Heel strike     DATA:    CBC:   Lab Results   Component Value Date    WBC 5.7 06/21/2021    RBC 3.92 06/21/2021    HGB 12.0 06/21/2021    HCT 40.0 06/21/2021    .0 06/21/2021    MCH 30.6 06/21/2021    MCHC 30.0 06/21/2021    RDW 14.6 06/21/2021     06/21/2021    MPV 10.2 06/21/2021     PT/INR:    Lab Results   Component Value Date    PROTIME 13.1 01/19/2022    INR 1.1 01/19/2022     Lactic Acid :   Lab Results   Component Value Date    LACTA 1.1 11/22/2020       Radiology Review:  01/19/22 - XR right knee demonstrating admitting believes displaced distal femur fracture, appears to be originate from the anterior cortex propagating proximally to the middle shaft. Fracture displaced spiral in nature. Does not extend into the joint.   No other fractures, dislocation or

## 2022-01-20 NOTE — PROGRESS NOTES
Room #: 6469/5798-30  Date: 2022       Patient Name: Christopher Mark  : 1932      MRN: [id number]     Patient unavailable for physical therapy eval due to scheduled for procedure with Dr. Jessa Christina 22. Will attempt PT evaluation at a later time. Thank you.        Clemente Chase, PT

## 2022-01-20 NOTE — CONSULTS
Inpatient Cardiology Consultation      Reason for Consult:  Abnormal EKG. Pre-operative cardiac clearance for repair of R hip fracture    Consulting Physician: Dr Jacobsen Erp    Requesting Physician:  Dr Kane Craven    Date of Consultation: 1/20/2022    HISTORY OF PRESENT ILLNESS: 81 yo  female known to Dr Danelle Light last seen in office 12/16/2020. PMH: HLD, Alzheimer's dementia, PAF, OAC Eliquis 2.5 mg BID,  iodine allergy (anaphylaxis), DNR-CCA, and lifelong non smoker    1/18/2019 Lost her balance fell on R side (onto cement) and bruised R hand--> started using walker    [facility] 1/19/2022 missed a step outside and fell landing on R shoulder/knee with inability to bear weight, no dizziness/LOC. Denies CP or SOB-->EMS  EKG SR with PVC's. Eliquis held in ED. Sebago given in ED along with IV morphine    Na 139, K+ 5.7-->4.4, Bun/Cr 15/0.7-->15/0.8, , troponin 11, AST 39, WBC 12.5-->10.4, Hgb 12.2, Plt 256, INR 1.1    CT head negative. CXR no CHF or infiltrates. Xray of shoulder no fracture, R knee imaging unremarkable for fracture. CT read as nondisplaced fracture of the distal femoral shaft extending to the level of the lateral patellofemoral joint. 1/19/2022 Orthopedic consult-->Closed, Right distal femur fracture    Lopressor 25 mg BID re-ordered (home medication)      Please note: past medical records were reviewed per electronic medical record (EMR) - see detailed reports under Past Medical/ Surgical History. Past Medical History:    Iodine allergy  Depression  PUD s/p partial gastrectomy in remote past  HLD  Alzheimer's Dementia on Namenda/Aricept  1st degree AVB  OA  Chronic knee and back pain  10/2013 seen by Dr Mahesh Knox for AF per his note-->\"EKG mimics atrial fibrillation, but is mainly  frequent premature atrial complexes, maybe even multifocal atrial tachycardia  sore of, but it is not a true atrial fibrillation.   I am not sure this is going require atrial fibrillation and I cannot predict if this will turn into atrial fibrillation. That is, I do not know if it requires anticoagulation. \"  10/2013 TTE Dr Edouard Armenta; EF 64%. Mild RVH. RVSP 25 mmHg  Admission 11/19/2019-11/26/2019 for abdominal pain/syncope  11/21/2021 open cholecystectomy with intraoperative cholangiogram  PAF diagnosed during admission 11/23/2019 placed on Lopressor 25 mg BID  OAC with Eliquis 2.5 mg BID  11/25/2019 TTE Dr Anna Pickup: EF 55%. NWM. E/A flow reversal noted. Suggestive of diastolic dysfunction. Mild MR/TR. RVSP 32 mmHg. DNR-CCA      Past Surgical History:    11/2020 Kyphoplasty (L1-L2), multiple lumbar injection in 2019/2020, open cholecystectomy, bilateral knee injections (Synvisc) in 2019, hysterectomy, artial gastrectomy      Medications Prior to admit:  Prior to Admission medications    Medication Sig Start Date End Date Taking?  Authorizing Provider   furosemide (LASIX) 20 MG tablet TAKE 1 TABLET BY MOUTH 3  TIMES WEEKLY 12/27/21   Chilo Nieves MD   [facility]) 10 MG tablet Take 1 tablet by mouth 2 times daily 12/27/21   Chilo Nieves MD   metoprolol tartrate (LOPRESSOR) 25 MG tablet Take 1 tablet by mouth 2 times daily 10/11/21   Francesca Jo MD   donepezil (ARICEPT) 10 MG tablet TAKE 1 TABLET BY MOUTH AT  NIGHT 10/4/21   Francesca Jo MD   citalopram (CELEXA) 10 MG tablet TAKE 1 TABLET BY MOUTH IN  THE MORNING  Patient taking differently: 10 mg every morning  8/6/21   Francesca Jo MD   apixaban (ELIQUIS) 2.5 MG TABS tablet TAKE 1 TABLET BY MOUTH  TWICE DAILY 6/14/21   Francesca Jo MD   Glucosamine-Chondroit-Vit C-Mn (GLUCOSAMINE 1500 COMPLEX PO) Take 1 tablet by mouth daily     Historical Provider, MD   Cholecalciferol (VITAMIN D3) 2000 units CAPS Take 2,000 Units by mouth daily     Historical Provider, MD       Current Medications:    Current Facility-Administered Medications: ceFAZolin (ANCEF) 2000 mg in dextrose 3 % 50 mL IVPB (duplex), 2,000 mg, IntraVENous, On Call to OR  sodium chloride flush 0.9 % injection 5-40 mL, 5-40 mL, IntraVENous, 2 times per day  sodium chloride flush 0.9 % injection 5-40 mL, 5-40 mL, IntraVENous, PRN  0.9 % sodium chloride infusion, 25 mL, IntraVENous, PRN  ondansetron (ZOFRAN-ODT) disintegrating tablet 4 mg, 4 mg, Oral, Q8H PRN **OR** ondansetron (ZOFRAN) injection 4 mg, 4 mg, IntraVENous, Q6H PRN  polyethylene glycol (GLYCOLAX) packet 17 g, 17 g, Oral, Daily PRN  acetaminophen (TYLENOL) tablet 650 mg, 650 mg, Oral, Q6H PRN **OR** acetaminophen (TYLENOL) suppository 650 mg, 650 mg, Rectal, Q6H PRN  morphine (PF) injection 2 mg, 2 mg, IntraVENous, Q2H PRN **OR** morphine (PF) injection 1 mg, 1 mg, IntraVENous, Q2H PRN  [Held by provider] apixaban (ELIQUIS) tablet 2.5 mg, 2.5 mg, Oral, BID  Vitamin D (CHOLECALCIFEROL) tablet 2,000 Units, 2,000 Units, Oral, Daily  citalopram (CELEXA) tablet 10 mg, 10 mg, Oral, QAM  donepezil (ARICEPT) tablet 10 mg, 10 mg, Oral, Nightly  memantine (NAMENDA) tablet 10 mg, 10 mg, Oral, BID  metoprolol tartrate (LOPRESSOR) tablet 25 mg, 25 mg, Oral, BID    Allergies:  Iodine: anaphylaxis     Social History:    Lifelong non smoker  Activity: Lives with  one story with basement. Occasionally climbs basement steps. Able to dress/bath self.  does cooking, cleaning. Able to ambulate through grocery store  Code Status: DNR-CCA      Family History: non contributory secondary to age      REVIEW OF SYSTEMS:     Constitutional: Denies fatigue, fevers, chills or night sweats  Eyes: Denies visual changes or drainage  ENT: Denies headaches or hearing loss. No mouth sores or sore throat. No epistaxis   Cardiovascular: Denies chest pain, pressure or palpitations. No lower extremity swelling. Respiratory: Denies CARPIO, cough, orthopnea or PND. No hemoptysis   Gastrointestinal: Denies hematemesis or anorexia. No hematochezia or melena    Genitourinary: Denies urgency, dysuria or hematuria.   Musculoskeletal: + Mechanical falls.  Integumentary: Denies rash, hives or pruritis   Neurological: Denies dizziness, headaches or seizures. No numbness or tingling  Psychiatric: Denies anxiety or depression. Endocrine: Denies temperature intolerance. No recent weight change. .  Hematologic/Lymphatic: Denies abnormal bruising or bleeding. No swollen lymph nodes    PHYSICAL EXAM:   BP 96/70   Pulse 58   Temp 97.6 °F (36.4 °C) (Oral)   Resp 18   Wt 119 lb 4.8 oz (54.1 kg)   SpO2 91%   BMI 19.26 kg/m²   CONST:  Well developed, elderly  female who appears of stated age. Awake, alert and cooperative. No apparent distress. HEENT:   Head- Normocephalic, atraumatic   Eyes- Conjunctivae pink, anicteric  Throat- Oral mucosa pink and moist  Neck-  No stridor, trachea midline, no jugular venous distention. No carotid bruit. CHEST: Chest symmetrical and non-tender to palpation. No accessory muscle use or intercostal retractions  RESPIRATORY: Lung sounds - clear throughout fields   CARDIOVASCULAR:     Heart Ausculation- Regular rate and rhythm, no murmur. No s3, s4 or rub   PV: RLE immobilizer. No lower extremity edema. No varicosities. Pedal pulses palpable, no clubbing or cyanosis   ABDOMEN: Soft, non-tender to light palpation. Bowel sounds present. No palpable masses no organomegaly; no abdominal bruit  MS: Good muscle strength and tone. No atrophy or abnormal movements. : Burrell clear george urine  SKIN: Pale, warm and dry no statis dermatitis or ulcers. Ecchymosis R hand. NEURO / PSYCH: Oriented to person but not time, place or situation. Oscar Hernández Speech clear and appropriate. Follows all commands.  Pleasant affect     DATA:    ECG as per Dr Franco Whitman interpretation  Tele strips: non monitored    Diagnostic:    See HPI    Intake/Output Summary (Last 24 hours) at 1/20/2022 0926  Last data filed at 1/20/2022 1066  Gross per 24 hour   Intake 0 ml   Output --   Net 0 ml       Labs:   CBC:   Recent Labs     01/19/22  2230 01/20/22  0524   WBC 12.5* 10.4   HGB 12.2 12.1   HCT 38.7 37.6    256     BMP:   Recent Labs     01/19/22  2230 01/20/22  0524    142   K 5.7* 4.4   CO2 25 26   BUN 15 15   CREATININE 0.7 0.8   LABGLOM >60 >60   CALCIUM 8.6 8.7     TFT:   Lab Results   Component Value Date    TSH 2.080 06/21/2021    T4FREE 1.16 10/21/2013      HgA1c:   Lab Results   Component Value Date    LABA1C 5.7 (H) 03/01/2019     PT/INR:   Recent Labs     01/19/22  1512 01/20/22  0524   PROTIME 13.1* 12.6*   INR 1.1 1.1     APTT:  Recent Labs     01/20/22  0552   APTT 27.2     CARDIAC ENZYMES:  Recent Labs     01/20/22  0826   TROPHS 11*     FASTING LIPID PANEL:  Lab Results   Component Value Date    CHOL 177 07/18/2016    HDL 71 06/21/2021    LDLCALC 91 06/21/2021    TRIG 80 07/18/2016     LIVER PROFILE:  Recent Labs     01/19/22 2230   AST 39*   ALT 17   LABALBU 3.6     ASSESSMENT  Mechanical fall resulting in closed, Right distal femur fracture  HLD  PAF  OAC with Eliquis currently on hold  Iodine allergy (anaphylaxis)  Alzheimer's dementia on Aricept (causes bradycardia) and Namenda  DNR-CCA    PLAN  May proceed with surgery. RCRI class I risk, 0.4% risk of major cardiac event  resume Eliquis when okay with orthopedics  Uninterrupted  BB therapy  Spoke with primary service    Discussed with Dr Saintclair Bach  Electronically signed by RUDOLPH Don on 1/20/2022 at 9:26 AM       Patient seen and examined and case discussed in detail with cardiology nurse practitioner and agree with assessment and plan and history and physical examination discussed in detail and documented above.

## 2022-01-20 NOTE — FLOWSHEET NOTE
01/20/22 1114   Social/Functional History   Lives With Spouse   Type of [address] One [address]   Home Access Stairs to enter with rails   Entrance Stairs - Number of Steps 1 step to breezway and 2 more to enter - grab bar on 1 side   Bathroom Shower/Tub Walk-in shower   Home Equipment Rolling walker;Cane   Receives Help From Arash Asif   1/20/2022 SS Note/Discharge plan:  Met with pt's , Jose Francisco Snider at his wife's bedside, pt admitted from home with a femur fx from a fall, PT/OT ordered but on hold pending medical clearance for anticipated orthopedic surgical intervention. Pt alert but pleasantly confused, h/o Alzheimer's Dementia, explained sw role for transition of care planning and rehab options reviewed, no past SNF/HHC services, pt's  currently hopes to bring pt home with home health therapy vs SNF placement, pt has a w/w for home use, [place] [id number] /SNF lists provided, pt's  prefers [facility] vs [address] [place] [address] if RADHA is needed, referral made to both Gemma Huddleston for [facility] and Cara liaison for [place] who will follow with sw/cm post-op and therapy evals to confirm d/c plan, nursing informed. Electronically signed by DORIAN Cui on 1/20/2022 at 11:04 AM

## 2022-01-20 NOTE — ED NOTES
Spoke with Dr. Romulo Hernandez, pt okay to take medication with sips of water tonight.       Diogenes See  01/20/22 7836

## 2022-01-20 NOTE — H&P
[address] [place] [address] Hospitalist Group   History and Physical      CHIEF COMPLAINT:  fall    History of Present Illness:  80 y.o. female with a history of AFib on Eliquis, HLD, Alzheimer's dementia presents with mechanical fall down 2-3 steps outside after missing a step. Reported right knee and right shoulder impact to ED. On exam was complaining of right leg and head pain. Initial x-rays of right shoulder and knee as well as CT head were negative. Continued to complain of right knee/leg pain, unable to bear weight therefore CT of right leg obtained. Imaging in ED significant for right distal femur fracture on CT. Ortho consulted from ED. Patient's  at bedside, states patient doesn't remember details of fall, but lost her balance going through a doorway. States had the same thing happen yesterday. Patient rates pain \"90\". Informant(s) for H&P: chart, patient,     REVIEW OF SYSTEMS:  no fevers, chills, cp, sob, n/v, ha, vision/hearing changes, wt changes, hot/cold flashes, other open skin lesions, diarrhea, constipation, dysuria/hematuria unless noted in HPI. Complete ROS performed with the patient and is otherwise negative.       PMH:  Past Medical History:   Diagnosis Date    Arthritis     Atrial fibrillation ([address]     Calculus of gallbladder with acute cholecystitis without obstruction     Calculus of gallbladder with cholecystitis with biliary obstruction     Hyperlipidemia     Syncope 11/20/2019       Surgical History:  Past Surgical History:   Procedure Laterality Date    ABDOMEN SURGERY      ANESTHESIA NERVE BLOCK Bilateral 2/6/2020    BILATERAL MEDIAL BRANCH L4-5 AND L5-S1 JOINTS performed by Scheedward-PlDO david at [address], LAPAROSCOPIC N/A 11/21/2019    DIAGNOSITIC LAPAROSCOPY CONVERTED TO EXPLORATORY LAPAROTOMY WITH CLOSURE OF ENTEROTOMY X1, CHOLANGIOGRAM, EGD performed by Heriberto Quesada MD at [address] COLONOSCOPY      ECHO COMPL W DOP COLOR FLOW  10/21/2013         HC INJECTION PROCEDURE FOR SACROILIAC JOINT Right 12/13/2018    RIGHT SACROILIAC JOINT STEROID INJECTION UNDER X-RAY GUIDANCE performed by Rhea Espino DO at [address] [facility] [address] JOINT Left 1/17/2019    LEFT SACROILIAC JOINT STEROID INJECTION UNDER X-RAY GUIDANCE performed by Rhea Espino DO at [address] [facility] [address] JOINT Bilateral 9/19/2019    BILATERAL SACROILIAC JOINT INJECTION X-RAY performed by Rhea Espino DO at [facility] Right 12/13/2018    sacroiliac joint     NERVE BLOCK Left 01/17/2019    sacroiliac joint injection     NERVE BLOCK Bilateral 02/06/2020    medial branch block    NERVE BLOCK Bilateral 10/29/2020    intra articular     NERVE BLOCK Bilateral 10/29/2020    BILATERAL L4-5 AND L5-S1 INTRA-ARTICULAR FACET STEROID INJECTION (CPT 47161,06286) performed by Rhea Espino DO at [address] N/A 11/25/2020    KYPHOPLASTY , L1 L2,BONE BIOPSY, EPIDURAL INJECTION performed by Balbir Keenan MD at [address] N/A     d/t peptic ulcers       Medications Prior to Admission:    Prior to Admission medications    Medication Sig Start Date End Date Taking?  Authorizing Provider   furosemide (LASIX) 20 MG tablet TAKE 1 TABLET BY MOUTH 3  TIMES WEEKLY 12/27/21   Kamaljit Hess MD   [facility]) 10 MG tablet Take 1 tablet by mouth 2 times daily 12/27/21   Kamaljit Hess MD   metoprolol tartrate (LOPRESSOR) 25 MG tablet Take 1 tablet by mouth 2 times daily 10/11/21   Onur Singh MD   donepezil (ARICEPT) 10 MG tablet TAKE 1 TABLET BY MOUTH AT  NIGHT 10/4/21   Onur Singh MD   citalopram (CELEXA) 10 MG tablet TAKE 1 TABLET BY MOUTH IN  THE MORNING  Patient taking differently: 10 mg every morning  8/6/21   Onur Singh MD apixaban (ELIQUIS) 2.5 MG TABS tablet TAKE 1 TABLET BY MOUTH  TWICE DAILY 6/14/21   Aly Garzon MD   Glucosamine-Chondroit-Vit C-Mn (GLUCOSAMINE 1500 COMPLEX PO) Take 1 tablet by mouth daily     Historical Provider, MD   Cholecalciferol (VITAMIN D3) 2000 units CAPS Take 2,000 Units by mouth daily     Historical Provider, MD       Allergies:    Iodine    Social History:    reports that she has never smoked. She has never used smokeless tobacco. She reports that she does not drink alcohol and does not use drugs. Family History:   family history includes Heart Disease in her father and mother. PHYSICAL EXAM:  Vitals:  BP (!) 161/77   Pulse 60   Temp 97.7 °F (36.5 °C) (Oral)   Resp 16   SpO2 95%     Constitutional:  Elderly, thin, NAD, awake, alert  Eyes: no scleral icterus, normal lids, no discharge  ENMT:  Normocephalic, atraumatic, mucosa moist, EOMI  Neck:  trachea midline, no JVD  Lungs:  CTA bilaterally, no audible rhonchi or wheezes noted, respirations unlabored, no retractions  Heart[de-identified]  RRR, distant heart tones, no murmur, rub, or gallop noted during exam  Abd:  Soft, non tender, non distended, bowel sounds present  :  deferred  MSK: sarcopenia present  Ext:  Moving all extremities, RLE limited movement due to pain, right knee edema, pulses present  Skin:  Warm and dry, no rashes on visible skin  Psych: non-anxious affect  Neuro:  PERRL, Alert, poor historian, grossly nonfocal; following commands    LABS:  No results for input(s): NA, K, CL, CO2, BUN, CREATININE, GLUCOSE, CALCIUM in the last 72 hours. No results for input(s): WBC, RBC, HGB, HCT, MCV, MCH, MCHC, RDW, PLT, MPV in the last 72 hours. No results for input(s): POCGLU in the last 72 hours. Warfarin PT/INR:  No components found for: Reagan Sue    Radiology: XR SHOULDER RIGHT (MIN 2 VIEWS)    Result Date: 1/19/2022  EXAMINATION: THREE XRAY VIEWS OF THE RIGHT SHOULDER 1/19/2022 3:32 pm COMPARISON: None.  HISTORY: ORDERING SYSTEM PROVIDED HISTORY: fall TECHNOLOGIST PROVIDED HISTORY: Reason for exam:->fall FINDINGS: Glenohumeral joint is normally aligned. No evidence of acute fracture or dislocation. No abnormal periarticular calcifications. Moderate severe osteoarthrosis of the right acromioclavicular joint. Visualized lung is unremarkable. Multiple healing right-sided rib fractures identified. No acute fractures or dislocations in the right shoulder. XR KNEE RIGHT (1-2 VIEWS)    Result Date: 1/19/2022  EXAMINATION: TWO XRAY VIEWS OF THE RIGHT KNEE 1/19/2022 3:32 pm COMPARISON: 08/01/2018 HISTORY: ORDERING SYSTEM PROVIDED HISTORY: fall TECHNOLOGIST PROVIDED HISTORY: Reason for exam:->fall FINDINGS: Two views of the right knee were obtained. There is no fracture dislocation of the right knee. There are degenerative changes of the right knee. There is no joint effusion. 1. There is no fracture or dislocation of the right knee 2. Degenerative changes the right knee. CT HEAD WO CONTRAST    Result Date: 1/19/2022  EXAMINATION: CT OF THE HEAD WITHOUT CONTRAST  1/19/2022 3:50 pm TECHNIQUE: CT of the head was performed without the administration of intravenous contrast. Dose modulation, iterative reconstruction, and/or weight based adjustment of the mA/kV was utilized to reduce the radiation dose to as low as reasonably achievable. COMPARISON: None. HISTORY: ORDERING SYSTEM PROVIDED HISTORY: Evaluate intracranial abnormality TECHNOLOGIST PROVIDED HISTORY: Has a \"code stroke\" or \"stroke alert\" been called? ->No Reason for exam:->Evaluate intracranial abnormality Decision Support Exception - unselect if not a suspected or confirmed emergency medical condition->Emergency Medical Condition (MA) FINDINGS: BRAIN/VENTRICLES: There is no acute intracranial hemorrhage, mass effect or midline shift. No abnormal extra-axial fluid collection.   The gray-white differentiation is maintained without evidence of an acute infarct. There is no evidence of hydrocephalus. The ventricles, cisterns and sulci are prominent consistent with atrophy. There is decreased attenuation within the periventricular white matter consistent with periventricular leukomalacia. ORBITS: The visualized portion of the orbits demonstrate no acute abnormality. SINUSES: The visualized paranasal sinuses and mastoid air cells demonstrate no acute abnormality. SOFT TISSUES/SKULL:  No acute abnormality of the visualized skull or soft tissues. 1.  There is no acute intracranial abnormality. Specifically, there is no intracranial hemorrhage. 2. Atrophy and periventricular leukomalacia, . CT KNEE RIGHT WO CONTRAST    Result Date: 1/19/2022  EXAMINATION: CT OF THE RIGHT KNEE WITHOUT CONTRAST 1/19/2022 5:44 pm TECHNIQUE: CT of the right knee was performed without the administration of intravenous contrast.  Multiplanar reformatted images are provided for review. Dose modulation, iterative reconstruction, and/or weight based adjustment of the mA/kV was utilized to reduce the radiation dose to as low as reasonably achievable. COMPARISON: Right knee radiographs, 01/19/2022. HISTORY ORDERING SYSTEM PROVIDED HISTORY: pain TECHNOLOGIST PROVIDED HISTORY: Reason for exam:->pain Decision Support Exception - unselect if not a suspected or confirmed emergency medical condition->Emergency Medical Condition (MA) FINDINGS: Are nondisplaced fractures in the distal femoral shaft, which extend to the level the lateral patellofemoral joint the no extension into the femoral condyles or the tibiofemoral joints is noted. The patella, proximal tibia and proximal fibula are intact. The bones are markedly demineralized. There are moderate loss of joint spaces in the medial tibiofemoral and patellofemoral joints with severe loss of joint space in the lateral tibiofemoral joint. Small volume joint space fluid is seen. No fat is seen within the joint fluid.      1. NONDISPLACED FRACTURES of the distal femoral shaft, extending to the level of the lateral patellofemoral joint. The no extension into the femoral condyles or the tibiofemoral joint. 2. Demineralized bones. RECOMMENDATIONS: Unavailable       EKG: frequent PVCs      ASSESSMENT:      Principal Problem:    Closed fracture of distal end of right femur ([address]  Active Problems:    Atrial fibrillation (HCC)    Alzheimer's dementia without behavioral disturbance (HCC)    Depression    Hyperlipidemia    Ambulatory dysfunction    Leukemoid reaction    PVC (premature ventricular contraction)    Osteopenia after menopause  Resolved Problems:    * No resolved hospital problems. *      PLAN:    1. Closed right distal femoral fracture. Ortho consulted from ED. Hold home Eliquis. Pain control, prn morphine. NPO.  2. Atrial fibrillation. Rate controlled. Frequent PVCs on EKG, sinus rhythm. Continue metoprolol. Hold Eliquis until determine if ortho plans any intervention. 3. Leukocytosis. Likely stress related. Will repeat CBC in AM.  4. Ambulatory dysfunction. PT/OT eval.  May benefit from rehab.  5. Alzheimer's dementia. Continue home memantine and donepezil. 6. Depression. Continue home citalopram.  7. Arthritis. Continue home glucosamine/chondroitin. 8. Osteopenia. Continue home Vitamin D3. Code Status: DNR-CCA  DVT prophylaxis: hold Eliquis, SCDs    NOTE: This report was transcribed using voice recognition software. Every effort was made to ensure accuracy; however, inadvertent computerized transcription errors may be present.      Electronically signed by Maryam Abel DO on 1/19/2022 at 8:58 PM

## 2022-01-20 NOTE — PROGRESS NOTES
[address] [place], [place] [address]ist   Progress Note    Admitting Date and Time: 1/19/2022  2:47 PM  Admit Dx: Closed nondisplaced apophyseal fracture of right femur, initial encounter ([facility] [S72.134A]  Closed fracture of distal end of right femur, unspecified fracture morphology, initial encounter ([facility] [S72.401A]    Subjective:    Pt appears comfortable as she rest in bed. Patient  Rubén Downing is at her bedside. Patient denies any chest pain, chest pressure or nausea. Patient is NPO for possible surgery. ROS: denies fever, chills, cp, sob, n/v, HA unless stated above.      ceFAZolin  2,000 mg IntraVENous On Call to OR    sodium chloride flush  5-40 mL IntraVENous 2 times per day    [Held by provider] apixaban  2.5 mg Oral BID    Vitamin D  2,000 Units Oral Daily    citalopram  10 mg Oral QAM    donepezil  10 mg Oral Nightly    memantine  10 mg Oral BID    metoprolol tartrate  25 mg Oral BID     sodium chloride flush, 5-40 mL, PRN  sodium chloride, 25 mL, PRN  ondansetron, 4 mg, Q8H PRN   Or  ondansetron, 4 mg, Q6H PRN  polyethylene glycol, 17 g, Daily PRN  acetaminophen, 650 mg, Q6H PRN   Or  acetaminophen, 650 mg, Q6H PRN  morphine, 2 mg, Q2H PRN   Or  morphine, 1 mg, Q2H PRN         Objective:    BP (!) 127/58   Pulse 64   Temp 97.6 °F (36.4 °C) (Oral)   Resp 18   Wt 119 lb 4.8 oz (54.1 kg)   SpO2 94%   BMI 19.26 kg/m²   General Appearance: alert and oriented to person, but confused to place and time and in no acute distress  Skin: warm and dry  Head: normocephalic and atraumatic  Hearing: very very hard of hearing, wears bilateral hearing aides   Eyes: pupils equal, round, and reactive to light, extraocular eye movements intact, conjunctivae normal, wears glasses  Neck: neck supple and non tender without mass   Pulmonary/Chest: decreased and clear to auscultation bilaterally- no wheezes, rales or rhonchi, normal air movement, no respiratory distress  Cardiovascular: normal rate, normal S1 and S2 and no carotid bruits  Abdomen: soft, non-tender, non-distended, normal bowel sounds  Extremities: no cyanosis, no clubbing and no edema, long leg brace intact   Neurologic: unable to evaluate d/t history of Alzheimer and speech normal      Recent Labs     01/19/22 2230 01/20/22  0524    142   K 5.7* 4.4    104   CO2 25 26   BUN 15 15   CREATININE 0.7 0.8   GLUCOSE 117* 124*   CALCIUM 8.6 8.7       Recent Labs     01/19/22 2230   ALKPHOS 90   PROT 6.1*   LABALBU 3.6   BILITOT 0.4   AST 39*   ALT 17       Recent Labs     01/19/22 2230 01/20/22  0524   WBC 12.5* 10.4   RBC 3.96 3.85   HGB 12.2 12.1   HCT 38.7 37.6   MCV 97.7 97.7   MCH 30.8 31.4   MCHC 31.5* 32.2   RDW 13.1 13.2    256   MPV 10.4 10.1       Radiology:   XR FEMUR RIGHT (MIN 2 VIEWS)   Final Result   Oblique nondisplaced fracture of distal femoral shaft, as above. CT KNEE RIGHT WO CONTRAST   Final Result   1. NONDISPLACED FRACTURES of the distal femoral shaft, extending to the level   of the lateral patellofemoral joint. The no extension into the femoral   condyles or the tibiofemoral joint. 2. Demineralized bones. RECOMMENDATIONS:   Unavailable         CT HEAD WO CONTRAST   Final Result   1. There is no acute intracranial abnormality. Specifically, there is no   intracranial hemorrhage. 2. Atrophy and periventricular leukomalacia,   . XR KNEE RIGHT (1-2 VIEWS)   Final Result   1. There is no fracture or dislocation of the right knee   2. Degenerative changes the right knee. XR SHOULDER RIGHT (MIN 2 VIEWS)   Final Result   No acute fractures or dislocations in the right shoulder.          XR CHEST PORTABLE    (Results Pending)       Assessment:  Principal Problem:    Closed fracture of distal end of right femur [facility])  Active Problems:    Atrial fibrillation (HCC)    Alzheimer's dementia without behavioral disturbance (HCC)    Depression    Hyperlipidemia    Ambulatory dysfunction Leukemoid reaction    PVC (premature ventricular contraction)    Osteopenia after menopause    Closed nondisplaced apophyseal fracture of right femur (HCC)  Resolved Problems:    * No resolved hospital problems. *      Plan:    1. Closed right femur fracture - fall at home orthopedic surgery following - NPO - surgery planned for later today - PT/OT once surgery completed - pain medication (Morphine) - holding anticoagulation - long leg brace on - will monitor     2. Atrial fibrillation - currently in sinus rhythm with controlled rate - cardiology clearance pending - troponin negative - no chest pain or pressure - Holding Eliquis - Lopressor     3. Hyperlipidemia - not on statin - diet controlled     4. Ambulatory dysfunction - fell and d/t fracture unable to ambulate     5. Alzheimer's/dementia - continue Namenda and Aricept - no agitation noted     6. Depression - continue Celexa       Disposition - Patient from a medical POV is cleared for surgery     NOTE: This report was transcribed using voice recognition software. Every effort was made to ensure accuracy; however, inadvertent computerized transcription errors may be present.      Electronically signed by HENRRY Hawkins CNP on 1/20/2022 at 7:41 AM

## 2022-01-21 NOTE — ANESTHESIA PRE PROCEDURE
Department of Anesthesiology  Preprocedure Note       Name:  Natan Patterson   Age:  80 y.o.  :  1932                                          MRN:  [id number]         Date:  2022      Surgeon: Shila Dove): Chato Espinoza DO    Procedure: Procedure(s):  RIGHT DISTAL FEMUR OPEN REDUCTION INTERNAL FIXATION (RETROGRADE NAIL)    Medications prior to admission:   Prior to Admission medications    Medication Sig Start Date End Date Taking? Authorizing Provider   citalopram (CELEXA) 10 MG tablet TAKE 1 TABLET BY MOUTH IN  THE MORNING 22   Willian Gomez MD   furosemide (LASIX) 20 MG tablet TAKE 1 TABLET BY MOUTH 3  TIMES WEEKLY 21   Crispin Grullon MD   memantine (NAMENDA) 10 MG tablet Take 1 tablet by mouth 2 times daily 21   Crispin Grullon MD   metoprolol tartrate (LOPRESSOR) 25 MG tablet Take 1 tablet by mouth 2 times daily 10/11/21   Willian Gomez MD   donepezil (ARICEPT) 10 MG tablet TAKE 1 TABLET BY MOUTH AT  NIGHT 10/4/21   Willian Gomez MD   apixaban (ELIQUIS) 2.5 MG TABS tablet TAKE 1 TABLET BY MOUTH  TWICE DAILY 21   Willian Gomez MD   Glucosamine-Chondroit-Vit C-Mn (GLUCOSAMINE 1500 COMPLEX PO) Take 1 tablet by mouth daily     Historical Provider, MD   Cholecalciferol (VITAMIN D3) 2000 units CAPS Take 2,000 Units by mouth daily     Historical Provider, MD       Current medications:    No current facility-administered medications for this visit.      Current Outpatient Medications   Medication Sig Dispense Refill    citalopram (CELEXA) 10 MG tablet TAKE 1 TABLET BY MOUTH IN  THE MORNING 90 tablet 1     Facility-Administered Medications Ordered in Other Visits   Medication Dose Route Frequency Provider Last Rate Last Admin    sodium chloride flush 0.9 % injection 5-40 mL  5-40 mL IntraVENous 2 times per day Dasha Verdin DO   10 mL at 22 0815    sodium chloride flush 0.9 % injection 5-40 mL  5-40 mL IntraVENous PRN Dasha Verdin DO        0.9 % sodium chloride infusion  25 mL IntraVENous PRN Anna Bjorn, DO        ondansetron (ZOFRAN-ODT) disintegrating tablet 4 mg  4 mg Oral Q8H PRN Anna Bjorn, DO        Or    ondansetron [facility]) injection 4 mg  4 mg IntraVENous Q6H PRN Anna Bjorn, DO        polyethylene glycol (GLYCOLAX) packet 17 g  17 g Oral Daily PRN Anna Bjorn, DO        acetaminophen (TYLENOL) tablet 650 mg  650 mg Oral Q6H PRN Anna Bjorn, DO        Or    acetaminophen (TYLENOL) suppository 650 mg  650 mg Rectal Q6H PRN Anna Bjorn, DO        morphine (PF) injection 2 mg  2 mg IntraVENous Q2H PRN Anna Bjorn, DO   2 mg at 01/20/22 1432    Or    morphine (PF) injection 1 mg  1 mg IntraVENous Q2H PRN Anna Bjorn, DO        [Held by provider] apixaban Dorrine Gores) tablet 2.5 mg  2.5 mg Oral BID Anna Bjorn, DO        Vitamin D (CHOLECALCIFEROL) tablet 2,000 Units  2,000 Units Oral Daily Anna Bjorn, DO        citalopram (CELEXA) tablet 10 mg  10 mg Oral QAM Anna Bjorn, DO        donepezil (ARICEPT) tablet 10 mg  10 mg Oral Nightly Anna Bjorn, DO   10 mg at 01/20/22 0035    memantine (NAMENDA) tablet 10 mg  10 mg Oral BID Anna Bjorn, DO   10 mg at 01/20/22 0035    metoprolol tartrate (LOPRESSOR) tablet 25 mg  25 mg Oral BID Domi Devlin. Sandrandalma, RUDOLPH   25 mg at 01/20/22 0034       Allergies: Allergies   Allergen Reactions    Iodine      I.V. Problem List:    Patient Active Problem List   Diagnosis Code    Atrial fibrillation (HCC) I48.91    Gastroesophageal reflux disease without esophagitis K21.9    Alzheimer's dementia without behavioral disturbance (HCC) G30.9, F02.80    Vitamin B 12 deficiency E53.8    Arthritis M19.90    Depression F32. A    Hyperlipidemia E78.5    Ambulatory dysfunction R26.2    Closed compression fracture of L2 lumbar vertebra, initial encounter ([facility] S32.020A    Closed fracture of distal end of right femur ([facility] S72.401A    by Jg Carroll DO at [address] N/A 11/25/2020    KYPHOPLASTY , L1 L2,BONE BIOPSY, EPIDURAL INJECTION performed by Sterling Zuniga MD at [address] N/A     d/t peptic ulcers       Social History:    Social History     Tobacco Use    Smoking status: Never Smoker    Smokeless tobacco: Never Used   Substance Use Topics    Alcohol use: No     Comment: 2 cups of coffee a day                                 Counseling given: Not Answered      Vital Signs (Current): There were no vitals filed for this visit. BP Readings from Last 3 Encounters:   01/20/22 (!) 164/72   01/10/22 110/64   10/25/21 110/63       NPO Status:  greater than 8 hours                                                                               BMI:   Wt Readings from Last 3 Encounters:   01/19/22 119 lb 4.8 oz (54.1 kg)   01/10/22 119 lb 4.8 oz (54.1 kg)   10/25/21 120 lb (54.4 kg)     There is no height or weight on file to calculate BMI.    CBC:   Lab Results   Component Value Date    WBC 10.4 01/20/2022    RBC 3.85 01/20/2022    HGB 12.1 01/20/2022    HCT 37.6 01/20/2022    MCV 97.7 01/20/2022    RDW 13.2 01/20/2022     01/20/2022       CMP:   Lab Results   Component Value Date     01/20/2022    K 4.4 01/20/2022     01/20/2022    CO2 26 01/20/2022    BUN 15 01/20/2022    CREATININE 0.8 01/20/2022    GFRAA >60 01/20/2022    LABGLOM >60 01/20/2022    GLUCOSE 124 01/20/2022    GLUCOSE 90 07/13/2011    PROT 6.1 01/19/2022    CALCIUM 8.7 01/20/2022    BILITOT 0.4 01/19/2022    ALKPHOS 90 01/19/2022    AST 39 01/19/2022    ALT 17 01/19/2022       POC Tests: No results for input(s): POCGLU, POCNA, POCK, POCCL, POCBUN, POCHEMO, POCHCT in the last 72 hours.     Coags:   Lab Results   Component Value Date    PROTIME 12.6 01/20/2022    INR 1.1 01/20/2022    APTT 27.2 01/20/2022       HCG (If Applicable): No results found for: PREGTESTUR, PREGSERUM, HCG, HCGQUANT     ABGs: No results found for: PHART, PO2ART, YHH8NUQ, WZW0WBI, BEART, V6SYSXPS     Type & Screen (If Applicable):  No results found for: LABABO, LABRH    Drug/Infectious Status (If Applicable):  No results found for: HIV, HEPCAB    COVID-19 Screening (If Applicable):   Lab Results   Component Value Date    COVID19 Not Detected 10/22/2020         Anesthesia Evaluation  Patient summary reviewed  Airway: Mallampati: II  TM distance: <3 FB   Neck ROM: limited  Mouth opening: < 3 FB Dental:    (+) edentulous      Pulmonary:Negative Pulmonary ROS and normal exam  breath sounds clear to auscultation                             Cardiovascular:    (+) hypertension:, dysrhythmias: atrial fibrillation, hyperlipidemia      ECG reviewed  Rhythm: regular  Rate: normal  Echocardiogram reviewed         Beta Blocker:  Dose within 24 Hrs      ROS comment: EKG: Normal Sinus Rhythm 71, first degree heart block. ECHO:   Compared to prior echo, changes noted. Technically adequate study. Normal left ventricular wall thickness. Ejection fraction is visually estimated at 55%. No regional wall motion abnormalities seen. E/A flow reversal noted. Suggestive of diastolic dysfunction. Mild mitral regurgitation is present. Physiologic and/or trace aortic regurgitation is noted. Mild tricuspid regurgitation. RVSP is 32 mmHg. Pulmonary hypertension is mild . Neuro/Psych:   (+) psychiatric history:             ROS comment: Alzheimer's dementia GI/Hepatic/Renal:   (+) GERD:,           Endo/Other:    (+) : arthritis:., .                 Abdominal:             Vascular: negative vascular ROS. Other Findings:             Anesthesia Plan      general     ASA 3       Induction: intravenous. BIS  MIPS: Postoperative opioids intended and Prophylactic antiemetics administered. Anesthetic plan and risks discussed with spouse and healthcare power of .       Plan discussed with

## 2022-01-21 NOTE — OP NOTE
Indications  The patient presented to the emergency room and was diagnosed with an femur fracture. The patient was admitted to the hospital and received medical clearance. The patient and family chose to proceed with closed reduction as needed with intramedullary nailing. Risks, benefits, expected outcomes and potential complications were discussed including but not limited to infection,, neurovascular injury, refracture, hardware complications requiring removal or revision as well as anesthesia risks including death. At no time were any guarantees implied or stated. The patient electively signed the consent form. Patient Positioning and Surgical Prep  The patient was seen in the holding area. The appropriate   extremity was marked with indelible pen. The patient was taken to the operative   suite, identified and place on the OR table. General anesthesia was   administered. The right lower extremity was isolated, and her panniculus was   retracted superiorly. The right lower extremity was elevated and prepped with   DuraPrep from the toes to groin. It was draped in normal sterile fashion. She received 2 g of Ancef prior to the incision. Official timeout was called indicating correct extremity and procedure. Operation  Incision was made from the distal pole of the patella extending distally. The incision was carried through subcutaneous tissue. The paratenon was   identified and split. The underlying patellar tendon was split in the   direction of its fibers. The fracture was reduced as well as possible A guidewire was placed in the femoral notch and drilled into place. A cannulated reamer was used to open the distal femur. A guidewire was placed through the hole and across the fracture site to the level of the lesser trochanter. The femur was reamed to 14.5 mm. A ruler was used to confirm the proper nail length. A 13 x 380-mm intramedullary chase was placed.  Using the distal targeting device, 2 screws were placed distally. The fracture appeared reasonably well reduced, and rotation was estimated to be reasonable. 2 proximal locking screws were placed through the distal anterior-to-posterior hole. All multiplanar C-arm radiographic images were obtained to confirm satisfactory placement of all hardware and a well-reduced fracture. Closure and Disposition  The wounds were copiously irrigated. The patellar tendon was closed with 0   Vicryl suture. The subcutaneous tissue was closed with 0 and 2-0 Vicryl. Skin was closed with staples. Lateral and anterior incisions were similarly   closed. Sterile dressings and 4 x 4s were applied. The patient was awakened   and taken to the postoperative area in stable condition. SCDs had been   applied to both lower extremities. I spoke the family in the   consultation room postoperatively. ADDENDUM:  Biplanar fluoroscopy was used throughout the case to confirm satisfactory fracture reduction and placement of all hardware.                 Electronically signed by Sobia Whyte DO on 1/20/2022 at 10:33 PM

## 2022-01-21 NOTE — PROGRESS NOTES
[address] [facility]  [facility] [place]. [place]        Date:2022                                                  Patient Name: Clarisse Woods    MRN: [id number]    : 1932    Room: [address] [place] [address]      Evaluating OT: Geeta Powers OTR/L; [id number]     Referring Provider and Specific Provider Orders/Date:      22 2315 Vira Hoff DO OT eval and treat  (PT/OT CONSULT PANEL)  ONE TIME             Placement Recommendation: Subacute        Diagnosis:   1. Closed fracture of distal end of right femur, unspecified fracture morphology, initial encounter ([facility] [address]    2. Type I or II open fracture of distal end of right femur, unspecified fracture morphology, initial encounter ([facility] [address]    3.  Fracture        Surgery:  R IMN for DF fx      Pertinent Medical History:       Past Medical History:   Diagnosis Date    Arthritis     Atrial fibrillation ([facility] [address]     Calculus of gallbladder with acute cholecystitis without obstruction     Calculus of gallbladder with cholecystitis with biliary obstruction     Hyperlipidemia     Syncope 2019         Past Surgical History:   Procedure Laterality Date    ABDOMEN SURGERY      ANESTHESIA NERVE BLOCK Bilateral 2020    BILATERAL MEDIAL BRANCH L4-5 AND L5-S1 JOINTS performed by Shaunna Valentine DO at [address], LAPAROSCOPIC N/A 2019    DIAGNOSITIC LAPAROSCOPY CONVERTED TO EXPLORATORY LAPAROTOMY WITH CLOSURE OF ENTEROTOMY X1, CHOLANGIOGRAM, EGD performed by Randell Acosta MD at [address] [facility] [address] [facility]  10/21/2013         FEMUR FRACTURE SURGERY Right 2022    RIGHT DISTAL FEMUR OPEN REDUCTION INTERNAL FIXATION (RETROGRADE NAIL) performed by Daniella Archuleta DO at [address] Right 2018    RIGHT SACROILIAC JOINT STEROID INJECTION UNDER X-RAY GUIDANCE performed by Mary Pierre DO at [address] [facility] [address] JOINT Left 1/17/2019    LEFT SACROILIAC JOINT STEROID INJECTION UNDER X-RAY GUIDANCE performed by Mary Pierre DO at [address] [facility] [address] JOINT Bilateral 9/19/2019    BILATERAL SACROILIAC JOINT INJECTION X-RAY performed by Mary Pierre DO at [facility] Right 12/13/2018    sacroiliac joint     NERVE BLOCK Left 01/17/2019    sacroiliac joint injection     NERVE BLOCK Bilateral 02/06/2020    medial branch block    NERVE BLOCK Bilateral 10/29/2020    intra articular     NERVE BLOCK Bilateral 10/29/2020    BILATERAL L4-5 AND L5-S1 INTRA-ARTICULAR FACET STEROID INJECTION (CPT 42449,27521) performed by Mary Pierre DO at [address] [facility]  N/A 11/25/2020    KYPHOPLASTY , L1 L2,BONE BIOPSY, EPIDURAL INJECTION performed by Javid Sr MD at [address] [facility]     d/t peptic ulcers     Precautions:  Fall Risk, Partial Weight Bearing: R LE d/t R IMN for DF fx, Knee immobilizer      Assessment of current deficits    [x] Functional mobility  [x]ADLs  [x] Strength               []Cognition    [x] Functional transfers   [x] IADLs         [] Safety Awareness   [x]Endurance    [] Fine Coordination              [x] Balance      [] Vision/perception   []Sensation     []Gross Motor Coordination  [x] ROM  [] Delirium                   [] Motor Control     OT PLAN OF CARE   OT POC based on physician orders, patient diagnosis and results of clinical assessment    Frequency/Duration 1-3 days/wk for 2 weeks PRN     Specific OT Treatment Interventions to include:   * Instruction/training on adapted ADL techniques and AE recommendations to increase functional independence within precautions       * Training on energy conservation strategies, correct breathing pattern and techniques to improve independence/tolerance for self-care routine  * Functional transfer/mobility training/DME recommendations for increased independence, safety, and fall prevention  * Patient/Family education to increase follow through with safety techniques and functional independence  * Recommendation of environmental modifications for increased safety with functional transfers/mobility and ADLs  * Therapeutic exercise to improve motor endurance, ROM, and functional strength for ADLs/functional transfers  * Therapeutic activities to facilitate/challenge dynamic balance, stand tolerance for increased safety and independence with ADLs  * Positioning to improve skin integrity, interaction with environment and functional independence    Recommended Adaptive Equipment: TBD at rehab      Home Living: with spouse; single family home, 1 story, 1+2 steps to enter with rail, walk in shower. Equipment owned: wheeled walker, cane, grab bars     Prior Level of Function: Independent with ADLs , Independent with IADLs; ambulated with no device    Driving: no  Occupation: retired     Pain Level: 8-9/10 pain in R LE, facilitated repositioning and assisted back to bed, R LE elevated on a pillow; Nursing notified.       Cognition: A&O: 4/4; Follows 3 step directions   Memory: fair minus    Sequencing: fair minus    Problem solving: fair minus    Judgement/safety: fair minus     [facility]   AM-PAC Daily Activity Inpatient   How much help for putting on and taking off regular lower body clothing?: Total  How much help for Bathing?: A Lot  How much help for Toileting?: Total  How much help for putting on and taking off regular upper body clothing?: A Little  How much help for taking care of personal grooming?: A Little  How much help for eating meals?: None  AM-[facility] Inpatient Daily Activity Raw Score: 14  AM-PAC Inpatient ADL T-Scale Score : 33.39  ADL Inpatient CMS 0-100% Score: 59.67  ADL Inpatient CMS G-Code Modifier : CK     Functional Assessment:    Initial Eval Status  Date: 1/21/22 Treatment Status  Date: STGs = LTGs  Time frame: 10-14 days   Feeding Independent   Independent    Grooming Minimal Assist   Independent    UB Dressing Minimal Assist   Independent    LB Dressing Dependent   Modified Treasure    Bathing Maximal Assist  Minimal Assist    Toileting Dependent   Moderate Assist    Bed Mobility  Supine to sit: N/T as pt was up in chair   Sit to supine: Moderate Assist x 2   Maximal assist x 2 to scoot up in bed. Maximal assist for positioning in bed for symmetry and comfort, R LE elevated on a pillow. Supine to sit: Independent   Sit to supine: Independent    Functional Transfers Moderate Assist x 2 from bedside chair to wheeled walker  Moderate Assist x 2 from standing with wheeled walker to edge of bed. Transfer training with verbal cues for hand placement throughout session to improve safety. Independent    Functional Mobility Moderate assist x 2 with wheeled walker to improve balance, verbal cues for walker sequence and safety. Modified Treasure    Balance Sitting:     Static: good     Dynamic: fair   Standing: fair minus/poor with wheeled walker     Activity Tolerance  Fair minus   good    Visual/  Perceptual Glasses: yes                 Hand Dominance: right      AROM (PROM) Strength Additional Info:    RUE  WFL 4/5 good  and wfl FMC/dexterity noted during ADL tasks     LUE WFL 4/5 good  and wfl FMC/dexterity noted during ADL tasks       Hearing: WFL   Sensation:  No c/o numbness or tingling  Tone: WFL   Edema: yes, surgical extremity     Comments: Upon arrival the patient was seated in bedside chair. At end of session, patient was supine with R LE elevated with call light and phone within reach, all lines and tubes intact. Daughter present. Overall patient demonstrated decreased independence and safety during completion of ADL/functional transfer/mobility tasks.   Pt would benefit from continued skilled OT to increase safety and independence with completion of ADL/IADL tasks for functional independence and quality of life. Treatment: OT treatment provided this date includes:    Instruction/training on safety and adapted techniques for completion of ADLs    Instruction/training on safe functional mobility/transfer techniques    Instruction/training on energy conservation/work simplification for completion of ADLs    Proper Positioning/Alignment   Instruction/training in weight bearing status and walker sequence    Rehab Potential: Good for established goals. Patient / Family Goal: daughter would like her mother to go to rehab      Patient and/or family were instructed on functional diagnosis, prognosis/goals and OT plan of care. Demonstrated good understanding. Eval Complexity: Low    Time In: 2:13pm  Time Out: 2:43pm    Total Treatment Time: 10      Min Units   OT Eval Low 97165  X  1    OT Eval Medium 77167      OT Eval High 37912      OT Re-Eval [id number]            ADL/Self Care 04095     Therapeutic Activities 92603  10  1    Therapeutic Ex 30159       Orthotic Management 24167       Manual [id number]     Neuro Re-Ed 10859       Non-Billable Time        Evaluation Time additionally includes thorough review of current medical information, gathering information on past medical history/social history and prior level of function, interpretation of standardized testing/informal observation of tasks, assessment of data and development of plan of care and goals.         Evaluating OT: Rush Mckeon OTR/L; [id number]

## 2022-01-21 NOTE — PROGRESS NOTES
INPATIENT CARDIOLOGY Follow UP    Name: Christopher Mark    Age: 80 y.o. Date of Admission: 1/19/2022  2:47 PM    Date of Service: 1/21/2022      Referring Physician: Phyllis Bowles DO      Subjective: S/p right distal femur open reduction internal fixation on 1/20/22      Denies any chest pain or shortness of breath.     Past Medical History:  Past Medical History:   Diagnosis Date    Arthritis     Atrial fibrillation ([address]     Calculus of gallbladder with acute cholecystitis without obstruction     Calculus of gallbladder with cholecystitis with biliary obstruction     Hyperlipidemia     Syncope 11/20/2019       Past Surgical History:  Past Surgical History:   Procedure Laterality Date    ABDOMEN SURGERY      ANESTHESIA NERVE BLOCK Bilateral 2/6/2020    BILATERAL MEDIAL BRANCH L4-5 AND L5-S1 JOINTS performed by Julia Brown DO at [address] [facility], LAPAROSCOPIC N/A 11/21/2019    DIAGNOSITIC LAPAROSCOPY CONVERTED TO EXPLORATORY LAPAROTOMY WITH CLOSURE OF ENTEROTOMY X1, CHOLANGIOGRAM, EGD performed by Baudilio Blancas MD at [address] [facility] ECHO COMPL [address] [facility]  10/21/2013         FEMUR FRACTURE SURGERY Right 1/20/2022    RIGHT DISTAL FEMUR OPEN REDUCTION INTERNAL FIXATION (RETROGRADE NAIL) performed by Roshni Miramontes DO at [address] [facility] Right 12/13/2018    RIGHT SACROILIAC JOINT STEROID INJECTION UNDER X-RAY GUIDANCE performed by Julia Brown DO at [address] [facility] [address] JOINT Left 1/17/2019    LEFT SACROILIAC JOINT STEROID INJECTION UNDER X-RAY GUIDANCE performed by Julia Brown DO at [address] [facility]. INJECTION PROCEDURE FOR SACROILIAC JOINT Bilateral 9/19/2019    BILATERAL SACROILIAC JOINT INJECTION X-RAY performed by Julia Brown DO at [facility] Right 12/13/2018 sacroiliac joint     NERVE BLOCK Left 01/17/2019    sacroiliac joint injection     NERVE BLOCK Bilateral 02/06/2020    medial branch block    NERVE BLOCK Bilateral 10/29/2020    intra articular     NERVE BLOCK Bilateral 10/29/2020    BILATERAL L4-5 AND L5-S1 INTRA-ARTICULAR FACET STEROID INJECTION (CPT 68361,80894) performed by Gladis Salvador DO at [address] N/A 11/25/2020    KYPHOPLASTY , L1 L2,BONE BIOPSY, EPIDURAL INJECTION performed by Hussein Juarez MD at [address] N/A     d/t peptic ulcers       Family History:  Family History   Problem Relation Age of Onset    Heart Disease Mother     Heart Disease Father        Social History:  Social History     Socioeconomic History    Marital status:      Spouse name: Not on file    Number of children: 3    Years of education: Not on file    Highest education level: Not on file   Occupational History    Not on file   Tobacco Use    Smoking status: Never Smoker    Smokeless tobacco: Never Used   Vaping Use    Vaping Use: Not on file   Substance and Sexual Activity    Alcohol use: No     Comment: 2 cups of coffee a day     Drug use: No    Sexual activity: Not on file   Other Topics Concern    Not on file   Social History Narrative    Not on file     Social Determinants of Health     Financial Resource Strain: Low Risk     Difficulty of Paying Living Expenses: Not hard at all   Food Insecurity: No Food Insecurity    Worried About Running Out of Food in the Last Year: Never true    Susanna of Food in the Last Year: Never true   Transportation Needs: No Transportation Needs    Lack of Transportation (Medical): No    Lack of Transportation (Non-Medical):  No   Physical Activity:     Days of Exercise per Week: Not on file    Minutes of Exercise per Session: Not on file   Stress:     Feeling of Stress : Not on file   Social Connections:     Frequency of Communication with Friends and Family: Not on file    Frequency of Social Gatherings with Friends and Family: Not on file    Attends Confucianist Services: Not on file    Active Member of Clubs or Organizations: Not on file    Attends Club or Organization Meetings: Not on file    Marital Status: Not on file   Intimate Partner Violence:     Fear of Current or Ex-Partner: Not on file    Emotionally Abused: Not on file    Physically Abused: Not on file    Sexually Abused: Not on file   Housing Stability:     Unable to Pay for Housing in the Last Year: Not on file    Number of [place] in the Last Year: Not on file    Unstable Housing in the Last Year: Not on file       Allergies: Allergies   Allergen Reactions    Iodine      I.V. Home Medications:  Prior to Admission medications    Medication Sig Start Date End Date Taking? Authorizing Provider   oxyCODONE-acetaminophen (PERCOCET) 5-325 MG per tablet Take 1 tablet by mouth every 6 hours as needed for Pain for up to 7 days. Intended supply: 7 days.  Take lowest dose possible to manage pain 1/20/22 1/27/22 Yes Luanne Javed DO   citalopram (CELEXA) 10 MG tablet TAKE 1 TABLET BY MOUTH IN  THE MORNING 1/20/22   Alma Aldrich MD   furosemide (LASIX) 20 MG tablet TAKE 1 TABLET BY MOUTH 3  TIMES WEEKLY 12/27/21   Óscar Singh MD   memantine (NAMENDA) 10 MG tablet Take 1 tablet by mouth 2 times daily 12/27/21   Óscar Singh MD   metoprolol tartrate (LOPRESSOR) 25 MG tablet Take 1 tablet by mouth 2 times daily 10/11/21   Alma Aldrich MD   donepezil (ARICEPT) 10 MG tablet TAKE 1 TABLET BY MOUTH AT  NIGHT 10/4/21   Alma Aldrich MD   apixaban (ELIQUIS) 2.5 MG TABS tablet TAKE 1 TABLET BY MOUTH  TWICE DAILY 6/14/21   Alma Aldrich MD   Glucosamine-Chondroit-Vit C-Mn (GLUCOSAMINE 1500 COMPLEX PO) Take 1 tablet by mouth daily     Historical Provider, MD   Cholecalciferol (VITAMIN D3) 2000 units CAPS Take 2,000 Units by mouth daily     Historical Provider, MD Current Medications:  Current Facility-Administered Medications   Medication Dose Route Frequency Provider Last Rate Last Admin    oxyCODONE-acetaminophen (PERCOCET) 5-325 MG per tablet 1 tablet  1 tablet Oral Q6H PRN Heike Goetz, DO   1 tablet at 01/21/22 2269    apixaban (ELIQUIS) tablet 2.5 mg  2.5 mg Oral BID [place] Jurist, DO   2.5 mg at 01/21/22 0817    0.9 % sodium chloride infusion   IntraVENous Continuous [place] Jurist, DO 75 mL/hr at 01/21/22 1313 New Bag at 01/21/22 1313    ceFAZolin (ANCEF) 1,000 mg in sterile water 10 mL IV syringe  1,000 mg IntraVENous Q8H [place] Jurist, DO   1,000 mg at 01/21/22 1251    senna (SENOKOT) tablet 8.6 mg  1 tablet Oral Daily PRN [place] Jurist, DO        labetalol (NORMODYNE;TRANDATE) injection 5 mg  5 mg IntraVENous Q10 Min PRN Cristina Servin DO   5 mg at 01/21/22 4213    sodium chloride flush 0.9 % injection 5-40 mL  5-40 mL IntraVENous 2 times per day [place] Jurist, DO   5 mL at 01/21/22 0022    sodium chloride flush 0.9 % injection 5-40 mL  5-40 mL IntraVENous PRN [place] Jurist, DO        0.9 % sodium chloride infusion  25 mL IntraVENous PRN [place] Jurist, DO        ondansetron (ZOFRAN-ODT) disintegrating tablet 4 mg  4 mg Oral Q8H PRN [place] Jurist, DO        Or    ondansetron [facility] injection 4 mg  4 mg IntraVENous Q6H PRN [place] Jurist, DO        polyethylene glycol (GLYCOLAX) packet 17 g  17 g Oral Daily PRN [place] Jurist, DO        acetaminophen (TYLENOL) tablet 650 mg  650 mg Oral Q6H PRN [place] Jurist, DO        Or    acetaminophen (TYLENOL) suppository 650 mg  650 mg Rectal Q6H PRN [place] Jurist, DO        morphine (PF) injection 2 mg  2 mg IntraVENous Q2H PRN [place] Jurist, DO   2 mg at 01/20/22 1432    Or    morphine (PF) injection 1 mg  1 mg IntraVENous Q2H PRN [place] Jurist, DO        Vitamin D (CHOLECALCIFEROL) tablet 2,000 Units  2,000 Units Oral Daily [place] Jurist, DO   2,000 Units at 01/21/22 0817    citalopram (CELEXA) tablet 10 mg  10 mg Oral QAM Jenita Mate, DO   10 mg at 01/21/22 2010    donepezil (ARICEPT) tablet 10 mg  10 mg Oral Nightly Jenita Mate, DO   10 mg at 01/21/22 0020    memantine (NAMENDA) tablet 10 mg  10 mg Oral BID Jenita Mate, DO   10 mg at 01/21/22 4916    metoprolol tartrate (LOPRESSOR) tablet 25 mg  25 mg Oral BID Jenita Mate, DO   25 mg at 01/21/22 0817      sodium chloride 75 mL/hr at 01/21/22 1313    sodium chloride         Physical Exam:  BP (!) 144/64   Pulse 65   Temp 97.9 °F (36.6 °C)   Resp 18   Wt 119 lb 4.8 oz (54.1 kg)   SpO2 94%   BMI 19.26 kg/m²   Wt Readings from Last 3 Encounters:   01/19/22 119 lb 4.8 oz (54.1 kg)   01/10/22 119 lb 4.8 oz (54.1 kg)   10/25/21 120 lb (54.4 kg)       Appearance: Alert and oriented x3 not in acute distress. Skin: Dry skin  Head: Atraumatic  Eyes: Intact extraocular muscles   ENMT: Mucous membranes are moist  Neck: Supple  Lungs: Clear to auscultation  Cardiac: Normal S1 and S2  Abdomen: Protuberant  Extremities: Intact range of motion  Neurologic: No focal neurological deficits  Peripheral Pulses: 2+ peripheral pulses    Intake/Output:    Intake/Output Summary (Last 24 hours) at 1/21/2022 1415  Last data filed at 1/21/2022 1307  Gross per 24 hour   Intake 1580 ml   Output 555 ml   Net 1025 ml     I/O this shift:  In: 1280 [P.O.:330;  I.V.:950]  Out: 150 [Urine:150]    Laboratory Tests:  Recent Labs     01/19/22 2230 01/20/22  0524    142   K 5.7* 4.4    104   CO2 25 26   BUN 15 15   CREATININE 0.7 0.8   GLUCOSE 117* 124*   CALCIUM 8.6 8.7     Lab Results   Component Value Date    MG 1.7 11/26/2019    MG 1.8 11/24/2019     Recent Labs     01/19/22 2230   ALKPHOS 90   ALT 17   AST 39*   PROT 6.1*   BILITOT 0.4   LABALBU 3.6     Recent Labs     01/19/22  2230 01/20/22  0524   WBC 12.5* 10.4   RBC 3.96 3.85   HGB 12.2 12.1   HCT 38.7 37.6   MCV 97.7 97.7   MCH 30.8 31.4   MCHC 31.5* 32.2 RDW 13.1 13.2    256   MPV 10.4 10.1     Lab Results   Component Value Date    TROPONINI <0.01 11/22/2020    TROPONINI <0.01 11/19/2019     Recent Labs     01/20/22  0826   TROPHS 11*     Lab Results   Component Value Date    INR 1.1 01/20/2022    INR 1.1 01/19/2022    INR 1.1 11/23/2020    PROTIME 12.6 (H) 01/20/2022    PROTIME 13.1 (H) 01/19/2022    PROTIME 12.3 11/23/2020     Lab Results   Component Value Date    TSH 2.080 06/21/2021    TSH 3.420 09/24/2020    TSH 2.900 03/02/2020     Lab Results   Component Value Date    LABA1C 5.7 (H) 03/01/2019     No results found for: EAG  Lab Results   Component Value Date    CHOL 177 07/18/2016    CHOL 199 01/26/2015    CHOL 155 10/22/2013     Lab Results   Component Value Date    TRIG 80 07/18/2016    TRIG 74 01/26/2015    TRIG 65 10/22/2013     Lab Results   Component Value Date    HDL 71 06/21/2021    HDL 68 09/24/2020    HDL 83 03/01/2019     Lab Results   Component Value Date    LDLCALC 91 06/21/2021    LDLCALC 78 09/24/2020    LDLCALC 79 03/01/2019     Lab Results   Component Value Date    LABVLDL 21 06/21/2021    LABVLDL 22 09/24/2020    LABVLDL 21 03/01/2019     No results found for: CHOLHDLRATIO  No results for input(s): PROBNP in the last 72 hours. Cardiac Tests:      Echocardiogram reviewed: November 25, 2019  Summary   Compared to prior echo, changes noted. Technically adequate study. Normal left ventricular wall thickness. Ejection fraction is visually estimated at 55%. No regional wall motion abnormalities seen. E/A flow reversal noted. Suggestive of diastolic dysfunction. Mild mitral regurgitation is present. Physiologic and/or trace aortic regurgitation is noted. Mild tricuspid regurgitation. RVSP is 32 mmHg. Pulmonary hypertension is mild . CXR reviewed: The ASCVD Risk score (Beryl Hussein, et al., 2013) failed to calculate for the following reasons:     The 2013 ASCVD risk score is only valid for ages 36 to 78    ASSESSMENT / PLAN:    1. mechanical fall with subsequent closed fracture of distal end of right femur, unspecified fracture morphology, initial encounter ([facility]  S/p right distal femur open reduction internal fixation on 1/20/22  Surgery is following. 2. Type I or II open fracture of distal end of right femur, unspecified fracture morphology, initial encounter ([facility]  Management per primary service  3. paroxysmal atrial fibrillation  Continue anticoagulation and rate control therapy  Follow-up with your cardiologist.  4.  Hyperlipidemia  5. History of iodine allergy with reported anaphylaxis  6. Alzheimer's dementia on Aricept (causes bradycardia) and Namenda  7. DNR-CCA     Cardiology will sign off. Please call with questions. Thank you for allowing me to participate in your patient's care. Please feel free to contact me if you have any questions or concerns.     Carmen Saxena MD  [facility]) Cardiology

## 2022-01-21 NOTE — CARE COORDINATION
CM note; PT recommendations reviewed. Spoke with [facility] liaison, unable to accept now, no bed available. Spouse not present to discuss additional choices, patient has dementia. Insurance verification for SNF cannot be completed on the weekend, will address new plan on Monday.

## 2022-01-21 NOTE — PROGRESS NOTES
[address] [place], [place] [address]ist   Progress Note    Admitting Date and Time: 1/19/2022  2:47 PM  Admit Dx: Closed nondisplaced apophyseal fracture of right femur, initial encounter ([facility] [S72.134A]  Closed fracture of distal end of right femur, unspecified fracture morphology, initial encounter ([facility] [S72.401A]    Subjective:    Patient was sitting up in the bed at the time of the exam.  She complains of pain and intermittent cramping in her right leg. Physical Therapy at bedside for evaluation. Patient is currently requiring oxygen at 2 liters and does not wear at home. ROS: denies fever, chills, cp, sob, n/v, HA unless stated above.      ceFAZolin 2000 mg in 20 mL SWFI IV Syringe        apixaban  2.5 mg Oral BID    ceFAZolin (ANCEF) IVPB  1,000 mg IntraVENous Q8H    sodium chloride flush  5-40 mL IntraVENous 2 times per day    Vitamin D  2,000 Units Oral Daily    citalopram  10 mg Oral QAM    donepezil  10 mg Oral Nightly    memantine  10 mg Oral BID    metoprolol tartrate  25 mg Oral BID     oxyCODONE-acetaminophen, 1 tablet, Q6H PRN  senna, 1 tablet, Daily PRN  labetalol, 5 mg, Q10 Min PRN  sodium chloride flush, 5-40 mL, PRN  sodium chloride, 25 mL, PRN  ondansetron, 4 mg, Q8H PRN   Or  ondansetron, 4 mg, Q6H PRN  polyethylene glycol, 17 g, Daily PRN  acetaminophen, 650 mg, Q6H PRN   Or  acetaminophen, 650 mg, Q6H PRN  morphine, 2 mg, Q2H PRN   Or  morphine, 1 mg, Q2H PRN         Objective:    BP (!) 172/76   Pulse 76   Temp 97.6 °F (36.4 °C) (Oral)   Resp 18   Wt 119 lb 4.8 oz (54.1 kg)   SpO2 99%   BMI 19.26 kg/m²   General Appearance: alert and oriented to person, place and time and in no acute distress, hard of hearing  Skin: warm and dry  Head: normocephalic and atraumatic  Eyes: pupils equal, round, and reactive to light, conjunctivae normal  Neck: neck supple and non tender without mass   Pulmonary/Chest: diminished bilaterally, no respiratory distress  Cardiovascular: normal rate, normal S1 and S2  Abdomen: soft, non-tender, non-distended, normal bowel sounds  Extremities: no cyanosis, no clubbing, dressing right lower extremity and knee immobilizer  Neurologic: no tremor and speech normal      Recent Labs     01/19/22 2230 01/20/22  0524    142   K 5.7* 4.4    104   CO2 25 26   BUN 15 15   CREATININE 0.7 0.8   GLUCOSE 117* 124*   CALCIUM 8.6 8.7       Recent Labs     01/19/22 2230   ALKPHOS 90   PROT 6.1*   LABALBU 3.6   BILITOT 0.4   AST 39*   ALT 17       Recent Labs     01/19/22 2230 01/20/22  0524   WBC 12.5* 10.4   RBC 3.96 3.85   HGB 12.2 12.1   HCT 38.7 37.6   MCV 97.7 97.7   MCH 30.8 31.4   MCHC 31.5* 32.2   RDW 13.1 13.2    256   MPV 10.4 10.1           Radiology:   Fluoro For Surgical Procedures   Final Result   Intraprocedural fluoroscopic spot images as above. See separate procedure   report for more information. XR CHEST PORTABLE   Final Result   Clear chest, no acute findings. XR FEMUR RIGHT (MIN 2 VIEWS)   Final Result   Oblique nondisplaced fracture of distal femoral shaft, as above. CT KNEE RIGHT WO CONTRAST   Final Result   1. NONDISPLACED FRACTURES of the distal femoral shaft, extending to the level   of the lateral patellofemoral joint. The no extension into the femoral   condyles or the tibiofemoral joint. 2. Demineralized bones. RECOMMENDATIONS:   Unavailable         CT HEAD WO CONTRAST   Final Result   1. There is no acute intracranial abnormality. Specifically, there is no   intracranial hemorrhage. 2. Atrophy and periventricular leukomalacia,   . XR KNEE RIGHT (1-2 VIEWS)   Final Result   1. There is no fracture or dislocation of the right knee   2. Degenerative changes the right knee. XR SHOULDER RIGHT (MIN 2 VIEWS)   Final Result   No acute fractures or dislocations in the right shoulder.              Assessment:  Principal Problem:    Closed fracture of distal end of right femur [facility])  Active Problems:    Atrial fibrillation (HCC)    Alzheimer's dementia without behavioral disturbance (HCC)    Depression    Hyperlipidemia    Ambulatory dysfunction    Leukemoid reaction    PVC (premature ventricular contraction)    Osteopenia after menopause    Closed nondisplaced apophyseal fracture of right femur ([address]  Resolved Problems:    * No resolved hospital problems. *      Plan:  1. Closed right femur fracture:  Fall at home. S/p right distal femur open reduction internal fixation on 1/20/22. PWB RLE, knee immobilizer. Receiving post op antibiotics. Check electrolytes due to complaints of cramping. 2. Atrial fibrillation:  Cardiology cleared for OR on 1/20. Continue Eliquis and Lopressor. 3. Hypertension:  BP pressure is elevated and likely secondary to pain. Continue Lopressor and adjust if blood pressure remains elevated. 4. Hyperlipidemia:  Currently not on statin and is diet controlled. 5. Ambulatory dysfunction: Secondary to fracture. PT/OT on consult. Anticipate need for rehab at discharge. 6. Alzheimer's/dementia:  Continue Namenda and Aricept. .   7. Depression:  Continue Celexa. NOTE: This report was transcribed using voice recognition software. Every effort was made to ensure accuracy; however, inadvertent computerized transcription errors may be present.      Electronically signed by Britta Hammans, APRN - CNP on 1/21/2022 at 8:25 AM

## 2022-01-21 NOTE — PROGRESS NOTES
Department of Orthopedic Surgery  Resident Progress Note    Patient seen and examined. Pain controlled. No new complaints. Denies chest pain, shortness of breath, calf pain, dizziness/lightheadedness. VITALS:  BP (!) 176/85   Pulse 70   Temp 97.6 °F (36.4 °C) (Oral)   Resp 14   Wt 119 lb 4.8 oz (54.1 kg)   SpO2 99%   BMI 19.26 kg/m²     GENERAL: awake and alert  MUSCULOSKELETAL:   right lower extremity:  · Dressing C/D/I  · Knee immobilizer in place  · Compartments soft and compressible, calf non-tender  · Palpable dorsalis pedis and posterior tibialis pulse, brisk cap refill to toes, foot warm and perfused  · Sensation intact to light touch in sural/deep peroneal/superficial peroneal/saphenous/posterior tibial nerve distributions to foot/ankle.   · Demonstrates active ankle plantar/dorsiflexion/great toe extension    CBC:   Lab Results   Component Value Date    WBC 10.4 01/20/2022    HGB 12.1 01/20/2022    HCT 37.6 01/20/2022     01/20/2022       ASSESSMENT  · S/p R IMN for DF fx 1/20    PLAN    PWB RLE  Pain control IV & PO  DVT prophylaxis- eliquis, early mobilization  Monitor Labs  Bowel regiment  Pulmonary hygiene   Trend H&H- pending today  24 hr post op ABX   PT/OT  D/C planning, SW/PT recs  Discuss with attending

## 2022-01-21 NOTE — PROGRESS NOTES
Physical Therapy Initial Evaluation/Plan of Care    Room #:  2252/6024-72  Patient Name: Erlinda Riley  YOB: 1932  MRN: [id number]    Date of Service: 1/21/2022     Tentative placement recommendation: Subacute rehab  Equipment recommendation: To be determined      Evaluating Physical Therapist: Crystal Bella, PT #[id number]      Specific Provider Orders/Date/Referring Provider :  01/19/22 2315   PT eval and treat Start: 01/19/22 2315, End: 01/19/22 2315, ONE TIME, Standing Count: 1 Occurrences, R    Last continued at transfer on u Jan 20, 2022 11:57 PM   Flor Wells DO      Admitting Diagnosis:   Closed nondisplaced apophyseal fracture of right femur, initial encounter [facility]) [S72.134A]  Closed fracture of distal end of right femur, unspecified fracture morphology, initial encounter [facility] [S72.401A]     walking outside and missed a step. She fell approximately 2-3 steps. Surgery:   Date of Procedure: 1/20/2022  Procedure(s):   RIGHT DISTAL FEMUR OPEN REDUCTION INTERNAL FIXATION (RETROGRADE NAIL)   Surgeon(s): Darvin Jain DO    Visit Diagnoses       Codes    Closed fracture of distal end of right femur, unspecified fracture morphology, initial encounter [facility])    -  Primary S72.401A    Type I or II open fracture of distal end of right femur, unspecified fracture morphology, initial encounter ([facility]     S72.401B    Fracture     T14. 8XXA          Patient Active Problem List   Diagnosis    Atrial fibrillation ([address]    Gastroesophageal reflux disease without esophagitis    Alzheimer's dementia without behavioral disturbance ([address]    Vitamin B 12 deficiency    Arthritis    Depression    Hyperlipidemia    Ambulatory dysfunction    Closed compression fracture of L2 lumbar vertebra, initial encounter ([facility]    Closed fracture of distal end of right femur (HCC)    Leukemoid reaction    PVC (premature ventricular contraction)    Osteopenia after menopause    Closed nondisplaced apophyseal fracture of right femur (HCC)        ASSESSMENT of Current Deficits Patient exhibits decreased strength, balance, endurance, range of motion and pain right hip impairing functional mobility, transfers, gait , gait distance and tolerance to activity pain, knee immobilizer with partial weight bearing Right lower extremity, history of alzheimer's with difficulty following directions. Patient requires continued skilled physical therapy to address concerns listed above for increased safety and function at discharge. PHYSICAL THERAPY  PLAN OF CARE       Physical therapy plan of care is established based on physician order,  patient diagnosis and clinical assessment    Current Treatment Recommendations:    -Bed Mobility: Lower extremity exercises  and Trunk control activities   -Sitting Balance: Facilitate active trunk muscle engagement  and Facilitate postural control in all planes   -Standing Balance: Perform sit to stand activities maintaining PWB (partial weight bearing) weightbearing right lower extremity   -Transfers: Provide instruction on proper hand and foot position for adequate transfer of weight onto lower extremities and use of gait device if needed and Provide instruction on proper hand and left lower extremity placement to maintain PWB (partial weight bearing) weightbearing right lower extremity   -Gait: Use of Assistive device for PWB (partial weight bearing) weight bearing right lower extremity     -Endurance: Utilize Supervised activities to increase level of endurance to allow for safe functional mobility including transfers and gait     PT long term treatment goals are located in below grid    Patient and or family understand(s) diagnosis, prognosis, and plan of care. Frequency of treatments: Patient will be seen  daily.          Prior Level of Function: Patient ambulated independently    Rehab Potential: good  - for baseline    Past medical history:   Past Medical History: Diagnosis Date    Arthritis     Atrial fibrillation ([facility]     Calculus of gallbladder with acute cholecystitis without obstruction     Calculus of gallbladder with cholecystitis with biliary obstruction     Hyperlipidemia     Syncope 11/20/2019     Past Surgical History:   Procedure Laterality Date    ABDOMEN SURGERY      ANESTHESIA NERVE BLOCK Bilateral 2/6/2020    BILATERAL MEDIAL BRANCH L4-5 AND L5-S1 JOINTS performed by Gregor Fernandez DO at [address] N/A 11/21/2019    DIAGNOSITIC LAPAROSCOPY CONVERTED TO EXPLORATORY LAPAROTOMY WITH CLOSURE OF ENTEROTOMY X1, CHOLANGIOGRAM, EGD performed by Cody Hobbs MD at [address] [facility] ECHO COMPL [address] [facility]  10/21/2013         FEMUR FRACTURE SURGERY Right 1/20/2022    RIGHT DISTAL FEMUR OPEN REDUCTION INTERNAL FIXATION (RETROGRADE NAIL) performed by Roshni Awan DO at [address] [facility],[address] [place] [address] Right 12/13/2018    RIGHT SACROILIAC JOINT STEROID INJECTION UNDER X-RAY GUIDANCE performed by Gregor Fernandez DO at [address] Left 1/17/2019    LEFT SACROILIAC JOINT STEROID INJECTION UNDER X-RAY GUIDANCE performed by Gregor Fernandez DO at [address] [facility] JOINT Bilateral 9/19/2019    BILATERAL SACROILIAC JOINT INJECTION X-RAY performed by Gregor Fernandez DO at [facility] Right 12/13/2018    sacroiliac joint     NERVE BLOCK Left 01/17/2019    sacroiliac joint injection     NERVE BLOCK Bilateral 02/06/2020    medial branch block    NERVE BLOCK Bilateral 10/29/2020    intra articular     NERVE BLOCK Bilateral 10/29/2020    BILATERAL L4-5 AND L5-S1 INTRA-ARTICULAR FACET STEROID INJECTION (CPT 97663,66640) performed by Gregor Fernandez DO at [address] [facility] 11/25/2020    KYPHOPLASTY , L1 L2,BONE BIOPSY, EPIDURAL INJECTION performed by Sterling Zuniga MD at [address] N/A     d/t peptic ulcers       SUBJECTIVE:    Precautions: Ambulate patient  and Partial weight bearing 50% on operative leg., falls, alarm, hard of hearing and partial weight bearing right lower extremity ,  Knee immobilizer       Social history: Patient lives with spouse in a ranch home  with 1 + 2 steps  to enter with grab bar on one side  Walk in shower      Equipment owned: Thania Flores,       [address] [place] Mobility Inpatient   How much difficulty turning over in bed?: A Lot  How much difficulty sitting down on / standing up from a chair with arms?: A Lot  How much difficulty moving from lying on back to sitting on side of bed?: A Lot  How much help from another person moving to and from a bed to a chair?: A Lot  How much help from another person needed to walk in hospital room?: A Lot  How much help from another person for climbing 3-5 steps with a railing?: Total  AM-PAC Inpatient Mobility Raw Score : 11  AM-PAC Inpatient T-Scale Score : 33.86  Mobility Inpatient CMS 0-100% Score: 72.57  Mobility Inpatient CMS G-Code Modifier : CL    Nursing cleared patient for PT evaluation. The admitting diagnosis and active problem list as listed above have been reviewed prior to the initiation of this evaluation. OBJECTIVE;   Initial Evaluation  Date: 1/21/2022 Treatment Date:     Short Term/ Long Term   Goals   Was pt agreeable to Eval/treatment? Yes  To be met in 3 days   Pain level   Unable to rate however increased pain with movement         Bed Mobility    Rolling: Not assessed     Supine to sit: Maximal assist of 1    Sit to supine: Not assessed     Scooting: Maximal assist of 1    Rolling: Moderate assist of 1    Supine to sit: Moderate assist of 1    Sit to supine: Moderate assist of 1    Scooting:  Moderate assist of 1     Transfers Sit to stand: activity tolerance. stood with assist of 2, assisted up to chair. Therapeutic Exercises:  ankle pumps, quad sets, glut sets, hip abduction/adduction and straight leg raise  x 15 reps. At end of session, patient in chair with spouse present call light and phone within reach,  all lines and tubes intact, nursing notified. Patient would benefit from continued skilled Physical Therapy to improve functional independence and quality of life. Patient's/ family goals   rehab    Time in  0824  Time out  0855    Total Treatment Time  11 minutes    Evaluation time includes thorough review of current medical information, gathering information on past medical history/social history and prior level of function, completion of standardized testing/informal observation of tasks, assessment of data, and development of Plan of care and goals.      CPT codes:  Low Complexity PT evaluation (28809)  Therapeutic activities (12048)   11 minutes  1 unit(s)    Ismael Mcconnell, PT

## 2022-01-21 NOTE — H&P
I have reviewed the history and physical and examined the patient and find no relevant changes. I have reviewed with the patient and/or family the risks, benefits, and alternatives to the procedure. The patient was counseled at length about the risks of sheila Covid-19 during their perioperative period and any recovery window from their procedure. The patient was made aware that sheila Covid-19  may worsen their prognosis for recovering from their procedure  and lend to a higher morbidity and/or mortality risk. All material risks, benefits, and reasonable alternatives including postponing the procedure were discussed. The patient does wish to proceed with the procedure at this time.         Jung Sheehan DO  1/20/2022

## 2022-01-22 NOTE — PROGRESS NOTES
Physical Therapy Treatment Note/Plan of Care    Room #:  5433/8209-33  Patient Name: Mckayla Landrum  YOB: 1932  MRN: [id number]    Date of Service: 1/22/2022     Tentative placement recommendation: Subacute rehab  Equipment recommendation: To be determined      Evaluating Physical Therapist: Evelin Finch, PT #[id number]      Specific Provider Orders/Date/Referring Provider :  01/19/22 2315   PT eval and treat Start: 01/19/22 2315, End: 01/19/22 2315, ONE TIME, Standing Count: 1 Occurrences, R    Last continued at transfer on u Jan 20, 2022 11:57 PM   Shayna Lynn DO      Admitting Diagnosis:   Closed nondisplaced apophyseal fracture of right femur, initial encounter [facility]) [S72.134A]  Closed fracture of distal end of right femur, unspecified fracture morphology, initial encounter ([facility] [S72.401A]     walking outside and missed a step. She fell approximately 2-3 steps. Surgery:   Date of Procedure: 1/20/2022  Procedure(s):   RIGHT DISTAL FEMUR OPEN REDUCTION INTERNAL FIXATION (RETROGRADE NAIL)   Surgeon(s): Sobia Whyte DO    Visit Diagnoses       Codes    Closed fracture of distal end of right femur, unspecified fracture morphology, initial encounter [facility])    -  Primary S72.401A    Fracture     T14. 8XXA          Patient Active Problem List   Diagnosis    Atrial fibrillation ([facility]    Gastroesophageal reflux disease without esophagitis    Alzheimer's dementia without behavioral disturbance ([address]    Vitamin B 12 deficiency    Arthritis    Depression    Hyperlipidemia    Ambulatory dysfunction    Closed compression fracture of L2 lumbar vertebra, initial encounter [facility]    Closed fracture of distal end of right femur (HCC)    Leukemoid reaction    PVC (premature ventricular contraction)    Osteopenia after menopause    Closed nondisplaced apophyseal fracture of right femur ([address]    Open fracture of right distal femur (HCC)        ASSESSMENT of Current Deficits Patient exhibits decreased strength, balance, endurance, range of motion and pain right hip impairing functional mobility, transfers, gait , gait distance and tolerance to activity . Patient with knee immobilizer with partial weight bearing Right lower extremity, history of alzheimer's with difficulty following directions; oriented x 1. Pt needing maximal assist for bed mobility; moderate assist x 2 for sit to stand, minimal assist x 2 for heel/toe shuffle. Pt not able to tolerate weight shifting on right lower extremity in order to advance left lower extremity due to pain. Patient requires continued skilled physical therapy to address concerns listed above for increased safety and function at discharge. PHYSICAL THERAPY  PLAN OF CARE       Physical therapy plan of care is established based on physician order,  patient diagnosis and clinical assessment    Current Treatment Recommendations:    -Bed Mobility: Lower extremity exercises  and Trunk control activities   -Sitting Balance: Facilitate active trunk muscle engagement  and Facilitate postural control in all planes   -Standing Balance: Perform sit to stand activities maintaining PWB (partial weight bearing) weightbearing right lower extremity   -Transfers: Provide instruction on proper hand and foot position for adequate transfer of weight onto lower extremities and use of gait device if needed and Provide instruction on proper hand and left lower extremity placement to maintain PWB (partial weight bearing) weightbearing right lower extremity   -Gait: Use of Assistive device for PWB (partial weight bearing) weight bearing right lower extremity     -Endurance: Utilize Supervised activities to increase level of endurance to allow for safe functional mobility including transfers and gait     PT long term treatment goals are located in below grid    Patient and or family understand(s) diagnosis, prognosis, and plan of care.     Frequency of treatments: Patient will be seen daily.         Prior Level of Function: Patient ambulated independently    Rehab Potential: good  - for baseline    Past medical history:   Past Medical History:   Diagnosis Date    Arthritis     Atrial fibrillation ([address]     Calculus of gallbladder with acute cholecystitis without obstruction     Calculus of gallbladder with cholecystitis with biliary obstruction     Hyperlipidemia     Syncope 11/20/2019     Past Surgical History:   Procedure Laterality Date    ABDOMEN SURGERY      ANESTHESIA NERVE BLOCK Bilateral 2/6/2020    BILATERAL MEDIAL BRANCH L4-5 AND L5-S1 JOINTS performed [facility] Lasha Coyle DO [facility] [address] [facility], LAPAROSCOPIC N/A 11/21/2019    DIAGNOSITIC LAPAROSCOPY CONVERTED TO EXPLORATORY LAPAROTOMY WITH CLOSURE OF ENTEROTOMY X1, CHOLANGIOGRAM, EGD performed by Satnam Mejias MD at [facility] [address] [facility]  10/21/2013         FEMUR FRACTURE SURGERY Right 1/20/2022    RIGHT DISTAL FEMUR OPEN REDUCTION INTERNAL FIXATION (RETROGRADE NAIL) performed by Jackie Perry DO at [address] [facility] Right 12/13/2018    RIGHT SACROILIAC JOINT STEROID INJECTION UNDER X-RAY GUIDANCE performed by Lasha Coyle DO at [address] [facility] [address] [place] [address] Left 1/17/2019    LEFT SACROILIAC JOINT STEROID INJECTION UNDER X-RAY GUIDANCE performed by Lasha Coyle DO at [address] [facility] JOINT Bilateral 9/19/2019    BILATERAL SACROILIAC JOINT INJECTION X-RAY performed by Lasha Coyle DO at [facility] Right 12/13/2018    sacroiliac joint     NERVE BLOCK Left 01/17/2019    sacroiliac joint injection     NERVE BLOCK Bilateral 02/06/2020    medial branch block    NERVE BLOCK Bilateral 10/29/2020    intra articular     NERVE BLOCK Bilateral 10/29/2020    BILATERAL L4-5 AND L5-S1 INTRA-ARTICULAR FACET STEROID INJECTION (CPT 55597,56811) performed by [employer] david at [address] N/A 11/25/2020    KYPHOPLASTY , L1 L2,BONE BIOPSY, EPIDURAL INJECTION performed by Lc Simmons MD at [address] N/A     d/t peptic ulcers       SUBJECTIVE:    Precautions: Ambulate patient  and Partial weight bearing 50% on operative leg., falls, alarm, hard of hearing and partial weight bearing right lower extremity ,  Knee immobilizer       Social history: Patient lives with spouse in a ranch home  with 1 + 2 steps  to enter with grab bar on one side  Walk in shower      Equipment owned: [employer] and Tessa Stallworth,       [address]  Mobility Inpatient   How much difficulty turning over in bed?: A Lot  How much difficulty sitting down on / standing up from a chair with arms?: A Lot  How much difficulty moving from lying on back to sitting on side of bed?: A Lot  How much help from another person moving to and from a bed to a chair?: A Lot  How much help from another person needed to walk in hospital room?: A Lot  How much help from another person for climbing 3-5 steps with a railing?: Total  AM-PAC Inpatient Mobility Raw Score : 11  AM-PAC Inpatient T-Scale Score : 33.86  Mobility Inpatient CMS 0-100% Score: 72.57  Mobility Inpatient CMS G-Code Modifier : CL    Nursing cleared patient for PT treatment. .   OBJECTIVE;   Initial Evaluation  Date: 1/21/2022 Treatment Date:  1/22/2022       Short Term/ Long Term   Goals   Was pt agreeable to Eval/treatment?  Yes yes To be met in 3 days   Pain level   Unable to rate however increased pain with movement     No number given    Bed Mobility    Rolling: Not assessed     Supine to sit: Maximal assist of 1    Sit to supine: Not assessed     Scooting: Maximal assist of 1   Rolling: Maximal assist of 1   Supine to sit: Maximal assist of  2   Sit to supine: Maximal assist of  2 Scooting: Maximal assist of 1    Rolling: Moderate assist of 1    Supine to sit: Moderate assist of 1    Sit to supine: Moderate assist of 1    Scooting: Moderate assist of 1     Transfers Sit to stand: Maximal assist of  2 cues for upright   Sit to stand: Moderate assist of  2 Cues for hand placement and safety       Sit to stand:  Moderate assist of 1     Ambulation    4 feet using  wheeled walker with Moderate assist of  2   for walker control, balance, upright and partial weight bearing right lower extremity and cues for sequencing, PWB (partial weight bearing) weight bearing right lower extremity, upright posture, safety and pacing 2 x 2 heel toe steps using  wheeled walker with Minimal assist of 2   cues for sequencing, PWB (partial weight bearing) weight bearing right lower extremity, upright posture, increased base of support, increased step length, safety and pacing    15 feet using  wheeled walker with Moderate assist of 1 partial weight bearing Right lower extremity knee immobilizer    Stair negotiation: ascended and descended   Not assessed          ROM Within functional limits except right hip   Increase range of motion 10% of affected joints    Strength BUE:  refer to OT eval  RLE:  Hip/ankle 3/55  LLE:  3+/5  Increase strength in affected mm groups by 1/3 grade   Balance Sitting EOB:  fair offloads right hip cues for midline  Dynamic Standing:  fair wheeled walker  Sitting EOB: fair left lateral lean  Dynamic Standing: fair wheeled walker; assist x 2   Sitting EOB:  good    Dynamic Standing: fair +wheeled walker      Patient is Alert & Oriented x person and follows one step directions    Sensation:  Patient  denies numbness/tingling   Edema:  yes right lower extremity   Endurance: fair       Vitals: room air   Blood Pressure at rest  Blood Pressure during session    Heart Rate at rest  Heart Rate during session     SPO2 at rest %  SPO2 during session  %     Patient education  Patient educated on role of Physical Therapy, risks of immobility, safety and plan of care,  importance of mobility while in hospital , ankle pumps, quad set and glut set for edema control, blood clot prevention and weight bearing status      Patient response to education:   Pt verbalized understanding Pt demonstrated skill Pt requires further education in this area   Yes Partial Yes      Treatment:  Patient practiced and was instructed/facilitated in the following treatment: Patient performed assisted to edge of bed,    Sat edge of bed 15 minutes with Minimal assist of 1 to increase dynamic sitting balance and activity tolerance. Worked on sitting balance due to left lateral lean. Pt stood x 2 reps, she would heel/toe shuffle x 2, sit to rest, and repeated the sequence again. Pt returned to supine and performed supine exercises. Therapeutic Exercises:  ankle pumps, quad sets, glut sets, heel slide, hip abduction/adduction and straight leg raise,  x 15 reps. At end of session, patient in bed with alarm and spouse present call light and phone within reach,  all lines and tubes intact, nursing notified. Patient would benefit from continued skilled Physical Therapy to improve functional independence and quality of life. Patient's/ family goals   rehab    Time in 9:20  Time out 9:45    Total Treatment Time  25 minutes      CPT codes:    Therapeutic activities (98241)   16 minutes  1 unit(s)  Therapeutic exercises (99589)   9 minutes  1 unit(s)   Carlos Sosa  [facility]  LIC # [id number]

## 2022-01-22 NOTE — PROGRESS NOTES
[address] [place], [place] [address]   Progress Note    Admitting Date and Time: 1/19/2022  2:47 PM  Admit Dx: Closed nondisplaced apophyseal fracture of right femur, initial encounter ([facility] [S72.134A]  Closed fracture of distal end of right femur, unspecified fracture morphology, initial encounter ([facility] [S72.401A]    Subjective:    Pt sitting up in bed pleasantly confused. Patient denies any complaints of pain at this time. ROS: denies fever, chills, cp, sob, n/v, HA unless stated above.      apixaban  2.5 mg Oral BID    sodium chloride flush  5-40 mL IntraVENous 2 times per day    Vitamin D  2,000 Units Oral Daily    citalopram  10 mg Oral QAM    donepezil  10 mg Oral Nightly    memantine  10 mg Oral BID    metoprolol tartrate  25 mg Oral BID     oxyCODONE-acetaminophen, 1 tablet, Q6H PRN  senna, 1 tablet, Daily PRN  labetalol, 5 mg, Q10 Min PRN  sodium chloride flush, 5-40 mL, PRN  sodium chloride, 25 mL, PRN  ondansetron, 4 mg, Q8H PRN   Or  ondansetron, 4 mg, Q6H PRN  polyethylene glycol, 17 g, Daily PRN  acetaminophen, 650 mg, Q6H PRN   Or  acetaminophen, 650 mg, Q6H PRN  morphine, 2 mg, Q2H PRN   Or  morphine, 1 mg, Q2H PRN         Objective:    /62   Pulse 100   Temp 98.3 °F (36.8 °C)   Resp 16   Wt 119 lb 4.8 oz (54.1 kg)   SpO2 94%   BMI 19.26 kg/m²   General Appearance: alert and oriented to person, but confused to place and time and in no acute distress  Skin: warm and dry, hip incision dressing intact   Head: normocephalic and atraumatic  Eyes: pupils equal, round, and reactive to light, extraocular eye movements intact, conjunctivae normal, wears glasses   Neck: neck supple and non tender without mass   Pulmonary/Chest: diminished to auscultation bilaterally, normal air movement, no respiratory distress  Cardiovascular: abnormal rate and rhythm irregular  Abdomen: soft, non-tender, non-distended, normal bowel sounds   Extremities: no cyanosis, no clubbing and no edema  Neurologic: unable to evaluate d/t history of Alzheimer's and speech normal       Recent Labs     01/20/22  0524 01/21/22  1550 01/22/22  0448    142 141   K 4.4 4.8 4.5    105 105   CO2 26 26 24   BUN 15 26* 25*   CREATININE 0.8 1.0 0.9   GLUCOSE 124* 126* 124*   CALCIUM 8.7 9.0 8.8       Recent Labs     01/19/22  2230 01/21/22  1550 01/22/22  0448   ALKPHOS 90 83 79   PROT 6.1* 5.9* 5.8*   LABALBU 3.6 3.2* 3.0*   BILITOT 0.4 0.3 0.4   AST 39* 20 20   ALT 17 11 9       Recent Labs     01/20/22  0524 01/21/22  1550 01/22/22  0448   WBC 10.4 14.4* 11.8*   RBC 3.85 3.58 3.54   HGB 12.1 11.1* 10.9*   HCT 37.6 35.4 35.0   MCV 97.7 98.9 98.9   MCH 31.4 31.0 30.8   MCHC 32.2 31.4* 31.1*   RDW 13.2 13.5 13.5    247 226   MPV 10.1 10.7 10.5        Radiology:   Fluoro For Surgical Procedures   Final Result   Intraprocedural fluoroscopic spot images as above. See separate procedure   report for more information. XR CHEST PORTABLE   Final Result   Clear chest, no acute findings. XR FEMUR RIGHT (MIN 2 VIEWS)   Final Result   Oblique nondisplaced fracture of distal femoral shaft, as above. CT KNEE RIGHT WO CONTRAST   Final Result   1. NONDISPLACED FRACTURES of the distal femoral shaft, extending to the level   of the lateral patellofemoral joint. The no extension into the femoral   condyles or the tibiofemoral joint. 2. Demineralized bones. RECOMMENDATIONS:   Unavailable         CT HEAD WO CONTRAST   Final Result   1. There is no acute intracranial abnormality. Specifically, there is no   intracranial hemorrhage. 2. Atrophy and periventricular leukomalacia,   . XR KNEE RIGHT (1-2 VIEWS)   Final Result   1. There is no fracture or dislocation of the right knee   2. Degenerative changes the right knee. XR SHOULDER RIGHT (MIN 2 VIEWS)   Final Result   No acute fractures or dislocations in the right shoulder.              Assessment:  Principal Problem: Closed fracture of distal end of right femur ([address]  Active Problems:    Atrial fibrillation (HCC)    Alzheimer's dementia without behavioral disturbance (HCC)    Depression    Hyperlipidemia    Ambulatory dysfunction    Leukemoid reaction    PVC (premature ventricular contraction)    Osteopenia after menopause    Closed nondisplaced apophyseal fracture of right femur (HCC)    Open fracture of right distal femur (HCC)  Resolved Problems:    * No resolved hospital problems. *      Plan:    1. Closed right femur fracture - fall while at home -  orthopedic surgery on - S/p right distal femur ORIF - PT/OT on - percocet for pain - DVT prophylaxis Eliquis - planning to go rehab       2. Atrial fibrillation - appears to be in atrial fibrillation - rate borderline elevated in 100's - cardiology following - troponin negative - no chest pain or pressure - Apixaban resumed - Metoprolol tartrate - no chest pain or dyspnea noted     3. Hyperlipidemia - regular diet      4. Ambulatory dysfunction - S/p fall - PT/OT - falls precaution maintained       5. Alzheimer's/dementia - controlled with medication - confused       6. Depression - pleasant and cooperative - continue current medication       Disposition - planning to discharge to acute rehab for PT/OT    NOTE: This report was transcribed using voice recognition software. Every effort was made to ensure accuracy; however, inadvertent computerized transcription errors may be present.      Electronically signed by HENRRY Castillo CNP on 1/22/2022 at 10:21 AM

## 2022-01-22 NOTE — PROGRESS NOTES
Department of Orthopedic Surgery  Resident Progress Note    Patient seen and examined. Pain controlled. No new complaints. Denies chest pain, shortness of breath, calf pain, dizziness/lightheadedness. -BM, - flatulence    VITALS:  /62   Pulse 100   Temp 98.3 °F (36.8 °C)   Resp 16   Wt 119 lb 4.8 oz (54.1 kg)   SpO2 94%   BMI 19.26 kg/m²     GENERAL: awake and alert  MUSCULOSKELETAL:   right lower extremity:  · Dressing C/D/I  · Knee immobilizer in place  · Compartments soft and compressible, calf non-tender  · Palpable dorsalis pedis and posterior tibialis pulse, brisk cap refill to toes, foot warm and perfused  · Sensation intact to light touch in sural/deep peroneal/superficial peroneal/saphenous/posterior tibial nerve distributions to foot/ankle. · Demonstrates active ankle plantar/dorsiflexion/great toe extension    CBC:   Lab Results   Component Value Date    WBC 11.8 01/22/2022    HGB 10.9 01/22/2022    HCT 35.0 01/22/2022     01/22/2022       ASSESSMENT  · S/p R IMN for DF fx 1/20    PLAN    PWB RLE  Pain control IV & PO  DVT prophylaxis- eliquis, early mobilization  Monitor Labs  Bowel regiment  Pulmonary hygiene   Trend H&H- 10.9, vitals stable   24 hr post op ABX-completed   PT/OT  D/C planning, SW/PT recs, pt.  W/ dementia, per last  note plan for SNF to be discussed Monday   Discuss with attending

## 2022-01-23 NOTE — PROGRESS NOTES
Department of Orthopedic Surgery  Resident Progress Note    Patient seen and examined. Pain controlled. No new complaints. Denies chest pain, shortness of breath, calf pain, dizziness/lightheadedness. -BM, - flatulence. Patient work with physical therapy yesterday. 11/24 AM PAC    The patient's  was in the room during my visit today. All questions and concerns were answered from both parties. VITALS:  BP (!) 141/77   Pulse 64   Temp 98.2 °F (36.8 °C) (Oral)   Resp 20   Wt 119 lb 4.8 oz (54.1 kg)   SpO2 93%   BMI 19.26 kg/m²     GENERAL: awake and alert, resting comfortably in the chair  MUSCULOSKELETAL:   right lower extremity:  · Dressing C/D/I  · Knee immobilizer in place  · Compartments soft and compressible, calf non-tender  · Palpable dorsalis pedis and posterior tibialis pulse, brisk cap refill to toes, foot warm and perfused  · Sensation intact to light touch in sural/deep peroneal/superficial peroneal/saphenous/posterior tibial nerve distributions to foot/ankle. · Demonstrates active ankle plantar/dorsiflexion/great toe extension    CBC:   Lab Results   Component Value Date    WBC 8.7 01/23/2022    HGB 10.0 01/23/2022    HCT 31.2 01/23/2022     01/23/2022       ASSESSMENT  · S/p R IMN for DF fx 1/20    PLAN    PWB RLE  Pain control IV & PO  DVT prophylaxis- eliquis, early mobilization  Monitor Labs  Bowel regiment  Pulmonary hygiene   Trend H&H- 10.9, vitals stable   24 hr post op ABX-completed   PT/OT  D/C planning, SW/PT recs, pt.  W/ dementia, per last  note plan for SNF to be discussed Monday   Discuss with attending

## 2022-01-23 NOTE — PROGRESS NOTES
[address] [place], [place] [address]   Progress Note    Admitting Date and Time: 1/19/2022  2:47 PM  Admit Dx: Closed nondisplaced apophyseal fracture of right femur, initial encounter ([facility] [S72.134A]  Closed fracture of distal end of right femur, unspecified fracture morphology, initial encounter ([facility] [S72.401A]    Subjective:    Pt resting in bed, no complaints. Spoke with bedside RN, patient hasn't had BM, daughter was concerned IV was causing patient discomfort so IV was removed. Per RN right arm edema/erythema site where IV was removed. ROS: denies fever, chills, cp, sob, n/v, HA unless stated above.      apixaban  2.5 mg Oral BID    sodium chloride flush  5-40 mL IntraVENous 2 times per day    Vitamin D  2,000 Units Oral Daily    citalopram  10 mg Oral QAM    donepezil  10 mg Oral Nightly    memantine  10 mg Oral BID    metoprolol tartrate  25 mg Oral BID     oxyCODONE-acetaminophen, 1 tablet, Q6H PRN  senna, 1 tablet, Daily PRN  labetalol, 5 mg, Q10 Min PRN  sodium chloride flush, 5-40 mL, PRN  sodium chloride, 25 mL, PRN  ondansetron, 4 mg, Q8H PRN   Or  ondansetron, 4 mg, Q6H PRN  polyethylene glycol, 17 g, Daily PRN  acetaminophen, 650 mg, Q6H PRN   Or  acetaminophen, 650 mg, Q6H PRN  morphine, 2 mg, Q2H PRN   Or  morphine, 1 mg, Q2H PRN         Objective:    BP (!) 140/75   Pulse 91   Temp 98.9 °F (37.2 °C) (Oral)   Resp 18   Wt 119 lb 4.8 oz (54.1 kg)   SpO2 93%   BMI 19.26 kg/m²   General Appearance: oriented to person only, NAD, pleasant and cooperative  Skin: warm and dry, hip incision dressing intact   Head: normocephalic and atraumatic  Eyes: PERRL, EOMI, conjunctivae normal, wears glasses   Neck: no JVD, trachea midline   Pulmonary/Chest: clear to auscultation bilaterally, unlabored respirations, no respiratory distress  Cardiovascular: abnormal rate and rhythm irregular  Abdomen: soft, non-tender, non-distended, normal bowel sounds   Extremities: no cyanosis, no clubbing and no edema  Neurologic: no focal deficit       Recent Labs     01/20/22  0524 01/21/22  1550 01/22/22  0448    142 141   K 4.4 4.8 4.5    105 105   CO2 26 26 24   BUN 15 26* 25*   CREATININE 0.8 1.0 0.9   GLUCOSE 124* 126* 124*   CALCIUM 8.7 9.0 8.8       Recent Labs     01/21/22  1550 01/22/22  0448   ALKPHOS 83 79   PROT 5.9* 5.8*   LABALBU 3.2* 3.0*   BILITOT 0.3 0.4   AST 20 20   ALT 11 9       Recent Labs     01/20/22  0524 01/21/22  1550 01/22/22  0448   WBC 10.4 14.4* 11.8*   RBC 3.85 3.58 3.54   HGB 12.1 11.1* 10.9*   HCT 37.6 35.4 35.0   MCV 97.7 98.9 98.9   MCH 31.4 31.0 30.8   MCHC 32.2 31.4* 31.1*   RDW 13.2 13.5 13.5    247 226   MPV 10.1 10.7 10.5        Radiology:   Fluoro For Surgical Procedures   Final Result   Intraprocedural fluoroscopic spot images as above. See separate procedure   report for more information. XR CHEST PORTABLE   Final Result   Clear chest, no acute findings. XR FEMUR RIGHT (MIN 2 VIEWS)   Final Result   Oblique nondisplaced fracture of distal femoral shaft, as above. CT KNEE RIGHT WO CONTRAST   Final Result   1. NONDISPLACED FRACTURES of the distal femoral shaft, extending to the level   of the lateral patellofemoral joint. The no extension into the femoral   condyles or the tibiofemoral joint. 2. Demineralized bones. RECOMMENDATIONS:   Unavailable         CT HEAD WO CONTRAST   Final Result   1. There is no acute intracranial abnormality. Specifically, there is no   intracranial hemorrhage. 2. Atrophy and periventricular leukomalacia,   . XR KNEE RIGHT (1-2 VIEWS)   Final Result   1. There is no fracture or dislocation of the right knee   2. Degenerative changes the right knee. XR SHOULDER RIGHT (MIN 2 VIEWS)   Final Result   No acute fractures or dislocations in the right shoulder.              Assessment:  Principal Problem:    Closed fracture of distal end of right femur [facility])  Active Problems:    Atrial fibrillation ([address]    Alzheimer's dementia without behavioral disturbance (HCC)    Depression    Hyperlipidemia    Ambulatory dysfunction    Leukemoid reaction    PVC (premature ventricular contraction)    Osteopenia after menopause    Closed nondisplaced apophyseal fracture of right femur (HCC)    Open fracture of right distal femur (HCC)  Resolved Problems:    * No resolved hospital problems. *      Plan:    1. Closed right femur fracture - fall while at home -  orthopedic surgery following - S/p right distal femur ORIF on 1/20- PT/OT eval/treat - percocet for pain - DVT prophylaxis Eliquis - planning to go rehab       2. Atrial fibrillation - rate controlled  - cardiology signed off - troponin negative - no chest pain or pressure - Apixaban resumed - Metoprolol tartrate - no chest pain or dyspnea      3. Hyperlipidemia - regular diet      4. Ambulatory dysfunction - S/p fall - PT/OT - fall precaution maintained       5. Alzheimer's/dementia - continue home Namenda/Aricept - oriented to person only       6. Depression - pleasant and cooperative - continue current medication     7. Hypertension. Start amlodipine 5mg daily. Disposition - planning to discharge to sub acute rehab for PT/OT, [facility] had no beds, SW will address tomorrow when insurance able to verify    NOTE: This report was transcribed using voice recognition software. Every effort was made to ensure accuracy; however, inadvertent computerized transcription errors may be present.      Electronically signed by Robin Burt DO on 1/23/2022 at 3:28 AM

## 2022-01-23 NOTE — PLAN OF CARE
Problem: Falls - Risk of:  Goal: Will remain free from falls  Outcome: Met This Shift     Problem: Falls - Risk of:  Goal: Absence of physical injury  Outcome: Met This Shift     Problem: Pain:  Goal: Pain level will decrease  Outcome: Met This Shift     Problem: Pain:  Goal: Control of acute pain  Outcome: Met This Shift     Problem: Pain:  Goal: Control of chronic pain  Outcome: Met This Shift     Problem: Skin Integrity:  Goal: Will show no infection signs and symptoms  Outcome: Met This Shift     Problem: Skin Integrity:  Goal: Absence of new skin breakdown  Outcome: Met This Shift

## 2022-01-23 NOTE — PROGRESS NOTES
Physical Therapy Treatment Note/Plan of Care    Room #:  5831/2752-79  Patient Name: Chrsitopher Mark  YOB: 1932  MRN: [id number]    Date of Service: 1/23/2022     Tentative placement recommendation: Subacute rehab  Equipment recommendation: To be determined      Evaluating Physical Therapist: Clemente Chase, PT #[id number]      Specific Provider Orders/Date/Referring Provider :  01/19/22 2315   PT eval and treat Start: 01/19/22 2315, End: 01/19/22 2315, ONE TIME, Standing Count: 1 Occurrences, R    Last continued at transfer on u Jan 20, 2022 11:57 PM   Yen Standing, DO    Admitting Diagnosis:   Closed nondisplaced apophyseal fracture of right femur, initial encounter [facility]) [S72.134A]  Closed fracture of distal end of right femur, unspecified fracture morphology, initial encounter ([facility] [S72.401A]     walking outside and missed a step. She fell approximately 2-3 steps. Surgery:   Date of Procedure: 1/20/2022  Procedure(s):   RIGHT DISTAL FEMUR OPEN REDUCTION INTERNAL FIXATION (RETROGRADE NAIL)   Surgeon(s): Roshni Miramontes,     Visit Diagnoses       Codes    Closed fracture of distal end of right femur, unspecified fracture morphology, initial encounter [facility])    -  Primary S72.401A    Fracture     T14. 8XXA          Patient Active Problem List   Diagnosis    Atrial fibrillation ([facility]    Gastroesophageal reflux disease without esophagitis    Alzheimer's dementia without behavioral disturbance ([address]    Vitamin B 12 deficiency    Arthritis    Depression    Hyperlipidemia    Ambulatory dysfunction    Closed compression fracture of L2 lumbar vertebra, initial encounter ([facility]    Closed fracture of distal end of right femur (HCC)    Leukemoid reaction    PVC (premature ventricular contraction)    Osteopenia after menopause    Closed nondisplaced apophyseal fracture of right femur ([address]    Open fracture of right distal femur (HCC)      ASSESSMENT of Current Deficits Patient exhibits decreased strength, balance, endurance, range of motion and pain right hip impairing functional mobility, transfers, gait , gait distance and tolerance to activity . Patient with knee immobilizer with partial weight bearing right lower extremity, history of alzheimer's with difficulty following directions; oriented x 1. Pt needing maximal assist for bed mobility; moderate assist of 2 for sit to stand transfers and minimal assist of 2 for heel/toe shuffle to chair. Pt not able to tolerate weight shifting on right lower extremity in order to advance left lower extremity due to pain. Patient requires continued skilled physical therapy to address concerns listed above for increased safety and function at discharge. PHYSICAL THERAPY  PLAN OF CARE     Physical therapy plan of care is established based on physician order,  patient diagnosis and clinical assessment    Current Treatment Recommendations:  -Bed Mobility: Lower extremity exercises  and Trunk control activities   -Sitting Balance: Facilitate active trunk muscle engagement  and Facilitate postural control in all planes   -Standing Balance: Perform sit to stand activities maintaining PWB (partial weight bearing) weightbearing right lower extremity   -Transfers: Provide instruction on proper hand and foot position for adequate transfer of weight onto lower extremities and use of gait device if needed and Provide instruction on proper hand and left lower extremity placement to maintain PWB (partial weight bearing) weightbearing right lower extremity   -Gait: Use of Assistive device for PWB (partial weight bearing) weight bearing right lower extremity     -Endurance: Utilize Supervised activities to increase level of endurance to allow for safe functional mobility including transfers and gait     PT long term treatment goals are located in below grid    Patient and or family understand(s) diagnosis, prognosis, and plan of care.     Frequency of treatments: Patient will be seen  daily.      Prior Level of Function: Patient ambulated independently    Rehab Potential: good  - for baseline    Past medical history:   Past Medical History:   Diagnosis Date    Arthritis     Atrial fibrillation ([address]     Calculus of gallbladder with acute cholecystitis without obstruction     Calculus of gallbladder with cholecystitis with biliary obstruction     Hyperlipidemia     Syncope 11/20/2019     Past Surgical History:   Procedure Laterality Date    ABDOMEN SURGERY      ANESTHESIA NERVE BLOCK Bilateral 2/6/2020    BILATERAL MEDIAL BRANCH L4-5 AND L5-S1 JOINTS performed by Shaunna Valentine DO at [address] [facility], LAPAROSCOPIC N/A 11/21/2019    DIAGNOSITIC LAPAROSCOPY CONVERTED TO EXPLORATORY LAPAROTOMY WITH CLOSURE OF ENTEROTOMY X1, CHOLANGIOGRAM, EGD performed by Randell Acosta MD at [address] [facility] ECHO COMPL [address]  10/21/2013         FEMUR FRACTURE SURGERY Right 1/20/2022    RIGHT DISTAL FEMUR OPEN REDUCTION INTERNAL FIXATION (RETROGRADE NAIL) performed by Daniella Archuleta DO at [facility],[address] [place] [address] Right 12/13/2018    RIGHT SACROILIAC JOINT STEROID INJECTION UNDER X-RAY GUIDANCE performed by Shaunna Valentine DO at [facility] [address] [place] [address] Left 1/17/2019    LEFT SACROILIAC JOINT STEROID INJECTION UNDER X-RAY GUIDANCE performed by Shaunna Valentine DO at [facility] Bilateral 9/19/2019    BILATERAL SACROILIAC JOINT INJECTION X-RAY performed by Shaunna Valentine DO at [facility] Right 12/13/2018    sacroiliac joint     NERVE BLOCK Left 01/17/2019    sacroiliac joint injection     NERVE BLOCK Bilateral 02/06/2020    medial branch block    NERVE BLOCK Bilateral 10/29/2020    intra articular     NERVE BLOCK Bilateral 10/29/2020    BILATERAL L4-5 AND L5-S1 INTRA-ARTICULAR FACET STEROID INJECTION (CPT 63483,74923) performed by Lloyd Landis DO at [address] N/A 11/25/2020    KYPHOPLASTY , L1 L2,BONE BIOPSY, EPIDURAL INJECTION performed by Alberto Packer MD at [address] N/A     d/t peptic ulcers       SUBJECTIVE:  Precautions: Ambulate patient  and Partial weight bearing 50% on operative leg., falls, alarm, hard of hearing and partial weight bearing right lower extremity,  Knee immobilizer       Social history: Patient lives with spouse in a ranch home  with 1 + 2 steps  to enter with grab bar on one side  Walk in shower      Equipment owned: Florinda Moreno and She[address] Mobility Inpatient   How much difficulty turning over in bed?: A Lot  How much difficulty sitting down on / standing up from a chair with arms?: A Lot  How much difficulty moving from lying on back to sitting on side of bed?: A Lot  How much help from another person moving to and from a bed to a chair?: A Lot  How much help from another person needed to walk in hospital room?: A Lot  How much help from another person for climbing 3-5 steps with a railing?: Total  AM-PAC Inpatient Mobility Raw Score : 11  AM-PAC Inpatient T-Scale Score : 33.86  Mobility Inpatient CMS 0-100% Score: 72.57  Mobility Inpatient CMS G-Code Modifier : CL    Nursing cleared patient for PT treatment. . Patient's  was present. Patient was medicated for pain during treatment. OBJECTIVE;   Initial Evaluation  Date: 1/21/2022 Treatment Date:  1/23/2022       Short Term/ Long Term   Goals   Was pt agreeable to Eval/treatment?  Yes yes To be met in 3 days   Pain level   Unable to rate however increased pain with movement     9/10 pain in right lower extremity    Bed Mobility    Rolling: Not assessed     Supine to sit: Maximal assist of 1    Sit to supine: Not assessed     Scooting: Maximal assist of 1   Rolling: Not assessed    Supine to sit: Maximal assist of 1   Sit to supine: Not assessed    Scooting: Maximal assist of 1    Rolling: Moderate assist of 1    Supine to sit: Moderate assist of 1    Sit to supine: Moderate assist of 1    Scooting: Moderate assist of 1     Transfers Sit to stand: Maximal assist of  2 cues for upright   Sit to stand: Moderate assist of  2 with cues for hand placement and safety       Sit to stand:  Moderate assist of 1     Ambulation    4 feet using  wheeled walker with Moderate assist of  2   for walker control, balance, upright and partial weight bearing right lower extremity and cues for sequencing, PWB (partial weight bearing) weight bearing right lower extremity, upright posture, safety and pacing 2 x 2 heel toe steps using  wheeled walker with Minimal assist of 2   cues for sequencing, PWB (partial weight bearing) weight bearing right lower extremity, upright posture, increased base of support, increased step length, safety and pacing    15 feet using  wheeled walker with Moderate assist of 1 partial weight bearing Right lower extremity knee immobilizer    Stair negotiation: ascended and descended   Not assessed          ROM Within functional limits except right hip   Increase range of motion 10% of affected joints    Strength BUE:  refer to OT eval  RLE:  Hip/ankle 3/55  LLE:  3+/5  Increase strength in affected mm groups by 1/3 grade   Balance Sitting EOB:  fair offloads right hip cues for midline  Dynamic Standing:  fair wheeled walker  Sitting EOB: fair- due to left lateral lean  Dynamic Standing: fair- using wheeled walker and assist x 2   Sitting EOB:  good    Dynamic Standing: fair +wheeled walker      Patient is Alert & Oriented x person and follows one step directions    Sensation:  Patient  denies numbness/tingling   Edema:  yes right lower extremity   Endurance: fair       Vitals: room air   Blood Pressure at rest  Blood Pressure during session    Heart Rate at rest Heart Rate during session     SPO2 at rest %  SPO2 during session  %     Patient education  Patient educated on role of Physical Therapy, risks of immobility, safety and plan of care,  importance of mobility while in hospital , ankle pumps, quad set and glut set for edema control, blood clot prevention and weight bearing status      Patient response to education:   Pt verbalized understanding Pt demonstrated skill Pt requires further education in this area   Yes Partial Yes      Treatment: Patient practiced and was instructed/facilitated in the following treatment: Patient assisted to edge of bed. Sat edge of bed 15 minutes with Minimal assist of 1 to increase dynamic sitting balance and activity tolerance. Worked on sitting balance due to left lateral lean. Pt stood x 2 reps and heel/toe shuffled to chair. Therapeutic Exercises: not performed    At end of session, patient in chair with spouse present call light and phone within reach, all lines and tubes intact, nursing notified. Patient would benefit from continued skilled Physical Therapy to improve functional independence and quality of life.        Patient's/ family goals   rehab    Time in 0922  Time out 0949    Total Treatment Time 27 minutes      CPT codes:  Therapeutic activities (09316)   14 minutes  1 unit(s)  Gait Training (07686) 13 minutes 1 unit(s)     Soila Flank, PTA   [facility]# [id number]

## 2022-01-24 NOTE — CARE COORDINATION
CM note: [address] [place] [address] and rehab interested in patient, have not officially accepted yet but liaison anticipates DON will. Requesting rapid covid test (ordered) and also states insurance provider has extended waiver so Yeni Balo is required. Will NEED updated OT note however and this was requested by CM.   Awaiting official acceptance by [address] [place] [address] and rehab

## 2022-01-24 NOTE — CARE COORDINATION
LVM for  Marleny Fitting explaining IMM letter and requested a call back.  Electronically signed by Otilia Capellan on 1/24/2022 at 8:52 AM

## 2022-01-24 NOTE — PROGRESS NOTES
Department of Orthopedic Surgery  Resident Progress Note    Patient seen and examined. Pain controlled. No new complaints. Denies chest pain, shortness of breath, calf pain, dizziness/lightheadedness. -BM, - flatulence. Patient work with physical therapy yesterday. 11/24 AM PAC. VITALS:  BP (!) 143/76   Pulse 80   Temp 98.5 °F (36.9 °C) (Oral)   Resp 18   Wt 119 lb 4.8 oz (54.1 kg)   SpO2 97%   BMI 19.26 kg/m²     GENERAL: awake and alert, resting comfortably in the chair  MUSCULOSKELETAL:   right lower extremity:  · Dressing C/D/I  · Knee immobilizer in place  · Compartments soft and compressible, calf non-tender  · Palpable dorsalis pedis and posterior tibialis pulse, brisk cap refill to toes, foot warm and perfused  · Sensation intact to light touch in sural/deep peroneal/superficial peroneal/saphenous/posterior tibial nerve distributions to foot/ankle. · Demonstrates active ankle plantar/dorsiflexion/great toe extension    CBC:   Lab Results   Component Value Date    WBC 8.7 01/23/2022    HGB 10.0 01/23/2022    HCT 31.2 01/23/2022     01/23/2022       ASSESSMENT  · S/p R IMN for DF fx 1/20    PLAN    PWB RLE  Pain control IV & PO  DVT prophylaxis- eliquis, early mobilization  Monitor Labs  Bowel regiment  Pulmonary hygiene   Trend H&H- 10.9 yesterday, vitals stable   24 hr post op ABX-completed   PT/OT  D/C planning, SW/PT recs, pt.  W/ dementia, per last  note plan for SNF to be discussed Monday [id number] [address] for DC from an orthopedic standpoint, we will continue to follow from the periphery, please reach out with any questions or concerns  Discuss with attending

## 2022-01-24 NOTE — DISCHARGE SUMMARY
[facility] Physician Discharge Summary       Ronel Valencia DO  [address]  [phone]    In 2 weeks      [place] [id number] [facility]. [address]        Angelic Nielson, 23 [facility] [address]  [facility] 160 [id number]    Schedule an appointment as soon as possible for a visit        Activity level: Partial weightbearing as tolerated to the right lower extremity    Diet: ADULT DIET; Regular    Labs: CBC twice a week and BMP weekly    Condition at discharge: Stable    Dispo: Discharged to SNF ([facility])        Patient ID:  Jed Wong  [id number]  80 y.o.  8/27/1932    Admit date: 1/19/2022    Discharge date and time:  1/24/2022  2:42 PM    Admission Diagnoses: Principal Problem:    Closed fracture of distal end of right femur ([address]  Active Problems:    Atrial fibrillation ([address]    Alzheimer's dementia without behavioral disturbance ([address]    Depression    Hyperlipidemia    Ambulatory dysfunction    Leukemoid reaction    PVC (premature ventricular contraction)    Osteopenia after menopause    Closed nondisplaced apophyseal fracture of right femur (HCC)    Open fracture of right distal femur (HCC)  Resolved Problems:    * No resolved hospital problems. *      Discharge Diagnoses: Principal Problem:    Closed fracture of distal end of right femur ([address]  Active Problems:    Atrial fibrillation (HCC)    Alzheimer's dementia without behavioral disturbance (HCC)    Depression    Hyperlipidemia    Ambulatory dysfunction    Leukemoid reaction    PVC (premature ventricular contraction)    Osteopenia after menopause    Closed nondisplaced apophyseal fracture of right femur (HCC)    Open fracture of right distal femur (HCC)  Resolved Problems:    * No resolved hospital problems.  *      Consults:  IP CONSULT TO ORTHOPEDIC SURGERY  IP CONSULT TO PRIMARY CARE PROVIDER  IP CONSULT TO ANESTHESIOLOGY  IP CONSULT TO CARDIOLOGY  IP CONSULT TO CASE MANAGEMENT    Procedures: Right distal femur open reduction internal fixation on 1/20/2022    Hospital Course: Rowena Sheikh is an 70-year-old female with a history of A. fib on Eliquis, hyperlipidemia, Alzheimer's dementia that presented to the emergency department with mechanical fall down 2-3 steps outside after missing a step. CT of her head showed no acute intercranial abnormality. She complained of pain on the right side. X-ray of the right shoulder was negative for acute fractures or dislocations but did show multiple healing right-sided rib fractures. X-ray of the right knee showed no fracture dislocation of the right knee. X-ray of the right femur showed an oblique nondisplaced fracture of the distal femoral shaft. CT of the right knee showed a nondisplaced fracture of the distal femoral shaft, extending to the level of the lateral patellofemoral joint. Orthopedic Surgery was consulted. Her Eliquis was placed on hold. Cardiology was consulted for surgical clearance and patient was cleared for surgery. On 1/20/2022 she had a right distal femur open reduction internal fixation. She received Ancef postoperatively. Her Eliquis was resumed postoperatively. PT OT evaluate the patient and recommended rehab at discharge. Wound care did evaluate the patient and the patient was found to have a deep tissue injury of her coccyx area. Wound care dressing orders are in place. She initially wanted to go to [address] however the facility was able to take her at this time. Her discharge was held until Monday, 1/24/2022 when a new facility could be chosen and information submitted to her insurance. She and her  decided on [facility] and the patient was accepted for admission. Her insurance did waive her pre-CERT and she will be transferred today.   She is partial weightbearing to that right lower extremity and has a knee immobilizer on. She will need to follow-up with orthopedic surgery 2 weeks postoperatively. Discharge Exam:  Vitals:    01/24/22 0020 01/24/22 0500 01/24/22 0950 01/24/22 0952   BP:  (!) 143/76 119/66    Pulse:  80 100 100   Resp:  18 20    Temp:  98.5 °F (36.9 °C) 98.5 °F (36.9 °C)    TempSrc:  Oral Oral    SpO2: 97%      Weight:         General Appearance: alert and oriented to person, place and time and in no acute distress, hard of hearing  Skin: warm and dry  Head: normocephalic and atraumatic  Eyes: pupils equal, round, and reactive to light, conjunctivae normal  Neck: neck supple and non tender without mass   Pulmonary/Chest: diminished bilaterally, no respiratory distress  Cardiovascular: normal rate, normal S1 and S2  Abdomen: soft, non-tender, non-distended, normal bowel sounds  Extremities: no cyanosis, no clubbing, dressing right lower extremity and knee immobilizer  Neurologic: no tremor and speech normal    I/O last 3 completed shifts: In: 720 [P.O.:720]  Out: 950 [Urine:950]  I/O this shift:  In: 660 [P.O.:660]  Out: 300 [Urine:300]      LABS:  Recent Labs     01/21/22  1550 01/22/22  0448 01/23/22  0518    141 137   K 4.8 4.5 4.0    105 104   CO2 26 24 25   BUN 26* 25* 17   CREATININE 1.0 0.9 0.7   GLUCOSE 126* 124* 116*   CALCIUM 9.0 8.8 8.5*       Recent Labs     01/21/22  1550 01/22/22  0448 01/23/22  0518   WBC 14.4* 11.8* 8.7   RBC 3.58 3.54 3.16*   HGB 11.1* 10.9* 10.0*   HCT 35.4 35.0 31.2*   MCV 98.9 98.9 98.7   MCH 31.0 30.8 31.6   MCHC 31.4* 31.1* 32.1   RDW 13.5 13.5 13.5    226 228   MPV 10.7 10.5 10.5       No results for input(s): POCGLU in the last 72 hours. Imaging:  XR SHOULDER RIGHT (MIN 2 VIEWS)    Result Date: 1/19/2022  EXAMINATION: THREE XRAY VIEWS OF THE RIGHT SHOULDER 1/19/2022 3:32 pm COMPARISON: None.  HISTORY: ORDERING SYSTEM PROVIDED HISTORY: fall TECHNOLOGIST PROVIDED HISTORY: Reason for exam:->fall FINDINGS: Glenohumeral joint is normally aligned. No evidence of acute fracture or dislocation. No abnormal periarticular calcifications. Moderate severe osteoarthrosis of the right acromioclavicular joint. Visualized lung is unremarkable. Multiple healing right-sided rib fractures identified. No acute fractures or dislocations in the right shoulder. XR FEMUR RIGHT (MIN 2 VIEWS)    Result Date: 1/19/2022  EXAMINATION: AP and lateral XRAY VIEWS OF THE RIGHT FEMUR 1/19/2022 9:25 pm COMPARISON: None. HISTORY: ORDERING SYSTEM PROVIDED HISTORY: trauma TECHNOLOGIST PROVIDED HISTORY: Reason for exam:->trauma FINDINGS: Bones are osteopenic. Lateral view demonstrates an oblique fracture of the distal femoral shaft extending from posterior to anterior suprapatellar region. This is not significantly displaced. I do not appreciate significant joint effusion at the knee. There is calcified plaque involving the superficial femoral artery. Oblique nondisplaced fracture of distal femoral shaft, as above. XR KNEE RIGHT (1-2 VIEWS)    Result Date: 1/19/2022  EXAMINATION: TWO XRAY VIEWS OF THE RIGHT KNEE 1/19/2022 3:32 pm COMPARISON: 08/01/2018 HISTORY: ORDERING SYSTEM PROVIDED HISTORY: fall TECHNOLOGIST PROVIDED HISTORY: Reason for exam:->fall FINDINGS: Two views of the right knee were obtained. There is no fracture dislocation of the right knee. There are degenerative changes of the right knee. There is no joint effusion. 1. There is no fracture or dislocation of the right knee 2. Degenerative changes the right knee. CT HEAD WO CONTRAST    Result Date: 1/19/2022  EXAMINATION: CT OF THE HEAD WITHOUT CONTRAST  1/19/2022 3:50 pm TECHNIQUE: CT of the head was performed without the administration of intravenous contrast. Dose modulation, iterative reconstruction, and/or weight based adjustment of the mA/kV was utilized to reduce the radiation dose to as low as reasonably achievable. COMPARISON: None.  HISTORY: ORDERING SYSTEM PROVIDED HISTORY: Evaluate intracranial abnormality TECHNOLOGIST PROVIDED HISTORY: Has a \"code stroke\" or \"stroke alert\" been called? ->No Reason for exam:->Evaluate intracranial abnormality Decision Support Exception - unselect if not a suspected or confirmed emergency medical condition->Emergency Medical Condition (MA) FINDINGS: BRAIN/VENTRICLES: There is no acute intracranial hemorrhage, mass effect or midline shift. No abnormal extra-axial fluid collection. The gray-white differentiation is maintained without evidence of an acute infarct. There is no evidence of hydrocephalus. The ventricles, cisterns and sulci are prominent consistent with atrophy. There is decreased attenuation within the periventricular white matter consistent with periventricular leukomalacia. ORBITS: The visualized portion of the orbits demonstrate no acute abnormality. SINUSES: The visualized paranasal sinuses and mastoid air cells demonstrate no acute abnormality. SOFT TISSUES/SKULL:  No acute abnormality of the visualized skull or soft tissues. 1.  There is no acute intracranial abnormality. Specifically, there is no intracranial hemorrhage. 2. Atrophy and periventricular leukomalacia, . CT KNEE RIGHT WO CONTRAST    Result Date: 1/19/2022  EXAMINATION: CT OF THE RIGHT KNEE WITHOUT CONTRAST 1/19/2022 5:44 pm TECHNIQUE: CT of the right knee was performed without the administration of intravenous contrast.  Multiplanar reformatted images are provided for review. Dose modulation, iterative reconstruction, and/or weight based adjustment of the mA/kV was utilized to reduce the radiation dose to as low as reasonably achievable. COMPARISON: Right knee radiographs, 01/19/2022.  HISTORY ORDERING SYSTEM PROVIDED HISTORY: pain TECHNOLOGIST PROVIDED HISTORY: Reason for exam:->pain Decision Support Exception - unselect if not a suspected or confirmed emergency medical condition->Emergency Medical Condition (MA) FINDINGS: Are nondisplaced fractures in the distal femoral shaft, which extend to the level the lateral patellofemoral joint the no extension into the femoral condyles or the tibiofemoral joints is noted. The patella, proximal tibia and proximal fibula are intact. The bones are markedly demineralized. There are moderate loss of joint spaces in the medial tibiofemoral and patellofemoral joints with severe loss of joint space in the lateral tibiofemoral joint. Small volume joint space fluid is seen. No fat is seen within the joint fluid. 1. NONDISPLACED FRACTURES of the distal femoral shaft, extending to the level of the lateral patellofemoral joint. The no extension into the femoral condyles or the tibiofemoral joint. 2. Demineralized bones. RECOMMENDATIONS: Unavailable     XR CHEST PORTABLE    Result Date: 1/20/2022  EXAMINATION: ONE XRAY VIEW OF THE CHEST 1/20/2022 5:54 am COMPARISON: April 1, 2016 HISTORY: ORDERING SYSTEM PROVIDED HISTORY: pre-operative planning and medical clearance TECHNOLOGIST PROVIDED HISTORY: Reason for exam:->pre-operative planning and medical clearance FINDINGS: On today's study there is decreased inspiratory effort when compared to previous. See no evidence of focal infiltrate, effusion, or pneumothorax. Heart mediastinum appear within normal limits. Trachea is midline. Surgical clips in the epigastric area are identified. There is suggestion of old rib fractures on the right. There is diffuse osteopenia noted. Clear chest, no acute findings. Fluoro For Surgical Procedures    Result Date: 1/20/2022  EXAMINATION: SPOT FLUOROSCOPIC IMAGES 1/20/2022 10:31 pm TECHNIQUE: Fluoroscopy was provided by the radiology department for procedure. Radiologist was not present during examination.  FLUOROSCOPY DOSE AND TYPE OR TIME AND EXPOSURES: 3.3 mGy COMPARISON: None HISTORY: ORDERING SYSTEM PROVIDED HISTORY: Fracture TECHNOLOGIST PROVIDED HISTORY: Reason for exam:->fracture right femur Intraprocedural imaging. FINDINGS: 9 spot images of the right femur were obtained. Intraprocedural fluoroscopic spot images as above. See separate procedure report for more information. Patient Instructions:   Current Discharge Medication List      START taking these medications    Details   senna (SENOKOT) 8.6 MG tablet Take 1 tablet by mouth daily as needed (Constipation)      polyethylene glycol (GLYCOLAX) 17 g packet Take 17 g by mouth daily as needed for Constipation  Qty: 527 g, Refills: 1      amLODIPine (NORVASC) 5 MG tablet Take 1 tablet by mouth daily  Qty: 30 tablet, Refills: 0      oxyCODONE-acetaminophen (PERCOCET) 5-325 MG per tablet Take 1 tablet by mouth every 6 hours as needed for Pain for up to 7 days. Intended supply: 7 days.  Take lowest dose possible to manage pain  Qty: 28 tablet, Refills: 0    Comments: Reduce doses taken as pain becomes manageable  Associated Diagnoses: Closed fracture of distal end of right femur, unspecified fracture morphology, initial encounter ([facility]         CONTINUE these medications which have NOT CHANGED    Details   citalopram (CELEXA) 10 MG tablet TAKE 1 TABLET BY MOUTH IN  THE MORNING  Qty: 90 tablet, Refills: 1    Comments: Requesting 1 year supply      furosemide (LASIX) 20 MG tablet TAKE 1 TABLET BY MOUTH 3  TIMES WEEKLY  Qty: 39 tablet, Refills: 2    Comments: Requesting 1 year supply      memantine (NAMENDA) 10 MG tablet Take 1 tablet by mouth 2 times daily  Qty: 180 tablet, Refills: 1    Comments: Requesting 1 year supply      metoprolol tartrate (LOPRESSOR) 25 MG tablet Take 1 tablet by mouth 2 times daily  Qty: 180 tablet, Refills: 1    Comments: Requesting 1 year supply      donepezil (ARICEPT) 10 MG tablet TAKE 1 TABLET BY MOUTH AT  NIGHT  Qty: 90 tablet, Refills: 1    Comments: Requesting 1 year supply      apixaban (ELIQUIS) 2.5 MG TABS tablet TAKE 1 TABLET BY MOUTH  TWICE DAILY  Qty: 180 tablet, Refills: 3    Comments: Requesting 1 year supply  Associated Diagnoses: Atrial fibrillation, unspecified type (HCC)      Glucosamine-Chondroit-Vit C-Mn (GLUCOSAMINE 1500 COMPLEX PO) Take 1 tablet by mouth daily       Cholecalciferol (VITAMIN D3) 2000 units CAPS Take 2,000 Units by mouth daily                Note that more than 30 minutes was spent in preparing discharge papers, discussing discharge with patient, medication review, etc.    NOTE: This report was transcribed using voice recognition software. Every effort was made to ensure accuracy; however, inadvertent computerized transcription errors may be present.      Signed:  Electronically signed by HENRRY Yusuf CNP on 1/24/2022 at 2:42 PM

## 2022-01-24 NOTE — PROGRESS NOTES
[address] [place], [place] [address]   Progress Note    Admitting Date and Time: 1/19/2022  2:47 PM  Admit Dx: Closed nondisplaced apophyseal fracture of right femur, initial encounter ([facility] [S72.134A]  Closed fracture of distal end of right femur, unspecified fracture morphology, [facility] encounter ([facility] [S72.401A]    Subjective:    Patient has been weaned to room air. Patient and family chose [facility] as her SNF choice but are unable to accept the patient. She will need a new SNF choice today and will need precert. ROS: denies fever, chills, cp, sob, n/v, HA unless stated above.      amLODIPine  5 mg Oral Daily    apixaban  2.5 mg Oral BID    sodium chloride flush  5-40 mL IntraVENous 2 times per day    Vitamin D  2,000 Units Oral Daily    citalopram  10 mg Oral QAM    donepezil  10 mg Oral Nightly    memantine  10 mg Oral BID    metoprolol tartrate  25 mg Oral BID     oxyCODONE-acetaminophen, 1 tablet, Q6H PRN  senna, 1 tablet, Daily PRN  labetalol, 5 mg, Q10 Min PRN  sodium chloride flush, 5-40 mL, PRN  sodium chloride, 25 mL, PRN  ondansetron, 4 mg, Q8H PRN   Or  ondansetron, 4 mg, Q6H PRN  polyethylene glycol, 17 g, Daily PRN  acetaminophen, 650 mg, Q6H PRN   Or  acetaminophen, 650 mg, Q6H PRN  morphine, 2 mg, Q2H PRN   Or  morphine, 1 mg, Q2H PRN         Objective:    BP (!) 143/76   Pulse 80   Temp 98.5 °F (36.9 °C) (Oral)   Resp 18   Wt 119 lb 4.8 oz (54.1 kg)   SpO2 97%   BMI 19.26 kg/m²   General Appearance: alert and oriented to person, place and time and in no acute distress, hard of hearing  Skin: warm and dry  Head: normocephalic and atraumatic  Eyes: pupils equal, round, and reactive to light, conjunctivae normal  Neck: neck supple and non tender without mass   Pulmonary/Chest: diminished bilaterally, no respiratory distress  Cardiovascular: normal rate, normal S1 and S2  Abdomen: soft, non-tender, non-distended, normal bowel sounds  Extremities: no cyanosis, no clubbing, dressing right lower extremity and knee immobilizer  Neurologic: no tremor and speech normal      Recent Labs     01/21/22  1550 01/22/22  0448 01/23/22  0518    141 137   K 4.8 4.5 4.0    105 104   CO2 26 24 25   BUN 26* 25* 17   CREATININE 1.0 0.9 0.7   GLUCOSE 126* 124* 116*   CALCIUM 9.0 8.8 8.5*       Recent Labs     01/21/22  1550 01/22/22  0448   ALKPHOS 83 79   PROT 5.9* 5.8*   LABALBU 3.2* 3.0*   BILITOT 0.3 0.4   AST 20 20   ALT 11 9       Recent Labs     01/21/22  1550 01/22/22  0448 01/23/22  0518   WBC 14.4* 11.8* 8.7   RBC 3.58 3.54 3.16*   HGB 11.1* 10.9* 10.0*   HCT 35.4 35.0 31.2*   MCV 98.9 98.9 98.7   MCH 31.0 30.8 31.6   MCHC 31.4* 31.1* 32.1   RDW 13.5 13.5 13.5    226 228   MPV 10.7 10.5 10.5           Radiology:   Fluoro For Surgical Procedures   Final Result   Intraprocedural fluoroscopic spot images as above. See separate procedure   report for more information. XR CHEST PORTABLE   Final Result   Clear chest, no acute findings. XR FEMUR RIGHT (MIN 2 VIEWS)   Final Result   Oblique nondisplaced fracture of distal femoral shaft, as above. CT KNEE RIGHT WO CONTRAST   Final Result   1. NONDISPLACED FRACTURES of the distal femoral shaft, extending to the level   of the lateral patellofemoral joint. The no extension into the femoral   condyles or the tibiofemoral joint. 2. Demineralized bones. RECOMMENDATIONS:   Unavailable         CT HEAD WO CONTRAST   Final Result   1. There is no acute intracranial abnormality. Specifically, there is no   intracranial hemorrhage. 2. Atrophy and periventricular leukomalacia,   . XR KNEE RIGHT (1-2 VIEWS)   Final Result   1. There is no fracture or dislocation of the right knee   2. Degenerative changes the right knee. XR SHOULDER RIGHT (MIN 2 VIEWS)   Final Result   No acute fractures or dislocations in the right shoulder.              Assessment:  Principal Problem:    Closed fracture of distal end of right femur ([address]  Active Problems:    Atrial fibrillation ([address]    Alzheimer's dementia without behavioral disturbance ([address]    Depression    Hyperlipidemia    Ambulatory dysfunction    Leukemoid reaction    PVC (premature ventricular contraction)    Osteopenia after menopause    Closed nondisplaced apophyseal fracture of right femur (HCC)    Open fracture of right distal femur (HCC)  Resolved Problems:    * No resolved hospital problems. *      Plan:  1. Closed right femur fracture:  Fall at home. S/p right distal femur open reduction internal fixation on 1/20/22. PWB RLE, knee immobilizer. Will need new SNF choice today. 2. Atrial fibrillation:  Cardiology signed off. Continue Eliquis and Lopressor. 3. Hypertension:  Continue Lopressor and Norvasc. 4. Hyperlipidemia:  Currently not on statin and is diet controlled. 5. Ambulatory dysfunction: Secondary to fracture. PT/OT Rehab at discharge. 6. Alzheimer's/dementia:  Continue Namenda and Aricept. .   7. Depression:  Continue Celexa. NOTE: This report was transcribed using voice recognition software. Every effort was made to ensure accuracy; however, inadvertent computerized transcription errors may be present.      Electronically signed by HENRRY Gutierrez CNP on 1/24/2022 at 9:05 AM

## 2022-01-24 NOTE — FLOWSHEET NOTE
Inpatient Wound Care    Admit Date: 1/19/2022  2:47 PM    Reason for consult:  coccyx    Significant history:    Past Medical History:   Diagnosis Date    Arthritis     Atrial fibrillation ([address]     Calculus of gallbladder with acute cholecystitis without obstruction     Calculus of gallbladder with cholecystitis with biliary obstruction     Hyperlipidemia     Syncope 11/20/2019       Wound history:      Findings:       01/24/22 1328   Wound 01/24/22 Coccyx   Date First Assessed/Time First Assessed: 01/24/22 1327   Present on Hospital Admission: No  Primary Wound Type: Pressure Injury  Location: Coccyx   Wound Image    Wound Cleansed Cleansed with saline   Dressing/Treatment Alginate; Foam   Wound Length (cm) 5 cm   Wound Width (cm) 4 cm   Wound Surface Area (cm^2) 20 cm^2   Drainage Amount Scant   Drainage Description Serosanguinous   Odor None   Carine-wound Assessment Blanchable erythema     **Informed Consent**    The patient has given verbal consent to have photos taken of coccyxand inserted into their chart as part of their permanent medical record for purposes of documentation, treatment management and/or medical review. All Images taken on 1/24/22 of patient name: [place] were transmitted and stored on secured Diet TV located within [employer] Tab by a registered [employer] Mobile Application Device.        Impression:  Deep tissue of coccyx area    Interventions in place:  mepilex    Plan:  Cont mepilex wih opticell at wound base  Ins  on pressure relief        Natalie Solitario RN 1/24/2022 1:29 PM

## 2022-01-24 NOTE — PROGRESS NOTES
Physical Therapy Treatment Note/Plan of Care    Room #:  7800/7009-05  Patient Name: Timbo Light  YOB: 1932  MRN: [id number]    Date of Service: 1/24/2022     Tentative placement recommendation: Subacute rehab  Equipment recommendation: To be determined      Evaluating Physical Therapist: Louise Castellon, PT #[id number]      Specific Provider Orders/Date/Referring Provider :  01/19/22 2315   PT eval and treat Start: 01/19/22 2315, End: 01/19/22 2315, ONE TIME, Standing Count: 1 Occurrences, R    Last continued at transfer on u Jan 20, 2022 11:57 PM   Roshni Mario DO      Admitting Diagnosis:   Closed nondisplaced apophyseal fracture of right femur, initial encounter [facility]) [S72.134A]  Closed fracture of distal end of right femur, unspecified fracture morphology, initial encounter ([facility] [S72.401A]     walking outside and missed a step. She fell approximately 2-3 steps. Surgery:   Date of Procedure: 1/20/2022  Procedure(s):   RIGHT DISTAL FEMUR OPEN REDUCTION INTERNAL FIXATION (RETROGRADE NAIL)   Surgeon(s): Obinna Bonds DO    Visit Diagnoses       Codes    Closed fracture of distal end of right femur, unspecified fracture morphology, initial encounter [facility])    -  Primary S72.401A    Fracture     T14. 8XXA          Patient Active Problem List   Diagnosis    Atrial fibrillation ([address]    Gastroesophageal reflux disease without esophagitis    Alzheimer's dementia without behavioral disturbance ([address]    Vitamin B 12 deficiency    Arthritis    Depression    Hyperlipidemia    Ambulatory dysfunction    Closed compression fracture of L2 lumbar vertebra, initial encounter ([facility] [address]    Closed fracture of distal end of right femur (HCC)    Leukemoid reaction    PVC (premature ventricular contraction)    Osteopenia after menopause    Closed nondisplaced apophyseal fracture of right femur ([address]    Open fracture of right distal femur (HCC)        ASSESSMENT of Current Deficits Patient exhibits decreased strength, balance, endurance, range of motion and pain right hip impairing functional mobility, transfers, gait , gait distance and tolerance to activity . Patient with knee immobilizer with partial weight bearing Right lower extremity, history of alzheimer's with difficulty following directions; oriented x 1. Pt needing maximal assist for bed mobility; moderate assist x 1 for sit to stand and gait training. Pt able to tolerate weight shifting on right lower extremity in order to advance left lower extremity but once she got to the chair, she sat abruptly but safely before squaring up to the chair properly due to fatigue. Patient requires continued skilled physical therapy to address concerns listed above for increased safety and function at discharge.          PHYSICAL THERAPY  PLAN OF CARE       Physical therapy plan of care is established based on physician order,  patient diagnosis and clinical assessment    Current Treatment Recommendations:    -Bed Mobility: Lower extremity exercises  and Trunk control activities   -Sitting Balance: Facilitate active trunk muscle engagement  and Facilitate postural control in all planes   -Standing Balance: Perform sit to stand activities maintaining PWB (partial weight bearing) weightbearing right lower extremity   -Transfers: Provide instruction on proper hand and foot position for adequate transfer of weight onto lower extremities and use of gait device if needed and Provide instruction on proper hand and left lower extremity placement to maintain PWB (partial weight bearing) weightbearing right lower extremity   -Gait: Use of Assistive device for PWB (partial weight bearing) weight bearing right lower extremity     -Endurance: Utilize Supervised activities to increase level of endurance to allow for safe functional mobility including transfers and gait     PT long term treatment goals are located in below grid    Patient and or family understand(s) diagnosis, prognosis, and plan of care. Frequency of treatments: Patient will be seen  daily.          Prior Level of Function: Patient ambulated independently    Rehab Potential: good  - for baseline    Past medical history:   Past Medical History:   Diagnosis Date    Arthritis     Atrial fibrillation ([address]     Calculus of gallbladder with acute cholecystitis without obstruction     Calculus of gallbladder with cholecystitis with biliary obstruction     Hyperlipidemia     Syncope 11/20/2019     Past Surgical History:   Procedure Laterality Date    ABDOMEN SURGERY      ANESTHESIA NERVE BLOCK Bilateral 2/6/2020    BILATERAL MEDIAL BRANCH L4-5 AND L5-S1 JOINTS performed by Rebecca Herring DO at [address] [facility], LAPAROSCOPIC N/A 11/21/2019    DIAGNOSITIC LAPAROSCOPY CONVERTED TO EXPLORATORY LAPAROTOMY WITH CLOSURE OF ENTEROTOMY X1, CHOLANGIOGRAM, EGD performed by Bharati Gaitan MD at [address] [facility] [address] [facility]  10/21/2013         FEMUR FRACTURE SURGERY Right 1/20/2022    RIGHT DISTAL FEMUR OPEN REDUCTION INTERNAL FIXATION (RETROGRADE NAIL) performed by Cordelia Bowers DO at [address] [facility] Right 12/13/2018    RIGHT SACROILIAC JOINT STEROID INJECTION UNDER X-RAY GUIDANCE performed by Rebecca Herring DO at [facility] [address] [facility] Left 1/17/2019    LEFT SACROILIAC JOINT STEROID INJECTION UNDER X-RAY GUIDANCE performed by Rebecca Herring DO at [address] [facility] JOINT Bilateral 9/19/2019    BILATERAL SACROILIAC JOINT INJECTION X-RAY performed by Rebecca Herring DO at [facility] Right 12/13/2018    sacroiliac joint     NERVE BLOCK Left 01/17/2019    sacroiliac joint injection     NERVE BLOCK Bilateral 02/06/2020    medial branch block    NERVE BLOCK Bilateral 10/29/2020    intra articular     NERVE BLOCK Bilateral 10/29/2020    BILATERAL L4-5 AND L5-S1 INTRA-ARTICULAR FACET STEROID INJECTION (CPT 33371,82274) performed by Rhea Espino DO at [address] N/A 11/25/2020    KYPHOPLASTY , L1 L2,BONE BIOPSY, EPIDURAL INJECTION performed by Balbir Keenan MD at [address] N/A     d/t peptic ulcers       SUBJECTIVE:    Precautions: Ambulate patient  and Partial weight bearing 50% on operative leg., falls, alarm, hard of hearing and partial weight bearing right lower extremity ,  Knee immobilizer       Social history: Patient lives with spouse in a ranch home  with 1 + 2 steps  to enter with grab bar on one side  Walk in shower      Equipment owned: Melinda Elise and Rudy Albert,       [address] [facility]  Mobility Inpatient   How much difficulty turning over in bed?: A Lot  How much difficulty sitting down on / standing up from a chair with arms?: A Lot  How much difficulty moving from lying on back to sitting on side of bed?: A Lot  How much help from another person moving to and from a bed to a chair?: A Lot  How much help from another person needed to walk in hospital room?: A Lot  How much help from another person for climbing 3-5 steps with a railing?: Total  AM-PAC Inpatient Mobility Raw Score : 11  AM-PAC Inpatient T-Scale Score : 33.86  Mobility Inpatient CMS 0-100% Score: 72.57  Mobility Inpatient CMS G-Code Modifier : CL    Nursing cleared patient for PT treatment. .   OBJECTIVE;   Initial Evaluation  Date: 1/21/2022 Treatment Date:  1/24/2022       Short Term/ Long Term   Goals   Was pt agreeable to Eval/treatment?  Yes yes To be met in 3 days   Pain level   Unable to rate however increased pain with movement     No number given    Bed Mobility    Rolling: Not assessed     Supine to sit: Maximal assist of 1    Sit to supine: Not assessed     Scooting: Maximal assist of 1   Rolling: Maximal assist of 1 Supine to sit: Maximal assist of 1   Sit to supine: Not assessed    Scooting: Maximal assist of 1    Rolling: Moderate assist of 1    Supine to sit: Moderate assist of 1    Sit to supine: Moderate assist of 1    Scooting: Moderate assist of 1     Transfers Sit to stand: Maximal assist of  2 cues for upright   Sit to stand: Moderate assist of 1 Cues for hand placement and safety       Sit to stand:  Moderate assist of 1     Ambulation    4 feet using  wheeled walker with Moderate assist of  2   for walker control, balance, upright and partial weight bearing right lower extremity and cues for sequencing, PWB (partial weight bearing) weight bearing right lower extremity, upright posture, safety and pacing 2 feet using  wheeled walker with Moderate assist of 1   cues for sequencing, PWB (partial weight bearing) weight bearing right lower extremity, upright posture, increased base of support, increased step length, safety and pacing    15 feet using  wheeled walker with Moderate assist of 1 partial weight bearing Right lower extremity knee immobilizer    Stair negotiation: ascended and descended   Not assessed          ROM Within functional limits except right hip   Increase range of motion 10% of affected joints    Strength BUE:  refer to OT eval  RLE:  Hip/ankle 3/55  LLE:  3+/5  Increase strength in affected mm groups by 1/3 grade   Balance Sitting EOB:  fair offloads right hip cues for midline  Dynamic Standing:  fair wheeled walker  Sitting EOB: fair left lateral lean  Dynamic Standing: fair wheeled walker   Sitting EOB:  good    Dynamic Standing: fair +wheeled walker      Patient is Alert & Oriented x person and follows one step directions    Sensation:  Patient  denies numbness/tingling   Edema:  yes right lower extremity   Endurance: fair       Vitals: room air   Blood Pressure at rest  Blood Pressure during session    Heart Rate at rest  Heart Rate during session     SPO2 at rest %  SPO2 during session  % Patient education  Patient educated on role of Physical Therapy, risks of immobility, safety and plan of care,  importance of mobility while in hospital , ankle pumps, quad set and glut set for edema control, blood clot prevention and weight bearing status      Patient response to education:   Pt verbalized understanding Pt demonstrated skill Pt requires further education in this area   Yes Partial Yes      Treatment:  Patient practiced and was instructed/facilitated in the following treatment: Patient performed supine exercises. Pt assisted to edge of bed,    Sat edge of bed 10 minutes with Minimal assist of 1 to increase dynamic sitting balance and activity tolerance. Worked on sitting balance due to left lateral lean. Pt stood, able to weight shift to take a steps with LLE to the chair. Nursing came in, wanting to check her skin, she stood again for nursing, and back down in the chair. Therapeutic Exercises:  ankle pumps, quad sets, glut sets, heel slide, hip abduction/adduction and straight leg raise,  x 15 reps. At end of session, patient in chair with spouse present call light and phone within reach,  all lines and tubes intact, nursing notified. Patient would benefit from continued skilled Physical Therapy to improve functional independence and quality of life. Patient's/ family goals   rehab    Time in 9:01  Time out 9:24    Total Treatment Time  23 minutes      CPT codes:    Therapeutic activities (85124)   14 minutes  1 unit(s)  Therapeutic exercises (31416)   9 minutes  1 unit(s)   Carlos Sosa  hospitals  LIC # [id number]

## 2022-01-24 NOTE — CARE COORDINATION
KULDIP note: Met with patient's  re:no bed available at [address]. He requested a referral to [facility] primary choice with a secondary choice of [facility] in [facility]. Referral called to liaison for [facility], left VM, await return call. For transfer to SNF will NEED up to date therapy notes, PRE-CERT with insurance provider, a completed HENS and a completed/signed ROLA. Covid testing will be dependent on accepting facility.

## 2022-01-24 NOTE — DISCHARGE INSTR - COC
Continuity of Care Form    Patient Name: Ritu Leblanc   :  1932  MRN:  [id number]    [facility] date:  2022  Discharge date:  2022      Code Status Order: Full Code   Advance Directives:      Admitting Physician:  Beatriz Ascencio DO  PCP: David Anne MD    Discharging Nurse: [facility] Unit/Room#: 2498/8382-11  Discharging Unit Phone Number:     Emergency Contact:   Extended Emergency Contact Information  Primary Emergency Contact: Fransico Waldron  Address: [address] Phone: [phone]  Relation: Spouse  Secondary Emergency Contact: Leonardo Jimenez [address] Phone: [phone]  Mobile Phone: [phone]  Relation: Child    Past Surgical History:  Past Surgical History:   Procedure Laterality Date    ABDOMEN SURGERY      ANESTHESIA NERVE BLOCK Bilateral 2020    BILATERAL MEDIAL BRANCH L4-5 AND L5-S1 JOINTS performed by DO Nas at [address] [facility], LAPAROSCOPIC N/A 2019    DIAGNOSITIC LAPAROSCOPY CONVERTED TO EXPLORATORY LAPAROTOMY WITH CLOSURE OF ENTEROTOMY X1, CHOLANGIOGRAM, EGD performed by Saritha Bhakta MD at [address] [facility] [address]      ECHO COMPL [address] [facility] [address]  10/21/2013         FEMUR FRACTURE SURGERY Right 2022    RIGHT DISTAL FEMUR OPEN REDUCTION INTERNAL FIXATION (RETROGRADE NAIL) performed by Sujey Skinner DO at [facility] Right 2018    RIGHT SACROILIAC JOINT STEROID INJECTION UNDER X-RAY GUIDANCE performed by DO Nas at [address] [place] JOINT Left 2019    LEFT SACROILIAC JOINT STEROID INJECTION UNDER X-RAY GUIDANCE performed by DO Nas at [address] JOINT Bilateral 2019    BILATERAL SACROILIAC JOINT fracture of L2 lumbar vertebra, initial encounter ([facility] S32.020A    Closed fracture of distal end of right femur ([facility] S72.401A    Leukemoid reaction D72.823    PVC (premature ventricular contraction) I49.3    Osteopenia after menopause M85.80, Z78.0    Closed nondisplaced apophyseal fracture of right femur ([facility] S72.134A    Open fracture of right distal femur ([facility] S72.401B       Isolation/Infection:   Isolation            No Isolation          Patient Infection Status       Infection Onset Added Last Indicated Last Indicated By Review Planned Expiration Resolved Resolved By    None active    Resolved    COVID-19 (Rule Out) 10/22/20 10/22/20 10/22/20 COVID-19 Ambulatory (Ordered)   10/24/20 Rule-Out Test Resulted    C-diff Rule Out  12/26/19 12/26/19 Clostridium Difficile Toxin/Antigen (Ordered)   11/23/20 Juan Daniel Shell RN            Nurse Assessment:  Last Vital Signs: BP (!) 143/76   Pulse 80   Temp 98.5 °F (36.9 °C) (Oral)   Resp 18   Wt 119 lb 4.8 oz (54.1 kg)   SpO2 97%   BMI 19.26 kg/m²     Last documented pain score (0-10 scale): Pain Level: 0  Last Weight:   Wt Readings from Last 1 Encounters:   01/19/22 119 lb 4.8 oz (54.1 kg)     Mental Status:  oriented and alert    IV Access:  - None    Nursing Mobility/ADLs:  Walking   Assisted  Transfer  Assisted  Bathing  Assisted  Dressing  Assisted  Toileting  Assisted  Feeding  Independent  Med Admin  Independent  Med Delivery   whole    Wound Care Documentation and Therapy:  Incision 12/13/18 Back Right (Active)   Number of days: 1138        Elimination:  Continence: Bowel: Yes  Bladder: No  Urinary Catheter: None   Colostomy/Ileostomy/Ileal Conduit: No       Date of Last BM: 1-      Intake/Output Summary (Last 24 hours) at 1/24/2022 0932  Last data filed at 1/24/2022 0902  Gross per 24 hour   Intake 600 ml   Output 550 ml   Net 50 ml     I/O last 3 completed shifts:   In: 5 [P.O.:720]  Out: 950 [Urine:950]    Safety Concerns:     History of Falls (last 30 days) and At Risk for Falls    Impairments/Disabilities:      None    Nutrition Therapy:  Current Nutrition Therapy:   - Oral Diet:  General    Routes of Feeding: Oral  Liquids: Thin Liquids  Daily Fluid Restriction: no  Last Modified Barium Swallow with Video (Video Swallowing Test): not done    Treatments at the Time of Hospital Discharge:   Respiratory Treatments: none  Oxygen Therapy:  is not on home oxygen therapy. Ventilator:    - No ventilator support    Rehab Therapies: Physical Therapy and Occupational Therapy  Weight Bearing Status/Restrictions: Partial weight bearing (30-50%) only on leg right leg  Other Medical Equipment (for information only, NOT a DME order):  walker  Other Treatments: n/a    Patient's personal belongings (please select all that are sent with patient):  Glasses    RN SIGNATURE:  Clarisa Boland RN     CASE MANAGEMENT/SOCIAL WORK SECTION    Inpatient Status Date: ***    Readmission Risk Assessment Score:  Readmission Risk              Risk of Unplanned Readmission:  15           Discharging to Facility/ Agency   Name: [facility] [place] [address]         Phone: [phone]       Fax: [phone]          / signature: Electronically signed by Christina Haywood RN on 1/24/22 at 1:44 PM EST    PHYSICIAN SECTION    Prognosis: Fair    Condition at Discharge: Stable    Rehab Potential (if transferring to Rehab): Fair    Recommended Labs or Other Treatments After Discharge: PT/OT. CBC twice weekly on Mon and Thurs starting 1/27. BMP weekly on Monday 8/98    Physician Certification: I certify the above information and transfer of Beatrice Lira  is necessary for the continuing treatment of the diagnosis listed and that she requires [facility] for less 30 days.      Update Admission H&P: Changes in H&P as follows - See discharge summary    PHYSICIAN SIGNATURE:  Electronically signed by Alaina Sheppard Naima Ashton MD on 1/24/22 at 2:41 PM EST

## 2022-01-24 NOTE — CARE COORDINATION
CM note: Patient has been accepted at [facility] and patient can discharge there today if medically ready. NO PRECERT needed as insurance provider is currently waiving d/t covid pandemic. Pt will NEED OT note from today and this has been requested. HENS will needed completed and ROLA will need completed/signed by physician.  covid test is pending.

## 2022-01-24 NOTE — CARE COORDINATION
CM note; Discharge order noted, arranged transportation with [facility] for 3:30PM  via stretcher. Pt is right femur fracture, alzheimers' dementia, alert to self only. Pts  present and aware of arrangements. Liaison notified.

## 2022-01-25 NOTE — ANESTHESIA POSTPROCEDURE EVALUATION
Department of Anesthesiology  Postprocedure Note    Patient: Timbo Light  MRN: [id number]  YOB: 1932  Date of evaluation: 1/25/2022  Time:  6:44 AM     Procedure Summary     Date: 01/20/22 Room / Location: [address] [place] [address] / [address] [place] [address]    Anesthesia Start: 2104 Anesthesia Stop: 2246    Procedure: RIGHT DISTAL FEMUR OPEN REDUCTION INTERNAL FIXATION (RETROGRADE NAIL) (Right ) Diagnosis: (RIGHT DISTAL FEMUR FRACTURE)    Surgeons: Obinna Bonds DO Responsible Provider: Gabriele Lucio DO    Anesthesia Type: general ASA Status: 3          Anesthesia Type: general    Jess Phase I: Jess Score: 9    Jess Phase II:      Last vitals: Reviewed and per EMR flowsheets.        Anesthesia Post Evaluation    Patient location during evaluation: PACU  Patient participation: complete - patient participated  Level of consciousness: awake and alert  Airway patency: patent  Nausea & Vomiting: no nausea and no vomiting  Complications: no  Cardiovascular status: hemodynamically stable  Respiratory status: acceptable  Hydration status: euvolemic

## 2022-01-29 PROBLEM — N17.9 AKI (ACUTE KIDNEY INJURY) (HCC): Status: ACTIVE | Noted: 2022-01-01

## 2022-01-29 NOTE — Clinical Note
Patient Class: Inpatient [101]   REQUIRED: Diagnosis: PETE (acute kidney injury) ([facility] [001774]   Estimated Length of Stay: Estimated stay of more than 2 midnights   Admitting Provider: Bonnie Lr [[id number]]   Telemetry/Cardiac Monitoring Required?: Yes

## 2022-01-30 PROBLEM — K57.92 DIVERTICULITIS: Status: ACTIVE | Noted: 2022-01-01

## 2022-01-30 NOTE — ED PROVIDER NOTES
[address]      Pt Name: Abbey Shaw  MRN: [id number]  [place] 8/27/1932  Date of evaluation: 1/29/2022      CHIEF COMPLAINT       Chief Complaint   Patient presents with    Fatigue     Pt presents today for fatigue and ams. per  she has not been eating or drinking for the last five days. Per nursing home at [address] at 1900. HPI  Abbey Shaw is a 80 y.o. female with history of atrial fibrillation on Eliquis, recent hip fracture, dementia presents from nursing facility with increased fatigue and decreased appetite. Per  not been eating or drinking for last couple of days. States cognitive decline since she had her hip surgery several weeks ago. He had the nursing home called ambulance today for further evaluation. History limited secondary to patient dementia. Review of Systems   Unable to perform ROS: Dementia        Physical Exam  Vitals and nursing note reviewed. Constitutional:       General: She is not in acute distress. Appearance: She is well-developed. Comments: Awake and alert. Sitting in the gurney in no obvious distress. HENT:      Head: Normocephalic and atraumatic. Right Ear: External ear normal.      Left Ear: External ear normal.      Mouth/Throat:      Mouth: Mucous membranes are moist.   Eyes:      General: No scleral icterus. Pupils: Pupils are equal, round, and reactive to light. Cardiovascular:      Rate and Rhythm: Normal rate and regular rhythm. Heart sounds: No murmur heard. Comments: 2+ radial and dorsal pedis pulses bilaterally  Pulmonary:      Effort: Pulmonary effort is normal. No respiratory distress. Breath sounds: Normal breath sounds. No wheezing. Abdominal:      Palpations: Abdomen is soft. Tenderness: There is no abdominal tenderness. There is no guarding or rebound.    Musculoskeletal:         General: Tenderness present. No deformity. Cervical back: Normal range of motion and neck supple. Right lower leg: No edema. Left lower leg: No edema. Comments: Decreased range of motion to right lower extremity with knee immobilizer in place and recent surgical wound to the right hip. Compartments t soft. Skin:     General: Skin is warm and dry. Capillary Refill: Capillary refill takes less than 2 seconds. Neurological:      General: No focal deficit present. Mental Status: She is alert. Cranial Nerves: No cranial nerve deficit. Sensory: No sensory deficit. Motor: No weakness or abnormal muscle tone. Comments: Patient looking around room but not answering questions. Occasionally moves all extremities. Psychiatric:         Behavior: Behavior normal.      Comments: Not answering questions. Calm cooperative. Procedures     MDM     This is a 75-year-old female with history of dementia, atrial fibrillation, recent hip surgery who presents to the emergency department from rehab facility with fatigue and decreased appetite. In the emergency department the patient is awake not answering questions. Hemodynamically stable. Metabolic panel showed PETE. Burrell catheter placed with about 1300 cc of urine expressed. No signs of urinary tract infection. Ministered IV fluids in the ED. Metabolic panel showed normal electrolytes. Lactic acid reassuring, normal.  Patient does have leukocytosis. CT imaging without contrast showed signs of mild diverticulitis as well as possible distention of the gallbladder. Ultrasound right upper quadrant pending. Patient started on Rocephin and Flagyl. No signs of obstructing stone. Likely obstruction causing worsening renal function. Patient full code has been made. Admitted for further evaluation. Currently prior to the surgery she was much more awake and talking.   Discussed the case with Dr. Lj Moy who is constipation for further evaluation. ED Course as of 01/30/22 0101   Sat Jan 29, 2022 2112 ATTENDING PROVIDER ATTESTATION:     I have personally performed and/or participated in the history, exam, medical decision making, and procedures and agree with all pertinent clinical information unless otherwise noted. I have also reviewed and agree with the past medical, family and social history unless otherwise noted. I have discussed this patient in detail with the resident, and provided the instruction and education regarding patient with history of Alzheimer's is here from nursing home apparently stopped eating and drinking today although was also given Percocet earlier today. Sent in for further evaluation, no reported recent injuries or falls although had a recent hip injury and replacement. .  My findings/plan: Patient is a frail, cachectic elderly female in no acute distress lying in the bed with her eyes open although is not responding verbally or following commands. Heart rate regular. Lungs are clear anteriorly with minimal air movement. Abdomen soft and shows no rigidity or guarding. Right leg has brace on it. No jaundice or icterus noted. No current seizure-like activity. No acute respiratory distress. [NC]      ED Course User Index  [NC] Dudley Acharya DO         ED Course as of 01/30/22 0101   Sat Jan 29, 2022 2112 ATTENDING PROVIDER ATTESTATION:     I have personally performed and/or participated in the history, exam, medical decision making, and procedures and agree with all pertinent clinical information unless otherwise noted. I have also reviewed and agree with the past medical, family and social history unless otherwise noted.     I have discussed this patient in detail with the resident, and provided the instruction and education regarding patient with history of Alzheimer's is here from nursing home apparently stopped eating and drinking today although was also given Percocet earlier today. Sent in for further evaluation, no reported recent injuries or falls although had a recent hip injury and replacement. .  My findings/plan: Patient is a frail, cachectic elderly female in no acute distress lying in the bed with her eyes open although is not responding verbally or following commands. Heart rate regular. Lungs are clear anteriorly with minimal air movement. Abdomen soft and shows no rigidity or guarding. Right leg has brace on it. No jaundice or icterus noted. No current seizure-like activity. No acute respiratory distress. [NC]      ED Course User Index  [NC] Jaswinder Dalton Cardinal, DO       --------------------------------------------- PAST HISTORY ---------------------------------------------  Past Medical History:  has a past medical history of Arthritis, Atrial fibrillation ([facility] [address] Calculus of gallbladder with acute cholecystitis without obstruction, Calculus of gallbladder with cholecystitis with biliary obstruction, Hyperlipidemia, and Syncope. Past Surgical History:  has a past surgical history that includes Abdomen surgery; Appendectomy; Colonoscopy; Hysterectomy; ECHO Compl W Dop Color Flow (10/21/2013); Nerve Block (Right, 12/13/2018); Injection Procedure For Sacroiliac Joint (Right, 12/13/2018); Nerve Block (Left, 01/17/2019); Injection Procedure For Sacroiliac Joint (Left, 1/17/2019); Injection Procedure For Sacroiliac Joint (Bilateral, 9/19/2019); Stomach surgery (N/A); Cholecystectomy, laparoscopic (N/A, 11/21/2019); Nerve Block (Bilateral, 02/06/2020); Anesthesia Nerve Block (Bilateral, 2/6/2020); Nerve Block (Bilateral, 10/29/2020); Nerve Block (Bilateral, 10/29/2020); Spine surgery (N/A, 11/25/2020); and Femur fracture surgery (Right, 1/20/2022). Social History:  reports that she has never smoked. She has never used smokeless tobacco. She reports that she does not drink alcohol and does not use drugs.     Family History: family history includes Heart Result Value Ref Range    Color, UA Yellow Straw/Yellow    Clarity, UA Clear Clear    Glucose, Ur Negative Negative mg/dL    Bilirubin Urine Negative Negative    Ketones, Urine Negative Negative mg/dL    Specific Gravity, UA 1.015 1.005 - 1.030    Blood, Urine LARGE (A) Negative    pH, UA 5.5 5.0 - 9.0    Protein, UA Negative Negative mg/dL    Urobilinogen, Urine 0.2 <2.0 E.U./dL    Nitrite, Urine Negative Negative    Leukocyte Esterase, Urine Negative Negative   Lactic Acid, Plasma   Result Value Ref Range    Lactic Acid 1.1 0.5 - 2.2 mmol/L   Microscopic Urinalysis   Result Value Ref Range    WBC, UA 1-3 0 - 5 /HPF    RBC, UA 10-20 (A) 0 - 2 /HPF    Epithelial Cells, UA RARE /HPF    Bacteria, UA RARE (A) None Seen /HPF   POCT Glucose   Result Value Ref Range    Glucose 136 mg/dL    QC OK? ok    POCT Glucose   Result Value Ref Range    Meter Glucose 136 (H) 74 - 99 mg/dL   EKG 12 Lead   Result Value Ref Range    Ventricular Rate 94 BPM    Atrial Rate 100 BPM    QRS Duration 70 ms    Q-T Interval 354 ms    QTc Calculation (Bazett) 442 ms    R Axis -6 degrees    T Axis -55 degrees       RADIOLOGY:  US GALLBLADDER RUQ   Final Result   Distended gallbladder with cholelithiasis and minimal sludge. Mild wall   thickening. Early changes of cholecystitis should be considered. This could   be correlated with hepatic biliary scan as indicated. Biliary ductal dilatation without identified obstructing stone. Correlate   with laboratory assessment. If indicated, ERCP or MRCP may be useful. RECOMMENDATIONS:   Unavailable         CT ABDOMEN PELVIS WO CONTRAST Additional Contrast? None   Final Result   Findings consistent with rectal fecal impaction. Moderate stool burden   otherwise throughout the colon. Severe diverticulosis. Minimal stranding of the fat about the ascending   colon concerning for localized diverticulitis or other   infectious/inflammatory process.       Mild right-sided hydronephrosis and hydroureter without identified   obstructing stone. While this could represent a recently passed stone, other   etiologies including occult neoplasia not excluded. Continued follow-up or   correlation with retrograde evaluation recommended. Severely distended gallbladder with cholelithiasis but no definitive evidence   of cholecystitis. This could be correlated with ultrasound if indicated. Biliary ductal dilatation without identified obstructing stone. Correlate   with clinical and laboratory assessment. If indicated, ERCP or MRCP may be   useful. Patchy opacities in the lung bases favoring atelectasis as described. RECOMMENDATIONS:   Unavailable         XR CHEST PORTABLE   Final Result   No acute process. CT Head WO Contrast   Final Result   No acute intracranial findings. Consider MRI if symptoms persist.      Volume loss and findings suggestive of chronic microvascular ischemia. Redemonstration of complete opacification of bilateral mastoid air cells and   fluid in the middle ears. RECOMMENDATIONS:   Unavailable             EKG:  This EKG is signed and interpreted by me. Rate: 94bpm  Rhythm: atrial fib with occasional pvs  Interpretation: normal axis. Non specific st chagnes. No st segment elevation  Comparison: stable as compared to patient's most recent EKG      ------------------------- NURSING NOTES AND VITALS REVIEWED ---------------------------  Date / Time Roomed:  1/29/2022  9:02 PM  ED Bed Assignment:  03/03    The nursing notes within the ED encounter and vital signs as below have been reviewed.      Patient Vitals for the past 24 hrs:   BP Temp Pulse Resp SpO2   01/30/22 0045 103/68 98.6 °F (37 °C) 99 28 95 %   01/29/22 2230 131/81  94 18 93 %   01/29/22 2111  98 °F (36.7 °C)      01/29/22 2109 139/75  105 17 93 %       Oxygen Saturation Interpretation: Normal    ------------------------------------------ PROGRESS NOTES ------------------------------------------    Counseling:  I have spoken with the patient and discussed todays results, in addition to providing specific details for the plan of care and counseling regarding the diagnosis and prognosis. Their questions are answered at this time and they are agreeable with the plan of admission.    --------------------------------- ADDITIONAL PROVIDER NOTES ---------------------------------  Consultations:   Spoke with Dr. Vielka Pena.  Discussed case. They will admit the patient. This patient's ED course included: a personal history and physicial examination, re-evaluation prior to disposition, multiple bedside re-evaluations, IV medications, cardiac monitoring and continuous pulse oximetry    This patient has remained hemodynamically stable during their ED course. Diagnosis:  1. PETE (acute kidney injury) ([facility] [address]    2. Diverticulitis of colon    3. Altered mental status, unspecified altered mental status type        Disposition:  Patient's disposition: Admit to telemetry  Patient's condition is stable.          Carie Mcclure DO  Resident  01/30/22 0102       Dea Boland DO  02/09/22 0026

## 2022-01-30 NOTE — PLAN OF CARE
Problem: Skin Integrity:  Goal: Will show no infection signs and symptoms  Description: Will show no infection signs and symptoms  1/30/2022 1431 by Robinson Willard RN  Outcome: Met This Shift     Problem: Confusion - Acute:  Goal: Absence of continued neurological deterioration signs and symptoms  Description: Absence of continued neurological deterioration signs and symptoms  1/30/2022 1431 by Robinson Willard, RN  Outcome: Met This Shift

## 2022-01-30 NOTE — PROGRESS NOTES
Patient has deep tissue injury to coccyx. Surrounding area is pink and barely blanchable.   Area cleansed with saline and mepilex applied

## 2022-01-30 NOTE — PROGRESS NOTES
[address] [place], [place] [address]   Progress Note    Admitting Date and Time: 1/29/2022  9:02 PM  Admit Dx: Diverticulitis [K57.92]  Diverticulitis of colon [K57.32]  PETE (acute kidney injury) ([facility] [address] [N17.9]  Altered mental status, unspecified altered mental status type [R41.82]    Subjective:    Patient had a small dark liquid brown bowel movement. Her  was present at the bedside and said that the patient had stopped eating and drinking at the nursing facility. Her family does not want her to return back to the facility that she was previously at. CT of the abdomen showed severely distended gallbladder with cholelithiasis and possible cholecystitis. She had an enterotomy with closure and the cholecystectomy was aborted due to adhesions in 11/21/2019. She was sent to [facility] at that time but had kyphoplasty surgery and further  gallbladder surgery was postponed. Patient continued to have pain related to her gallbladder and saw Dr. Maya Saini in the office. She was referred to Dr. Nuha Tovar on 1/26/21 and decided to hold off on surgery unless needed. ROS: denies fever, chills, cp, sob, n/v, HA unless stated above.      sodium chloride flush  10 mL IntraVENous 2 times per day    heparin (porcine)  5,000 Units SubCUTAneous 3 times per day    metoprolol tartrate  25 mg Oral BID    [Held by provider] furosemide  20 mg Oral Daily    [Held by provider] memantine  10 mg Oral BID    amLODIPine  5 mg Oral Daily    citalopram  10 mg Oral Daily    donepezil  10 mg Oral Nightly    cefTRIAXone (ROCEPHIN) IV  1,000 mg IntraVENous Q24H    metroNIDAZOLE  500 mg IntraVENous Q8H     sodium chloride flush, 10 mL, PRN  sodium chloride, 25 mL, PRN  promethazine, 12.5 mg, Q6H PRN   Or  ondansetron, 4 mg, Q6H PRN  polyethylene glycol, 17 g, Daily PRN  acetaminophen, 650 mg, Q6H PRN   Or  acetaminophen, 650 mg, Q6H PRN  senna, 1 tablet, Daily PRN         Objective:    /60   Pulse 88 Temp 98.6 °F (37 °C)   Resp 16   Ht 5' 6\" (1.676 m)   Wt 124 lb 12.8 oz (56.6 kg)   SpO2 94%   BMI 20.14 kg/m²   General Appearance: alert and oriented to person, place and time and in no acute distress, hearing loss  Skin: warm and dry  Head: normocephalic and atraumatic  Eyes: pupils equal, round, and reactive to light, conjunctivae normal  Neck: neck supple and non tender without mass   Pulmonary/Chest: clear to auscultation bilaterally- no wheezes, rales or rhonchi, no respiratory distress  Cardiovascular: normal rate, normal S1 and S2   Abdomen: soft, tender, non-distended, normal bowel sounds  Extremities: no cyanosis, no clubbing, right hip surgical wound and right knee immobilizer  Neurologic: no tremor and speech normal      Recent Labs     01/29/22 2125 01/30/22  0045 01/30/22  1523     --  147*   K 4.7  --  3.9     --  112*   CO2 23  --  25   BUN 90*  --  61*   CREATININE 3.8*  --  1.3*   GLUCOSE 137* 136 112*   CALCIUM 8.7  --  8.6       Recent Labs     01/29/22 2125 01/30/22  1523   ALKPHOS 131* 113*   PROT 6.2* 5.7*   LABALBU 2.7* 2.4*   BILITOT 0.5 0.3   AST 27 21   ALT 13 12       Recent Labs     01/29/22 2125 01/30/22  1523   WBC 17.7* 11.3   RBC 3.68 3.38*   HGB 11.1* 10.3*   HCT 35.3 33.0*   MCV 95.9 97.6   MCH 30.2 30.5   MCHC 31.4* 31.2*   RDW 13.5 13.6   * 440   MPV 9.6 9.7         Radiology:   US GALLBLADDER RUQ   Final Result   Distended gallbladder with cholelithiasis and minimal sludge. Mild wall   thickening. Early changes of cholecystitis should be considered. This could   be correlated with hepatic biliary scan as indicated. Biliary ductal dilatation without identified obstructing stone. Correlate   with laboratory assessment. If indicated, ERCP or MRCP may be useful. RECOMMENDATIONS:   Unavailable         CT ABDOMEN PELVIS WO CONTRAST Additional Contrast? None   Final Result   Findings consistent with rectal fecal impaction.   Moderate stool burden   otherwise throughout the colon. Severe diverticulosis. Minimal stranding of the fat about the ascending   colon concerning for localized diverticulitis or other   infectious/inflammatory process. Mild right-sided hydronephrosis and hydroureter without identified   obstructing stone. While this could represent a recently passed stone, other   etiologies including occult neoplasia not excluded. Continued follow-up or   correlation with retrograde evaluation recommended. Severely distended gallbladder with cholelithiasis but no definitive evidence   of cholecystitis. This could be correlated with ultrasound if indicated. Biliary ductal dilatation without identified obstructing stone. Correlate   with clinical and laboratory assessment. If indicated, ERCP or MRCP may be   useful. Patchy opacities in the lung bases favoring atelectasis as described. RECOMMENDATIONS:   Unavailable         XR CHEST PORTABLE   Final Result   No acute process. CT Head WO Contrast   Final Result   No acute intracranial findings. Consider MRI if symptoms persist.      Volume loss and findings suggestive of chronic microvascular ischemia. Redemonstration of complete opacification of bilateral mastoid air cells and   fluid in the middle ears. RECOMMENDATIONS:   Unavailable         NM HEPATOBILIARY    (Results Pending)       Assessment:  Principal Problem:    PETE (acute kidney injury) ([address]  Active Problems:    Atrial fibrillation (HCC)    Alzheimer's dementia without behavioral disturbance (HCC)    Open fracture of right distal femur (HCC)    Diverticulitis  Resolved Problems:    * No resolved hospital problems. *      Plan:  1. Acute kidney injury:  Creatinine was 3.8 on admission and was previously 0.7 on 1/23. Creatinine improved to 1.3. Continue IV fluids. 2. Diverticulosis with diverticulitis:  Continue Flagyl and ceftriaxone.    3. Distended gallbladder with cholelithiasis and possible cholecystitis: Seen on CT with Biliary duct dilatation. General Surgery following. For HIDA scan tomorrow. 4. Hypernatremia:  Na level is 147. Stop 0.9 NS and start 0.45 NS  5. Atrial fibrillation:  Hold Eliquis for possible surgical procedure. Continue metoprolol. 6. Constipation:  Fecal retention seen on CT of the abdomen. Give a dose of Miralax x 1.    7. UTI:  Continue ceftriaxone. Await urine culture. 8. Recent hip fracture: PT/OT. 9. Hypertension:  Continue Norvasc and Lopressor. Hold Lasix due to PETE. 10. Dementia:  Continue citalopram, donepezil. Memantine is on hold due to kidney function. 11. Poor oral intake:  Consult Dietician. NOTE: This report was transcribed using voice recognition software. Every effort was made to ensure accuracy; however, inadvertent computerized transcription errors may be present.      Electronically signed by HENRRY Gutierrez CNP on 1/30/2022 at 5:08 PM

## 2022-01-30 NOTE — H&P
[address] [place], [place] [address]ist Group   HISTORY AND PHYSICAL EXAM      AUTHOR: Tony Padilla MD PATIENT NAME: Arik Fuller: 2022 MRN: [id number], : 1932   Primary Care Physician: Andi Valentin MD     CHIEF COMPLAINT / REASON FOR ADMISSION:  Generalized weakness, AMS, Poor oral intake, dehydration    HPI:   This is a 80 y.o. female  has a past medical history of Arthritis, Atrial fibrillation ([address] Calculus of gallbladder with acute cholecystitis without obstruction, Calculus of gallbladder with cholecystitis with biliary obstruction, Hyperlipidemia, and Syncope. presented with Generalized weakness, AMS, Poor oral intake, dehydration for last few days prior to arrival to the hospital.  She was finished her rehab for recent right hip fracture but her conditions declining and  brought her in for evaluation. Patient is a poor historian. Patient is alert and awake but has dementia and confused. Additional details was obtained by reviewing EMS, ED physicians and nursing notes and talking with family members/care giver at bedside.     ROS:  Unable to obtain because of AMS    PMH:  Past Medical History:   Diagnosis Date    Arthritis     Atrial fibrillation ([address]     Calculus of gallbladder with acute cholecystitis without obstruction     Calculus of gallbladder with cholecystitis with biliary obstruction     Hyperlipidemia     Syncope 2019       Surgical History:  Past Surgical History:   Procedure Laterality Date    ABDOMEN SURGERY      ANESTHESIA NERVE BLOCK Bilateral 2020    BILATERAL MEDIAL BRANCH L4-5 AND L5-S1 JOINTS performed by Gregor Fernandez DO at [address] N/A 2019    DIAGNOSITIC LAPAROSCOPY CONVERTED TO EXPLORATORY LAPAROTOMY WITH CLOSURE OF ENTEROTOMY X1, CHOLANGIOGRAM, EGD performed by Cody Hobbs MD at [address] [place] [address] ECHO COMPL [address] [facility]  10/21/2013         FEMUR FRACTURE SURGERY Right 1/20/2022    RIGHT DISTAL FEMUR OPEN REDUCTION INTERNAL FIXATION (RETROGRADE NAIL) performed by Apryl Mesa DO at [address] [facility] Right 12/13/2018    RIGHT SACROILIAC JOINT STEROID INJECTION UNDER X-RAY GUIDANCE performed by Yannick Dominguez DO at [address] [facility] [address] JOINT Left 1/17/2019    LEFT SACROILIAC JOINT STEROID INJECTION UNDER X-RAY GUIDANCE performed by Yannick Dominguez DO at [address] [facility] [address] JOINT Bilateral 9/19/2019    BILATERAL SACROILIAC JOINT INJECTION X-RAY performed by Yannick Dominguez DO at [facility] Right 12/13/2018    sacroiliac joint     NERVE BLOCK Left 01/17/2019    sacroiliac joint injection     NERVE BLOCK Bilateral 02/06/2020    medial branch block    NERVE BLOCK Bilateral 10/29/2020    intra articular     NERVE BLOCK Bilateral 10/29/2020    BILATERAL L4-5 AND L5-S1 INTRA-ARTICULAR FACET STEROID INJECTION (CPT 13888,03363) performed by Yannick Dominguez DO at [address] N/A 11/25/2020    KYPHOPLASTY , L1 L2,BONE BIOPSY, EPIDURAL INJECTION performed by Lazaro Yusuf MD at [address] N/A     d/t peptic ulcers       Medications Prior to Admission:    Prior to Admission medications    Medication Sig Start Date End Date Taking?  Authorizing Provider   senna (SENOKOT) 8.6 MG tablet Take 1 tablet by mouth daily as needed (Constipation) 1/24/22 2/23/22  HENRRY Modi CNP   polyethylene glycol (GLYCOLAX) 17 g packet Take 17 g by mouth daily as needed for Constipation 1/24/22 2/23/22  HENRRY Modi CNP   amLODIPine (NORVASC) 5 MG tablet Take 1 tablet by mouth daily 1/25/22   HENRRY Modi CNP   citalopram (CELEXA) 10 MG tablet TAKE 1 TABLET BY MOUTH IN  THE MORNING 1/20/22   Linda SHOOK Andrea Mehta MD   furosemide (LASIX) 20 MG tablet TAKE 1 TABLET BY MOUTH 3  TIMES WEEKLY 12/27/21   India Howard MD   memantine [facility]) 10 MG tablet Take 1 tablet by mouth 2 times daily 12/27/21   India Howard MD   metoprolol tartrate (LOPRESSOR) 25 MG tablet Take 1 tablet by mouth 2 times daily 10/11/21   Linwood Patricio MD   donepezil (ARICEPT) 10 MG tablet TAKE 1 TABLET BY MOUTH AT  NIGHT 10/4/21   Linwood Patricio MD   apixaban (ELIQUIS) 2.5 MG TABS tablet TAKE 1 TABLET BY MOUTH  TWICE DAILY 6/14/21   Linwood Patricio MD   Glucosamine-Chondroit-Vit C-Mn (GLUCOSAMINE 1500 COMPLEX PO) Take 1 tablet by mouth daily     Historical Provider, MD   Cholecalciferol (VITAMIN D3) 2000 units CAPS Take 2,000 Units by mouth daily     Historical Provider, MD       Allergies:    Iodine    Social History:    reports that she has never smoked. She has never used smokeless tobacco. She reports that she does not drink alcohol and does not use drugs. Family History:   family history includes Heart Disease in her father and mother. Family History: Unable to obtain because of AMS  PHYSICAL EXAM:  Vitals:  /63   Pulse 83   Temp 97.7 °F (36.5 °C) (Oral)   Resp 20   Ht 5' 6\" (1.676 m)   Wt 124 lb 12.8 oz (56.6 kg)   SpO2 94%   BMI 20.14 kg/m²   GENERAL: No acute distress, Lethargic, nonverbal, Afebrile, Appears tired and weak otherwise hemodynamically stable at present. HEENT: PERRLA, no icterus. OP clear and no exudates. NECK: Supple  no carotid/ophthalmic bruits, JVD None. RESPIRATORY:  Bilateral equal vesicular breath sound with no wheezing. Lung bases are clear. HEART: No tachycardia at bedside and regular rhythm. Normal S1 and S2, No S3 or S4 is audible. No pulsation, thrills, murmur or friction rubs. ABDOMEN: Soft, mildly distended but has mild generalized pain and tenderness with out peritoneal signs. No hepatomegaly or splenomegaly. No CVA tenderness on the both sides.  Bowel sound is present. EXTREMITIES: All peripheral pulses are present. No calf tenderness or swelling. No pedal edema is present. [place] NEUROLOGY: Lethargic, nonverbal. No new focal neuro deficit. Bilateral Pupil is equal and reactive to light. CN-ii-xii otherwise grossly intact. Motor and Sensory: Grossly Intact bilaterally with no new focal signs   LABS:  Recent Labs     01/29/22 2125   WBC 17.7*   RBC 3.68   HGB 11.1*   HCT 35.3   MCV 95.9   MCH 30.2   MCHC 31.4*   RDW 13.5   *   MPV 9.6     Recent Labs     01/29/22 2125 01/30/22  0045     --    K 4.7  --      --    CO2 23  --    BUN 90*  --    CREATININE 3.8*  --    GLUCOSE 137* 136   CALCIUM 8.7  --      No results for input(s): POCGLU in the last 72 hours.   Results for orders placed or performed during the hospital encounter of 01/29/22   CBC Auto Differential   Result Value Ref Range    WBC 17.7 (H) 4.5 - 11.5 E9/L    RBC 3.68 3.50 - 5.50 E12/L    Hemoglobin 11.1 (L) 11.5 - 15.5 g/dL    Hematocrit 35.3 34.0 - 48.0 %    MCV 95.9 80.0 - 99.9 fL    MCH 30.2 26.0 - 35.0 pg    MCHC 31.4 (L) 32.0 - 34.5 %    RDW 13.5 11.5 - 15.0 fL    Platelets 098 (H) 794 - 450 E9/L    MPV 9.6 7.0 - 12.0 fL    Neutrophils % 78.0 43.0 - 80.0 %    Lymphocytes % 10.2 (L) 20.0 - 42.0 %    Monocytes % 10.2 2.0 - 12.0 %    Eosinophils % 1.7 0.0 - 6.0 %    Basophils % 0.2 0.0 - 2.0 %    Neutrophils Absolute 13.81 (H) 1.80 - 7.30 E9/L    Lymphocytes Absolute 1.77 1.50 - 4.00 E9/L    Monocytes Absolute 1.77 (H) 0.10 - 0.95 E9/L    Eosinophils Absolute 0.30 0.05 - 0.50 E9/L    Basophils Absolute 0.00 0.00 - 0.20 E9/L    Polychromasia 1+     Poikilocytes 1+     Sargeant Cells 1+     Ovalocytes 1+     Target Cells 1+    Comprehensive Metabolic Panel w/ Reflex to MG   Result Value Ref Range    Sodium 141 132 - 146 mmol/L    Potassium reflex Magnesium 4.7 3.5 - 5.0 mmol/L    Chloride 102 98 - 107 mmol/L    CO2 23 22 - 29 mmol/L    Anion Gap 16 7 - 16 mmol/L    Glucose 137 (H) 74 - 99 mg/dL BUN 90 (H) 6 - 23 mg/dL    CREATININE 3.8 (H) 0.5 - 1.0 mg/dL    GFR Non-African American 11 >=60 mL/min/1.73    GFR African American 14     Calcium 8.7 8.6 - 10.2 mg/dL    Total Protein 6.2 (L) 6.4 - 8.3 g/dL    Albumin 2.7 (L) 3.5 - 5.2 g/dL    Total Bilirubin 0.5 0.0 - 1.2 mg/dL    Alkaline Phosphatase 131 (H) 35 - 104 U/L    ALT 13 0 - 32 U/L    AST 27 0 - 31 U/L   Troponin   Result Value Ref Range    Troponin, High Sensitivity 16 (H) 0 - 9 ng/L   Urinalysis, reflex to microscopic   Result Value Ref Range    Color, UA Yellow Straw/Yellow    Clarity, UA Clear Clear    Glucose, Ur Negative Negative mg/dL    Bilirubin Urine Negative Negative    Ketones, Urine Negative Negative mg/dL    Specific Gravity, UA 1.015 1.005 - 1.030    Blood, Urine LARGE (A) Negative    pH, UA 5.5 5.0 - 9.0    Protein, UA Negative Negative mg/dL    Urobilinogen, Urine 0.2 <2.0 E.U./dL    Nitrite, Urine Negative Negative    Leukocyte Esterase, Urine Negative Negative   Lactic Acid, Plasma   Result Value Ref Range    Lactic Acid 1.1 0.5 - 2.2 mmol/L   Microscopic Urinalysis   Result Value Ref Range    WBC, UA 1-3 0 - 5 /HPF    RBC, UA 10-20 (A) 0 - 2 /HPF    Epithelial Cells, UA RARE /HPF    Bacteria, UA RARE (A) None Seen /HPF   POCT Glucose   Result Value Ref Range    Glucose 136 mg/dL    QC OK? ok    POCT Glucose   Result Value Ref Range    Meter Glucose 136 (H) 74 - 99 mg/dL   EKG 12 Lead   Result Value Ref Range    Ventricular Rate 94 BPM    Atrial Rate 100 BPM    QRS Duration 70 ms    Q-T Interval 354 ms    QTc Calculation (Bazett) 442 ms    R Axis -6 degrees    T Axis -55 degrees     ED Course as of 01/30/22 0646   Sat Jan 29, 2022 2112 ATTENDING PROVIDER ATTESTATION:     I have personally performed and/or participated in the history, exam, medical decision making, and procedures and agree with all pertinent clinical information unless otherwise noted.       I have also reviewed and agree with the past medical, family and social history unless otherwise noted. I have discussed this patient in detail with the resident, and provided the instruction and education regarding patient with history of Alzheimer's is here from nursing home apparently stopped eating and drinking today although was also given Percocet earlier today. Sent in for further evaluation, no reported recent injuries or falls although had a recent hip injury and replacement. .  My findings/plan: Patient is a frail, cachectic elderly female in no acute distress lying in the bed with her eyes open although is not responding verbally or following commands. Heart rate regular. Lungs are clear anteriorly with minimal air movement. Abdomen soft and shows no rigidity or guarding. Right leg has brace on it. No jaundice or icterus noted. No current seizure-like activity. No acute respiratory distress. [NC]      ED Course User Index  [NC] Amrit Avilez, DO     Radiology: CT ABDOMEN PELVIS WO CONTRAST Additional Contrast? None    Result Date: 1/29/2022  EXAMINATION: CT OF THE ABDOMEN AND PELVIS WITHOUT CONTRAST 1/29/2022 11:17 pm TECHNIQUE: CT of the abdomen and pelvis was performed without the administration of intravenous contrast. Multiplanar reformatted images are provided for review. Dose modulation, iterative reconstruction, and/or weight based adjustment of the mA/kV was utilized to reduce the radiation dose to as low as reasonably achievable. COMPARISON: 11/12/2020 HISTORY: ORDERING SYSTEM PROVIDED HISTORY: abdominal pain TECHNOLOGIST PROVIDED HISTORY: Reason for exam:->abdominal pain Additional Contrast?->None Decision Support Exception - unselect if not a suspected or confirmed emergency medical condition->Emergency Medical Condition (MA) FINDINGS: Gallbladder is severely distended measuring 14 x 6.3 cm. There is a calcified stone in the gallbladder. Some densities along the periphery of the gallbladder fundus are concerning for wall calcification.   Mild intrahepatic biliary ductal dilatation with moderate extrahepatic ductal dilatation. The common duct measures approximately 1.6 cm. No definitive evidence of choledocholithiasis. No definite gallbladder wall thickening or pericholecystic fluid. Exam is limited without intravenous contrast.  Liver is homogeneous. Spleen is normal in size. Pancreas is somewhat atrophic. No evidence of acute pancreatitis. Thickening of the adrenal glands, likely due to hyperplasia. Mild right-sided hydronephrosis and hydroureter without obvious obstructing stone. The extreme distal ureter is normal in caliber. No left-sided hydronephrosis. No calcified renal stone. Small low-attenuation foci in the kidneys are too small for definitive characterization but likely represent cysts. Assessment of bowel is limited without oral contrast.  Surgical clips are present near the gastroesophageal junction. Postoperative changes of gastro jejunal anastomosis. No bowel obstruction. Appendix not definitely identified. Large amount of stool in the rectal vault consistent with fecal impaction, distending the rectum with fecal material to approximately 7.3 cm. Moderate stool burden otherwise throughout the colon. Severe diverticulosis, greatest in the sigmoid colon. No definitive evidence of acute diverticulitis, allowing for patient motion artifact. Minimal soft tissue thickening and fat stranding about the ascending colon. This could be artifactual but localized diverticulitis or other infectious/inflammatory process should be considered. No abscess. Urinary bladder is largely decompressed with Burrell catheter. Gas in the bladder fundus is probably related to the presence of the catheter. Fistula or infection thought less likely. Scattered areas of opacification in the lung bases likely due to atelectasis. Developing infiltrates thought unlikely. Diffuse osteopenia. Status post kyphoplasty of L1 and L2.   Degenerative changes are present in the spine and pelvis. Old appearing lower left pelvic fractures. Partial visualization of right femoral nail. There is also partial visualization of skin staples in the right thigh with underlying soft tissue gas, presumably related to recent surgery. Findings consistent with rectal fecal impaction. Moderate stool burden otherwise throughout the colon. Severe diverticulosis. Minimal stranding of the fat about the ascending colon concerning for localized diverticulitis or other infectious/inflammatory process. Mild right-sided hydronephrosis and hydroureter without identified obstructing stone. While this could represent a recently passed stone, other etiologies including occult neoplasia not excluded. Continued follow-up or correlation with retrograde evaluation recommended. Severely distended gallbladder with cholelithiasis but no definitive evidence of cholecystitis. This could be correlated with ultrasound if indicated. Biliary ductal dilatation without identified obstructing stone. Correlate with clinical and laboratory assessment. If indicated, ERCP or MRCP may be useful. Patchy opacities in the lung bases favoring atelectasis as described. RECOMMENDATIONS: Unavailable     CT Head WO Contrast    Result Date: 1/29/2022  EXAMINATION: CT OF THE HEAD WITHOUT CONTRAST  1/29/2022 10:15 pm TECHNIQUE: CT of the head was performed without the administration of intravenous contrast. Dose modulation, iterative reconstruction, and/or weight based adjustment of the mA/kV was utilized to reduce the radiation dose to as low as reasonably achievable. COMPARISON: 01/19/2022 HISTORY: ORDERING SYSTEM PROVIDED HISTORY: altered mental status TECHNOLOGIST PROVIDED HISTORY: Reason for exam:->altered mental status Has a \"code stroke\" or \"stroke alert\" been called? ->No Decision Support Exception - unselect if not a suspected or confirmed emergency medical condition->Emergency Medical Condition (MA) FINDINGS: No intracranial hemorrhage, mass effect or midline shift. Basal cisterns are patent. Ventricles are not enlarged. Cortical volume loss. Areas of decreased attenuation in the bilateral white matter are nonspecific but likely due to chronic microvascular ischemia. Vascular calcifications. Again identified is complete opacification of the bilateral mastoid air cells and fluid in the bilateral middle ear. Prominent degenerative changes of the temporomandibular joints. No acute intracranial findings. Consider MRI if symptoms persist. Volume loss and findings suggestive of chronic microvascular ischemia. Redemonstration of complete opacification of bilateral mastoid air cells and fluid in the middle ears. RECOMMENDATIONS: Unavailable     US GALLBLADDER RUQ    Result Date: 1/30/2022  EXAMINATION: RIGHT UPPER QUADRANT ULTRASOUND 1/30/2022 12:12 am COMPARISON: Correlation with CT dated 01/29/2022 HISTORY: ORDERING SYSTEM PROVIDED HISTORY: eval for acute biliary etiology TECHNOLOGIST PROVIDED HISTORY: Reason for exam:->eval for acute biliary etiology What reading provider will be dictating this exam?->CRC FINDINGS: LIVER:  The liver demonstrates normal echogenicity without evidence of intrahepatic biliary ductal dilatation. BILIARY SYSTEM:  Gallbladder is distended with cholelithiasis and sludge. Mild gallbladder wall thickening measuring 4-5 mm. No obvious pericholecystic fluid. The visualized common duct measures 7 mm. On the CT, more proximally, this was more conspicuous. The common duct is not visualized in its entirety with this ultrasound. RIGHT KIDNEY: The right kidney is grossly unremarkable without evidence of hydronephrosis. PANCREAS:  Visualized portions of the pancreas are unremarkable. OTHER: No evidence of right upper quadrant ascites. Distended gallbladder with cholelithiasis and minimal sludge. Mild wall thickening. Early changes of cholecystitis should be considered.   This could be correlated with hepatic biliary scan as indicated. Biliary ductal dilatation without identified obstructing stone. Correlate with laboratory assessment. If indicated, ERCP or MRCP may be useful. RECOMMENDATIONS: Unavailable     XR CHEST PORTABLE    Result Date: 1/29/2022  EXAMINATION: ONE XRAY VIEW OF THE CHEST 1/29/2022 11:02 pm COMPARISON: 01/20/2022 HISTORY: ORDERING SYSTEM PROVIDED HISTORY: altered mental status TECHNOLOGIST PROVIDED HISTORY: Reason for exam:->altered mental status FINDINGS: The lungs are without acute focal process. There is no effusion or pneumothorax. The cardiomediastinal silhouette is without acute process. The osseous structures are without acute process. No acute process. ASSESSMENT:    Present on Admission:   PETE (acute kidney injury) ([facility] [address]   Diverticulitis    PLAN:  # AMS with diverticulitis with diverticulosis + Possible cholecystitis + UTI   IF fluid, NPO   CT / USG showed gall bladder distension + wall thickening + possible cholecystitis   Ceftriaxone + Flagyl   Surgery consult  # PETE - secondary to dehydration           Possible mild hydronephrosis without any stone   Continue IV fluid and encourge PO fluid              Monitor I/O, BUN/Cr accordingly. Avoid Nephrotoxic medications, ACE-i and NSAIDS. US-Renal/Renal consult if fails to improve promptly  # Recent Hip fracture   Stable   PT/OT  # Hypertension   Currently better controlled  Will resume home medications as needed. Monitor vitals and adjust BP meds as needed  # H/o Dementia   Will resume home meds  # Rest of the chronic medical problems are stable and will be managed with appropriately with home medications, placed nursing communication order to verify home medications before giving them to the patient.   # Diet: NPO Except Meds  # IVF's: Yes  # Fall Precaution: Yes  # Disposition: Home/primary residence   # Code Status: Full code by default\  The patient at bedside was counseled about clinical status, laboratory/imaging results, diagnoses, medication side effects, risk, and treatment plan, all questions were answered to patient's satisfaction and verbalized understanding      SIGNATURE: Leeann Trevino MD PATIENT NAME: Mandi Alexandra #: Hospitalist on call MRN: [id number]     Disclaimer: Portions of this note may have been generated using Dragon voice recognition software. Reasonable efforts were made to correct any dictation errors that resulted due to the programming of this software but some may still be present.

## 2022-01-30 NOTE — ED NOTES
in er waiting     Marisela Barnett, [address]  01/29/22 2105
Bed: 03  Expected date:   Expected time:   Means of arrival:   Comments:  Yolis Jay RN  01/29/22 2103
Dr. Fidelina Franco informed that pt is producing trigeminal PVCs. No orders at this time.       Kermit Crabtree  01/30/22 0055       Kermit [place]  01/30/22 9087
Knee brace to right leg, previous fracture     Zachery Gastelum  01/30/22 1341
Nurse to Nurse report given, pt to go to 4th floor.       [place]  01/30/22 9964
Pt returned from [address]  01/29/22 7013
Pt to CT SCAN     Ty Brantingham  01/29/22 6768
RN woke pt up to assess mental status, pt aroused to voice, but would not follow RN's commands and would just stare. Dr. Mike Pang informed of pt's Mental status and stated pt okay to transfer to floor still.       Zachery Gastelum  01/30/22 0302
coffee

## 2022-01-30 NOTE — CONSULTS
1 Abbe Arana MD    Patient's Name/Date of Birth: hCristopher Mark / 8/27/1932    Date: January 30, 2022     Consulting Surgeon: Ruthie Simon MD    PCP: Linwood Patricio MD     Chief Complaint: cholecystitis    HPI:   Christopher Mark is a 80 y.o. female who presents for evaluation of generalized weakness, AMS, Poor oral intake, dehydration for last few days prior to arrival to the hospital.   She had recent hip fracture for which she had surgery. She has just finished her rehab for this. Her  noted however that she has been declining lately. She complained of abdominal pain in the emergency room and a CT abdomen pelvis was ordered that revealed a markedly distended gallbladder. Ultrasound confirmed this with some mild wall thickening concerning for early cholecystitis. I was consulted for further evaluation and management. Patient is a poor historian and said no words during my encounter with her. HPI obtained from EMR and nursing staff. Patient Active Problem List   Diagnosis    Atrial fibrillation ([address]    Gastroesophageal reflux disease without esophagitis    Alzheimer's dementia without behavioral disturbance ([address]    Vitamin B 12 deficiency    Arthritis    Depression    Hyperlipidemia    Ambulatory dysfunction    Closed compression fracture of L2 lumbar vertebra, initial encounter ([address]    Closed fracture of distal end of right femur ([address]    Leukemoid reaction    PVC (premature ventricular contraction)    Osteopenia after menopause    Closed nondisplaced apophyseal fracture of right femur ([address]    Open fracture of right distal femur (HCC)    PETE (acute kidney injury) ([address]    Diverticulitis       Allergies   Allergen Reactions    Iodine      I.V.        Past Medical History:   Diagnosis Date    Arthritis     Atrial fibrillation ([address]     Calculus of gallbladder with acute cholecystitis without obstruction     Calculus of gallbladder with cholecystitis with biliary obstruction     Hyperlipidemia     Syncope 11/20/2019       Past Surgical History:   Procedure Laterality Date    ABDOMEN SURGERY      ANESTHESIA NERVE BLOCK Bilateral 2/6/2020    BILATERAL MEDIAL BRANCH L4-5 AND L5-S1 JOINTS performed by Caren Campuzano DO at [address], LAPAROSCOPIC N/A 11/21/2019    DIAGNOSITIC LAPAROSCOPY CONVERTED TO EXPLORATORY LAPAROTOMY WITH CLOSURE OF ENTEROTOMY X1, CHOLANGIOGRAM, EGD performed by Mohini Neri MD at [address] [facility] ECHO COMPL W DOP COLOR FLOW  10/21/2013         FEMUR FRACTURE SURGERY Right 1/20/2022    RIGHT DISTAL FEMUR OPEN REDUCTION INTERNAL FIXATION (RETROGRADE NAIL) performed by Darvin Jain DO at [facility] Right 12/13/2018    RIGHT SACROILIAC JOINT STEROID INJECTION UNDER X-RAY GUIDANCE performed by Caren Campuzano DO at [address] [facility] [address] JOINT Left 1/17/2019    LEFT SACROILIAC JOINT STEROID INJECTION UNDER X-RAY GUIDANCE performed by Caren Campuzano DO at [address] [facility] [address] JOINT Bilateral 9/19/2019    BILATERAL SACROILIAC JOINT INJECTION X-RAY performed by Caren Campuzano DO at [facility] Right 12/13/2018    sacroiliac joint     NERVE BLOCK Left 01/17/2019    sacroiliac joint injection     NERVE BLOCK Bilateral 02/06/2020    medial branch block    NERVE BLOCK Bilateral 10/29/2020    intra articular     NERVE BLOCK Bilateral 10/29/2020    BILATERAL L4-5 AND L5-S1 INTRA-ARTICULAR FACET STEROID INJECTION (CPT 56766,03702) performed by Caren Campuzano DO at [address] N/A 11/25/2020    KYPHOPLASTY , L1 L2,BONE BIOPSY, EPIDURAL INJECTION performed by Wagner Smart MD at [facility]     d/t peptic ulcers       Social History     Socioeconomic History    Marital status:      Spouse name: Not on file    Number of children: 3    Years of education: Not on file    Highest education level: Not on file   Occupational History    Not on file   Tobacco Use    Smoking status: Never Smoker    Smokeless tobacco: Never Used   Vaping Use    Vaping Use: Not on file   Substance and Sexual Activity    Alcohol use: No     Comment: 2 cups of coffee a day     Drug use: No    Sexual activity: Not on file   Other Topics Concern    Not on file   Social History Narrative    Not on file     Social Determinants of Health     Financial Resource Strain: Low Risk     Difficulty of Paying Living Expenses: Not hard at all   Food Insecurity: No Food Insecurity    Worried About Running Out of Food in the Last Year: Never true    [address] in the Last Year: Never true   Transportation Needs: No Transportation Needs    Lack of Transportation (Medical): No    Lack of Transportation (Non-Medical):  No   Physical Activity:     Days of Exercise per Week: Not on file    Minutes of Exercise per Session: Not on file   Stress:     Feeling of Stress : Not on file   Social Connections:     Frequency of Communication with Friends and Family: Not on file    Frequency of Social Gatherings with Friends and Family: Not on file    Attends Taoist Services: Not on file    Active Member of Clubs or Organizations: Not on file    Attends Club or Organization Meetings: Not on file    Marital Status: Not on file   Intimate Partner Violence:     Fear of Current or Ex-Partner: Not on file    Emotionally Abused: Not on file    Physically Abused: Not on file    Sexually Abused: Not on file   Housing Stability:     Unable to Pay for Housing in the Last Year: Not on file    Number of [place] in the Last Year: Not on file    Unstable Housing in the Last Year: Not on file       The patient has a family history that is negative for severe cardiovascular or respiratory issues, negative for reaction to anesthesia. Recent Labs     01/29/22 2125   WBC 17.7*   HGB 11.1*   HCT 35.3   MCV 95.9   *       Recent Labs     01/29/22 2125      K 4.7   CO2 23   BUN 90*   CREATININE 3.8*       Recent Labs     01/29/22 2125 01/30/22  0756   PROT 6.2*  --    LIPASE  --  30         Intake/Output Summary (Last 24 hours) at 1/30/2022 1114  Last data filed at 1/30/2022 8966  Gross per 24 hour   Intake 0 ml   Output 1150 ml   Net -1150 ml       I have reviewed relevant labs from this admission and interpretation is included in my assessment and plan      Review of Systems  A complete 10 system review was performed and are otherwise negative unless mentioned in the above HPI. Specific negatives are listed below but may not include all those reviewed.     General ROS: negative obtundation, AMS  ENT ROS: negative rhinorrhea, epistaxis  Allergy and Immunology ROS: negative itchy/watery eyes or nasal congestion  Hematological and Lymphatic ROS: negative spontaneous bleeding or bruising  Endocrine ROS: negative  lethargy, mood swings, palpitations or polydipsia/polyuria  Respiratory ROS: negative sputum changes, stridor, tachypnea or wheezing  Cardiovascular ROS: negative for - loss of consciousness, murmur or orthopnea  Gastrointestinal ROS: negative for - hematochezia or hematemesis  Genito-Urinary ROS: negative for -  genital discharge or hematuria  Musculoskeletal ROS: negative for - focal weakness, gangrene  Psych/Neuro ROS: negative for - visual or auditory hallucinations, suicidal ideation      Physical exam:   /63   Pulse 72   Temp 97.5 °F (36.4 °C) (Axillary)   Resp 16   Ht 5' 6\" (1.676 m)   Wt 124 lb 12.8 oz (56.6 kg)   SpO2 95%   BMI 20.14 kg/m²   General appearance:  NAD, appears stated age  Head: NCAT, PERRLA, EOMI, red conjunctiva  Neck: supple, no masses, trachea midline  Lungs: Equal chest rise bilateral, no retractions, no wheezing  Heart: Reg rate  Abdomen: soft, nontender, nondistended  Skin; warm and dry, no cyanosis  Gu: no cva tenderness  Extremities: atraumatic, no focal motor deficits, no open wounds  Psych: No tremor, visual hallucinations    Pathology: N/A    Radiology: I reviewed relevant abdominal imaging from this admission and that available in the EMR including CT abd/pel from 1/29/2022. My assessment is distended gallbladder with gallstones. Dilated common bile duct    Assessment:  Sharon Stephenson is a 80 y.o. female with markedly distended gallbladder concerning for chronic cystic duct obstruction and chronic cholecystitis    Patient Active Problem List   Diagnosis    Atrial fibrillation ([address]    Gastroesophageal reflux disease without esophagitis    Alzheimer's dementia without behavioral disturbance (HCC)    Vitamin B 12 deficiency    Arthritis    Depression    Hyperlipidemia    Ambulatory dysfunction    Closed compression fracture of L2 lumbar vertebra, initial encounter ([address]    Closed fracture of distal end of right femur (HCC)    Leukemoid reaction    PVC (premature ventricular contraction)    Osteopenia after menopause    Closed nondisplaced apophyseal fracture of right femur ([address]    Open fracture of right distal femur (HCC)    PETE (acute kidney injury) ([address]    Diverticulitis       Plan:  Check HIDA scan  Antibiotics for leukocytosis and possible cholecystitis  Okay for diet after HIDA scan  Suboptimal surgical patient due to advanced age and recent decline in physical status  Medical risk stratification for any procedure would be recommended  May benefit from just cholecystostomy tube if HIDA scan possible - will discuss with family  Time spent reviewing past medical, surgical, social and family history, vitals, nursing assessment and images.   Time spent face to face with patient and family counciling and discussing care exceeded 50% of the time of the consult. Additional time spent reviewing images and labs, discussing case with nursing, support staff and other physicians; as well as coordinating care.         Physician Signature: Electronically signed by Dr. Tennille Johnson

## 2022-01-31 PROBLEM — E44.0 MODERATE PROTEIN-CALORIE MALNUTRITION (HCC): Status: ACTIVE | Noted: 2022-01-01

## 2022-01-31 NOTE — PROGRESS NOTES
[address] [place], [place] [address]ist   Progress Note    Admitting Date and Time: 1/29/2022  9:02 PM  Admit Dx: Diverticulitis [K57.92]  Diverticulitis of colon [K57.32]  PETE (acute kidney injury) ([facility] [N17.9]  Altered mental status, unspecified altered mental status type [R41.82]    Subjective:    Pt seen after her HIDA scan. Patient is alert to self but pleasantly confused. She is also very hard of hearing which can add to the confusion. Patient family at her bedside. Patient answers most questions appropriately but there are a few questions that patient was not able to answer correctly. No complaints of abdominal or leg pain at this time. ROS: denies fever, chills, cp, sob, n/v, HA unless stated above.      sodium chloride flush  10 mL IntraVENous 2 times per day    heparin (porcine)  5,000 Units SubCUTAneous 3 times per day    metoprolol tartrate  25 mg Oral BID    [Held by provider] furosemide  20 mg Oral Daily    [Held by provider] memantine  10 mg Oral BID    amLODIPine  5 mg Oral Daily    citalopram  10 mg Oral Daily    donepezil  10 mg Oral Nightly    cefTRIAXone (ROCEPHIN) IV  1,000 mg IntraVENous Q24H    metroNIDAZOLE  500 mg IntraVENous Q8H     technetium mebrofenin, 6 millicurie, ONCE PRN  sodium chloride flush, 10 mL, PRN  sodium chloride, 25 mL, PRN  promethazine, 12.5 mg, Q6H PRN   Or  ondansetron, 4 mg, Q6H PRN  polyethylene glycol, 17 g, Daily PRN  acetaminophen, 650 mg, Q6H PRN   Or  acetaminophen, 650 mg, Q6H PRN  senna, 1 tablet, Daily PRN         Objective:    /64   Pulse 72   Temp 98.1 °F (36.7 °C) (Oral)   Resp 20   Ht 5' 6\" (1.676 m)   Wt 124 lb 12.8 oz (56.6 kg)   SpO2 95%   BMI 20.14 kg/m²   General Appearance: alert and oriented to self but pleasantly confused to place and time and in no acute distress  Skin: warm and dry, right ace wrap with long leg brace intact   Head: normocephalic and atraumatic  Eyes: pupils equal, round, and reactive to light, extraocular eye movements intact, conjunctivae normal  Neck: neck supple and non tender without mass   Pulmonary/Chest: clear to auscultation bilaterally- no wheezes, rales or rhonchi, normal air movement, no respiratory distress  Cardiovascular: normal rate, normal S1 and S2 and no carotid bruits  Abdomen: soft, non-tender, non-distended, normal bowel sounds   Extremities: no cyanosis, no clubbing and no edema  Neurologic: no cranial nerve deficit and speech normal      Recent Labs     01/29/22 2125 01/29/22 2125 01/30/22  0045 01/30/22  1523 01/31/22  0701     --   --  147* 146   K 4.7  --   --  3.9 3.6     --   --  112* 112*   CO2 23  --   --  25 24   BUN 90*  --   --  61* 42*   CREATININE 3.8*  --   --  1.3* 0.8   GLUCOSE 137*   < > 136 112* 98   CALCIUM 8.7  --   --  8.6 8.4*    < > = values in this interval not displayed. Recent Labs     01/29/22 2125 01/30/22  1523 01/31/22  0701   ALKPHOS 131* 113* 97   PROT 6.2* 5.7* 5.2*   LABALBU 2.7* 2.4* 2.4*   BILITOT 0.5 0.3 0.2   AST 27 21 22   ALT 13 12 11       Recent Labs     01/29/22 2125 01/30/22  1523 01/31/22  0701   WBC 17.7* 11.3 9.7   RBC 3.68 3.38* 3.13*   HGB 11.1* 10.3* 9.5*   HCT 35.3 33.0* 31.0*   MCV 95.9 97.6 99.0   MCH 30.2 30.5 30.4   MCHC 31.4* 31.2* 30.6*   RDW 13.5 13.6 13.7   * 440 436   MPV 9.6 9.7 9.9        Radiology:   NM HEPATOBILIARY   Final Result   Gallbladder is nonvisualized, for which cholecystitis could be considered in   the appropriate clinical setting. US GALLBLADDER RUQ   Final Result   Distended gallbladder with cholelithiasis and minimal sludge. Mild wall   thickening. Early changes of cholecystitis should be considered. This could   be correlated with hepatic biliary scan as indicated. Biliary ductal dilatation without identified obstructing stone. Correlate   with laboratory assessment. If indicated, ERCP or MRCP may be useful.       RECOMMENDATIONS:   Unavailable         CT ABDOMEN PELVIS WO CONTRAST Additional Contrast? None   Final Result   Findings consistent with rectal fecal impaction. Moderate stool burden   otherwise throughout the colon. Severe diverticulosis. Minimal stranding of the fat about the ascending   colon concerning for localized diverticulitis or other   infectious/inflammatory process. Mild right-sided hydronephrosis and hydroureter without identified   obstructing stone. While this could represent a recently passed stone, other   etiologies including occult neoplasia not excluded. Continued follow-up or   correlation with retrograde evaluation recommended. Severely distended gallbladder with cholelithiasis but no definitive evidence   of cholecystitis. This could be correlated with ultrasound if indicated. Biliary ductal dilatation without identified obstructing stone. Correlate   with clinical and laboratory assessment. If indicated, ERCP or MRCP may be   useful. Patchy opacities in the lung bases favoring atelectasis as described. RECOMMENDATIONS:   Unavailable         XR CHEST PORTABLE   Final Result   No acute process. CT Head WO Contrast   Final Result   No acute intracranial findings. Consider MRI if symptoms persist.      Volume loss and findings suggestive of chronic microvascular ischemia. Redemonstration of complete opacification of bilateral mastoid air cells and   fluid in the middle ears. RECOMMENDATIONS:   Unavailable             Assessment:  Principal Problem:    PETE (acute kidney injury) ([address]  Active Problems:    Atrial fibrillation (HCC)    Alzheimer's dementia without behavioral disturbance (HCC)    Open fracture of right distal femur (HCC)    Diverticulitis    Diverticulitis of colon    Acute cholecystitis  Resolved Problems:    * No resolved hospital problems. *      Plan:  1.  Acute kidney injury - clinically resolved as creatinine is now wnl - IVF's - continue monitor - may stop fluids now she is no longer NPO     2. Acute cholecystitis - S/p HIDA scan showed gallbladder is nonvisualized, for which cholecystitis could be considered in the appropriate clinical setting - GI following - Clear liquid diet - possible percutaneous cholecystotomy tube placement - ? Chronic cystic duct obstruction and chronic cholecystitis     3. Atrial fibrillation - rate controlled with beta blocker - remains in atrial fibrillation - anticoagulation is being held pending possible surgery     4. Open fracture of the right distal femur - S/o ORIF with insertion of retrograde intramedullary implant - ace wrap with long leg brace on - PT/OT following     5. Diverticulitis - Flagyl and Rocephin - general surgery following     6. Alzheimer's/dementia without behavioral disturbance - Celexa and Aricept continued - Namenda held     7. Anemia - H&H stable - stool black - occult stool pending    NOTE: This report was transcribed using voice recognition software. Every effort was made to ensure accuracy; however, inadvertent computerized transcription errors may be present.      Electronically signed by HENRRY Moreno CNP on 1/31/2022 at 1:13 PM

## 2022-01-31 NOTE — CARE COORDINATION
SS Note: Covid negative 1/24/22. Readmit completed. Pt was admitted from [facility] and [address] [place] [address], per Eduard olivares at [facility] pt is a bed hold and PRECERT WAIVED thru 2/4. SW met with pt's  and daughter Tammie Palma in room for transition of care, stated instead of pt returning to [facility] they request referral to [facility] at the [place]. WILMA called referral to [facility] liaison and she is reviewing.   Electronically signed by DORIAN Presley on 1/31/2022 at 5:04 PM

## 2022-01-31 NOTE — CARE COORDINATION
Readmission Assessment  Number of Days since last admission?: 1-7 days  Previous Disposition: SNF  Who is being Interviewed: Caregiver ( and daughter Krystyna Robbins)  What was the patient's/caregiver's perception as to why they think they needed to return back to the hospital?: Other (Comment) (\"wasn't eating or drinking, dehydrated\")  Did you visit your Primary Care Physician after you left the hospital, before you returned this time?: No  Why weren't you able to visit your PCP?: Other (Comment) (Readmitted from SNF to hospital)  Did you see a specialist, such as Cardiac, Pulmonary, Orthopedic Physician, etc. after you left the hospital?: No  Who advised the patient to return to the hospital?: Other (Comment) (pt's )  Does the patient report anything that got in the way of taking their medications?: No  In our efforts to provide the best possible care to you and others like you, can you think of anything that we could have done to help you after you left the hospital the first time, so that you might not have needed to return so soon?:  (no)

## 2022-01-31 NOTE — CONSULTS
Comprehensive Nutrition Assessment    Type and Reason for Visit:  Initial,Positive Nutrition Screen,Consult (Poor PO)    Nutrition Recommendations/Plan: Continue Current Diet, ADAT, start Ensure Clear TID and Andrea BID    Nutrition Assessment:  Pt w/ diverticulitis/cholelithiasis, noted PETE/AMS. Hx dementia. Pt now clear liquid diet, pt's family reports poor PO intake PTA. Pt w/ wound/moderate malnutrition, will add ONS & monitor. Malnutrition Assessment:  Malnutrition Status: Moderate malnutrition    Context:  Chronic Illness     Findings of the 6 clinical characteristics of malnutrition:  Energy Intake:  7 - 75% or less estimated energy requirements for 1 month or longer  Weight Loss:  No significant weight loss     Body Fat Loss:   (moderate) Buccal region,Orbital   Muscle Mass Loss:   (moderate) Temples (temporalis),Clavicles (pectoralis & deltoids)  Fluid Accumulation:  No significant fluid accumulation     Strength:  Not Performed    Nutrition Related Findings:  dementia, -I&Os, constipation      Wounds:  Pressure Injury       Current Nutrition Therapies:    ADULT DIET;  Clear Liquid  Diet NPO Exceptions are: Sips of Water with Meds    Anthropometric Measures:  · Height: 5' 6\" (167.6 cm)  · Current Body Weight: 123 lb (55.8 kg) (1/31 bed)    · Usual Body Weight: 118 lb (53.5 kg) (4/2021 actual per EMR)     · Ideal Body Weight: 130 lbs; % Ideal Body Weight 94.6 %   · BMI: 19.9  · BMI Categories: Underweight (BMI less than 22) age over 72       Nutrition Diagnosis:   · Moderate malnutrition,In context of chronic illness related to cognitive or neurological impairment as evidenced by poor intake prior to admission,moderate loss of subcutaneous fat,moderate muscle loss    Nutrition Interventions:   Nutrition Education/Counseling:  Education initiated (discussed ONS w/ pt's family)   Coordination of Nutrition Care:  Continue to monitor while inpatient    Goals:  Nutrition progression       Nutrition Monitoring and Evaluation:   Food/Nutrient Intake Outcomes:  Food and Nutrient Intake,Supplement Intake  Physical Signs/Symptoms Outcomes:  Biochemical Data,Constipation,GI Status,Fluid Status or Edema,Nutrition Focused Physical Findings,Skin,Weight     Discharge Planning:     Too soon to determine     Electronically signed by Shelbie Olson RD, LD on 1/31/22 at 3:22 PM EST  Contact: [phone]

## 2022-01-31 NOTE — PROGRESS NOTES
GENERAL SURGERY  DAILY PROGRESS NOTE  1/31/2022    Chief Complaint   Patient presents with    Fatigue     Pt presents today for fatigue and ams. per  she has not been eating or drinking for the last five days. Per nursing home at [address] at 1900. Subjective:  Patient is still not saying any words today, no visible pain with abdominal palpation     Objective:  /62   Pulse 72   Temp 98.8 °F (37.1 °C) (Oral)   Resp 20   Ht 5' 6\" (1.676 m)   Wt 124 lb 12.8 oz (56.6 kg)   SpO2 94%   BMI 20.14 kg/m²     GENERAL:  Laying in bed, awake, no apparent distress  HEAD: Normocephalic, atraumatic  EYES: No sclera icterus, pupils equal  LUNGS:  No increased work of breathing  CARDIOVASCULAR:  RR  ABDOMEN:  Soft, non-tender, non-distended  EXTREMITIES: No edema or swelling  SKIN: Warm and dry    Assessment/Plan:  80 y.o. female with markedly distended gallbladder concerning for chronic cystic duct obstruction and chronic cholecystitis    - HIDA scan today  - NPO for scan, ok for diet after   - if positive, will consider percutaneous cholecystostomy tube      Electronically signed by Aura Gamez MD on 1/31/2022 at 7:47 AM    HIDA positive for cholecystitis. Ask medicine for  Risk stratification for surgery vs cholecystostomy tube.      Margarett Cheadle, MD

## 2022-02-01 PROBLEM — K81.9 CHOLECYSTITIS: Status: ACTIVE | Noted: 2022-01-01

## 2022-02-01 PROBLEM — K82.9 GALLBLADDER DISORDER: Status: ACTIVE | Noted: 2022-01-01

## 2022-02-01 NOTE — PROGRESS NOTES
[address] [place], [place] [address]ist   Progress Note    Admitting Date and Time: 1/29/2022  9:02 PM  Admit Dx: Diverticulitis [K57.92]  Diverticulitis of colon [K57.32]  PETE (acute kidney injury) ([facility] [address] [N17.9]  Altered mental status, unspecified altered mental status type [R41.82]    Subjective:    Pt awake and alert to self. No complaints of nausea or abdominal pain. Patient pulled out her IV line and another one is being placed. Patient has been pleasant and cooperative. ROS: denies fever, chills, cp, sob, n/v, HA unless stated above.      sodium chloride flush  10 mL IntraVENous 2 times per day    heparin (porcine)  5,000 Units SubCUTAneous 3 times per day    metoprolol tartrate  25 mg Oral BID    [Held by provider] furosemide  20 mg Oral Daily    [Held by provider] memantine  10 mg Oral BID    amLODIPine  5 mg Oral Daily    citalopram  10 mg Oral Daily    donepezil  10 mg Oral Nightly    cefTRIAXone (ROCEPHIN) IV  1,000 mg IntraVENous Q24H    metroNIDAZOLE  500 mg IntraVENous Q8H     technetium mebrofenin, 6 millicurie, ONCE PRN  sodium chloride flush, 10 mL, PRN  sodium chloride, 25 mL, PRN  promethazine, 12.5 mg, Q6H PRN   Or  ondansetron, 4 mg, Q6H PRN  polyethylene glycol, 17 g, Daily PRN  acetaminophen, 650 mg, Q6H PRN   Or  acetaminophen, 650 mg, Q6H PRN  senna, 1 tablet, Daily PRN         Objective:    BP (!) 145/64   Pulse 79   Temp 98.3 °F (36.8 °C) (Axillary)   Resp 20   Ht 5' 6\" (1.676 m)   Wt 123 lb (55.8 kg)   SpO2 94%   BMI 19.85 kg/m²   General Appearance: alert and oriented to person but pleasantly confused to place and time and in no acute distress  Skin: warm and dry  Head: normocephalic and atraumatic  Eyes: pupils equal, round, and reactive to light, extraocular eye movements intact, conjunctivae normal  Neck: neck supple and non tender without mass   Pulmonary/Chest: clear to auscultation bilaterally- no wheezes, rales or rhonchi, normal air movement, no respiratory distress  Cardiovascular: normal rate, abnormal irregular rhythm atria fibrillation  Abdomen: soft, non-tender, non-distended, normal bowel sounds   Extremities: no cyanosis, no clubbing and no edema  Neurologic: no cranial nerve deficit and speech normal      Recent Labs     01/30/22  1523 01/31/22  0701 02/01/22  0809   * 146 146   K 3.9 3.6 3.4*   * 112* 111*   CO2 25 24 25   BUN 61* 42* 22   CREATININE 1.3* 0.8 0.6   GLUCOSE 112* 98 106*   CALCIUM 8.6 8.4* 8.3*       Recent Labs     01/30/22  1523 01/31/22  0701 02/01/22  0809   ALKPHOS 113* 97 96   PROT 5.7* 5.2* 5.7*   LABALBU 2.4* 2.4* 2.4*   BILITOT 0.3 0.2 0.3   AST 21 22 30   ALT 12 11 11       Recent Labs     01/30/22  1523 01/31/22  0701 02/01/22  0809   WBC 11.3 9.7 9.0   RBC 3.38* 3.13* 3.50   HGB 10.3* 9.5* 10.4*   HCT 33.0* 31.0* 34.2   MCV 97.6 99.0 97.7   MCH 30.5 30.4 29.7   MCHC 31.2* 30.6* 30.4*   RDW 13.6 13.7 13.5    436 508*   MPV 9.7 9.9 9.6         Radiology:   NM HEPATOBILIARY   Final Result   Gallbladder is nonvisualized, for which cholecystitis could be considered in   the appropriate clinical setting. US GALLBLADDER RUQ   Final Result   Distended gallbladder with cholelithiasis and minimal sludge. Mild wall   thickening. Early changes of cholecystitis should be considered. This could   be correlated with hepatic biliary scan as indicated. Biliary ductal dilatation without identified obstructing stone. Correlate   with laboratory assessment. If indicated, ERCP or MRCP may be useful. RECOMMENDATIONS:   Unavailable         CT ABDOMEN PELVIS WO CONTRAST Additional Contrast? None   Final Result   Findings consistent with rectal fecal impaction. Moderate stool burden   otherwise throughout the colon. Severe diverticulosis. Minimal stranding of the fat about the ascending   colon concerning for localized diverticulitis or other   infectious/inflammatory process.       Mild right-sided hydronephrosis and hydroureter without identified   obstructing stone. While this could represent a recently passed stone, other   etiologies including occult neoplasia not excluded. Continued follow-up or   correlation with retrograde evaluation recommended. Severely distended gallbladder with cholelithiasis but no definitive evidence   of cholecystitis. This could be correlated with ultrasound if indicated. Biliary ductal dilatation without identified obstructing stone. Correlate   with clinical and laboratory assessment. If indicated, ERCP or MRCP may be   useful. Patchy opacities in the lung bases favoring atelectasis as described. RECOMMENDATIONS:   Unavailable         XR CHEST PORTABLE   Final Result   No acute process. CT Head WO Contrast   Final Result   No acute intracranial findings. Consider MRI if symptoms persist.      Volume loss and findings suggestive of chronic microvascular ischemia. Redemonstration of complete opacification of bilateral mastoid air cells and   fluid in the middle ears. RECOMMENDATIONS:   Unavailable             Assessment:  Principal Problem:    PETE (acute kidney injury) ([address]  Active Problems:    Atrial fibrillation (HCC)    Alzheimer's dementia without behavioral disturbance (HCC)    Open fracture of right distal femur (HCC)    Diverticulitis    Diverticulitis of colon    Acute cholecystitis    Moderate protein-calorie malnutrition ([address]    Gallbladder disorder    Gallbladder sludge  Resolved Problems:    * No resolved hospital problems. *      Plan:    1. Acute kidney injury - clinically resolved as creatinine is now wnl - IVF's discontinued     2.  Acute cholecystitis - S/p HIDA scan showed gallbladder is nonvisualized, for which cholecystitis could be considered in the appropriate clinical setting - GI following - Clear liquid diet - possible percutaneous cholecystotomy tube placement vs cholecystectomy  - possible chronic cystic duct obstruction and chronic cholecystitis - afebrile, no pain or wbc - appears comfortable     3. Atrial fibrillation - irregular heart beat with controlled rate - Eliquis remains on hold d/t possible procedure - Metoprolol - continue monitor closely       4. Open fracture of the right distal femur - S/o ORIF with insertion of retrograde intramedullary implant - ace wrap with long leg brace intact - PT/OT following - will encourage patient to be up in chair when family is present      5. Diverticulitis - no c/o abdominal pain - general surgery on     6. Alzheimer's/dementia without behavioral disturbance - pleasant and cooperative - follows some simple commands - continue medications as prescribed      7. Anemia - H&H stable - stool black - occult stool pending    8. Hypokalemia - oral potassium replacement ordered     NOTE: This report was transcribed using voice recognition software. Every effort was made to ensure accuracy; however, inadvertent computerized transcription errors may be present.      Electronically signed by HENRRY Espinoza CNP on 2/1/2022 at 11:51 AM

## 2022-02-01 NOTE — CARE COORDINATION
Ss note:2/1/2022.2:31 PM Neg covid on 1-24-22. Pt presents from [facility] and rehab. Per notes pts spouse and dtr requested referral to [facility]. Liaison Solo Buchanan will need therapy notes to determine if facility can accept. Pt has dx of dementia. [facility] is waived till 6/3/53. Sw will follow.  DORIAN Wasserman

## 2022-02-01 NOTE — PROGRESS NOTES
GENERAL SURGERY  DAILY PROGRESS NOTE  2/1/2022    Chief Complaint   Patient presents with    Fatigue     Pt presents today for fatigue and ams. per  she has not been eating or drinking for the last five days. Per nursing home at [address] at [address]. Subjective:  Patient pleasantly confused this morning. No visible pain with abdominal palpation.      Objective:  BP (!) 141/62   Pulse 77   Temp 98 °F (36.7 °C) (Axillary)   Resp 18   Ht 5' 6\" (1.676 m)   Wt 123 lb (55.8 kg)   SpO2 95%   BMI 19.85 kg/m²     GENERAL:  Laying in bed, awake, no apparent distress  HEAD: Normocephalic, atraumatic  EYES: No sclera icterus, pupils equal  LUNGS:  No increased work of breathing  CARDIOVASCULAR:  RR  ABDOMEN:  Soft, non-tender, non-distended  EXTREMITIES: No edema or swelling  SKIN: Warm and dry    Assessment/Plan:  80 y.o. female with markedly distended gallbladder with positive HIDA scan     - Afebrile, no leukocytosis, no abdominal pain  - await medical risk stratification  - will plan for cholecystectomy vs cholecystostomy pending recommendations of medical team -- may be at risk for pulling out perc treasure tube given mental status  - analgesia    Electronically signed by Mary Jo Rodriguez MD on 2/1/2022 at 6:58 AM   Electronically signed by Leyla Pettit DO on 2/1/2022 at 7:36 AM    General Surgery Progress Note  [facility]    Patient's Name/Date of Birth: Antonio Ware / 8/27/1932    Date: February 1, 2022     Surgeon: Flor Vásquez MD    Chief Complaint: cholecystitis    Patient Active Problem List   Diagnosis    Atrial fibrillation ([address]    Gastroesophageal reflux disease without esophagitis    Alzheimer's dementia without behavioral disturbance ([address]    Vitamin B 12 deficiency    Arthritis    Depression    Hyperlipidemia    Ambulatory dysfunction    Closed compression fracture of L2 lumbar vertebra, initial encounter ([address]    Closed fracture of distal end of right femur ([address]    Leukemoid reaction    PVC (premature ventricular contraction)    Osteopenia after menopause    Closed nondisplaced apophyseal fracture of right femur (HCC)    Open fracture of right distal femur (HCC)    EPTE (acute kidney injury) ([facility] [address]    Diverticulitis    Diverticulitis of colon    Acute cholecystitis    Moderate protein-calorie malnutrition (HCC)    Gallbladder disorder    Gallbladder sludge       Subjective: Nonverbal, no complaints.     Objective:  BP (!) 145/64   Pulse 79   Temp 98.3 °F (36.8 °C) (Axillary)   Resp 20   Ht 5' 6\" (1.676 m)   Wt 123 lb (55.8 kg)   SpO2 94%   BMI 19.85 kg/m²   Labs:  Recent Labs     01/30/22  1523 01/31/22  0701 02/01/22  0809   WBC 11.3 9.7 9.0   HGB 10.3* 9.5* 10.4*   HCT 33.0* 31.0* 34.2     Lab Results   Component Value Date    CREATININE 0.6 02/01/2022    BUN 22 02/01/2022     02/01/2022    K 3.4 (L) 02/01/2022     (H) 02/01/2022    CO2 25 02/01/2022     Recent Labs     01/30/22  0756   LIPASE 30     CBC with Differential:    Lab Results   Component Value Date    WBC 9.0 02/01/2022    RBC 3.50 02/01/2022    HGB 10.4 02/01/2022    HCT 34.2 02/01/2022     02/01/2022    MCV 97.7 02/01/2022    MCH 29.7 02/01/2022    MCHC 30.4 02/01/2022    RDW 13.5 02/01/2022    SEGSPCT 71 10/21/2013    LYMPHOPCT 15.9 02/01/2022    MONOPCT 9.1 02/01/2022    BASOPCT 0.2 02/01/2022    MONOSABS 0.82 02/01/2022    LYMPHSABS 1.43 02/01/2022    EOSABS 0.11 02/01/2022    BASOSABS 0.02 02/01/2022     CMP:    Lab Results   Component Value Date     02/01/2022    K 3.4 02/01/2022    K 3.6 01/31/2022     02/01/2022    CO2 25 02/01/2022    BUN 22 02/01/2022    CREATININE 0.6 02/01/2022    GFRAA >60 02/01/2022    LABGLOM >60 02/01/2022    GLUCOSE 106 02/01/2022    GLUCOSE 90 07/13/2011    PROT 5.7 02/01/2022    LABALBU 2.4 02/01/2022    LABALBU 4.1 07/13/2011    CALCIUM 8.3 02/01/2022    BILITOT 0.3 02/01/2022    ALKPHOS 96 02/01/2022    AST 30 02/01/2022    ALT 11 02/01/2022       General appearance:  NAD  Head: NCAT, PERRLA, EOMI, red conjunctiva  Neck: supple, no masses  Lungs: CTAB, equal chest rise bilateral  Heart: Reg rate  Abdomen: soft, nondistended, nontender  Skin; no lesions  Gu: no cva tenderness  Extremities: extremities normal, atraumatic, no cyanosis or edema      Assessment/Plan:  Mary Gutierrez is a 80 y.o. female with acute on chronic cholecystitis    Antibiotics  Okay for diet  No plan for surgical intervention.   Patient has had prior attempted cholecystectomy however due to extremely large gallbladder, anatomy was difficult to delineate  Can consider cholecystostomy tube if develops pain in her sepsis  We will see as needed    Juliane Salas MD  2/1/2022  2:18 PM

## 2022-02-02 NOTE — PROGRESS NOTES
[address] [facility]  [facility] [place]. [place]        Date:2022                                                  Patient Name: Christopher Mark    MRN: [id number]    : 1932    Room: [address] [place] [address]      Evaluating OT: Lyndsay Flores OTR/L #[id number]     Referring Provider and Specific Provider Orders/Date:      22  OT eval and treat  Start:  22,   End:  22,   ONE TIME,   Standing Count:  1 Occurrences,   R         Aldair Haro, APRN - CNP      Placement Recommendation: Subacute       Diagnosis:   1. PETE (acute kidney injury) ([facility] [address]    2. Diverticulitis of colon    3.  Altered mental status, unspecified altered mental status type           Surgery: None this admission       Pertinent Medical History:       Past Medical History:   Diagnosis Date    Arthritis     Atrial fibrillation ([facility] [address]     Calculus of gallbladder with acute cholecystitis without obstruction     Calculus of gallbladder with cholecystitis with biliary obstruction     Hyperlipidemia     Syncope 2019         Past Surgical History:   Procedure Laterality Date    ABDOMEN SURGERY      ANESTHESIA NERVE BLOCK Bilateral 2020    BILATERAL MEDIAL BRANCH L4-5 AND L5-S1 JOINTS [facility], LAPAROSCOPIC N/A 2019    DIAGNOSITIC LAPAROSCOPY CONVERTED TO EXPLORATORY LAPAROTOMY WITH CLOSURE OF ENTEROTOMY X1, CHOLANGIOGRAM, EGD performed by Baudilio Blancas MD at [address] [facility] [address] [place] [address]  10/21/2013         FEMUR FRACTURE SURGERY Right 2022    RIGHT DISTAL FEMUR OPEN REDUCTION INTERNAL FIXATION (RETROGRADE NAIL) performed by Roshni Miramontes DO at [address] [place] [address] Right 2018    RIGHT SACROILIAC JOINT STEROID INJECTION UNDER X-RAY GUIDANCE performed by Byron Mcdonald, DO at [address] [facility] [address] JOINT Left 1/17/2019    LEFT SACROILIAC JOINT STEROID INJECTION UNDER X-RAY GUIDANCE performed by Byron Mcdonald, DO at [address] [facility] [address] JOINT Bilateral 9/19/2019    BILATERAL SACROILIAC JOINT INJECTION X-RAY performed by Byron Mcdonald, DO at [facility] Right 12/13/2018    sacroiliac joint     NERVE BLOCK Left 01/17/2019    sacroiliac joint injection     NERVE BLOCK Bilateral 02/06/2020    medial branch block    NERVE BLOCK Bilateral 10/29/2020    intra articular     NERVE BLOCK Bilateral 10/29/2020    BILATERAL L4-5 AND L5-S1 INTRA-ARTICULAR FACET STEROID INJECTION (CPT 71797,13728) performed by Byron Mcdonald, DO at [address] N/A 11/25/2020    KYPHOPLASTY , L1 L2,BONE BIOPSY, EPIDURAL INJECTION performed by Vanessa Mata MD at [address] [place] [address]     d/t peptic ulcers        Precautions:  Fall Risk, falls and alarm, s/p right distal femur open reduction internal fixation 1/20/22, partial weight bearing right lower extremity, leg immobilizer right lower extremity, Alzheimer's dementia.        Assessment of current deficits    [x] Functional mobility  [x]ADLs  [x] Strength               [x]Cognition    [x] Functional transfers   [x] IADLs         [x] Safety Awareness   [x]Endurance    [] Fine Coordination              [x] Balance      [] Vision/perception   []Sensation     []Gross Motor Coordination  [] ROM  [] Delirium                   [] Motor Control     OT PLAN OF CARE   OT POC based on physician orders, patient diagnosis and results of clinical assessment    Frequency/Duration 1-3 days/wk for 2 weeks PRN     Specific OT Treatment Interventions to include:   * Instruction/training on adapted ADL techniques and AE recommendations to increase functional independence within precautions       * Training on energy conservation strategies, correct breathing pattern and techniques to improve independence/tolerance for self-care routine  * Functional transfer/mobility training/DME recommendations for increased independence, safety, and fall prevention  * Patient/Family education to increase follow through with safety techniques and functional independence  * Recommendation of environmental modifications for increased safety with functional transfers/mobility and ADLs  * Cognitive retraining/development of therapeutic activities to improve problem solving, judgement, memory, and attention for increased safety/participation in ADL/IADL tasks  * Therapeutic exercise to improve motor endurance, ROM, and functional strength for ADLs/functional transfers  * Therapeutic activities to facilitate/challenge dynamic balance, stand tolerance for increased safety and independence with ADLs    Recommended Adaptive Equipment: TBD     Home Living: Pt presented from rehab at [facility]. Prior to initial hospitalization, patient was living at home with spouse in a single story home. Equipment owned: Unknown     Prior Level of Function: Per spouse, patient was mostly independent with ADLs and mobility using wheeled walker at baseline. Pain Level: Pt reports minimal pain in right LE with activity; Nursing notified.      Cognition: A&O: 1/4; Follows 1-2 step directions   Memory: poor   Sequencing: poor   Problem solving: poor   Judgement/safety: poor    [facility]   AM-PAC Daily Activity Inpatient   How much help for putting on and taking off regular lower body clothing?: Total  How much help for Bathing?: A Lot  How much help for Toileting?: Total  How much help for putting on and taking off regular upper body clothing?: A Lot  How much help for taking care of personal grooming?: A Lot  How much help for eating meals?: A Little  AM-PAC Inpatient Daily Activity Raw Score: 11  AM-PAC Inpatient ADL T-Scale Score : 29.04  ADL Inpatient CMS 0-100% Score: 70.42  ADL Inpatient CMS G-Code Modifier : CL     Functional Assessment:    Initial Eval Status  Date: 2/2/22 Treatment Status  Date: STGs = LTGs  Time frame: 10-14 days   Feeding Minimal Assist     Supervision    Grooming Moderate Assist     Minimal Assist    UB Dressing Moderate Assist    Minimal Assist    LB Dressing Dependent   Knee immobilizer donned throughout session. Maximal Assist    Bathing Moderate/Maximal Assist       Minimal Assist    Toileting Dependent     Maximal Assist    Bed Mobility  Supine to sit: N/T  Sit to supine: Maximal Assist     Supine to sit: Moderate Assist   Sit to supine: Moderate Assist    Functional Transfers Sit to stand: Maximal Assist x2  Stand to sit: Maximal Assist x2    Moderate Assist with use of wheeled walker   Functional Mobility Maximal Assist x2 with walker to improve balance with small side step along bed. Verbal cues for weight bearing restriction through R LE and overall safety. Moderate Assist with use of wheeled walker   Balance Sitting:     Static: SBA    Dynamic: SBA/CGA  Standing: Mod/Max A x2 with walker    Sitting:     Static: SBA    Dynamic: SBA  Standing: Min/Mod A with walker   Activity Tolerance fair    Increase standing tolerance >1-2 minutes for improved engagement with functional transfers and indep in ADLs   Visual/  Perceptual Glasses: Yes      NA      Hand Dominance: N/a     AROM (PROM) Strength Additional Info:    RUE  WFL 3+/5 good  and wfl FMC/dexterity noted during ADL tasks     LUE WFL 3+/5 good  and wfl FMC/dexterity noted during ADL tasks       Hearing:  WFL   Sensation:   No c/o numbness or tingling  Tone:  WFL   Edema:     Comments: RN cleared patient for OT. Upon arrival patient at EOB with PTS.  Therapist facilitated and instructed pt on adapted  techniques & compensatory strategies to improve safety and independence with basic ADLs, bed mobility, functional transfers and mobility to allow pt to achieve highest level of independence and safely. Pt demonstrated poor understanding of education & follow through. At end of session, patient was supine, alarm on, with call light and phone within reach, all lines and tubes intact. Overall, patient demonstrated  decreased independence and safety during completion of ADL tasks. Pt would benefit from continued skilled OT to increase safety and independence with completion of ADL tasks and functional mobility for improved quality of life. Rehab Potential: Good for established goals. Patient / Family Goal: Spouse plans for patient to return to rehab      Patient and/or family were instructed on functional diagnosis, prognosis/goals and OT plan of care. Demonstrated fair understanding. Eval Complexity: Low    Time In: 10:25 AM   Time Out: 10:45 AM    Total Treatment Time: 0      Min Units   OT Eval Low 97165  X  1    OT Eval Medium 97081      OT Eval High 67523      OT Re-Eval [id number]            ADL/Self Care 74292     Therapeutic Activities 14377       Therapeutic Ex 69225       Orthotic Management 25598       Manual 27086     Neuro Re-Ed 21500       Non-Billable Time        Evaluation Time additionally includes thorough review of current medical information, gathering information on past medical history/social history and prior level of function, interpretation of standardized testing/informal observation of tasks, assessment of data and development of plan of care and goals.         Evaluating OT: Emerson Hernandez OTR/L #[id number]

## 2022-02-02 NOTE — PROGRESS NOTES
[address] [place], [place] [address]ist   Progress Note    Admitting Date and Time: 1/29/2022  9:02 PM  Admit Dx: Diverticulitis [K57.92]  Diverticulitis of colon [K57.32]  PETE (acute kidney injury) ([facility] [N17.9]  Altered mental status, unspecified altered mental status type [R41.82]    Subjective:    Pt is awake and pleasantly confused but cooperative. Patient is complaining of abdominal pain but without nausea or vomiting. Patient's  at the bedside. Both patient and  have been updated on the plan of care. ROS: denies fever, chills, cp, sob, n/v, HA unless stated above.      potassium chloride  40 mEq Oral BID    potassium chloride  10 mEq IntraVENous Once    ciprofloxacin  500 mg Oral 2 times per day    metroNIDAZOLE  500 mg Oral 3 times per day    sodium chloride flush  10 mL IntraVENous 2 times per day    heparin (porcine)  5,000 Units SubCUTAneous 3 times per day    metoprolol tartrate  25 mg Oral BID    [Held by provider] furosemide  20 mg Oral Daily    [Held by provider] memantine  10 mg Oral BID    amLODIPine  5 mg Oral Daily    citalopram  10 mg Oral Daily    donepezil  10 mg Oral Nightly     technetium mebrofenin, 6 millicurie, ONCE PRN  sodium chloride flush, 10 mL, PRN  sodium chloride, 25 mL, PRN  promethazine, 12.5 mg, Q6H PRN   Or  ondansetron, 4 mg, Q6H PRN  polyethylene glycol, 17 g, Daily PRN  acetaminophen, 650 mg, Q6H PRN   Or  acetaminophen, 650 mg, Q6H PRN  senna, 1 tablet, Daily PRN         Objective:    /73   Pulse 87   Temp 98.1 °F (36.7 °C) (Axillary)   Resp 20   Ht 5' 6\" (1.676 m)   Wt 123 lb (55.8 kg)   SpO2 97%   BMI 19.85 kg/m²   General Appearance: alert and oriented to person but confused to place and time and in no acute distress  Skin: warm and dry, S/p ORIF dressing and long leg brace intact   Head: normocephalic and atraumatic  Eyes: pupils equal, round, and reactive to light, extraocular eye movements intact, conjunctivae normal, wears diverticulosis. Minimal stranding of the fat about the ascending   colon concerning for localized diverticulitis or other   infectious/inflammatory process. Mild right-sided hydronephrosis and hydroureter without identified   obstructing stone. While this could represent a recently passed stone, other   etiologies including occult neoplasia not excluded. Continued follow-up or   correlation with retrograde evaluation recommended. Severely distended gallbladder with cholelithiasis but no definitive evidence   of cholecystitis. This could be correlated with ultrasound if indicated. Biliary ductal dilatation without identified obstructing stone. Correlate   with clinical and laboratory assessment. If indicated, ERCP or MRCP may be   useful. Patchy opacities in the lung bases favoring atelectasis as described. RECOMMENDATIONS:   Unavailable         XR CHEST PORTABLE   Final Result   No acute process. CT Head WO Contrast   Final Result   No acute intracranial findings. Consider MRI if symptoms persist.      Volume loss and findings suggestive of chronic microvascular ischemia. Redemonstration of complete opacification of bilateral mastoid air cells and   fluid in the middle ears. RECOMMENDATIONS:   Unavailable             Assessment:  Principal Problem:    PETE (acute kidney injury) ([address]  Active Problems:    Atrial fibrillation (HCC)    Alzheimer's dementia without behavioral disturbance (HCC)    Open fracture of right distal femur (HCC)    Diverticulitis    Diverticulitis of colon    Acute cholecystitis    Moderate protein-calorie malnutrition ([address]    Cholecystitis    Gallbladder sludge  Resolved Problems:    * No resolved hospital problems. *      Plan:    1. Acute kidney injury - clinically resolved as creatinine is now wnl - IVF's discontinued     2.  Acute cholecystitis - now starting to c/o upper abdominal pain - S/p HIDA scan showed gallbladder is nonvisualized, for which cholecystitis could be considered in the appropriate clinical setting - GI on - Clear liquid diet - possible percutaneous cholecystotomy tube placement vs cholecystectomy  - possible chronic cystic duct obstruction and chronic cholecystitis - no leukocytosis - tolerating diet  - now on oral antibiotics      3. Atrial fibrillation - irregular heart beat with controlled rate - anticoagulation on hold d/t possible procedure but since procedure not going to take place may restart - Lopressor BID - monitor closely       4. Open fracture of the right distal femur - S/o ORIF with insertion of retrograde intramedullary implant - ace wrap with long leg brace intact - PT/OT on - will encourage patient to be up in chair when family is present      5. Diverticulitis - c/o abdominal pain - general surgery following - Cipro and Flagyl      6. Alzheimer's/dementia without behavioral disturbance - pleasant and cooperative - follows some simple commands - Aricept      7. Anemia - cbc has remained stable - stool black and starting to be liquid - occult stool still not done - will monitor      8. Hypokalemia - K+ low 2.9 - IV and oral potassium     9. 10 beat nonsustained VTach - most likely d/t low potassium and low normal magnesium     NOTE: This report was transcribed using voice recognition software. Every effort was made to ensure accuracy; however, inadvertent computerized transcription errors may be present.      Electronically signed by HENRRY Guerra CNP on 2/2/2022 at 12:00 PM

## 2022-02-02 NOTE — PLAN OF CARE
Problem: Skin Integrity:  Goal: Will show no infection signs and symptoms  Description: Will show no infection signs and symptoms  Outcome: Met This Shift     Problem: Skin Integrity:  Goal: Absence of new skin breakdown  Description: Absence of new skin breakdown  Outcome: Met This Shift     Problem: Injury - Risk of, Physical Injury:  Goal: Absence of physical injury  Description: Absence of physical injury  Outcome: Met This Shift     Problem: Injury - Risk of, Physical Injury:  Goal: Will remain free from falls  Description: Will remain free from falls  Outcome: Met This Shift     Problem: Falls - Risk of:  Goal: Absence of physical injury  Description: Absence of physical injury  Outcome: Met This Shift     Problem: Falls - Risk of:  Goal: Will remain free from falls  Description: Will remain free from falls  Outcome: Met This Shift

## 2022-02-02 NOTE — PROGRESS NOTES
Department of Orthopedic Surgery  Resident Progress Note    Patient seen and examined. Pain controlled. Sleeping comfortably in bed. Per nursing has had waxing and waning levels of consciousness, at sometimes has become agitated. She was readmitted for diverticulitis and PETE.        VITALS:  /65   Pulse 66   Temp 98.1 °F (36.7 °C) (Axillary)   Resp 18   Ht 5' 6\" (1.676 m)   Wt 123 lb (55.8 kg)   SpO2 97%   BMI 19.85 kg/m²     GENERAL: sleeping, does not awaken to painful stimuli   MUSCULOSKELETAL:   right lower extremity:  · Dressing C/D/I  · Knee immobilizer in place  · Compartments soft and compressible, calf non-tender  · Palpable dorsalis pedis and posterior tibialis pulse, brisk cap refill to toes, foot warm and perfused  · Sensation and motor exam unable to be preformed due to patient status    CBC:   Lab Results   Component Value Date    WBC 9.0 02/01/2022    HGB 10.4 02/01/2022    HCT 34.2 02/01/2022     02/01/2022       ASSESSMENT  · S/p R IMN for DF fx 1/20    PLAN    PWB RLE  Pain control IV & PO  DVT prophylaxis- eliquis, early mobilization  Monitor Labs  Bowel regiment  Pulmonary hygiene   Trend H&H- 10.4 yesterday, vitals stable   PT/OT eval and treat  D/C planning, SW/PT recs, per primary  Discuss with attending

## 2022-02-02 NOTE — PROGRESS NOTES
Physical Therapy  Physical Therapy Initial Evaluation/Plan of Care    Room #:  0927/0940-41  Patient Name: Alesia Boland  YOB: 1932  MRN: [id number]    Date of Service: 2/2/2022     Tentative placement recommendation: Subacute rehab  Equipment recommendation:  To be determined      Evaluating Physical Therapist: Kika Goldstein Physical Therapist      Specific Provider Orders/Date/Referring Provider : 02/01/22 1445   PT eval and treat Start: 02/01/22 1445, End: 02/01/22 1445, ONE TIME, Standing Count: 1 Occurrences, R    Alina Ashraf, APRN - CNP      Admitting Diagnosis:   Diverticulitis [K57.92]  Diverticulitis of colon [K57.32]  PETE (acute kidney injury) ([facility] [address] [N17.9]  Altered mental status, unspecified altered mental status type [R41.82]    Surgery: Right distal femur open reduction internal fixation (1/20/21)  Visit Diagnoses       Codes    Altered mental status, unspecified altered mental status type     R41.82          Patient Active Problem List   Diagnosis    Atrial fibrillation ([address]    Gastroesophageal reflux disease without esophagitis    Alzheimer's dementia without behavioral disturbance ([address]    Vitamin B 12 deficiency    Arthritis    Depression    Hyperlipidemia    Ambulatory dysfunction    Closed compression fracture of L2 lumbar vertebra, initial encounter ([address]    Closed fracture of distal end of right femur ([address]    Leukemoid reaction    PVC (premature ventricular contraction)    Osteopenia after menopause    Closed nondisplaced apophyseal fracture of right femur ([address]    Open fracture of right distal femur ([address]    PETE (acute kidney injury) ([address]    Diverticulitis    Diverticulitis of colon    Acute cholecystitis    Moderate protein-calorie malnutrition ([address]    Cholecystitis    Gallbladder sludge        ASSESSMENT of Current Deficits Patient exhibits decreased strength, balance, endurance and pain in right lower extremity impairing functional mobility, transfers, gait , gait distance and tolerance to activity. Patient has dementia, and is alert, but not oriented to the situation at hand. Patient is partial weight bearing on right lower extremity after previous distal femur fracture. Patient requires consistent verbal and tactile cueing to maintain partial weight bearing status. Overall weakness of the patient, history of dementia, and limits with functional mobility are barriers to d/c and require skilled intervention during hospital stay to attain pre hospital level of function. Decreased strength, balance and endurance  increases patient's risk for fall. PHYSICAL THERAPY  PLAN OF CARE       Physical therapy plan of care is established based on physician order,  patient diagnosis and clinical assessment    Current Treatment Recommendations:    -Bed Mobility: Lower extremity exercises , Upper extremity exercises  and Trunk control activities   -Sitting Balance: Incorporate reaching activities to activate trunk muscles  and Hands on support to maintain midline   -Standing Balance: Perform strengthening exercises in standing to promote motor control with or without upper extremity support  and Perform sit to stand activities maintaining PWB (partial weight bearing) weightbearing Right   -Transfers: Provide instruction on proper hand and foot position for adequate transfer of weight onto lower extremities and use of gait device if needed and Cues for hand placement, technique and safety. Provide stabilization to prevent fall   -Gait: Gait training, Standing activities to improve: base of support, weight shift, weight bearing , Pregait training to emphasize: Weight shift, Upright and Safety and Use of Assistive device for PWB (partial weight bearing) weight bearing Right       PT long term treatment goals are located in below grid    Patient and or family understand(s) diagnosis, prognosis, and plan of care.     Frequency of treatments: Patient will be seen  3-5 times/week.          Prior Level of Function: Patient ambulated independently   Rehab Potential: good - for baseline    Past medical history:   Past Medical History:   Diagnosis Date    Arthritis     Atrial fibrillation ([address]     Calculus of gallbladder with acute cholecystitis without obstruction     Calculus of gallbladder with cholecystitis with biliary obstruction     Hyperlipidemia     Syncope 11/20/2019     Past Surgical History:   Procedure Laterality Date    ABDOMEN SURGERY      ANESTHESIA NERVE BLOCK Bilateral 2/6/2020    BILATERAL MEDIAL BRANCH L4-5 AND L5-S1 JOINTS performed by DO Nas at [address], LAPAROSCOPIC N/A 11/21/2019    DIAGNOSITIC LAPAROSCOPY CONVERTED TO EXPLORATORY LAPAROTOMY WITH CLOSURE OF ENTEROTOMY X1, CHOLANGIOGRAM, EGD performed by Dania Hernandez MD at [address]  10/21/2013         FEMUR FRACTURE SURGERY Right 1/20/2022    RIGHT DISTAL FEMUR OPEN REDUCTION INTERNAL FIXATION (RETROGRADE NAIL) performed by Jung Sheehan DO at [address] [facility] Right 12/13/2018    RIGHT SACROILIAC JOINT STEROID INJECTION UNDER X-RAY GUIDANCE performed by DO Nas at [address] [facility] [address] [place] [address] Left 1/17/2019    LEFT SACROILIAC JOINT STEROID INJECTION UNDER X-RAY GUIDANCE performed by DO Nas at [address] [facility] JOINT Bilateral 9/19/2019    BILATERAL SACROILIAC JOINT INJECTION X-RAY performed by DO Nas at [facility] Right 12/13/2018    sacroiliac joint     NERVE BLOCK Left 01/17/2019    sacroiliac joint injection     NERVE BLOCK Bilateral 02/06/2020    medial branch block    NERVE BLOCK Bilateral 10/29/2020    intra articular     NERVE BLOCK Bilateral 10/29/2020 BILATERAL L4-5 AND L5-S1 INTRA-ARTICULAR FACET STEROID INJECTION (CPT 51181,37816) performed by Jg Carroll DO at [address] N/A 11/25/2020    KYPHOPLASTY , L1 L2,BONE BIOPSY, EPIDURAL INJECTION performed by Sterling Zuniga MD at [address] . N/A     d/t peptic ulcers       SUBJECTIVE:    Precautions: , falls, dementia and partial weight bearing right lower extremity , knee immobilizer, alarm  Social history: Patient lives with spouse in a ranch home  with 1+2 steps  to enter with [place] in shower. Patient was at [place] [address] and rehab for a short time after last hospital visit before returning here. Plan is to try to be admitted to [address] owned: [facility] [place] and [address]   How much difficulty turning over in bed?: A Lot  How much difficulty sitting down on / standing up from a chair with arms?: Unable  How much difficulty moving from lying on back to sitting on side of bed?: A Lot  How much help from another person moving to and from a bed to a chair?: Total  How much help from another person needed to walk in hospital room?: Total  How much help from another person for climbing 3-5 steps with a railing?: Total  AM-PAC Inpatient Mobility Raw Score : 8  AM-PAC Inpatient T-Scale Score : 28.52  Mobility Inpatient CMS 0-100% Score: 86.62  Mobility Inpatient CMS G-Code Modifier : CM    Nursing cleared patient for PT evaluation. The admitting diagnosis and active problem list as listed above have been reviewed prior to the initiation of this evaluation. OBJECTIVE;   Initial Evaluation  Date: 2/2/2022 Treatment Date:     Short Term/ Long Term   Goals   Was pt agreeable to Eval/treatment?  Yes  To be met in 4 days   Pain level   5/10  Right lower extremity     Bed Mobility    Rolling: Maximal assist of 1    Supine to sit: Maximal assist of 1    Sit to supine: Maximal assist of 1    Scooting: Maximal assist of 1    Rolling: Minimal assist of 1    Supine to sit: Minimal assist of 1    Sit to supine: Minimal assist of 1    Scooting: Minimal assist of 1     Transfers Sit to stand: Moderate assist of  2   Sit to stand: Moderate assist of 1    Ambulation    2 side steps using  wheeled walker with Moderate assist of  2   cues for PWB (partial weight bearing) weight bearing right lower extremity, upright posture, safety and pacing   10 feet using  wheeled walker with Moderate assist of 1    Stair negotiation: ascended and descended   Not assessed     Not assessed    ROM Within functional limits    Increase range of motion 10% of affected joints    Strength BUE:  3+/5  RLE:  3+/5 ankle, did not test knee and hip  LLE:  3+/5  Increase strength in affected mm groups by 1/3 grade   Balance Sitting EOB:  fair -  Dynamic Standing:  poor +  Sitting EOB:  fair   Dynamic Standing: fair      Patient is Alert & Oriented x person and follows one step directions    Sensation:  Patient  denies numbness/tingling   Edema:  no   Endurance: fair  -    Vitals: room air   Blood Pressure at rest  Blood Pressure during session    Heart Rate at rest  Heart Rate during session    SPO2 at rest  SPO2 during session     Patient education  Patient educated on role of Physical Therapy, risks of immobility, safety and plan of care, energy conservation,  importance of mobility while in hospital , safety  and weight bearing status      Patient response to education:   Pt verbalized understanding Pt demonstrated skill Pt requires further education in this area   Yes Partial Yes      Treatment:  Patient practiced and was instructed/facilitated in the following treatment: Patient performed Sit to Stand transfer with cueing for pushing through arms on walker to assist with weight bearing. Patient performed side stepping along edge of bed with cueing for heel toe walk.   Sat edge of bed 15 minutes with Supervision  to increase dynamic sitting balance and activity tolerance. Therapeutic Exercises:  ankle pumps  x 10 reps. At end of session, patient in bed with alarm and spouse present call light and phone within reach,  all lines and tubes intact, nursing notified. Patient would benefit from continued skilled Physical Therapy to improve functional independence and quality of life. Patient's/ family goals   rehab    Time in  36  Time out  1037    Total Treatment Time  0 minutes    Evaluation time includes thorough review of current medical information, gathering information on past medical history/social history and prior level of function, completion of standardized testing/informal observation of tasks, assessment of data, and development of Plan of care and goals.      CPT codes:  Low Complexity PT evaluation (63518)  No treatment billed  [place], Student Physical Therapist

## 2022-02-02 NOTE — CARE COORDINATION
Ss note:2/2/2022.1:24 PM Neg covid on 1-24-22. WILL NEED RAPID COVID ON DAY OF DISCHARGE. Per [facility] at [facility], pt has been accepted, hx of dementia. Will need updated therapy notes, Jovanna Gubler is waived until 2/4/2022. If pt remains hospitalized sw will need to follow up with liaison regarding if pt will require a FULL PRECERT. Need Hens and signed ROLA. SW will follow.  DORIAN Schultz

## 2022-02-03 NOTE — PLAN OF CARE
Problem: Skin Integrity:  Goal: Will show no infection signs and symptoms  Description: Will show no infection signs and symptoms  Outcome: Met This Shift     Problem: Skin Integrity:  Goal: Absence of new skin breakdown  Description: Absence of new skin breakdown  Outcome: Met This Shift     Problem: Injury - Risk of, Physical Injury:  Goal: Absence of physical injury  Description: Absence of physical injury  Outcome: Met This Shift     Problem: Injury - Risk of, Physical Injury:  Goal: Will remain free from falls  Description: Will remain free from falls  Outcome: Met This Shift     Problem: Mood - Altered:  Goal: Mood stable  Description: Mood stable  Outcome: Met This Shift     Problem: Mood - Altered:  Goal: Absence of abusive behavior  Description: Absence of abusive behavior  Outcome: Met This Shift     Problem: Falls - Risk of:  Goal: Absence of physical injury  Description: Absence of physical injury  Outcome: Met This Shift     Problem: Falls - Risk of:  Goal: Will remain free from falls  Description: Will remain free from falls  Outcome: Met This Shift

## 2022-02-03 NOTE — PROGRESS NOTES
Try to call [facility] at [facility] to give nurse to nurse no answer at this time.

## 2022-02-03 NOTE — PROGRESS NOTES
Department of Orthopedic Surgery  Resident Progress Note    Patient seen and examined. Pain controlled. Patient awake today, does speak but does not respond to questioning or commands. Per previous visit with  she is very hard of hearing as well as demented.         VITALS:  BP (!) 143/64   Pulse 77   Temp 98.4 °F (36.9 °C)   Resp 16   Ht 5' 6\" (1.676 m)   Wt 123 lb (55.8 kg)   SpO2 93%   BMI 19.85 kg/m²     GENERAL: Awake, does not respond to questioning, AOx0  MUSCULOSKELETAL:   right lower extremity:  · Dressing C/D/I, taken down, staples removed, no erythema or drainage, new ace wrap placed for comfort under KI  · Knee immobilizer in place  · Compartments soft and compressible, calf non-tender  · Palpable dorsalis pedis and posterior tibialis pulse, brisk cap refill to toes, foot warm and perfused  · Sensation and motor exam unable to be preformed due to patient status, however patient seen moving foot and toes    CBC:   Lab Results   Component Value Date    WBC 7.4 02/02/2022    HGB 10.4 02/02/2022    HCT 33.0 02/02/2022     02/02/2022       ASSESSMENT  · S/p R IMN for DF fx 1/20    PLAN    PWB RLE  Pain control IV & PO  DVT prophylaxis- eliquis, early mobilization  Monitor Labs  Bowel regiment  Pulmonary hygiene   Trend H&H- 10.4 yesterday, vitals stable   PT/OT eval and treat  2 week post op xrays  No further orthopedic intervention planned, we will continue to follow from the periphery, please reach out with any questions or concerns  D/C planning, SW/PT recs, per primary  Discuss with attending

## 2022-02-03 NOTE — DISCHARGE INSTR - COC
Continuity of Care Form    Patient Name: Sharon Stephenson   :  1932  MRN:  [id number]    [address] [place] [address] date:  2022  Discharge date:  2/3/22    Code Status Order: Full Code   Advance Directives:      Admitting Physician:  Marice Paget, MD  PCP: Onur Singh MD    Discharging Nurse: [facility] Unit/Room#: 8712/6605-04  Discharging Unit Phone Number: [phone]    Emergency Contact:   Extended Emergency Contact Information  Primary Emergency Contact: Christoph Albarran  Address: [address] Phone: [phone]  Mobile Phone: [phone]  Relation: Spouse  Secondary Emergency Contact: Ayanna Ornelas [address] Phone: [phone]  Mobile Phone: [phone]  Relation: Child    Past Surgical History:  Past Surgical History:   Procedure Laterality Date    ABDOMEN SURGERY      ANESTHESIA NERVE BLOCK Bilateral 2020    BILATERAL MEDIAL BRANCH L4-5 AND L5-S1 JOINTS performed by DO Nas at [address] [facility], LAPAROSCOPIC N/A 2019    DIAGNOSITIC LAPAROSCOPY CONVERTED TO EXPLORATORY LAPAROTOMY WITH CLOSURE OF ENTEROTOMY X1, CHOLANGIOGRAM, EGD performed by Roxanne Workman MD at [address] [address]      ECHO COMPL [address] [facility]  10/21/2013         FEMUR FRACTURE SURGERY Right 2022    RIGHT DISTAL FEMUR OPEN REDUCTION INTERNAL FIXATION (RETROGRADE NAIL) performed by Mar Saldana DO at [facility] [address] Right 2018    RIGHT SACROILIAC JOINT STEROID INJECTION UNDER X-RAY GUIDANCE performed by Scheedward-DO Aba at [address] JOINT Left 2019    LEFT SACROILIAC JOINT STEROID INJECTION UNDER X-RAY GUIDANCE performed by Scheedward-PlDO david at [address] JOINT Bilateral 2019 BILATERAL SACROILIAC JOINT INJECTION X-RAY performed by Byron Mcdonald, DO at [address] Right 12/13/2018    sacroiliac joint     NERVE BLOCK Left 01/17/2019    sacroiliac joint injection     NERVE BLOCK Bilateral 02/06/2020    medial branch block    NERVE BLOCK Bilateral 10/29/2020    intra articular     NERVE BLOCK Bilateral 10/29/2020    BILATERAL L4-5 AND L5-S1 INTRA-ARTICULAR FACET STEROID INJECTION (CPT 06276,43020) performed by Byron Mcdonald, DO at [address] N/A 11/25/2020    KYPHOPLASTY , L1 L2,BONE BIOPSY, EPIDURAL INJECTION performed by Vanessa Mata MD at [address] [place] [address]     d/t peptic ulcers       Immunization History:   Immunization History   Administered Date(s) Administered    COVID-19, Erling Nipper, Primary or Immunocompromised, PF, 100mcg/0.5mL 01/27/2021, 02/24/2021, 10/29/2021    Influenza Vaccine, unspecified formulation 09/10/2012, 10/09/2013, 09/02/2014, 10/05/2015, 09/14/2016, 11/09/2016    Influenza Virus Vaccine 09/10/2012, 10/09/2013, 09/02/2014, 10/05/2015, 09/14/2016, 11/09/2016    Influenza, High Dose (Fluzone 65 yrs and older) 10/01/2018, 10/03/2018    Influenza, MDCK, Preservative free 10/18/2017    Influenza, Quadv, adjuvanted, 65 yrs +, IM, PF (Fluad) 10/22/2020, 10/25/2021    Influenza, Triv, inactivated, subunit, adjuvanted, IM (Fluad 65 yrs and older) 10/21/2019    Pneumococcal Conjugate 13-valent (Yanick Boyce) 01/10/2017, 04/18/2019    Pneumococcal Polysaccharide ([id number]) 11/01/2012    Tdap (Boostrix, Adacel) 07/18/2019       Active Problems:  Patient Active Problem List   Diagnosis Code    Atrial fibrillation (HCC) I48.91    Gastroesophageal reflux disease without esophagitis K21.9    Alzheimer's dementia without behavioral disturbance ([facility] G30.9, F02.80    Vitamin B 12 deficiency E53.8    Arthritis M19.90    Depression F32. A    Hyperlipidemia E78.5    Ambulatory dysfunction R26.2 Closed compression fracture of L2 lumbar vertebra, initial encounter [facility] S32.020A    Closed fracture of distal end of right femur ([facility] S72.401A    Leukemoid reaction D72.823    PVC (premature ventricular contraction) I49.3    Osteopenia after menopause M85.80, Z78.0    Closed nondisplaced apophyseal fracture of right femur ([facility] S72.134A    Open fracture of right distal femur ([facility] S72.401B    PETE (acute kidney injury) ([facility] N17.9    Diverticulitis K57.92    Diverticulitis of colon K57.32    Acute cholecystitis K81.0    Moderate protein-calorie malnutrition (HCC) E44.0    Cholecystitis K81.9    Gallbladder sludge K82.8       Isolation/Infection:   Isolation            No Isolation          Patient Infection Status       Infection Onset Added Last Indicated Last Indicated By Review Planned Expiration Resolved Resolved By    None active    Resolved    COVID-19 (Rule Out) 10/22/20 10/22/20 10/22/20 COVID-19 Ambulatory (Ordered)   10/24/20 Rule-Out Test Resulted    C-diff Rule Out  12/26/19 12/26/19 Clostridium Difficile Toxin/Antigen (Ordered)   11/23/20 Madalny Ward, RN            Nurse Assessment:  Last Vital Signs: BP (!) 143/64   Pulse 77   Temp 98.4 °F (36.9 °C)   Resp 16   Ht 5' 6\" (1.676 m)   Wt 123 lb (55.8 kg)   SpO2 93%   BMI 19.85 kg/m²     Last documented pain score (0-10 scale): Pain Level: 0  Last Weight:   Wt Readings from Last 1 Encounters:   01/31/22 123 lb (55.8 kg)     Mental Status:  disoriented and alert    IV Access:  - None    Nursing Mobility/ADLs:  Walking   Dependent  Transfer  Dependent  Bathing  Dependent  Dressing  Dependent  Toileting  Dependent  Feeding  Assisted  Med Admin  Assisted  Med Delivery   whole    Wound Care Documentation and Therapy:  Wound 01/24/22 Coccyx (Active)   Wound Etiology Pressure Unstageable 02/03/22 0800   Dressing Status New dressing applied 02/02/22 2250   Wound Cleansed Other (Comment) 02/03/22 0800   Dressing/Treatment Hydrocolloid 02/03/22 0800 Dressing Change Due 02/03/22 02/03/22 0800   Wound Length (cm) 4.5 cm 02/02/22 0915   Wound Width (cm) 4 cm 02/02/22 0915   Wound Surface Area (cm^2) 18 cm^2 02/02/22 0915   Change in Wound Size % (l*w) 10 02/02/22 0915   Wound Assessment Pink/red;Purple/maroon 02/02/22 2250   Drainage Amount None 02/02/22 2250   Odor None 02/02/22 2250   Carine-wound Assessment Intact 02/02/22 2250   Number of days: 9        Elimination:  Continence: Bowel: No  Bladder: cath  Urinary Catheter: Insertion Date: 1/20/22    Colostomy/Ileostomy/Ileal Conduit: No       Date of Last BM: 2/3/22    Intake/Output Summary (Last 24 hours) at 2/3/2022 1242  Last data filed at 2/3/2022 0618  Gross per 24 hour   Intake 540 ml   Output 700 ml   Net -160 ml     I/O last 3 completed shifts: In: 2300 [P.O.:1120; I.V.:1180]  Out: 1150 [Urine:1150]    Safety Concerns: At Risk for Falls    Impairments/Disabilities:      Vision and Hearing    Nutrition Therapy:  Current Nutrition Therapy:   - Oral Diet:  General    Routes of Feeding: Oral  Liquids: Thin Liquids  Daily Fluid Restriction: no  Last Modified Barium Swallow with Video (Video Swallowing Test): not done    Treatments at the Time of Hospital Discharge:   Respiratory Treatments:   Oxygen Therapy:  is not on home oxygen therapy.   Ventilator:    - No ventilator support    Rehab Therapies: Physical Therapy and Occupational Therapy  Weight Bearing Status/Restrictions: Partial weight bearing (30-50%) only on leg right leg  Other Medical Equipment (for information only, NOT a DME order):  wheelchair and hospital bed  Other Treatments:     Patient's personal belongings (please select all that are sent with patient):  Glasses, Hearing Aides bilateral    RN SIGNATURE:  Electronically signed by Sheridan Nguyen RN on 2/3/22 at 3:29 PM EST    CASE MANAGEMENT/SOCIAL WORK SECTION    Inpatient Status Date:     Readmission Risk Assessment Score:  Readmission Risk              Risk of Unplanned Readmission:  14           Discharging to Facility/ Agency   Name: [facility]  Address: [address] [place] [address]  Phone: [phone]  Fax: [phone]    Dialysis Facility (if applicable)   Name:  Address:  Dialysis Schedule:  Phone:  Fax:    / signature: Electronically signed by DORIAN Quach on 2/3/22 at 12:43 PM EST    PHYSICIAN SECTION    Prognosis: Good    Condition at Discharge: Stable    Rehab Potential (if transferring to Rehab): Good    Recommended Labs or Other Treatments After Discharge:     Physician Certification: I certify the above information and transfer of [address] [place] [address]  is necessary for the continuing treatment of the diagnosis listed and that she requires [place] for less 30 days.      Update Admission H&P: Changes in H&P as follows - see discharge summary     PHYSICIAN SIGNATURE:  Electronically signed by Olivia Lopez DO on 2/3/22 at 3:30 PM EST

## 2022-02-03 NOTE — DISCHARGE SUMMARY
[facility] Physician Discharge Summary       Onur Singh, [address] [facility] [id number]    Schedule an appointment as soon as possible for a visit in 2 weeks  Post hospital visit, check labs    [place] Les, [address] [facility] [address] [facility] [place]  [phone]    Schedule an appointment as soon as possible for a visit in 2 weeks  Postoperative wound check with the surgeon in 2 weeks    Activity level: Up only with assistance, able to participate with therapy with their assistance    Diet: ADULT DIET; Full Liquid    Labs: Per orthopedic and family practice/primary care    Condition at discharge: Improved    Dispo: To rehab facility    Patient ID:  Sharon Stephenson  [id number]  80 y.o.  8/27/1932    Admit date: 1/29/2022    Discharge date and time:  2/3/2022  2:51 PM    Admission Diagnoses: Principal Problem:    PETE (acute kidney injury) ([address]  Active Problems:    Atrial fibrillation ([address]    Alzheimer's dementia without behavioral disturbance ([address]    Open fracture of right distal femur ([address]    Diverticulitis    Diverticulitis of colon    Acute cholecystitis    Moderate protein-calorie malnutrition ([address]    Cholecystitis    Gallbladder sludge  Resolved Problems:    * No resolved hospital problems. *      Discharge Diagnoses: Principal Problem:    PETE (acute kidney injury) ([address]  Active Problems:    Atrial fibrillation ([address]    Alzheimer's dementia without behavioral disturbance ([address]    Open fracture of right distal femur (HCC)    Diverticulitis    Diverticulitis of colon    Acute cholecystitis    Moderate protein-calorie malnutrition ([address]    Cholecystitis    Gallbladder sludge  Resolved Problems:    * No resolved hospital problems.  *    Consults:  IP CONSULT TO GENERAL SURGERY  IP CONSULT TO DIETITIAN  IP CONSULT TO ORTHOPEDIC SURGERY    Procedures: Open treatment of right distal femoral shaft fracture with insertion of retrograde intramedullary implant    Hospital Course:  Gallbladder distention-gallbladder is markedly distended on CT, however she has only very mild tenderness, afebrile, no leukocytosis or hepatic dysfunction evident. Discussed with general surgery resident her potentially high risk with recent femur fracture. We will hold off on percutaneous cholecystostomy tube at this time, but consider cholecystectomy if she starts to show signs of cholecystitis or obstruction. Will advance from clear liquid to full liquid diet today and see how she tolerates this. Mild diverticulitis-changed ceftriaxone and metronidazole to oral ciprofloxacin and oral metronidazole on 2/1 will need 5 more days of oral to equal 10 days total  Paroxysmal atrial fibrillation-continue metoprolol tartrate. Will resume full anticoagulation with Eliquis 2.5 twice daily, post recent fall with femur fracture and will not be having any upcoming GI procedure or invasive procedure. Right distal femur fracture status post ORIF-appreciate orthopedic surgery recommendations and will follow up with Dr. Nisha Luther in 2 weeks. She is going to rehab facility for strengthening and gait training. Medication reconciliation was performed prior to discharge she is stable for discharge and will participate in therapy for strengthening gait training. Her acute renal failure resolved and renal function is back to baseline which is normal prior to discharge,  to follow-up with PCP in 2 weeks and orthopedic surgeon Dr. Nisha Luther in 2 weeks as well. MS patient has a severe exacerbation of cholecystitis there is no planned surgery for her or decompression of her gallbladder. Discharge Exam:  General Appearance: Severely hard of hearing.  Alert and oriented x3, does not appear to be in any distress  Skin: warm and dry, no rash or erythema  Head: normocephalic and atraumatic   Eyes: pupils equal, round, and reactive to light, extraocular eye movements intact, conjunctivae normal, anicteric sclera  ENT: External nose and ears normal.  Oral mucosa moist.  Throat clear  Neck: supple and non-tender without mass, no thyromegaly or thyroid nodules, no cervical lymphadenopathy  Pulmonary/Chest: Nonlabored on room air. Clear to auscultation bilaterally  Cardiovascular: Regular rate and rhythm, S1, S2 without murmurs, gallops, clicks, or rubs. No carotid bruits. No appreciable JVD. Abdomen:Normal bowel sounds. soft, mildly distended, very mild right upper quadrant tenderness without rigidity or guarding. Extremities: no cyanosis, clubbing or edema, brace noted to right knee  Musculoskeletal: Normal muscle mass. No joint swelling or deformity  Neurologic: No cranial nerve deficit. Speech normal.    Vitals:    02/01/22 1945 02/02/22 0915 02/02/22 2049 02/03/22 0730   BP: 139/65 132/73 119/61 (!) 143/64   Pulse: 66 87 78 77   Resp: 18 20 16 16   Temp: 98.1 °F (36.7 °C) 97.9 °F (36.6 °C) 98.2 °F (36.8 °C) 98.4 °F (36.9 °C)   TempSrc: Axillary Axillary Oral    SpO2: 97% 97% 94% 93%   Weight:       Height:         I/O last 3 completed shifts: In: 2300 [P.O.:1120; I.V.:1180]  Out: 1150 [Urine:1150]  I/O this shift:  In: 120 [P.O.:120]  Out: 400 [Urine:400]    LABS:  Recent Labs     02/01/22  0809 02/01/22  0809 02/02/22  0706 02/02/22  1452 02/03/22  0757     --  142  --  141   K 3.4*   < > 2.9* 3.5 4.2   *  --  110*  --  108*   CO2 25  --  22  --  26   BUN 22  --  13  --  10   CREATININE 0.6  --  0.6  --  0.6   GLUCOSE 106*  --  100*  --  101*   CALCIUM 8.3*  --  8.2*  --  8.7    < > = values in this interval not displayed.        Recent Labs     02/01/22  0809 02/02/22  0706 02/03/22  0757   WBC 9.0 7.4 8.7   RBC 3.50 3.39* 3.48*   HGB 10.4* 10.4* 10.5*   HCT 34.2 33.0* 33.8*   MCV 97.7 97.3 97.1   MCH 29.7 30.7 30.2   MCHC 30.4* 31.5* 31.1*   RDW 13.5 13.6 13.8   * 443 491*   MPV 9.6 9.3 10.0       No results for input(s): POCGLU in the last 72 hours.    Imaging:  CT ABDOMEN PELVIS WO CONTRAST Additional Contrast? None    Result Date: 1/29/2022  EXAMINATION: CT OF THE ABDOMEN AND PELVIS WITHOUT CONTRAST 1/29/2022 11:17 pm TECHNIQUE: CT of the abdomen and pelvis was performed without the administration of intravenous contrast. Multiplanar reformatted images are provided for review. Dose modulation, iterative reconstruction, and/or weight based adjustment of the mA/kV was utilized to reduce the radiation dose to as low as reasonably achievable. COMPARISON: 11/12/2020 HISTORY: ORDERING SYSTEM PROVIDED HISTORY: abdominal pain TECHNOLOGIST PROVIDED HISTORY: Reason for exam:->abdominal pain Additional Contrast?->None Decision Support Exception - unselect if not a suspected or confirmed emergency medical condition->Emergency Medical Condition (MA) FINDINGS: Gallbladder is severely distended measuring 14 x 6.3 cm. There is a calcified stone in the gallbladder. Some densities along the periphery of the gallbladder fundus are concerning for wall calcification. Mild intrahepatic biliary ductal dilatation with moderate extrahepatic ductal dilatation. The common duct measures approximately 1.6 cm. No definitive evidence of choledocholithiasis. No definite gallbladder wall thickening or pericholecystic fluid. Exam is limited without intravenous contrast.  Liver is homogeneous. Spleen is normal in size. Pancreas is somewhat atrophic. No evidence of acute pancreatitis. Thickening of the adrenal glands, likely due to hyperplasia. Mild right-sided hydronephrosis and hydroureter without obvious obstructing stone. The extreme distal ureter is normal in caliber. No left-sided hydronephrosis. No calcified renal stone. Small low-attenuation foci in the kidneys are too small for definitive characterization but likely represent cysts. Assessment of bowel is limited without oral contrast.  Surgical clips are present near the gastroesophageal junction.   Postoperative changes of gastro jejunal anastomosis. No bowel obstruction. Appendix not definitely identified. Large amount of stool in the rectal vault consistent with fecal impaction, distending the rectum with fecal material to approximately 7.3 cm. Moderate stool burden otherwise throughout the colon. Severe diverticulosis, greatest in the sigmoid colon. No definitive evidence of acute diverticulitis, allowing for patient motion artifact. Minimal soft tissue thickening and fat stranding about the ascending colon. This could be artifactual but localized diverticulitis or other infectious/inflammatory process should be considered. No abscess. Urinary bladder is largely decompressed with Burrell catheter. Gas in the bladder fundus is probably related to the presence of the catheter. Fistula or infection thought less likely. Scattered areas of opacification in the lung bases likely due to atelectasis. Developing infiltrates thought unlikely. Diffuse osteopenia. Status post kyphoplasty of L1 and L2. Degenerative changes are present in the spine and pelvis. Old appearing lower left pelvic fractures. Partial visualization of right femoral nail. There is also partial visualization of skin staples in the right thigh with underlying soft tissue gas, presumably related to recent surgery. Findings consistent with rectal fecal impaction. Moderate stool burden otherwise throughout the colon. Severe diverticulosis. Minimal stranding of the fat about the ascending colon concerning for localized diverticulitis or other infectious/inflammatory process. Mild right-sided hydronephrosis and hydroureter without identified obstructing stone. While this could represent a recently passed stone, other etiologies including occult neoplasia not excluded. Continued follow-up or correlation with retrograde evaluation recommended. Severely distended gallbladder with cholelithiasis but no definitive evidence of cholecystitis.   This could be correlated with ultrasound if indicated. Biliary ductal dilatation without identified obstructing stone. Correlate with clinical and laboratory assessment. If indicated, ERCP or MRCP may be useful. Patchy opacities in the lung bases favoring atelectasis as described. RECOMMENDATIONS: Unavailable     CT Head WO Contrast    Result Date: 1/29/2022  EXAMINATION: CT OF THE HEAD WITHOUT CONTRAST  1/29/2022 10:15 pm TECHNIQUE: CT of the head was performed without the administration of intravenous contrast. Dose modulation, iterative reconstruction, and/or weight based adjustment of the mA/kV was utilized to reduce the radiation dose to as low as reasonably achievable. COMPARISON: 01/19/2022 HISTORY: ORDERING SYSTEM PROVIDED HISTORY: altered mental status TECHNOLOGIST PROVIDED HISTORY: Reason for exam:->altered mental status Has a \"code stroke\" or \"stroke alert\" been called? ->No Decision Support Exception - unselect if not a suspected or confirmed emergency medical condition->Emergency Medical Condition (MA) FINDINGS: No intracranial hemorrhage, mass effect or midline shift. Basal cisterns are patent. Ventricles are not enlarged. Cortical volume loss. Areas of decreased attenuation in the bilateral white matter are nonspecific but likely due to chronic microvascular ischemia. Vascular calcifications. Again identified is complete opacification of the bilateral mastoid air cells and fluid in the bilateral middle ear. Prominent degenerative changes of the temporomandibular joints. No acute intracranial findings. Consider MRI if symptoms persist. Volume loss and findings suggestive of chronic microvascular ischemia. Redemonstration of complete opacification of bilateral mastoid air cells and fluid in the middle ears.  RECOMMENDATIONS: Unavailable     US GALLBLADDER RUQ    Result Date: 1/30/2022  EXAMINATION: RIGHT UPPER QUADRANT ULTRASOUND 1/30/2022 12:12 am COMPARISON: Correlation with CT dated 01/29/2022 HISTORY: ORDERING SYSTEM PROVIDED HISTORY: eval for acute biliary etiology TECHNOLOGIST PROVIDED HISTORY: Reason for exam:->eval for acute biliary etiology What reading provider will be dictating this exam?->CRC FINDINGS: LIVER:  The liver demonstrates normal echogenicity without evidence of intrahepatic biliary ductal dilatation. BILIARY SYSTEM:  Gallbladder is distended with cholelithiasis and sludge. Mild gallbladder wall thickening measuring 4-5 mm. No obvious pericholecystic fluid. The visualized common duct measures 7 mm. On the CT, more proximally, this was more conspicuous. The common duct is not visualized in its entirety with this ultrasound. RIGHT KIDNEY: The right kidney is grossly unremarkable without evidence of hydronephrosis. PANCREAS:  Visualized portions of the pancreas are unremarkable. OTHER: No evidence of right upper quadrant ascites. Distended gallbladder with cholelithiasis and minimal sludge. Mild wall thickening. Early changes of cholecystitis should be considered. This could be correlated with hepatic biliary scan as indicated. Biliary ductal dilatation without identified obstructing stone. Correlate with laboratory assessment. If indicated, ERCP or MRCP may be useful. RECOMMENDATIONS: Unavailable     XR CHEST PORTABLE    Result Date: 1/29/2022  EXAMINATION: ONE XRAY VIEW OF THE CHEST 1/29/2022 11:02 pm COMPARISON: 01/20/2022 HISTORY: ORDERING SYSTEM PROVIDED HISTORY: altered mental status TECHNOLOGIST PROVIDED HISTORY: Reason for exam:->altered mental status FINDINGS: The lungs are without acute focal process. There is no effusion or pneumothorax. The cardiomediastinal silhouette is without acute process. The osseous structures are without acute process. No acute process.      Patient Instructions:   Current Discharge Medication List      START taking these medications    Details   metroNIDAZOLE (FLAGYL) 500 MG tablet Take 1 tablet by mouth every 8 hours for 10 days  Qty: 30 tablet, Refills: 0      ciprofloxacin (CIPRO) 500 MG tablet Take 1 tablet by mouth every 12 hours for 10 days  Qty: 20 tablet, Refills: 0         CONTINUE these medications which have NOT CHANGED    Details   senna (SENOKOT) 8.6 MG tablet Take 1 tablet by mouth daily as needed (Constipation)      polyethylene glycol (GLYCOLAX) 17 g packet Take 17 g by mouth daily as needed for Constipation  Qty: 527 g, Refills: 1      amLODIPine (NORVASC) 5 MG tablet Take 1 tablet by mouth daily  Qty: 30 tablet, Refills: 0      citalopram (CELEXA) 10 MG tablet TAKE 1 TABLET BY MOUTH IN  THE MORNING  Qty: 90 tablet, Refills: 1    Comments: Requesting 1 year supply      furosemide (LASIX) 20 MG tablet TAKE 1 TABLET BY MOUTH 3  TIMES WEEKLY  Qty: 39 tablet, Refills: 2    Comments: Requesting 1 year supply      memantine (NAMENDA) 10 MG tablet Take 1 tablet by mouth 2 times daily  Qty: 180 tablet, Refills: 1    Comments: Requesting 1 year supply      metoprolol tartrate (LOPRESSOR) 25 MG tablet Take 1 tablet by mouth 2 times daily  Qty: 180 tablet, Refills: 1    Comments: Requesting 1 year supply      donepezil (ARICEPT) 10 MG tablet TAKE 1 TABLET BY MOUTH AT  NIGHT  Qty: 90 tablet, Refills: 1    Comments: Requesting 1 year supply      apixaban (ELIQUIS) 2.5 MG TABS tablet TAKE 1 TABLET BY MOUTH  TWICE DAILY  Qty: 180 tablet, Refills: 3    Comments: Requesting 1 year supply  Associated Diagnoses: Atrial fibrillation, unspecified type (HCC)      Glucosamine-Chondroit-Vit C-Mn (GLUCOSAMINE 1500 COMPLEX PO) Take 1 tablet by mouth daily       Cholecalciferol (VITAMIN D3) 2000 units CAPS Take 2,000 Units by mouth daily            Note that greater than 30 minutes was spent in preparing discharge papers, discussing discharge with patient, medication review, etc.    NOTE: This report was transcribed using voice recognition software.  Every effort was made to ensure accuracy; however, inadvertent computerized transcription errors may be present.      Signed:  Electronically signed by Kayla Moreno, Ph.D. on 2/3/2022 at @NOW

## 2022-02-03 NOTE — PROGRESS NOTES
Physical Therapy  Physical Therapy Treatment Note/Plan of Care    Room #:  9059/7694-36  Patient Name: Abbey Shaw  YOB: 1932  MRN: [id number]    Date of Service: 2/3/2022     Tentative placement recommendation: Subacute rehab  Equipment recommendation:  To be determined      Evaluating Physical Therapist: Kika Mantilla Physical Therapist      Specific Provider Orders/Date/Referring Provider : 02/01/22 1445   PT eval and treat Start: 02/01/22 1445, End: 02/01/22 1445, ONE TIME, Standing Count: 1 Occurrences, R    Juana Diaz Pitch, APRN - CNP      Admitting Diagnosis:   Diverticulitis [K57.92]  Diverticulitis of colon [K57.32]  PETE (acute kidney injury) ([facility] [address] [N17.9]  Altered mental status, unspecified altered mental status type [R41.82]    Surgery: Right distal femur open reduction internal fixation (1/20/21)  Visit Diagnoses       Codes    Altered mental status, unspecified altered mental status type     R41.82          Patient Active Problem List   Diagnosis    Atrial fibrillation ([address]    Gastroesophageal reflux disease without esophagitis    Alzheimer's dementia without behavioral disturbance ([address]    Vitamin B 12 deficiency    Arthritis    Depression    Hyperlipidemia    Ambulatory dysfunction    Closed compression fracture of L2 lumbar vertebra, initial encounter ([address]    Closed fracture of distal end of right femur ([address]    Leukemoid reaction    PVC (premature ventricular contraction)    Osteopenia after menopause    Closed nondisplaced apophyseal fracture of right femur ([address]    Open fracture of right distal femur ([address]    PETE (acute kidney injury) ([address]    Diverticulitis    Diverticulitis of colon    Acute cholecystitis    Moderate protein-calorie malnutrition ([address]    Cholecystitis    Gallbladder sludge        ASSESSMENT of Current Deficits Patient exhibits decreased strength, balance, endurance and pain in right lower extremity impairing functional mobility, transfers, gait , gait distance and tolerance to activity. Patient with dementia and  [place] increases risk for falls due to patient not able to fully understand therapist for following proper weight bearing, safety, transfers and bed mobility, decreased strength. Able to stand this session; therapist put patients foot on top of therapist foot for judgement of partial weight bearing, cues needed; intermittently able to maintain partial weight bearing. Left lateral lean intermittently while seated at edge of bed. PHYSICAL THERAPY  PLAN OF CARE       Physical therapy plan of care is established based on physician order,  patient diagnosis and clinical assessment    Current Treatment Recommendations:    -Bed Mobility: Lower extremity exercises , Upper extremity exercises  and Trunk control activities   -Sitting Balance: Incorporate reaching activities to activate trunk muscles  and Hands on support to maintain midline   -Standing Balance: Perform strengthening exercises in standing to promote motor control with or without upper extremity support  and Perform sit to stand activities maintaining PWB (partial weight bearing) weightbearing Right   -Transfers: Provide instruction on proper hand and foot position for adequate transfer of weight onto lower extremities and use of gait device if needed and Cues for hand placement, technique and safety. Provide stabilization to prevent fall   -Gait: Gait training, Standing activities to improve: base of support, weight shift, weight bearing , Pregait training to emphasize: Weight shift, Upright and Safety and Use of Assistive device for PWB (partial weight bearing) weight bearing Right       PT long term treatment goals are located in below grid    Patient and or family understand(s) diagnosis, prognosis, and plan of care. Frequency of treatments: Patient will be seen  3-5 times/week.          Prior Level of Function: Patient ambulated independently   Rehab Potential: good - for baseline    Past medical history:   Past Medical History:   Diagnosis Date    Arthritis     Atrial fibrillation ([address]     Calculus of gallbladder with acute cholecystitis without obstruction     Calculus of gallbladder with cholecystitis with biliary obstruction     Hyperlipidemia     Syncope 11/20/2019     Past Surgical History:   Procedure Laterality Date    ABDOMEN SURGERY      ANESTHESIA NERVE BLOCK Bilateral 2/6/2020    BILATERAL MEDIAL BRANCH L4-5 AND L5-S1 JOINTS performed by Robert White DO at [address], LAPAROSCOPIC N/A 11/21/2019    DIAGNOSITIC LAPAROSCOPY CONVERTED TO EXPLORATORY LAPAROTOMY WITH CLOSURE OF ENTEROTOMY X1, CHOLANGIOGRAM, EGD performed by Irais Francois MD at [address] [facility] ECHO COMPL [address] [facility]  10/21/2013         FEMUR FRACTURE SURGERY Right 1/20/2022    RIGHT DISTAL FEMUR OPEN REDUCTION INTERNAL FIXATION (RETROGRADE NAIL) performed by Makayla Jean DO at [address] Right 12/13/2018    RIGHT SACROILIAC JOINT STEROID INJECTION UNDER X-RAY GUIDANCE performed by Robert White DO at [address] Left 1/17/2019    LEFT SACROILIAC JOINT STEROID INJECTION UNDER X-RAY GUIDANCE performed by Robert White DO at [facility] Bilateral 9/19/2019    BILATERAL SACROILIAC JOINT INJECTION X-RAY performed by Robert White DO at [facility] Right 12/13/2018    sacroiliac joint     NERVE BLOCK Left 01/17/2019    sacroiliac joint injection     NERVE BLOCK Bilateral 02/06/2020    medial branch block    NERVE BLOCK Bilateral 10/29/2020    intra articular     NERVE BLOCK Bilateral 10/29/2020    BILATERAL L4-5 AND L5-S1 INTRA-ARTICULAR FACET STEROID INJECTION (CPT 51722,55287) performed by Silvano Peña DO Luís at [address] N/A 11/25/2020    KYPHOPLASTY , L1 L2,BONE BIOPSY, EPIDURAL INJECTION performed by Maddi Merrill MD at [address] N/A     d/t peptic ulcers       SUBJECTIVE:    Precautions: , falls, dementia and partial weight bearing right lower extremity , knee immobilizer, alarm  Social history: Patient lives with spouse in a ranch home  with 1+2 steps  to enter with [employer] in shower. Patient was at [place] [address] and rehab for a short time after last hospital visit before returning here. Plan is to try to be admitted to [address] owned: [place] and [address]   How much difficulty turning over in bed?: A Lot  How much difficulty sitting down on / standing up from a chair with arms?: A Lot  How much difficulty moving from lying on back to sitting on side of bed?: A Lot  How much help from another person moving to and from a bed to a chair?: Total  How much help from another person needed to walk in hospital room?: Total  How much help from another person for climbing 3-5 steps with a railing?: Total  AM-PAC Inpatient Mobility Raw Score : 9  AM-PAC Inpatient T-Scale Score : 30.55  Mobility Inpatient CMS 0-100% Score: 81.38  Mobility Inpatient CMS G-Code Modifier : CM    Nursing cleared patient for PT treatment. OBJECTIVE;   Initial Evaluation  Date: 2/2/2022 Treatment Date:  2/3/2022     Short Term/ Long Term   Goals   Was pt agreeable to Eval/treatment? Yes Yes  To be met in 4 days   Pain level   5/10  Right lower extremity Not stated.      Bed Mobility    Rolling: Maximal assist of 1    Supine to sit: Maximal assist of 1    Sit to supine: Maximal assist of 1    Scooting: Maximal assist of 1   Rolling: Maximal assist of 1   Supine to sit: Maximal assist of 1   Sit to supine: Maximal assist of 1   Scooting: Maximal assist of 1 ' Rolling: Minimal assist of 1    Supine to sit: Minimal assist of 1    Sit to supine: Minimal assist of 1    Scooting: Minimal assist of 1     Transfers Sit to stand: Moderate assist of  2  Sit to stand: Moderate assist of  2 forward flexed     Sit to stand: Moderate assist of 1    Ambulation    2 side steps using  wheeled walker with Moderate assist of  2   cues for PWB (partial weight bearing) weight bearing right lower extremity, upright posture, safety and pacing not assessed unable to follow most directions from therapist this session. 10 feet using  wheeled walker with Moderate assist of 1    Stair negotiation: ascended and descended   Not assessed  Not assessed     Not assessed    ROM Within functional limits    Increase range of motion 10% of affected joints    Strength BUE:  3+/5  RLE:  3+/5 ankle, did not test knee and hip  LLE:  3+/5  Increase strength in affected mm groups by 1/3 grade   Balance Sitting EOB:  fair -  Dynamic Standing:  poor + Sitting EOB: fair-   Dynamic Standing: poor forward flexed. Sitting EOB:  fair   Dynamic Standing: fair      Patient is Alert & Oriented x person and follows one step directions    Sensation:  Patient  denies numbness/tingling   Edema:  no   Endurance: fair  -    Vitals: room air   Blood Pressure at rest  Blood Pressure during session    Heart Rate at rest  Heart Rate during session    SPO2 at rest  SPO2 during session     Patient education  Patient educated on role of Physical Therapy, risks of immobility, safety and plan of care, energy conservation,  importance of mobility while in hospital , safety  and weight bearing status      Patient response to education:   Pt verbalized understanding Pt demonstrated skill Pt requires further education in this area   Yes Partial Yes      Treatment:  Patient practiced and was instructed/facilitated in the following treatment: Patient  Sat edge of bed 15 minutes with Supervision  to increase dynamic sitting balance and activity tolerance.   Performed seated exercise on LLE. Stood multiple times, patient with bowel movement; therapist assisted with hygiene and chux change. Assisted back to supine. positioned for comfort, assisted with some feeding for breakfast tray. Therapeutic Exercises:  ankle pumps, heel raises, long arc quad and seated marching  x 10 reps. AAROM and tactile cues needed for proper tech. At end of session, patient in bed with alarm call light and phone within reach,  all lines and tubes intact, nursing notified. Patient would benefit from continued skilled Physical Therapy to improve functional independence and quality of life. Patient's/ family goals   rehab    Time in  18  Time out  902    Total Treatment Time  26 minutes  CPT codes:    Therapeutic activities (63439)   23 minutes  2 unit(s)  Non billable time 3 minutes assisted with feeding.   Hoda Urbano [facility] JOX#[id number]

## 2022-02-09 NOTE — PROGRESS NOTES
Chief Complaint   Patient presents with    Knee Pain     Right knee ORIF follow up. Knee is showing signs of improvement. Ms. Ernie Edwards returns today for follow-up of a right distal femur fracture which was treated with ORIF. Date of surgery was 1/19/2022. Physical Exam:  Right Leg - nontender to palpation at the area of the fracture site. ROM   stiff but intact          The incision is healing well without evidence of infection. Pulses are intact and symmetric bilaterally         Strength 4/5         Sensation SI LT    Xrays:      XR FEMUR RIGHT (MIN 2 VIEWS)    Result Date: 2/2/2022  EXAMINATION: 4 XRAY VIEWS OF THE RIGHT FEMUR 2/2/2022 4:23 pm COMPARISON: Right femur series from January 19, 2022 HISTORY: ORDERING SYSTEM PROVIDED HISTORY: post op follow up TECHNOLOGIST PROVIDED HISTORY: Reason for exam:->post op follow up FINDINGS: Postprocedural changes following placement of right femoral nail. No complications identified. Improved alignment of the distal right femur fracture. Osseous mineralization is decreased. Mild degenerative changes of the right hip and right knee. Postprocedural changes to the right femur. Improved alignment of the distal right femur fracture. No complicating features. XR FEMUR RIGHT (MIN 2 VIEWS)    Result Date: 1/19/2022  EXAMINATION: AP and lateral XRAY VIEWS OF THE RIGHT FEMUR 1/19/2022 9:25 pm COMPARISON: None. HISTORY: ORDERING SYSTEM PROVIDED HISTORY: trauma TECHNOLOGIST PROVIDED HISTORY: Reason for exam:->trauma FINDINGS: Bones are osteopenic. Lateral view demonstrates an oblique fracture of the distal femoral shaft extending from posterior to anterior suprapatellar region. This is not significantly displaced. I do not appreciate significant joint effusion at the knee. There is calcified plaque involving the superficial femoral artery. Oblique nondisplaced fracture of distal femoral shaft, as above.      XR KNEE RIGHT (1-2 VIEWS)    Result Date: 1/19/2022  EXAMINATION: TWO XRAY VIEWS OF THE RIGHT KNEE 1/19/2022 3:32 pm COMPARISON: 08/01/2018 HISTORY: ORDERING SYSTEM PROVIDED HISTORY: fall TECHNOLOGIST PROVIDED HISTORY: Reason for exam:->fall FINDINGS: Two views of the right knee were obtained. There is no fracture dislocation of the right knee. There are degenerative changes of the right knee. There is no joint effusion. 1. There is no fracture or dislocation of the right knee 2. Degenerative changes the right knee. CT KNEE RIGHT WO CONTRAST    Result Date: 1/19/2022  EXAMINATION: CT OF THE RIGHT KNEE WITHOUT CONTRAST 1/19/2022 5:44 pm TECHNIQUE: CT of the right knee was performed without the administration of intravenous contrast.  Multiplanar reformatted images are provided for review. Dose modulation, iterative reconstruction, and/or weight based adjustment of the mA/kV was utilized to reduce the radiation dose to as low as reasonably achievable. COMPARISON: Right knee radiographs, 01/19/2022. HISTORY ORDERING SYSTEM PROVIDED HISTORY: pain TECHNOLOGIST PROVIDED HISTORY: Reason for exam:->pain Decision Support Exception - unselect if not a suspected or confirmed emergency medical condition->Emergency Medical Condition (MA) FINDINGS: Are nondisplaced fractures in the distal femoral shaft, which extend to the level the lateral patellofemoral joint the no extension into the femoral condyles or the tibiofemoral joints is noted. The patella, proximal tibia and proximal fibula are intact. The bones are markedly demineralized. There are moderate loss of joint spaces in the medial tibiofemoral and patellofemoral joints with severe loss of joint space in the lateral tibiofemoral joint. Small volume joint space fluid is seen. No fat is seen within the joint fluid. 1. NONDISPLACED FRACTURES of the distal femoral shaft, extending to the level of the lateral patellofemoral joint.   The no extension into the femoral condyles or the tibiofemoral joint. 2. Demineralized bones. RECOMMENDATIONS: Stew Geiger For Surgical Procedures    Result Date: 1/20/2022  EXAMINATION: SPOT FLUOROSCOPIC IMAGES 1/20/2022 10:31 pm TECHNIQUE: Fluoroscopy was provided by the radiology department for procedure. Radiologist was not present during examination. FLUOROSCOPY DOSE AND TYPE OR TIME AND EXPOSURES: 3.3 mGy COMPARISON: None HISTORY: ORDERING SYSTEM PROVIDED HISTORY: Fracture TECHNOLOGIST PROVIDED HISTORY: Reason for exam:->fracture right femur Intraprocedural imaging. FINDINGS: 9 spot images of the right femur were obtained. Intraprocedural fluoroscopic spot images as above. See separate procedure report for more information. Radiographic findings reviewed with patient    Impression:   [place] was seen today for knee pain. Diagnoses and all orders for this visit:    Closed nondisplaced apophyseal fracture of right femur, initial encounter [facility])            Plan:   Patient seen and examined. Imaging  reviewed with patient in detail. Natural history and course discussed with patient in long discussion  Treatment options discussed with patient in detail including risks and benefits. Patient should do well with continued conservative management. Reviewed x-ray reviewed with adjustable knee brace unlocked. Follow-up and 3 to 4 weeks with new x-ray  Physical therapy/Occupational Therapy. Weight-bear as tolerated with brace and assistance. In a 15 minute assessment and discussion, patient was re fitted for a removable knee brace readjusted and reapplied. She was educated in detail how to use brace and the importance of use as well as range of motion and HEP exercises.

## 2022-02-18 NOTE — CARE COORDINATION
Lisa [facility] Transitions Initial Follow Up Call    Call within 2 business days of discharge: Yes    Patient: Jess Irene Patient : 1932   MRN: <[id number]>  Reason for Admission: PETE  Discharge Date: 2/3/22 RARS: Readmission Risk Score: 17.7 ( )      Last Discharge [facility]       Complaint Diagnosis Description Type Department Provider    22 Fatigue PETE (acute kidney injury) ([facility] [address] . .. ED to Hosp-Admission (Discharged) (ADMITTED) SJWZ 4 TELE [facility], DO; Jaswinder Imam... Per Patientping patient discharged from [facility] . Attempted to contact patient for transitional call. Spouse Isabel Artis informed caller patient is on the service of [facility]. No further outreach.        Non-face-to-face services provided:      Care Transitions 24 Hour Call    Care Transitions Interventions         Follow Up  Future Appointments   Date Time Provider Marcello Brock   3/9/2022  2:30 PM Roque Yu DO [address] [facility]   3/16/2022  2:45 PM Danielle Isidro MD [facility]   10/24/2022  2:30 PM Danielle Isidro MD [facility]       Anai Anderson RN

## 2022-02-19 NOTE — CONSULTS
Department of Orthopedic Surgery  Resident Consult Note              Reason for Consult: Right knee pain     HISTORY OF PRESENT ILLNESS:       Orthopedic surgery consulted on a 61-year-old female presenting to [address] [place] [address] emergency department after sustaining a fall causing right hip pain. Patient is extremely hard of hearing. Patient is alert, however disoriented to person place and location. History is extremely unreliable. Patient does elicit pain response when performing physical examination on the right knee. When prompted to locate source of pain patient appropriately pointing to her right knee. Patient denies any numbness and tingling about the affected extremity. Patient denies fever and chills nausea and vomiting, chest pain or shortness of breath.         Tobacco use: History unreliable  Alcohol use: history unreliable  Illicit drug use: History unreliable  Past Medical History:    Past Medical History             Diagnosis Date    Arthritis      Atrial fibrillation ([address]      Calculus of gallbladder with acute cholecystitis without obstruction      Calculus of gallbladder with cholecystitis with biliary obstruction      Hyperlipidemia      Syncope 11/20/2019         Past Surgical History:    Past Surgical History             Procedure Laterality Date    ABDOMEN SURGERY        ANESTHESIA NERVE BLOCK Bilateral 2/6/2020     BILATERAL MEDIAL BRANCH L4-5 AND L5-S1 JOINTS performed by Caren Campuzano DO at [address], LAPAROSCOPIC N/A 11/21/2019     DIAGNOSITIC LAPAROSCOPY CONVERTED TO EXPLORATORY LAPAROTOMY WITH CLOSURE OF ENTEROTOMY X1, CHOLANGIOGRAM, EGD performed by Mohini Neri MD at [address] [facility] ECHO COMPL [address] [facility]    10/21/2013          [place] INJECTION PROCEDURE FOR SACROILIAC JOINT Right 12/13/2018     RIGHT SACROILIAC JOINT STEROID INJECTION UNDER X-RAY GUIDANCE performed by Caren Campuzano DO at [facility]     INJECTION PROCEDURE FOR SACROILIAC JOINT Left 1/17/2019     LEFT SACROILIAC JOINT STEROID INJECTION UNDER X-RAY GUIDANCE performed by Jodie Aleman DO at [address] [facility] [address] JOINT Bilateral 9/19/2019     BILATERAL SACROILIAC JOINT INJECTION X-RAY performed by Jodie Aleman DO at [facility] Right 12/13/2018     sacroiliac joint     NERVE BLOCK Left 01/17/2019     sacroiliac joint injection     NERVE BLOCK Bilateral 02/06/2020     medial branch block    NERVE BLOCK Bilateral 10/29/2020     intra articular     NERVE BLOCK Bilateral 10/29/2020     BILATERAL L4-5 AND L5-S1 INTRA-ARTICULAR FACET STEROID INJECTION (CPT 88405,26331) performed by Jodie Aleman DO at [address] N/A 11/25/2020     KYPHOPLASTY , L1 L2,BONE BIOPSY, EPIDURAL INJECTION performed by Velia Morales MD at [address] [facility]       d/t peptic ulcers         Current Medications:   Current Hospital Medications   Current Facility-Administered Medications: morphine sulfate (PF) injection 5 mg, 5 mg, IntraVENous, Once     Allergies:  Iodine     Social History:   TOBACCO:   reports that she has never smoked. She has never used smokeless tobacco.  ETOH:   reports no history of alcohol use. DRUGS:   reports no history of drug use.   ACTIVITIES OF DAILY LIVING:    OCCUPATION:    Family History:   Family History             Problem Relation Age of Onset    Heart Disease Mother      Heart Disease Father              REVIEW OF SYSTEMS:  CONSTITUTIONAL:  negative for  fevers, chills  EYES:  negative for blurred vision, visual disturbance  HEENT:  negative for  hearing loss, voice change  RESPIRATORY:  negative for  dyspnea, wheezing  CARDIOVASCULAR:  negative for  chest pain, palpitations  GASTROINTESTINAL:  negative for nausea, vomiting  GENITOURINARY:  negative for frequency, urinary incontinence  HEMATOLOGIC/LYMPHATIC:  negative for bleeding and petechiae  MUSCULOSKELETAL:  positive for  pain and decreased range of motion  NEUROLOGICAL:  negative for headaches, dizziness  BEHAVIOR/PSYCH:  negative for increased agitation and anxiety     PHYSICAL EXAM:    VITALS:  BP (!) 144/75   Pulse 65   Temp 97.7 °F (36.5 °C) (Oral)   Resp 16   SpO2 98%   CONSTITUTIONAL:  awake, alert, cooperative, no apparent distress and appears stated age    HEENT: Head is normocephalic, atraumatic. PERRLA.      SKIN: Clean, dry, intact. There is not any cellulitis or cutaneous lesions noted in the lower extremities     PULMONARY: breathing is regular and unlabored, no acute distress     CV: The bilateral lower extremities are warm and well-perfused with brisk capillary refill. 2+ pulses LE bilateral.      PSYCHIATRY: Pleasant mood, appropriate behavior, follows commands     NEURO: Sensation is intact distally with light touch with no alteration. Motor exam of the lower extremities show quadriceps, hamstrings, foot dorsiflexion and plantarflexion grossly intact 5/5. MUSCULOSKELETAL:  Right lower Extremity:  · Patient affected extremity rested in knee immobilizer. · Shortened and externally rotated  · +Log roll  · + Heel Strike  · Tenderness to palpation about the anterior knee. Suspected moderate in nature based on pain response. · -TTP over hip and Ankle  · Skin intact with no signs of degloving. No evidence of ecchymosis, abrasions or skin tears  · Compartments soft and compressible, calf non-tender  · +DP & PT pulses, Brisk Cap refill, Toes warm and perfused  · Sensation grossly intact superficial/deep peroneal,saphenous,sural,tibial n. distributions  · +GS/TA/EHL. Secondary Exam:   Bilateral UE: No obvious signs of trauma. -TTP to fingers, hand, wrist, forearm, elbow, humerus, shoulder or clavicle. · leftLE: No obvious signs of trauma. -TTP to foot, ankle, leg, knee, thigh, hip. · Pelvis: -TTP, -Log roll, -Heel strike      DATA:    CBC:         Lab Results   Component Value Date     WBC 5.7 06/21/2021     RBC 3.92 06/21/2021     HGB 12.0 06/21/2021     HCT 40.0 06/21/2021     .0 06/21/2021     MCH 30.6 06/21/2021     MCHC 30.0 06/21/2021     RDW 14.6 06/21/2021      06/21/2021     MPV 10.2 06/21/2021      PT/INR:          Lab Results   Component Value Date     PROTIME 13.1 01/19/2022     INR 1.1 01/19/2022      Lactic Acid :         Lab Results   Component Value Date     LACTA 1.1 11/22/2020         Radiology Review:  01/19/22 - XR right knee demonstrating admitting believes displaced distal femur fracture, appears to be originate from the anterior cortex propagating proximally to the middle shaft. Fracture displaced spiral in nature. Does not extend into the joint. No other fractures, dislocation or abnormalities noted on plain film        IMPRESSION:   · Closed, Right distal femoral shaft fracture     PLAN:  · Patient seen examined. X-rays reviewed. Patient to be admitted to medicine. · RLE Non-weight bearing. Patient's right lower extremity was placed in a well-padded molded posterior fiberglass splint with manipulation applied to the fracture site to maintain alignment. Patient tolerated procedure well.   · Patient and family educated about pathology and risks and benefits of treatment options discussed  · Labs and Imaging  · Pain control   · Mechanical DVT prophylaxis  · Will follow while in hospital

## 2022-02-19 NOTE — PROGRESS NOTES
Department of Orthopedic Surgery  Progress Note     Patient seen and examined. Pain controlled. No new complaints. Denies chest pain, shortness of breath, calf pain, dizziness/lightheadedness. -BM, - flatulence. Patient work with physical therapy yesterday. 11/24 AM PAC. VITALS:  BP (!) 143/76   Pulse 80   Temp 98.5 °F (36.9 °C) (Oral)   Resp 18   Wt 119 lb 4.8 oz (54.1 kg)   SpO2 97%   BMI 19.26 kg/m²      GENERAL: awake and alert, resting comfortably in the chair  MUSCULOSKELETAL:   right lower extremity:  · Dressing C/D/I  · Knee immobilizer in place  · Compartments soft and compressible, calf non-tender  · Palpable dorsalis pedis and posterior tibialis pulse, brisk cap refill to toes, foot warm and perfused  · Sensation intact to light touch in sural/deep peroneal/superficial peroneal/saphenous/posterior tibial nerve distributions to foot/ankle. · Demonstrates active ankle plantar/dorsiflexion/great toe extension     CBC:         Lab Results   Component Value Date     WBC 8.7 01/23/2022     HGB 10.0 01/23/2022     HCT 31.2 01/23/2022      01/23/2022         ASSESSMENT  · S/p R IMN for DF fx 1/20     PLAN    · PWB RLE  · Pain control IV & PO  · DVT prophylaxis- eliquis, early mobilization  · Monitor Labs  · Bowel regiment  · Pulmonary hygiene   · Trend H&H- 10.9 yesterday, vitals stable   · 24 hr post op ABX-completed   · PT/OT  · D/C planning, SW/PT recs, pt.  W/ dementia, per last  note plan for SNF to be discussed Monday   · [id number] for DC from an orthopedic standpoint, we will continue to follow from the periphery, please reach out with any questions or concerns

## 2022-02-19 NOTE — PROGRESS NOTES
Department of Orthopedic Surgery  Progress Note     Patient seen and examined. Pain controlled. No new complaints. Denies chest pain, shortness of breath, calf pain, dizziness/lightheadedness. -BM, - flatulence     VITALS:  /62   Pulse 100   Temp 98.3 °F (36.8 °C)   Resp 16   Wt 119 lb 4.8 oz (54.1 kg)   SpO2 94%   BMI 19.26 kg/m²      GENERAL: awake and alert  MUSCULOSKELETAL:   right lower extremity:  · Dressing C/D/I  · Knee immobilizer in place  · Compartments soft and compressible, calf non-tender  · Palpable dorsalis pedis and posterior tibialis pulse, brisk cap refill to toes, foot warm and perfused  · Sensation intact to light touch in sural/deep peroneal/superficial peroneal/saphenous/posterior tibial nerve distributions to foot/ankle. · Demonstrates active ankle plantar/dorsiflexion/great toe extension     CBC:         Lab Results   Component Value Date     WBC 11.8 01/22/2022     HGB 10.9 01/22/2022     HCT 35.0 01/22/2022      01/22/2022         ASSESSMENT  · S/p R IMN for DF fx 1/20     PLAN    · PWB RLE  · Pain control IV & PO  · DVT prophylaxis- eliquis, early mobilization  · Monitor Labs  · Bowel regiment  · Pulmonary hygiene   · Trend H&H- 10.9, vitals stable   · 24 hr post op ABX-completed   · PT/OT  · D/C planning, SW/PT recs, pt.  W/ dementia, per last  note plan for SNF to be discussed Monday

## 2022-02-19 NOTE — PROGRESS NOTES
Department of Orthopedic Surgery  Progress Note     Patient seen and examined. Pain controlled. No new complaints. Denies chest pain, shortness of breath, calf pain, dizziness/lightheadedness. VITALS:  BP (!) 176/85   Pulse 70   Temp 97.6 °F (36.4 °C) (Oral)   Resp 14   Wt 119 lb 4.8 oz (54.1 kg)   SpO2 99%   BMI 19.26 kg/m²      GENERAL: awake and alert  MUSCULOSKELETAL:   right lower extremity:  · Dressing C/D/I  · Knee splint in place  · Compartments soft and compressible, calf non-tender  · Palpable dorsalis pedis and posterior tibialis pulse, brisk cap refill to toes, foot warm and perfused  · Sensation intact to light touch in sural/deep peroneal/superficial peroneal/saphenous/posterior tibial nerve distributions to foot/ankle. · Demonstrates active ankle plantar/dorsiflexion/great toe extension     CBC:         Lab Results   Component Value Date     WBC 10.4 01/20/2022     HGB 12.1 01/20/2022     HCT 37.6 01/20/2022      01/20/2022         IMPRESSION:   ·      Closed, Right distal femoral shaft fracture     PLAN:  ·      Patient seen examined. X-rays reviewed. Patient to be admitted to medicine. ·      RLE Non-weight bearing. ·      Patient and family educated about pathology and risks and benefits of treatment options discussed  ·      Labs and Imaging  ·      Pain control   ·      Mechanical DVT prophylaxis  ·      Risks, benefits, and alternatives were discussed with the patient and their family. Risks include but are not limited to infection, blood loss, damage to neurovascular and musculoskeletal structures, malunion, nonunion, symptomatic hardware, need for further surgery, possible arthritis and pain despite surgical stabilization, stiffness, and catastrophic anesthesia complications. Patient and pertinent parties verbalized understanding and wish to proceed if medical cleared/optimized.

## 2022-02-19 NOTE — PROGRESS NOTES
Department of Orthopedic Surgery  Progress Note     Patient seen and examined. Pain controlled. No new complaints. Denies chest pain, shortness of breath, calf pain, dizziness/lightheadedness. VITALS:  BP (!) 176/85   Pulse 70   Temp 97.6 °F (36.4 °C) (Oral)   Resp 14   Wt 119 lb 4.8 oz (54.1 kg)   SpO2 99%   BMI 19.26 kg/m²      GENERAL: awake and alert  MUSCULOSKELETAL:   right lower extremity:  · Dressing C/D/I  · Knee immobilizer in place  · Compartments soft and compressible, calf non-tender  · Palpable dorsalis pedis and posterior tibialis pulse, brisk cap refill to toes, foot warm and perfused  · Sensation intact to light touch in sural/deep peroneal/superficial peroneal/saphenous/posterior tibial nerve distributions to foot/ankle.   · Demonstrates active ankle plantar/dorsiflexion/great toe extension     CBC:         Lab Results   Component Value Date     WBC 10.4 01/20/2022     HGB 12.1 01/20/2022     HCT 37.6 01/20/2022      01/20/2022         ASSESSMENT  · S/p R IMN for DF fx 1/20     PLAN    · PWB RLE  · Pain control IV & PO  · DVT prophylaxis- eliquis, early mobilization  · Monitor Labs  · Bowel regiment  · Pulmonary hygiene   · Trend H&H- pending today  · 24 hr post op ABX   · PT/OT  · D/C planning, SW/PT recs

## (undated) DEVICE — [HIGH FLOW INSUFFLATOR,  DO NOT USE IF PACKAGE IS DAMAGED,  KEEP DRY,  KEEP AWAY FROM SUNLIGHT,  PROTECT FROM HEAT AND RADIOACTIVE SOURCES.]: Brand: PNEUMOSURE

## (undated) DEVICE — TOWEL OR BLUEE 16X26IN ST 8 PACK ORB08 16X26ORTWL

## (undated) DEVICE — NDL CNTR 40CT FM MAG: Brand: MEDLINE INDUSTRIES, INC.

## (undated) DEVICE — NEEDLE HYPO 25GA L1.5IN BLU POLYPR HUB S STL REG BVL STR

## (undated) DEVICE — NON-DEHP CATHETER EXTENSION SET, MALE LUER LOCK ADAPTER

## (undated) DEVICE — SPONGE,LAP,12"X12",XR,ST,5/PK,40PK/CS: Brand: MEDLINE

## (undated) DEVICE — TROCAR: Brand: KII SLEEVE

## (undated) DEVICE — GLOVE SURG SZ 65 THK91MIL LTX FREE SYN POLYISOPRENE

## (undated) DEVICE — GARMENT,MEDLINE,DVT,INT,CALF,MED, GEN2: Brand: MEDLINE

## (undated) DEVICE — BANDAGE ADH W1XL3IN NAT FAB WVN FLX DURABLE N ADH PD SEAL

## (undated) DEVICE — STANDARD HYPODERMIC NEEDLE,POLYPROPYLENE HUB: Brand: MONOJECT

## (undated) DEVICE — SUTURE ABSRB L6IN L37MM 0 GS-21 GRN 1/2 CIR TAPR PNT NDL VLOCL0306

## (undated) DEVICE — SYRINGE A08E KIS INFLATION HP: Brand: KYPHON®  INFLATION SYRINGE

## (undated) DEVICE — GOWN,SIRUS,FABRNF,L,20/CS: Brand: MEDLINE

## (undated) DEVICE — 6617 IOBAN II PATIENT ISOLATION DRAPE 5/BX,4BX/CS: Brand: STERI-DRAPE™ IOBAN™ 2

## (undated) DEVICE — INTENDED FOR TISSUE SEPARATION, AND OTHER PROCEDURES THAT REQUIRE A SHARP SURGICAL BLADE TO PUNCTURE OR CUT.: Brand: BARD-PARKER ® STAINLESS STEEL BLADES

## (undated) DEVICE — SWABSTICK SURG PREP BENZOIN TINCTURE SINGLE ST

## (undated) DEVICE — CHLORAPREP 26ML ORANGE

## (undated) DEVICE — BANDAGE,SELF ADHRNT,COFLEX,4"X5YD,STRL: Brand: COLABEL

## (undated) DEVICE — 6 ML SYRINGE LUER-LOCK TIP: Brand: MONOJECT

## (undated) DEVICE — SYRINGE MED 10ML LUERLOCK TIP W/O SFTY DISP

## (undated) DEVICE — SET ENDO INSTR LAPAROSCOPIC INCISIONAL

## (undated) DEVICE — LABEL MED 4 IN SURG PANEL W/ PEN STRL

## (undated) DEVICE — ENCORE® LATEX TEXTURED SIZE 6.5, STERILE LATEX POWDER-FREE SURGICAL GLOVE: Brand: ENCORE

## (undated) DEVICE — CAMERA STRYKER 1488 HD GEN

## (undated) DEVICE — NEEDLE HYPO 18GA L1.5IN PNK POLYPR HUB S STL THN WALL FILL

## (undated) DEVICE — GAUZE,SPONGE,4"X4",12PLY,STERILE,LF,2'S: Brand: MEDLINE

## (undated) DEVICE — SET ENDO INSTR RED YEL LAPAROSCOPIC

## (undated) DEVICE — DOUBLE BASIN SET: Brand: MEDLINE INDUSTRIES, INC.

## (undated) DEVICE — Z DISCONTINUED USE 2275686 GLOVE SURG SZ 8 L12IN FNGR THK13MIL WHT ISOLEX POLYISOPRENE

## (undated) DEVICE — MARKER,SKIN,WI/RULER AND LABELS: Brand: MEDLINE

## (undated) DEVICE — Z DISCONTINUED APPLICATOR SURG PREP 0.35OZ 2% CHG 70% ISO ALC W/ HI LT

## (undated) DEVICE — BANDAGE WND ADH LF FLX CURAD 3/4X3IN

## (undated) DEVICE — DRAIN SURG 15FR L3/16IN DIA4.7MM SIL CHN RND HUBLESS FULL

## (undated) DEVICE — BIT DRL L145MM DIA4.2MM ST 3 FLUT NDL PNT QUIK CPL FOR FEM

## (undated) DEVICE — GOWN,SIRUS,POLYRNF,BRTHSLV,XLN/XL,20/CS: Brand: MEDLINE

## (undated) DEVICE — STAPLER INT L75MM CUT LN L73MM STPL LN L77MM BLU B FRM 8

## (undated) DEVICE — SOLUTION IV IRRIG POUR BRL 0.9% SODIUM CHL 2F7124

## (undated) DEVICE — TUBING, SUCTION, 1/4" X 10', STRAIGHT: Brand: MEDLINE

## (undated) DEVICE — 3M™ RED DOT™ MONITORING ELECTRODE WITH FOAM TAPE AND STICKY GEL 2560, 50/BAG, 20/CASE, 72/PLT: Brand: RED DOT™

## (undated) DEVICE — SYRINGE IRRIG 60ML SFT PLIABLE BLB EZ TO GRP 1 HND USE W/

## (undated) DEVICE — NEEDLE SPNL 22GA L3.5IN BLK HUB S STL REG WALL FIT STYL W/

## (undated) DEVICE — CONTROL SYRINGE LUER-LOCK TIP: Brand: MONOJECT

## (undated) DEVICE — 3 ML SYRINGE LUER-LOCK TIP: Brand: MONOJECT

## (undated) DEVICE — Device

## (undated) DEVICE — ELECTRODE PT RET AD L9FT HI MOIST COND ADH HYDRGEL CORDED

## (undated) DEVICE — 3M™ STERI-DRAPE™ INCISE DRAPE 1050 (60CM X 45CM): Brand: STERI-DRAPE™

## (undated) DEVICE — NEEDLE SPNL 22GA L5IN BLK HUB S STL W/ QNCKE PNT W/OUT

## (undated) DEVICE — GAUZE,SPONGE,4"X4",16PLY,STRL,LF,10/TRAY: Brand: MEDLINE

## (undated) DEVICE — DRAPE 24X23IN RADIOLOGY CASS ADHES STRIP

## (undated) DEVICE — PACK,BASIC I: Brand: MEDLINE

## (undated) DEVICE — PENCIL ES L3M BTTN SWCH HOLSTER W/ BLDE ELECTRD EDGE

## (undated) DEVICE — GOWN,SIRUS,FABRNF,XL,20/CS: Brand: MEDLINE

## (undated) DEVICE — MEDIA CONTRAST RX ISOVUE-300 61% 30ML VIALS

## (undated) DEVICE — PATIENT RETURN ELECTRODE, SINGLE-USE, CONTACT QUALITY MONITORING, ADULT, WITH 9FT CORD, FOR PATIENTS WEIGING OVER 33LBS. (15KG): Brand: MEGADYNE

## (undated) DEVICE — TAMP K08A XPAN INFLAT BONE  SIZE 20/3-RB: Brand: KYPHON XPANDER™ INFLATABLE BONE TAMP

## (undated) DEVICE — INTRODUCER T15K ONE STEP OID BEVEL: Brand: KYPHON® ONE-STEP™ OSTEO INTRODUCER™ SYSTEM

## (undated) DEVICE — ROD RMR L950MM DIA2.5MM W/ EXTN BALL TIP

## (undated) DEVICE — PAD,ABDOMINAL,5"X9",ST,LF,25/BX: Brand: MEDLINE INDUSTRIES, INC.

## (undated) DEVICE — DRESSING,GAUZE,XEROFORM,CURAD,5"X9",ST: Brand: CURAD

## (undated) DEVICE — DRAPE ROBOTIC CAM ARM DISP ENDOWRIST DA VINCI SI

## (undated) DEVICE — PLUMEPORT LAPAROSCOPIC SMOKE FILTRATION DEVICE: Brand: PLUMEPORT ACTIV

## (undated) DEVICE — SET MAJOR INSTR ORTHO

## (undated) DEVICE — DRAPE 64X41IN RADIOLOGY C ARM EQUIP STER

## (undated) DEVICE — DRAPE C ARM UNIV W41XL74IN CLR PLAS XR VELC CLSR POLY STRP

## (undated) DEVICE — CATHETER CHOLGM LAPSCP 5 MMX32 CM

## (undated) DEVICE — PITCHER PT 1200ML W HNDL CSR WRP

## (undated) DEVICE — SKIN AFFIX SURG ADHESIVE 72/CS 0.55ML: Brand: MEDLINE

## (undated) DEVICE — 1000 S-DRAPE TOWEL DRAPE 10/BX: Brand: STERI-DRAPE™

## (undated) DEVICE — TOWEL,OR,DSP,ST,BLUE,STD,6/PK,12PK/CS: Brand: MEDLINE

## (undated) DEVICE — COUNTER NDL 30 COUNT DBL MAG

## (undated) DEVICE — CLOTH SURG PREP PREOPERATIVE CHLORHEXIDINE GLUC 2% READYPREP

## (undated) DEVICE — AGENT HEMSTAT W2XL4IN OXIDIZED REGENERATED CELOS ABSRB

## (undated) DEVICE — COVER,LIGHT HANDLE,FLX,1/PK: Brand: MEDLINE INDUSTRIES, INC.

## (undated) DEVICE — BONE BIOPSY DEVICE F05A BBD SIZE 3: Brand: MEDTRONIC REUSABLE INSTRUMENTS

## (undated) DEVICE — BIT DRL L330MM DIA4.2MM CALIB 100MM 3 FLUT QUIK CPL

## (undated) DEVICE — PACK SURG LAP CHOLE CUSTOM

## (undated) DEVICE — DRAPE,REIN 53X77,STERILE: Brand: MEDLINE

## (undated) DEVICE — APPLICATOR MEDICATED 26 CC SOLUTION HI LT ORNG CHLORAPREP

## (undated) DEVICE — STRIP,CLOSURE,WOUND,MEDI-STRIP,1/4X3: Brand: MEDLINE

## (undated) DEVICE — NEEDLE HYPO 25GA L0.625IN BLU POLYPR HUB S STL REG BVL STR

## (undated) DEVICE — APPLICATOR PREP 26ML 0.7% IOD POVACRYLEX 74% ISO ALC ST

## (undated) DEVICE — BONE FILLER DEVICE F04B SIZE 3

## (undated) DEVICE — GLOVE ORANGE PI 8   MSG9080

## (undated) DEVICE — RELOAD STPL L75MM OPN H3.8MM CLS 1.5MM WIRE DIA0.2MM REG

## (undated) DEVICE — SCISSORS ENDOSCP DIA5MM CRV MPLR CAUT W/ RATCH HNDL

## (undated) DEVICE — TROCAR: Brand: KII FIOS FIRST ENTRY

## (undated) DEVICE — PACK,LAPAROTOMY,NO GOWNS: Brand: MEDLINE

## (undated) DEVICE — YANKAUER,BULB TIP,W/O VENT,RIGID,STERILE: Brand: MEDLINE

## (undated) DEVICE — DRAPE THER FLUID WARMING 66X44 IN FLAT SLUSH DBL DISC ORS

## (undated) DEVICE — TRAP,MUCUS SPECIMEN,40CC: Brand: MEDLINE

## (undated) DEVICE — GAUZE,SPONGE,4"X4",16PLY,XRAY,STRL,LF: Brand: MEDLINE

## (undated) DEVICE — TOWEL,OR,DSP,ST,BLUE,DLX,10/PK,8PK/CS: Brand: MEDLINE

## (undated) DEVICE — GUIDEWIRE ORTH L290MM DIA3.2MM FOR RG AG EXPERT FEM NAILING

## (undated) DEVICE — INSUFFLATION NEEDLE TO ESTABLISH PNEUMOPERITONEUM.: Brand: INSUFFLATION NEEDLE

## (undated) DEVICE — GLOVE ORANGE PI 7 1/2   MSG9075

## (undated) DEVICE — APPLIER CLP M/L SHFT DIA5MM 15 LIG LIGAMAX 5

## (undated) DEVICE — PMI PTFE COATED LAPAROSCOPIC WIRE L-HOOK 33 CM: Brand: PMI